# Patient Record
Sex: FEMALE | Race: WHITE | NOT HISPANIC OR LATINO | Employment: OTHER | URBAN - METROPOLITAN AREA
[De-identification: names, ages, dates, MRNs, and addresses within clinical notes are randomized per-mention and may not be internally consistent; named-entity substitution may affect disease eponyms.]

---

## 2017-02-08 ENCOUNTER — ALLSCRIPTS OFFICE VISIT (OUTPATIENT)
Dept: OTHER | Facility: OTHER | Age: 42
End: 2017-02-08

## 2017-03-22 ENCOUNTER — ALLSCRIPTS OFFICE VISIT (OUTPATIENT)
Dept: OTHER | Facility: OTHER | Age: 42
End: 2017-03-22

## 2017-03-27 ENCOUNTER — ALLSCRIPTS OFFICE VISIT (OUTPATIENT)
Dept: OTHER | Facility: OTHER | Age: 42
End: 2017-03-27

## 2017-03-27 ENCOUNTER — APPOINTMENT (OUTPATIENT)
Dept: LAB | Facility: HOSPITAL | Age: 42
End: 2017-03-27
Payer: COMMERCIAL

## 2017-03-27 DIAGNOSIS — E66.9 OBESITY: ICD-10-CM

## 2017-03-27 DIAGNOSIS — R30.0 DYSURIA: ICD-10-CM

## 2017-03-27 DIAGNOSIS — Z12.31 ENCOUNTER FOR SCREENING MAMMOGRAM FOR MALIGNANT NEOPLASM OF BREAST: ICD-10-CM

## 2017-03-27 DIAGNOSIS — F41.9 ANXIETY DISORDER: ICD-10-CM

## 2017-03-27 DIAGNOSIS — M53.3 SACROCOCCYGEAL DISORDERS, NOT ELSEWHERE CLASSIFIED: ICD-10-CM

## 2017-03-27 LAB
BACTERIA UR QL AUTO: ABNORMAL /HPF
BILIRUB UR QL STRIP: NEGATIVE
BILIRUB UR QL STRIP: NEGATIVE
CLARITY UR: ABNORMAL
CLARITY UR: NORMAL
COLOR UR: YELLOW
COLOR UR: YELLOW
GLUCOSE (HISTORICAL): NEGATIVE
GLUCOSE UR STRIP-MCNC: NEGATIVE MG/DL
HGB UR QL STRIP.AUTO: NEGATIVE
HGB UR QL STRIP.AUTO: NEGATIVE
HYALINE CASTS #/AREA URNS LPF: ABNORMAL /LPF
KETONES UR STRIP-MCNC: NEGATIVE MG/DL
KETONES UR STRIP-MCNC: NEGATIVE MG/DL
LEUKOCYTE ESTERASE UR QL STRIP: ABNORMAL
LEUKOCYTE ESTERASE UR QL STRIP: NORMAL
NITRITE UR QL STRIP: NEGATIVE
NITRITE UR QL STRIP: NEGATIVE
NON-SQ EPI CELLS URNS QL MICRO: ABNORMAL /HPF
PH UR STRIP.AUTO: 5.5 [PH]
PH UR STRIP.AUTO: 6 [PH] (ref 4.5–8)
PROT UR STRIP-MCNC: NEGATIVE MG/DL
PROT UR STRIP-MCNC: NEGATIVE MG/DL
RBC #/AREA URNS AUTO: ABNORMAL /HPF
SP GR UR STRIP.AUTO: 1.03
SP GR UR STRIP.AUTO: 1.03 (ref 1–1.03)
UROBILINOGEN UR QL STRIP.AUTO: 0.2
UROBILINOGEN UR QL STRIP.AUTO: 0.2 E.U./DL
WBC #/AREA URNS AUTO: ABNORMAL /HPF

## 2017-03-27 PROCEDURE — 87086 URINE CULTURE/COLONY COUNT: CPT

## 2017-03-27 PROCEDURE — 81001 URINALYSIS AUTO W/SCOPE: CPT

## 2017-03-28 LAB — BACTERIA UR CULT: NORMAL

## 2017-05-08 ENCOUNTER — ALLSCRIPTS OFFICE VISIT (OUTPATIENT)
Dept: OTHER | Facility: OTHER | Age: 42
End: 2017-05-08

## 2017-05-30 ENCOUNTER — ALLSCRIPTS OFFICE VISIT (OUTPATIENT)
Dept: OTHER | Facility: OTHER | Age: 42
End: 2017-05-30

## 2017-06-21 ENCOUNTER — ALLSCRIPTS OFFICE VISIT (OUTPATIENT)
Dept: OTHER | Facility: OTHER | Age: 42
End: 2017-06-21

## 2017-08-02 ENCOUNTER — APPOINTMENT (OUTPATIENT)
Dept: LAB | Age: 42
End: 2017-08-02
Payer: COMMERCIAL

## 2017-08-02 ENCOUNTER — TRANSCRIBE ORDERS (OUTPATIENT)
Dept: ADMINISTRATIVE | Age: 42
End: 2017-08-02

## 2017-08-02 DIAGNOSIS — Z00.8 HEALTH EXAMINATION IN POPULATION SURVEY: ICD-10-CM

## 2017-08-02 DIAGNOSIS — Z00.8 HEALTH EXAMINATION IN POPULATION SURVEY: Primary | ICD-10-CM

## 2017-08-02 LAB
CHOLEST SERPL-MCNC: 207 MG/DL (ref 50–200)
EST. AVERAGE GLUCOSE BLD GHB EST-MCNC: 131 MG/DL
HBA1C MFR BLD: 6.2 % (ref 4.2–6.3)
HDLC SERPL-MCNC: 67 MG/DL (ref 40–60)
LDLC SERPL CALC-MCNC: 118 MG/DL (ref 0–100)
TRIGL SERPL-MCNC: 110 MG/DL

## 2017-08-02 PROCEDURE — 36415 COLL VENOUS BLD VENIPUNCTURE: CPT

## 2017-08-02 PROCEDURE — 83036 HEMOGLOBIN GLYCOSYLATED A1C: CPT

## 2017-08-02 PROCEDURE — 80061 LIPID PANEL: CPT

## 2017-08-21 ENCOUNTER — ALLSCRIPTS OFFICE VISIT (OUTPATIENT)
Dept: OTHER | Facility: OTHER | Age: 42
End: 2017-08-21

## 2017-08-21 ENCOUNTER — TRANSCRIBE ORDERS (OUTPATIENT)
Dept: LAB | Age: 42
End: 2017-08-21

## 2017-08-21 ENCOUNTER — APPOINTMENT (OUTPATIENT)
Dept: LAB | Age: 42
End: 2017-08-21
Payer: COMMERCIAL

## 2017-08-21 DIAGNOSIS — R05.9 COUGH: ICD-10-CM

## 2017-08-21 DIAGNOSIS — M53.3 SACROCOCCYGEAL DISORDERS, NOT ELSEWHERE CLASSIFIED: ICD-10-CM

## 2017-08-21 DIAGNOSIS — F41.9 ANXIETY DISORDER: ICD-10-CM

## 2017-08-21 DIAGNOSIS — E66.9 OBESITY: ICD-10-CM

## 2017-08-21 LAB
ALBUMIN SERPL BCP-MCNC: 3.3 G/DL (ref 3.5–5)
ALP SERPL-CCNC: 74 U/L (ref 46–116)
ALT SERPL W P-5'-P-CCNC: 20 U/L (ref 12–78)
ANION GAP SERPL CALCULATED.3IONS-SCNC: 7 MMOL/L (ref 4–13)
AST SERPL W P-5'-P-CCNC: 14 U/L (ref 5–45)
BACTERIA UR QL AUTO: ABNORMAL /HPF
BASOPHILS # BLD AUTO: 0.05 THOUSANDS/ΜL (ref 0–0.1)
BASOPHILS NFR BLD AUTO: 1 % (ref 0–1)
BILIRUB SERPL-MCNC: 0.43 MG/DL (ref 0.2–1)
BILIRUB UR QL STRIP: NEGATIVE
BUN SERPL-MCNC: 22 MG/DL (ref 5–25)
CALCIUM SERPL-MCNC: 8.7 MG/DL (ref 8.3–10.1)
CHLORIDE SERPL-SCNC: 103 MMOL/L (ref 100–108)
CHOLEST SERPL-MCNC: 230 MG/DL (ref 50–200)
CLARITY UR: ABNORMAL
CO2 SERPL-SCNC: 25 MMOL/L (ref 21–32)
COLOR UR: YELLOW
CREAT SERPL-MCNC: 0.6 MG/DL (ref 0.6–1.3)
EOSINOPHIL # BLD AUTO: 0.25 THOUSAND/ΜL (ref 0–0.61)
EOSINOPHIL NFR BLD AUTO: 2 % (ref 0–6)
ERYTHROCYTE [DISTWIDTH] IN BLOOD BY AUTOMATED COUNT: 16.1 % (ref 11.6–15.1)
GFR SERPL CREATININE-BSD FRML MDRD: 112 ML/MIN/1.73SQ M
GLUCOSE P FAST SERPL-MCNC: 91 MG/DL (ref 65–99)
GLUCOSE UR STRIP-MCNC: NEGATIVE MG/DL
HCT VFR BLD AUTO: 37.6 % (ref 34.8–46.1)
HDLC SERPL-MCNC: 65 MG/DL (ref 40–60)
HGB BLD-MCNC: 12.3 G/DL (ref 11.5–15.4)
HGB UR QL STRIP.AUTO: ABNORMAL
HYALINE CASTS #/AREA URNS LPF: ABNORMAL /LPF
KETONES UR STRIP-MCNC: NEGATIVE MG/DL
LDLC SERPL CALC-MCNC: 145 MG/DL (ref 0–100)
LEUKOCYTE ESTERASE UR QL STRIP: ABNORMAL
LYMPHOCYTES # BLD AUTO: 3.91 THOUSANDS/ΜL (ref 0.6–4.47)
LYMPHOCYTES NFR BLD AUTO: 38 % (ref 14–44)
MCH RBC QN AUTO: 26.3 PG (ref 26.8–34.3)
MCHC RBC AUTO-ENTMCNC: 32.7 G/DL (ref 31.4–37.4)
MCV RBC AUTO: 81 FL (ref 82–98)
MONOCYTES # BLD AUTO: 0.52 THOUSAND/ΜL (ref 0.17–1.22)
MONOCYTES NFR BLD AUTO: 5 % (ref 4–12)
NEUTROPHILS # BLD AUTO: 5.62 THOUSANDS/ΜL (ref 1.85–7.62)
NEUTS SEG NFR BLD AUTO: 54 % (ref 43–75)
NITRITE UR QL STRIP: NEGATIVE
NON-SQ EPI CELLS URNS QL MICRO: ABNORMAL /HPF
NRBC BLD AUTO-RTO: 0 /100 WBCS
PH UR STRIP.AUTO: 6 [PH] (ref 4.5–8)
PLATELET # BLD AUTO: 381 THOUSANDS/UL (ref 149–390)
PMV BLD AUTO: 10 FL (ref 8.9–12.7)
POTASSIUM SERPL-SCNC: 4.3 MMOL/L (ref 3.5–5.3)
PROT SERPL-MCNC: 8.1 G/DL (ref 6.4–8.2)
PROT UR STRIP-MCNC: ABNORMAL MG/DL
RBC # BLD AUTO: 4.67 MILLION/UL (ref 3.81–5.12)
RBC #/AREA URNS AUTO: ABNORMAL /HPF
SODIUM SERPL-SCNC: 135 MMOL/L (ref 136–145)
SP GR UR STRIP.AUTO: 1.03 (ref 1–1.03)
TRIGL SERPL-MCNC: 98 MG/DL
TSH SERPL DL<=0.05 MIU/L-ACNC: 1.84 UIU/ML (ref 0.36–3.74)
UROBILINOGEN UR QL STRIP.AUTO: 0.2 E.U./DL
WBC # BLD AUTO: 10.37 THOUSAND/UL (ref 4.31–10.16)
WBC #/AREA URNS AUTO: ABNORMAL /HPF

## 2017-08-21 PROCEDURE — 36415 COLL VENOUS BLD VENIPUNCTURE: CPT

## 2017-08-21 PROCEDURE — 82785 ASSAY OF IGE: CPT

## 2017-08-21 PROCEDURE — 86003 ALLG SPEC IGE CRUDE XTRC EA: CPT

## 2017-08-21 PROCEDURE — 81001 URINALYSIS AUTO W/SCOPE: CPT

## 2017-08-21 PROCEDURE — 80053 COMPREHEN METABOLIC PANEL: CPT

## 2017-08-21 PROCEDURE — 85025 COMPLETE CBC W/AUTO DIFF WBC: CPT

## 2017-08-21 PROCEDURE — 84443 ASSAY THYROID STIM HORMONE: CPT

## 2017-08-21 PROCEDURE — 87086 URINE CULTURE/COLONY COUNT: CPT

## 2017-08-21 PROCEDURE — 80061 LIPID PANEL: CPT

## 2017-08-22 ENCOUNTER — GENERIC CONVERSION - ENCOUNTER (OUTPATIENT)
Dept: OTHER | Facility: OTHER | Age: 42
End: 2017-08-22

## 2017-08-22 LAB
A ALTERNATA IGE QN: <0.1 KUA/I
A FUMIGATUS IGE QN: <0.1 KUA/I
ALLERGEN COMMENT: ABNORMAL
BACTERIA UR CULT: NORMAL
BERMUDA GRASS IGE QN: 0.13 KUA/I
BOXELDER IGE QN: <0.1 KUA/I
C HERBARUM IGE QN: <0.1 KUA/I
CAT DANDER IGE QN: <0.1 KUA/I
CMN PIGWEED IGE QN: <0.1 KUA/I
COMMON RAGWEED IGE QN: <0.1 KUA/I
COTTONWOOD IGE QN: <0.1 KUA/I
D FARINAE IGE QN: 0.66 KUA/I
D PTERONYSS IGE QN: 0.72 KUA/I
DOG DANDER IGE QN: <0.1 KUA/I
LONDON PLANE IGE QN: <0.1 KUA/I
MOUSE URINE PROT IGE QN: <0.1 KUA/I
MT JUNIPER IGE QN: <0.1 KUA/I
MUGWORT IGE QN: <0.1 KUA/I
P NOTATUM IGE QN: 0.1 KUA/I
ROACH IGE QN: 0.15 KUA/I
SHEEP SORREL IGE QN: <0.1 KUA/I
SILVER BIRCH IGE QN: <0.1 KUA/I
TIMOTHY IGE QN: 0.1 KUA/I
TOTAL IGE SMQN RAST: 156 KU/L (ref 0–113)
WALNUT IGE QN: <0.1 KUA/I
WHITE ASH IGE QN: <0.1 KUA/I
WHITE ELM IGE QN: <0.1 KUA/I
WHITE MULBERRY IGE QN: <0.1 KUA/I
WHITE OAK IGE QN: <0.1 KUA/I

## 2017-09-11 ENCOUNTER — GENERIC CONVERSION - ENCOUNTER (OUTPATIENT)
Dept: OTHER | Facility: OTHER | Age: 42
End: 2017-09-11

## 2017-09-27 ENCOUNTER — GENERIC CONVERSION - ENCOUNTER (OUTPATIENT)
Dept: OTHER | Facility: OTHER | Age: 42
End: 2017-09-27

## 2017-10-09 ENCOUNTER — ALLSCRIPTS OFFICE VISIT (OUTPATIENT)
Dept: OTHER | Facility: OTHER | Age: 42
End: 2017-10-09

## 2017-10-09 DIAGNOSIS — R05.9 COUGH: ICD-10-CM

## 2017-10-09 DIAGNOSIS — J30.2 OTHER SEASONAL ALLERGIC RHINITIS: ICD-10-CM

## 2017-10-13 NOTE — PROGRESS NOTES
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  No hx of smoking  no improvement in cough with flonase would recommend PFTs to r/o asthma  The patient was counseled regarding diagnostic results,-instructions for management,-risk factor reductions,-prognosis,-patient and family education,-impressions  Chief Complaint  pt c/o cough and chest congestion, pt states her ribs hurt from coughing, pt states this has been going on since late spring and has been taking allergy meds and nothing working      History of Present Illness  HPI: Pt states that her allergies are bothering her  Pt states that she is taking OTC zyrtec that is helping some, she has tried claritin in the past without success  Pt states that she has had allergy testing in past and allergic to multiple environmental items  Pt also with a cough that she is concerned for lung 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 and concerned about exposure to aerosol items  Pt was never a smoker  Seasonal Allergy: The patient is being seen for a routine clinic follow-up of seasonal allergy  Presentation included itching eyes, itching nose, runny nose, nasal congestion, postnasal drainage, cough, sneezing and sinus pressure  She describes this as unchanged  Current treatment includes zyrtec  Review of Systems    Constitutional: no fever,-not feeling poorly,-no chills-and-not feeling tired  ENT: as noted in HPI     Cardiovascular: the heart rate was not slow,-no chest pain,-the heart rate was not fast-and-no palpitations  Respiratory: no shortness of breath,-no cough,-no orthopnea,-no wheezing-and-no shortness of breath during exertion  Gastrointestinal: no nausea  Musculoskeletal: no arthralgias-and-no myalgias  Integumentary: no rashes  Neurological: hx of migraines, but-no headache-and-no dizziness  Active Problems  1  Allergic cough (786 2) (R05)   2  Anxiety (300 00) (F41 9)   3  Chronic migraine without aura (346 70) (G43 709)   4  Classic migraine with aura (346 00) (G43 109)   5  Facial Nerve Injury (351 9)   6  Fall on same level from slipping, tripping or stumbling, initial encounter (E885 9)   (W01  0XXA)   7  Left sided sciatica (724 3) (M54 32)   8  Obesity, Class II, BMI 35-39 9 (278 00) (E66 9)   9  Post-nasal drip (784 91) (R09 82)   10  Sacral back pain (724 6) (M53 3)    Past Medical History  Active Problems And Past Medical History Reviewed: The active problems and past medical history were reviewed and updated today  Surgical History  Surgical History Reviewed: The surgical history was reviewed and updated today  Social History   · Caffeine Use   · coffee or tea, 2 servings per day   · Never A Smoker   · No alcohol use   · No illicit drug use  The social history was reviewed and updated today  The social history was reviewed and is unchanged  Family History  Family History Reviewed: The family history was reviewed and updated today  Current Meds   1  Aleve CAPS; 2 caps every 4-6 hours prn headache; Therapy: (Recorded:24Sep2017) to Recorded   2  Ibuprofen 800 MG Oral Tablet; TAKE 1 TABLET 3 times daily with food; Therapy: 21Jun2017 to (Evaluate:01Eqv2516)  Requested for: 21Jun2017; Last   SF:[id number] Ordered   3  Magnesium 250 MG Oral Tablet; Therapy: (Recorded:24Sep2017) to Recorded   4  PriLOSEC OTC 20 MG Oral Tablet Delayed Release; Take 1 tablet daily; Therapy: (Recorded:09Oct2017) to Recorded   5   Vitamin B Complex Oral Tablet; Therapy: (Recorded:22Ndw0499) to Recorded   6  Vitamin D 1000 UNIT CAPS; Therapy: (Recorded:80Tok0889) to Recorded    The medication list was reviewed and updated today  Allergies  1  Sulfa Drugs  2  No Known Environmental Allergies   3  No Known Food Allergies    Vitals   Recorded: 97OFE2615 04:29PM   Temperature 99 2 F, Tympanic   Heart Rate 230   Systolic 334, LUE, Sitting   Diastolic 86, LUE, Sitting   Height 5 ft 7 in   Weight 249 lb    BMI Calculated 39   BSA Calculated 2 22   O2 Saturation 98, RA     Physical Exam    Constitutional   General appearance: No acute distress, well appearing and well nourished  Eyes   Conjunctiva and lids: No swelling, erythema or discharge  Pupils and irises: Equal, round and reactive to light  Ears, Nose, Mouth, and Throat   External inspection of ears and nose: Normal     Otoscopic examination: Tympanic membranes translucent with normal light reflex  Canals patent without erythema  Nasal mucosa, septum, and turbinates: Normal without edema or erythema  -No TTP to sinus  Oropharynx: Normal with no erythema, edema, exudate or lesions  -MMM  Pulmonary   Respiratory effort: No increased work of breathing or signs of respiratory distress  Auscultation of lungs: Clear to auscultation  -no rhonchi or wheezing  Cardiovascular   Auscultation of heart: Normal rate and rhythm, normal S1 and S2, without murmurs  Lymphatic   Palpation of lymph nodes in neck: No lymphadenopathy  Musculoskeletal   Gait and station: Normal     Skin   Skin and subcutaneous tissue: Normal without rashes or lesions  Neurologic   Cranial nerves: Cranial nerves 2-12 intact      Psychiatric   Orientation to person, place, and time: Normal     Mood and affect: Normal          Future Appointments    Date/Time Provider Specialty Site   03/12/2018 10:30 AM Babar Hoffman [facility]   01/10/2018 02:30 PM Cesar Cordero MD Neurology [facility]   09/25/2018 02:30 PM Cesar Cordero MD Neurology [facility] 21   11/20/2017 11:45 AM Kae Yuen DO Internal Medicine [facility]     Signatures   Electronically signed by : Teddy Calderon, [facility]; Oct  9 2017  5:14PM EST                       (Author)    Electronically signed by :  Zeny Mantilla MD; Oct 11 2017 12:31PM EST                       (Author)

## 2017-11-27 ENCOUNTER — TRANSCRIBE ORDERS (OUTPATIENT)
Dept: ADMINISTRATIVE | Age: 42
End: 2017-11-27

## 2017-11-27 ENCOUNTER — APPOINTMENT (OUTPATIENT)
Dept: RADIOLOGY | Age: 42
End: 2017-11-27
Payer: COMMERCIAL

## 2017-11-27 DIAGNOSIS — J30.2 OTHER SEASONAL ALLERGIC RHINITIS: ICD-10-CM

## 2017-11-27 DIAGNOSIS — R05.9 COUGH: ICD-10-CM

## 2017-11-27 PROCEDURE — 71020 HB CHEST X-RAY 2VW FRONTAL&LATL: CPT

## 2018-01-08 ENCOUNTER — GENERIC CONVERSION - ENCOUNTER (OUTPATIENT)
Dept: OTHER | Facility: OTHER | Age: 43
End: 2018-01-08

## 2018-01-10 ENCOUNTER — ALLSCRIPTS OFFICE VISIT (OUTPATIENT)
Dept: OTHER | Facility: OTHER | Age: 43
End: 2018-01-10

## 2018-01-10 ENCOUNTER — GENERIC CONVERSION - ENCOUNTER (OUTPATIENT)
Dept: OTHER | Facility: OTHER | Age: 43
End: 2018-01-10

## 2018-01-10 NOTE — RESULT NOTES
Verified Results  (1) ALLERGY, NORTHEAST PANEL ADULT 43Jsx3265 10:20AM Jessicayue Mar    Order Number: [id number]     Test Name Result Flag Reference   ALTERNARIA ALTERNATA <0 10 kUA/I  0 00-0 09   ASPERGILLUS FUMIGATUS <0 10 kUA/I  0 00-0 09   BERMUDA GRASS 0 13 kUA/I H 0 00-0 09   ALLERGEN MAPLE/BOX ELDER <0 10 kUA/I  0 00-0 09   ALLERGEN CAT EPITHELIUM-DANDER <0 10 kUA/I  0 00-0 09   CLADOSPORIUM HERBARUM <0 10 kUA/I  0 00-0 09   COCKROACH 0 15 kUA/I H 0 00-0 09   ALLERGEN, COMMON SILVER BIRCH <0 10 kUA/I  0 00-0 09   ALLERGEN, COTTONWOOD <0 10 kUA/I  0 00-0 09   D  FARINAE 0 66 kUA/I H 0 00-0 09   D  PTERONYSSINUS 0 72 kUA/I H 0 00-0 09   DOG DANDER <0 10 kUA/I  0 00-0 09   ALLERGEN ELM <0 10 kUA/I  0 00-0 09   M[place] TREE <0 10 kUA/I  0 00-0 09   MOUSE URINE <0 10 kUA/I  0 00-0 09   As in all diagnostic testing, a diagnosis should be made by the physician based on both test results and patient clinical history     ALLERGEN MUGWORT IGE <0 10 kUA/I  0 00-0 09   MULBERRY TREE <0 10 kUA/I  0 00-0 09   ALLERGEN, OAK <0 10 kUA/I  0 00-0 09   PENICILLIUM CHRYSOGENUM 0 10 kUA/I H 0 00-0 09   ALLERGEN ROUGH PIGWEED (W14) IGE <0 10 kUA/I  0 00-0 09   COMMON RAGWEED <0 10 kUA/I  0 00-0 09   ALLERGEN SHEEP SORREL (W18) IGE <0 10 kUA/I  0 00-0 09   [place] TREE <0 10 kUA/I  0 00-0 09   JONAS GRASS 0 10 kUA/I H 0 00-0 09   W[place] TREE <0 10 kUA/I  0 00-0 09   WHITE CRISTÓBAL TREE <0 10 kUA/I  0 00-0 09   TOTAL  kU/l H 0-113   ALLERGEN COMMENT See Below

## 2018-01-12 NOTE — PROGRESS NOTES
Chief Complaint   botox inj      Current Meds    1  Aleve CAPS; 2 caps every 4-6 hours prn headache; Therapy: (Recorded:22Hgx5808) to Recorded   2  Azithromycin 250 MG Oral Tablet; Take 2 tablets on day 1 and then 1 tablet daily until     completion; Therapy: 53OEP2400 to (Last Rx:08Jan2018)  Requested for: 38ICH3716 Ordered   3  Fluticasone Propionate 50 MCG/ACT Nasal Suspension; USE 2 SPRAYS IN EACH     NOSTRIL ONCE DAILY; Therapy: 15YVO5036 to (Last Rx:09Oct2017)  Requested for: 26TQC0039 Ordered   4  Ibuprofen 800 MG Oral Tablet; TAKE 1 TABLET 3 times daily with food; Therapy: 21Jun2017 to (Evaluate:97Eds7155)  Requested for: 21Jun2017; Last     ZW:[id number] Ordered   5  Magnesium 250 MG Oral Tablet; Therapy: (Recorded:01Rhy0164) to Recorded   6  PriLOSEC OTC 20 MG Oral Tablet Delayed Release; Take 1 tablet daily; Therapy: (Recorded:09Oct2017) to Recorded   7  Topiramate 25 MG Oral Tablet; 2 tabs qHS; Therapy: 01Edb5762 to (Martinez Barnes)  Requested for: 50PKT0383; Last     Rx:02Jan2018 Ordered   8  Vitamin B Complex Oral Tablet; Therapy: (Recorded:62Xhk7075) to Recorded   9  Vitamin D 1000 UNIT CAPS; Therapy: (Recorded:13Zyn4370) to Recorded    Allergies   1  Sulfa Drugs  2  No Known Environmental Allergies   3  No Known Food Allergies    Vitals    Recorded: 47SXN0968 03:48PM   Temperature 11 7 F   Systolic 816   Diastolic 94     Procedure   Procedure: Headache botox injection  Indication: Chronic migraine headache  Risks, benefits and alternatives were discussed with the patient  We discussed possible complications, including infection,-- bleeding-- and-- allergic reaction  Written consent was obtained prior to the procedure  The site was prepped with an alcohol swab  Anesthesia: No anesthesia was needed  Procedure Note: The patient was placed in the upright position        100 --Mml of Botox-A and       5 unit(s) was injected into the procerus muscle  5 unit(s) was injected into the  right  muscle  5 unit(s) was injected into the  left  muscle  5 unit(s) was injected into the  right frontalis muscle  5 unit(s) was injected into the  left frontalis muscle  20 unit(s) was injected into the  right temporalis muscle  20 unit(s) was injected into the  left temporalis muscle  15 unit(s) was injected into the  right occipitalis muscle  15 unit(s) was injected into the  left occipitalis muscle  5 units in the scalp  A total of 100units were used  A total of 0units were discarded  Botox Lot:  Lot number: [id number] -- Expiration date: jul 2020  Patient Status:  the patient tolerated the procedure well  Complications:  there were no complications  Follow-up in the office in 3 month(s)  100 UNITS/ 2CC SALINE X1      Assessment   1  Chronic migraine without aura (346 70) (G43 709)    Plan   Chronic migraine without aura    · Botox 100 UNIT Injection Solution Reconstituted   Rx By: Izabela Gage; For: Chronic migraine without aura; Dose of 100 UNIT; Injection; TITO = N; Administered: 1/10/2018 2:47:00 PM; Last Updated By: Jacki Williamson; 1/10/2018 2:48:35 PM   · Chemodenervation of muscles innervated by facial, trigeminal, c-spine, accessory    nerves - POC; Status:Need Information - Financial Authorization; Requested    SALLIE:[id number]; Perform: In Office; EFN:[id number]; For:Chronic migraine without aura; Ordered By:Betty Matthew;   · Follow-up visit in 3 months Evaluation and Treatment  Follow-up  Status: Complete     Done: 37CRD4278   Ordered; For: Chronic migraine without aura; Ordered By: Izabela Gage Performed:  Due: 06DVM8664; Last Updated By: Nahid Hernandez; 1/10/2018 3:26:21 PM  Chronic migraine without aura, Classic migraine with aura    · Topiramate 25 MG Oral Tablet; 3 tabs qHS   Rx By: Izabela Gage; Dispense: 30 Days ; #:90 Tablet;  Refill: 3;For: Chronic migraine without aura, Classic migraine with aura; TITO = N; Verified Transmission to [address]; Last Updated By: System, [employer]; 1/10/2018 3:08:48 PM    Future Appointments      Date/Time Provider Specialty Site   03/12/2018 10:30 AM Sal Arambula, [facility]   03/14/2018 09:30 AM Lula Pimentel MD Neurology [facility] [address]   09/25/2018 02:30 PM Lula Pimentel MD Neurology [facility] [address]     Signatures    Electronically signed by : Nati Miller MD; Nicolas 10 2018  3:58PM EST                       (Author)

## 2018-01-13 VITALS
HEIGHT: 67 IN | OXYGEN SATURATION: 98 % | BODY MASS INDEX: 39.08 KG/M2 | HEART RATE: 100 BPM | TEMPERATURE: 99.2 F | WEIGHT: 249 LBS | DIASTOLIC BLOOD PRESSURE: 86 MMHG | SYSTOLIC BLOOD PRESSURE: 126 MMHG

## 2018-01-13 VITALS
WEIGHT: 239.4 LBS | TEMPERATURE: 98.5 F | OXYGEN SATURATION: 98 % | HEART RATE: 100 BPM | DIASTOLIC BLOOD PRESSURE: 70 MMHG | HEIGHT: 67 IN | DIASTOLIC BLOOD PRESSURE: 88 MMHG | SYSTOLIC BLOOD PRESSURE: 116 MMHG | BODY MASS INDEX: 37.57 KG/M2 | SYSTOLIC BLOOD PRESSURE: 154 MMHG | TEMPERATURE: 97.9 F

## 2018-01-13 VITALS
SYSTOLIC BLOOD PRESSURE: 108 MMHG | WEIGHT: 250 LBS | DIASTOLIC BLOOD PRESSURE: 64 MMHG | HEART RATE: 97 BPM | OXYGEN SATURATION: 99 % | BODY MASS INDEX: 39.24 KG/M2 | HEIGHT: 67 IN | TEMPERATURE: 97.5 F

## 2018-01-14 VITALS
SYSTOLIC BLOOD PRESSURE: 118 MMHG | WEIGHT: 235.6 LBS | OXYGEN SATURATION: 98 % | BODY MASS INDEX: 36.98 KG/M2 | DIASTOLIC BLOOD PRESSURE: 84 MMHG | HEIGHT: 67 IN | HEART RATE: 97 BPM | TEMPERATURE: 98.4 F

## 2018-01-14 VITALS — SYSTOLIC BLOOD PRESSURE: 112 MMHG | DIASTOLIC BLOOD PRESSURE: 80 MMHG | TEMPERATURE: 98.6 F

## 2018-01-14 VITALS
BODY MASS INDEX: 38.45 KG/M2 | WEIGHT: 245 LBS | DIASTOLIC BLOOD PRESSURE: 88 MMHG | HEIGHT: 67 IN | SYSTOLIC BLOOD PRESSURE: 112 MMHG | TEMPERATURE: 98.6 F | HEART RATE: 113 BPM | OXYGEN SATURATION: 94 %

## 2018-01-15 ENCOUNTER — ALLSCRIPTS OFFICE VISIT (OUTPATIENT)
Dept: OTHER | Facility: OTHER | Age: 43
End: 2018-01-15

## 2018-01-15 NOTE — MISCELLANEOUS
Signatures   Electronically signed by : ALANIS Vaca; Mar 23 2017 12:32PM EST                       (Author)

## 2018-01-22 VITALS
HEIGHT: 67 IN | OXYGEN SATURATION: 99 % | RESPIRATION RATE: 14 BRPM | HEART RATE: 80 BPM | DIASTOLIC BLOOD PRESSURE: 72 MMHG | SYSTOLIC BLOOD PRESSURE: 110 MMHG | WEIGHT: 250 LBS | BODY MASS INDEX: 39.24 KG/M2

## 2018-01-22 VITALS — HEART RATE: 88 BPM | DIASTOLIC BLOOD PRESSURE: 96 MMHG | SYSTOLIC BLOOD PRESSURE: 156 MMHG | TEMPERATURE: 98.3 F

## 2018-01-23 VITALS — TEMPERATURE: 97.7 F | SYSTOLIC BLOOD PRESSURE: 148 MMHG | DIASTOLIC BLOOD PRESSURE: 94 MMHG

## 2018-01-23 NOTE — MISCELLANEOUS
Provider Comments  Provider Comments:   MISSED APPT DR Iverson Lipoma      Signatures   Electronically signed by : Charan Echavarria DO; Nicolas 15 2018  2:36PM EST                       (Author)

## 2018-01-24 VITALS
HEART RATE: 85 BPM | HEIGHT: 68 IN | SYSTOLIC BLOOD PRESSURE: 112 MMHG | OXYGEN SATURATION: 99 % | TEMPERATURE: 98 F | DIASTOLIC BLOOD PRESSURE: 80 MMHG

## 2018-03-28 ENCOUNTER — TELEPHONE (OUTPATIENT)
Dept: NEUROLOGY | Facility: CLINIC | Age: 43
End: 2018-03-28

## 2018-03-29 ENCOUNTER — TELEPHONE (OUTPATIENT)
Dept: NEUROLOGY | Facility: CLINIC | Age: 43
End: 2018-03-29

## 2018-04-02 ENCOUNTER — TELEPHONE (OUTPATIENT)
Dept: NEUROLOGY | Facility: CLINIC | Age: 43
End: 2018-04-02

## 2018-04-23 ENCOUNTER — TELEPHONE (OUTPATIENT)
Dept: NEUROLOGY | Facility: CLINIC | Age: 43
End: 2018-04-23

## 2018-04-23 ENCOUNTER — OFFICE VISIT (OUTPATIENT)
Dept: NEUROLOGY | Facility: CLINIC | Age: 43
End: 2018-04-23
Payer: COMMERCIAL

## 2018-04-23 VITALS
BODY MASS INDEX: 29.86 KG/M2 | WEIGHT: 197 LBS | SYSTOLIC BLOOD PRESSURE: 124 MMHG | HEART RATE: 100 BPM | HEIGHT: 68 IN | DIASTOLIC BLOOD PRESSURE: 84 MMHG

## 2018-04-23 DIAGNOSIS — G43.709 CHRONIC MIGRAINE WITHOUT AURA WITHOUT STATUS MIGRAINOSUS, NOT INTRACTABLE: Primary | ICD-10-CM

## 2018-04-23 DIAGNOSIS — G43.109 MIGRAINE WITH AURA AND WITHOUT STATUS MIGRAINOSUS, NOT INTRACTABLE: ICD-10-CM

## 2018-04-23 PROCEDURE — 99214 OFFICE O/P EST MOD 30 MIN: CPT | Performed by: PHYSICIAN ASSISTANT

## 2018-04-23 RX ORDER — COVID-19 ANTIGEN TEST
KIT MISCELLANEOUS
COMMUNITY
End: 2021-03-16 | Stop reason: ALTCHOICE

## 2018-04-23 RX ORDER — IBUPROFEN 800 MG/1
800 TABLET ORAL EVERY 4 HOURS PRN
COMMUNITY
Start: 2017-06-21 | End: 2018-09-25 | Stop reason: ALTCHOICE

## 2018-04-23 RX ORDER — FAMOTIDINE 20 MG
1 TABLET ORAL DAILY
COMMUNITY

## 2018-04-23 RX ORDER — OMEPRAZOLE 20 MG/1
1 TABLET, DELAYED RELEASE ORAL DAILY
COMMUNITY
End: 2018-09-25 | Stop reason: ALTCHOICE

## 2018-04-23 RX ORDER — MULTIVITAMIN WITH IRON
TABLET ORAL AS NEEDED
COMMUNITY
End: 2021-04-23

## 2018-04-23 RX ORDER — TOPIRAMATE 25 MG/1
TABLET ORAL
Qty: 120 TABLET | Refills: 2 | Status: SHIPPED | OUTPATIENT
Start: 2018-04-23 | End: 2018-10-08 | Stop reason: ALTCHOICE

## 2018-04-23 RX ORDER — TOPIRAMATE 25 MG/1
TABLET ORAL
COMMUNITY
Start: 2017-09-11 | End: 2018-04-23 | Stop reason: SDUPTHER

## 2018-04-23 RX ORDER — FLUTICASONE PROPIONATE 50 MCG
2 SPRAY, SUSPENSION (ML) NASAL DAILY
COMMUNITY
Start: 2017-10-09 | End: 2018-09-25 | Stop reason: ALTCHOICE

## 2018-04-23 NOTE — PROGRESS NOTES
Patient ID: Odette Ross is a 39 y o  female  Assessment/Plan:    No problem-specific Assessment & Plan notes found for this encounter  Diagnoses and all orders for this visit:    Chronic migraine without aura without status migrainosus, not intractable  -     Naproxen Sodium (ALEVE) 220 MG CAPS; Take by mouth  -     Magnesium 250 MG TABS; Take by mouth  -     ibuprofen (MOTRIN) 800 mg tablet; Take by mouth  -     topiramate (TOPAMAX) 25 mg tablet; 75 mg qhs x 5 days, then 100 mg qhs  Migraine with aura and without status migrainosus, not intractable  -     Naproxen Sodium (ALEVE) 220 MG CAPS; Take by mouth  -     Magnesium 250 MG TABS; Take by mouth  -     ibuprofen (MOTRIN) 800 mg tablet; Take by mouth  -     topiramate (TOPAMAX) 25 mg tablet; 75 mg qhs x 5 days, then 100 mg qhs  Other orders  -     Discontinue: topiramate (TOPAMAX) 25 mg tablet; Take by mouth  -     B Complex Vitamins (VITAMIN B COMPLEX PO); Take by mouth  -     Vitamin D, Cholecalciferol, 1000 units CAPS; Take by mouth  -     omeprazole (PRILOSEC OTC) 20 MG tablet; Take 1 tablet by mouth daily  -     fluticasone (FLONASE) 50 mcg/act nasal spray; 2 sprays into each nostril daily         Continue botox and topamax for migraine prevention  States she is currently taking 50 mg topamax qhs and continues with headaches as noted  Today we increased Topamax to 75 mg qhs x 5 days, then 100 mg qhs after that  S/e of topamax reviewed with her including memory loss/ word finding as well as paresthesias  Continue naproxen prn migraine, or ibuprofen, but no more than 2-3 doses of any analgesic per week to avoid [facility]  She will continue to hydrate adequately t/o the day and eat smaller, more frequent meals to prevention daytime headaches  Subjective:    HPI    We had the pleasure of evaluating Odette Ross in neurological follow up today  As you know she is a 39year old RH female  She owns a hair salon   She takes care of her daughter and son at home  Since starting botox, the patient reports greater than 7 days of migraine relief from baseline, correlated with headache diary, decreased abortive medication use and decreased ER visits  What medications do you take or have you taken for your headaches? PREVENTIVE: Mag, B-2, B-complex, Zoloft, topamax  ABORTIVE: Toradol, Decadron, aleve, advil/ ibuprofen  Non-Medical/Alternative Treatments used in the past for headaches: Massage- improved neck pain, PT, TENS unit- made neck pain worse    - Headache trigger: Fatigue, Stress/Tension, bright lights, missing meals  - Any warning prior to headache and how long do they last - heavy feeling in the head and neck pain sometimes; sometimes (not with every headache) scotomas/ colorful lights when closing eyes/ spots in eyes lasting about 10 minutes before headaches    What is your current pain level - 0/10  Headaches started at what age - 26 or 30 yo after the birth of her second child  Accident or injury prior to onset of headaches - No  How often do the headaches occur - once per week- migraines; 2-3 per week or more of TTH  What time of the day do the headaches start - Variable  How long do the headaches last - 45 minutes to 1 hour  Are you ever headache free - Yes   Where are they located - frontal, eyes, radiate back into the occipital and neck discomfort L>R  What is the intensity of pain - average 6/10; max 8-9/10  Describe your usual headache - Throbbing, Pounding  Associated symptoms:   - Decrease of appetite, nausea   - Photophobia, phonophobia  - Problem with concentration  - Blurred vision  - Light-headed or dizzy, stiff or sore neck  - Hands or feet tingle or feel numb  - Prefer to be alone and in a dark room, unable to work  Headaches are worse if the patient: bending over, with moving the neck sometimes, or when exposed to bright lights  Are you current pregnant or planning on getting pregnant?  No, tubal ligation a/p the pt  What time of the year do headaches occur more frequently - Variable   Have you seen someone else for headaches or pain - No  Have you had trigger point injection performed and how often - No   Have you had Botox injection performed and how often - Yes q 3 months  Have you had epidural injections or transforaminal injections performed - No  ---    The following portions of the patient's history were reviewed and updated as appropriate:   She  has no past medical history on file  She   Patient Active Problem List    Diagnosis Date Noted    Chronic migraine without aura without status migrainosus, not intractable 04/23/2018    Migraine with aura and without status migrainosus, not intractable 04/23/2018     She  has no past surgical history on file  Her family history is not on file  She  reports that she has never smoked  She has never used smokeless tobacco  She reports that she does not drink alcohol or use drugs  Current Outpatient Prescriptions   Medication Sig Dispense Refill    fluticasone (FLONASE) 50 mcg/act nasal spray 2 sprays into each nostril daily      ibuprofen (MOTRIN) 800 mg tablet Take by mouth      topiramate (TOPAMAX) 25 mg tablet 75 mg qhs x 5 days, then 100 mg qhs  120 tablet 2    B Complex Vitamins (VITAMIN B COMPLEX PO) Take by mouth      Magnesium 250 MG TABS Take by mouth      Naproxen Sodium (ALEVE) 220 MG CAPS Take by mouth      omeprazole (PRILOSEC OTC) 20 MG tablet Take 1 tablet by mouth daily      Vitamin D, Cholecalciferol, 1000 units CAPS Take by mouth       No current facility-administered medications for this visit  No current outpatient prescriptions on file prior to visit  No current facility-administered medications on file prior to visit  She is allergic to sulfa antibiotics            Objective:    Blood pressure 124/84, pulse 100, height 5' 8" (1 727 m), weight 89 4 kg (197 lb)      Physical Exam   Constitutional: She is oriented to person, place, and time  She appears well-developed and well-nourished  Neck: Normal range of motion  Neck supple  Musculoskeletal: Normal range of motion  5/5 t/o  Neurological: She is alert and oriented to person, place, and time  No cranial nerve deficit  Coordination normal    Reflexes 2+/ symmetric t/o  Gait steady  Psychiatric:   Very pleasant  Nursing note and vitals reviewed  Neurological Exam      ROS:    Review of Systems   Constitutional: Negative  Negative for appetite change and fever  HENT: Negative  Negative for hearing loss, tinnitus, trouble swallowing and voice change  Eyes: Negative  Negative for photophobia and pain  Respiratory: Negative  Negative for shortness of breath  Cardiovascular: Negative  Negative for palpitations  Gastrointestinal: Negative  Negative for nausea and vomiting  Endocrine: Negative  Negative for cold intolerance and heat intolerance  Genitourinary: Negative  Negative for dysuria, frequency and urgency  Musculoskeletal: Negative  Negative for myalgias and neck pain  Skin: Negative  Negative for rash  Neurological: Positive for headaches  Negative for dizziness, tremors, seizures, syncope, facial asymmetry, speech difficulty, weakness, light-headedness and numbness  Hematological: Negative  Does not bruise/bleed easily  Psychiatric/Behavioral: Negative  Negative for confusion, hallucinations and sleep disturbance  Review of systems, Past medical history, Surgical history, Family history, Social history and Medication history were reviewed and otherwise unremarkable from a neurological perspective

## 2018-04-24 NOTE — TELEPHONE ENCOUNTER
She is apparently overdue for botox  She requests to see Dr Eduar Diaz for injections  Please schedule if BM agreeable asap

## 2018-04-24 NOTE — TELEPHONE ENCOUNTER
Once I receive a valid auth from Christiano I can set up next binj apt    Please advise Christiano  Thanks! :-)   Wilfredo

## 2018-04-30 ENCOUNTER — PROCEDURE VISIT (OUTPATIENT)
Dept: NEUROLOGY | Facility: CLINIC | Age: 43
End: 2018-04-30
Payer: COMMERCIAL

## 2018-04-30 VITALS — TEMPERATURE: 98.3 F | HEART RATE: 80 BPM | SYSTOLIC BLOOD PRESSURE: 142 MMHG | DIASTOLIC BLOOD PRESSURE: 99 MMHG

## 2018-04-30 DIAGNOSIS — G43.709 CHRONIC MIGRAINE WITHOUT AURA WITHOUT STATUS MIGRAINOSUS, NOT INTRACTABLE: Primary | ICD-10-CM

## 2018-04-30 PROCEDURE — 64615 CHEMODENERV MUSC MIGRAINE: CPT | Performed by: PSYCHIATRY & NEUROLOGY

## 2018-04-30 NOTE — PROGRESS NOTES
Chemodenervation  Date/Time: 4/30/2018 8:10 AM  Performed by: Elizabeth Call by: Pedro Sotomayor details:     Prepped With: Alcohol    Anesthesia (see MAR for exact dosages): Anesthesia method:  None  Procedure details:     Position:  Upright  Botox:     Botox Type:  Type A    Brand:  Botox    mL's of Botulinum Toxin:  100    Final Concentration per CC:  100 units    Needle Gauge:  30 G 2 5 inch  Procedures:     Botox Procedures: chronic headache      Indications: migraines    Injection Location:     Head / Face:  L superior cervical paraspinal, R superior cervical paraspinal, L , R , L frontalis, R frontalis, L medial occipitalis, R medial occipitalis, procerus, R temporalis, L temporalis, R superior trapezius and L superior trapezius    L  injection amount:  5 unit(s)    R  injection amount:  5 unit(s)    L lateral frontalis:  5 unit(s)    R lateral frontalis:  5 unit(s)    L temporalis injection amount:  20 unit(s)    R temporalis injection amount:  20 unit(s)    Procerus injection amount:  5 unit(s)    L medial occipitalis injection amount:  15 unit(s)    R medial occipitalis injection amount:  15 unit(s)  Total Units:     Total units used:  100    Total units discarded:  0  Post-procedure details:     Chemodenervation:  Chronic migraine    Patient tolerance of procedure:   Tolerated well, no immediate complications  Comments:      5 units in the scalp

## 2018-05-24 ENCOUNTER — TELEPHONE (OUTPATIENT)
Dept: NEUROLOGY | Facility: CLINIC | Age: 43
End: 2018-05-24

## 2018-06-13 ENCOUNTER — OFFICE VISIT (OUTPATIENT)
Dept: INTERNAL MEDICINE CLINIC | Facility: CLINIC | Age: 43
End: 2018-06-13
Payer: COMMERCIAL

## 2018-06-13 VITALS
OXYGEN SATURATION: 94 % | HEIGHT: 69 IN | WEIGHT: 246.8 LBS | TEMPERATURE: 97.9 F | SYSTOLIC BLOOD PRESSURE: 132 MMHG | HEART RATE: 94 BPM | BODY MASS INDEX: 36.56 KG/M2 | DIASTOLIC BLOOD PRESSURE: 62 MMHG

## 2018-06-13 DIAGNOSIS — F41.9 ANXIETY: ICD-10-CM

## 2018-06-13 DIAGNOSIS — E66.9 OBESITY, CLASS II, BMI 35-39.9: ICD-10-CM

## 2018-06-13 DIAGNOSIS — R05.8 ALLERGIC COUGH: Primary | ICD-10-CM

## 2018-06-13 DIAGNOSIS — J30.1 SEASONAL ALLERGIC RHINITIS DUE TO POLLEN: ICD-10-CM

## 2018-06-13 PROBLEM — J30.2 SEASONAL ALLERGIES: Status: ACTIVE | Noted: 2017-10-09

## 2018-06-13 PROBLEM — E66.812 OBESITY, CLASS II, BMI 35-39.9: Status: ACTIVE | Noted: 2017-03-27

## 2018-06-13 PROBLEM — R09.82 POST-NASAL DRIP: Status: ACTIVE | Noted: 2017-08-21

## 2018-06-13 PROCEDURE — 99213 OFFICE O/P EST LOW 20 MIN: CPT | Performed by: NURSE PRACTITIONER

## 2018-06-13 RX ORDER — CHLORAL HYDRATE 500 MG
1000 CAPSULE ORAL DAILY
COMMUNITY
End: 2021-03-16 | Stop reason: ALTCHOICE

## 2018-06-13 NOTE — PROGRESS NOTES
Assessment/Plan:    Seasonal allergies    Advised patient to switch from Zyrtec to Claritin to see if this helps  Patient is to continue to use Flonase 2 sprays each nostril daily  Patient is  To use saline rinses when she comes in from outside to help rinse out her sinuses  Patient is to continue to stay hydrated to prevent headaches  Will consider referral to ENT if her symtpoms persist  Will also consider PFT to r/o asthma  Will get blood-work and follow-up with patient in one month  Diagnoses and all orders for this visit:    Allergic cough    Anxiety  -     TSH, 3rd generation with Free T4 reflex    Obesity, Class II, BMI 35-39 9  -     TSH, 3rd generation with Free T4 reflex  -     CBC and differential  -     Comprehensive metabolic panel; Future  -     Lipid panel    Seasonal allergic rhinitis due to pollen    Other orders  -     Omega-3 Fatty Acids (FISH OIL) 1,000 mg; Take 1,000 mg by mouth daily          Subjective:      Patient ID: Jessica Negrete is a 39 y o  female  Patient presents today with complaints of allergies, she has a dry cough which is persistent this started mid January  She did have a chest x-ray done which was negative  She has had allergy testing done in the past   She currently takes Zyrtec as well as uses Flonase intermittently  Sinus Problem   This is a new problem  The current episode started more than 1 year ago  The problem has been waxing and waning since onset  There has been no fever  She is experiencing no pain  Associated symptoms include coughing (dry cough) and sneezing  Pertinent negatives include no chills, congestion, diaphoresis, ear pain, headaches, neck pain, shortness of breath, sinus pressure, sore throat or swollen glands  Treatments tried: Zyrtec and done  The treatment provided mild relief         The following portions of the patient's history were reviewed and updated as appropriate: allergies, current medications, past family history, past medical history, past social history, past surgical history and problem list     Review of Systems   Constitutional: Negative for activity change, appetite change, chills, diaphoresis and fever  HENT: Positive for postnasal drip and sneezing  Negative for congestion, ear discharge, ear pain, rhinorrhea, sinus pain, sinus pressure and sore throat  Eyes: Negative for pain, discharge, itching and visual disturbance  Respiratory: Positive for cough (dry cough)  Negative for chest tightness, shortness of breath and wheezing  Cardiovascular: Negative for chest pain, palpitations and leg swelling  Gastrointestinal: Negative for abdominal pain, blood in stool, constipation, diarrhea, nausea and vomiting  Endocrine: Negative for polydipsia, polyphagia and polyuria  Genitourinary: Negative for difficulty urinating, dysuria, hematuria and urgency  Musculoskeletal: Negative for arthralgias, back pain and neck pain  Skin: Negative for rash and wound  Allergic/Immunologic: Positive for environmental allergies  Neurological: Negative for dizziness, weakness, numbness and headaches  History reviewed  No pertinent past medical history        Current Outpatient Prescriptions:     B Complex Vitamins (VITAMIN B COMPLEX PO), Take by mouth, Disp: , Rfl:     fluticasone (FLONASE) 50 mcg/act nasal spray, 2 sprays into each nostril daily, Disp: , Rfl:     ibuprofen (MOTRIN) 800 mg tablet, Take 800 mg by mouth every 4 (four) hours as needed  , Disp: , Rfl:     Magnesium 250 MG TABS, Take by mouth, Disp: , Rfl:     Naproxen Sodium (ALEVE) 220 MG CAPS, Take by mouth, Disp: , Rfl:     Omega-3 Fatty Acids (FISH OIL) 1,000 mg, Take 1,000 mg by mouth daily, Disp: , Rfl:     omeprazole (PRILOSEC OTC) 20 MG tablet, Take 1 tablet by mouth daily, Disp: , Rfl:     topiramate (TOPAMAX) 25 mg tablet, 75 mg qhs x 5 days, then 100 mg qhs , Disp: 120 tablet, Rfl: 2    Vitamin D, Cholecalciferol, 1000 units CAPS, Take by mouth, Disp: , Rfl:     Allergies   Allergen Reactions    Sulfa Antibiotics Hives     Other reaction(s): Itching       Social History   Past Surgical History:   Procedure Laterality Date     SECTION       No family history on file  Objective:  /62 (BP Location: Left arm, Patient Position: Sitting, Cuff Size: Large)   Pulse 94   Temp 97 9 °F (36 6 °C) (Oral)   Ht 5' 8 5" (1 74 m)   Wt 112 kg (246 lb 12 8 oz)   SpO2 94%   BMI 36 98 kg/m²     No results found for this or any previous visit (from the past 1344 hour(s))  Physical Exam   Constitutional: She is oriented to person, place, and time  She appears well-developed and well-nourished  No distress  HENT:   Head: Normocephalic and atraumatic  Right Ear: External ear normal    Left Ear: External ear normal    Nose: Nose normal    Mouth/Throat: Oropharynx is clear and moist  No oropharyngeal exudate  Eyes: Conjunctivae and EOM are normal  Pupils are equal, round, and reactive to light  Right eye exhibits no discharge  Left eye exhibits no discharge  Neck: Normal range of motion  Neck supple  No thyromegaly present  Cardiovascular: Normal rate, regular rhythm, normal heart sounds and intact distal pulses  Exam reveals no gallop and no friction rub  No murmur heard  Pulmonary/Chest: Effort normal and breath sounds normal  No stridor  No respiratory distress  She has no wheezes  She has no rales  Abdominal: Soft  Bowel sounds are normal  She exhibits no distension  There is no tenderness  Lymphadenopathy:     She has no cervical adenopathy  Neurological: She is alert and oriented to person, place, and time  Skin: Skin is warm and dry  No rash noted  She is not diaphoretic  No erythema  Psychiatric: She has a normal mood and affect   Her behavior is normal  Judgment and thought content normal

## 2018-06-13 NOTE — ASSESSMENT & PLAN NOTE
Advised patient to switch from Zyrtec to Claritin to see if this helps  Patient is to continue to use Flonase 2 sprays each nostril daily  Patient is  To use saline rinses when she comes in from outside to help rinse out her sinuses  Patient is to continue to stay hydrated to prevent headaches  Will consider referral to ENT if her symtpoms persist  Will also consider PFT to r/o asthma  Will get blood-work and follow-up with patient in one month

## 2018-06-29 ENCOUNTER — TELEPHONE (OUTPATIENT)
Dept: NEUROLOGY | Facility: CLINIC | Age: 43
End: 2018-06-29

## 2018-07-31 ENCOUNTER — TRANSCRIBE ORDERS (OUTPATIENT)
Dept: ADMINISTRATIVE | Age: 43
End: 2018-07-31

## 2018-07-31 ENCOUNTER — APPOINTMENT (OUTPATIENT)
Dept: LAB | Age: 43
End: 2018-07-31

## 2018-07-31 DIAGNOSIS — Z00.8 HEALTH EXAMINATION IN POPULATION SURVEY: ICD-10-CM

## 2018-07-31 DIAGNOSIS — Z00.8 HEALTH EXAMINATION IN POPULATION SURVEY: Primary | ICD-10-CM

## 2018-07-31 LAB
CHOLEST SERPL-MCNC: 216 MG/DL (ref 50–200)
EST. AVERAGE GLUCOSE BLD GHB EST-MCNC: 120 MG/DL
HBA1C MFR BLD: 5.8 % (ref 4.2–6.3)
HDLC SERPL-MCNC: 66 MG/DL (ref 40–60)
LDLC SERPL CALC-MCNC: 131 MG/DL (ref 0–100)
NONHDLC SERPL-MCNC: 150 MG/DL
TRIGL SERPL-MCNC: 96 MG/DL

## 2018-07-31 PROCEDURE — 80061 LIPID PANEL: CPT

## 2018-07-31 PROCEDURE — 36415 COLL VENOUS BLD VENIPUNCTURE: CPT

## 2018-07-31 PROCEDURE — 83036 HEMOGLOBIN GLYCOSYLATED A1C: CPT

## 2018-08-08 NOTE — PROGRESS NOTES
Chemodenervation  Date/Time: 8/9/2018 1:33 PM  Performed by: Leopoldo Lisle  Authorized by: Martín Pierre details:     Prepped With: Alcohol    Anesthesia (see MAR for exact dosages): Anesthesia method:  None  Procedure details:     Position:  Upright  Botox:     Botox Type:  Type A    Brand:  Botox    mL's of Botulinum Toxin:  100    Final Concentration per CC:  200 units    Needle Gauge:  30 G 2 5 inch  Procedures:     Botox Procedures: chronic headache      Indications: migraines    Injection Location:     Head / Face:  L , R , L frontalis, R frontalis, L medial occipitalis, R medial occipitalis, procerus, R temporalis and L temporalis    L  injection amount:  5 unit(s)    R  injection amount:  5 unit(s)    L lateral frontalis:  5 unit(s)    R lateral frontalis:  5 unit(s)    L temporalis injection amount:  20 unit(s)    R temporalis injection amount:  20 unit(s)    Procerus injection amount:  5 unit(s)    L medial occipitalis injection amount:  15 unit(s)    R medial occipitalis injection amount:  15 unit(s)  Total Units:     Total units used:  100    Total units discarded:  0  Post-procedure details:     Chemodenervation:  Chronic migraine    Patient tolerance of procedure:   Tolerated well, no immediate complications  Comments:      5 units scalp  All medically necessary

## 2018-08-09 ENCOUNTER — PROCEDURE VISIT (OUTPATIENT)
Dept: NEUROLOGY | Facility: CLINIC | Age: 43
End: 2018-08-09
Payer: COMMERCIAL

## 2018-08-09 VITALS — TEMPERATURE: 97.9 F | SYSTOLIC BLOOD PRESSURE: 142 MMHG | HEART RATE: 80 BPM | DIASTOLIC BLOOD PRESSURE: 88 MMHG

## 2018-08-09 DIAGNOSIS — G43.709 CHRONIC MIGRAINE WITHOUT AURA WITHOUT STATUS MIGRAINOSUS, NOT INTRACTABLE: Primary | ICD-10-CM

## 2018-08-09 PROCEDURE — 64615 CHEMODENERV MUSC MIGRAINE: CPT | Performed by: PHYSICIAN ASSISTANT

## 2018-09-25 ENCOUNTER — OFFICE VISIT (OUTPATIENT)
Dept: INTERNAL MEDICINE CLINIC | Age: 43
End: 2018-09-25
Payer: COMMERCIAL

## 2018-09-25 ENCOUNTER — APPOINTMENT (OUTPATIENT)
Dept: LAB | Age: 43
End: 2018-09-25
Payer: COMMERCIAL

## 2018-09-25 VITALS
HEART RATE: 81 BPM | BODY MASS INDEX: 37.28 KG/M2 | OXYGEN SATURATION: 98 % | DIASTOLIC BLOOD PRESSURE: 80 MMHG | WEIGHT: 248.8 LBS | SYSTOLIC BLOOD PRESSURE: 120 MMHG | TEMPERATURE: 97.5 F

## 2018-09-25 DIAGNOSIS — H65.112 ACUTE ALLERGIC OTITIS MEDIA OF LEFT EAR, RECURRENCE NOT SPECIFIED: Primary | ICD-10-CM

## 2018-09-25 DIAGNOSIS — E66.9 OBESITY, CLASS II, BMI 35-39.9: ICD-10-CM

## 2018-09-25 PROBLEM — H66.90 OTITIS MEDIA: Status: ACTIVE | Noted: 2018-09-25

## 2018-09-25 LAB
ALBUMIN SERPL BCP-MCNC: 3.6 G/DL (ref 3.5–5)
ALP SERPL-CCNC: 76 U/L (ref 46–116)
ALT SERPL W P-5'-P-CCNC: 18 U/L (ref 12–78)
ANION GAP SERPL CALCULATED.3IONS-SCNC: 5 MMOL/L (ref 4–13)
AST SERPL W P-5'-P-CCNC: 15 U/L (ref 5–45)
BASOPHILS # BLD AUTO: 0.06 THOUSANDS/ΜL (ref 0–0.1)
BASOPHILS NFR BLD AUTO: 1 % (ref 0–1)
BILIRUB SERPL-MCNC: 0.51 MG/DL (ref 0.2–1)
BUN SERPL-MCNC: 18 MG/DL (ref 5–25)
CALCIUM SERPL-MCNC: 9 MG/DL (ref 8.3–10.1)
CHLORIDE SERPL-SCNC: 104 MMOL/L (ref 100–108)
CHOLEST SERPL-MCNC: 215 MG/DL (ref 50–200)
CO2 SERPL-SCNC: 27 MMOL/L (ref 21–32)
CREAT SERPL-MCNC: 0.69 MG/DL (ref 0.6–1.3)
EOSINOPHIL # BLD AUTO: 0.17 THOUSAND/ΜL (ref 0–0.61)
EOSINOPHIL NFR BLD AUTO: 2 % (ref 0–6)
ERYTHROCYTE [DISTWIDTH] IN BLOOD BY AUTOMATED COUNT: 16 % (ref 11.6–15.1)
GFR SERPL CREATININE-BSD FRML MDRD: 106 ML/MIN/1.73SQ M
GLUCOSE P FAST SERPL-MCNC: 80 MG/DL (ref 65–99)
HCT VFR BLD AUTO: 40.7 % (ref 34.8–46.1)
HDLC SERPL-MCNC: 58 MG/DL (ref 40–60)
HGB BLD-MCNC: 12.5 G/DL (ref 11.5–15.4)
IMM GRANULOCYTES # BLD AUTO: 0.04 THOUSAND/UL (ref 0–0.2)
IMM GRANULOCYTES NFR BLD AUTO: 0 % (ref 0–2)
LDLC SERPL CALC-MCNC: 125 MG/DL (ref 0–100)
LYMPHOCYTES # BLD AUTO: 3.72 THOUSANDS/ΜL (ref 0.6–4.47)
LYMPHOCYTES NFR BLD AUTO: 39 % (ref 14–44)
MCH RBC QN AUTO: 26 PG (ref 26.8–34.3)
MCHC RBC AUTO-ENTMCNC: 30.7 G/DL (ref 31.4–37.4)
MCV RBC AUTO: 85 FL (ref 82–98)
MONOCYTES # BLD AUTO: 0.51 THOUSAND/ΜL (ref 0.17–1.22)
MONOCYTES NFR BLD AUTO: 5 % (ref 4–12)
NEUTROPHILS # BLD AUTO: 5.02 THOUSANDS/ΜL (ref 1.85–7.62)
NEUTS SEG NFR BLD AUTO: 53 % (ref 43–75)
NONHDLC SERPL-MCNC: 157 MG/DL
NRBC BLD AUTO-RTO: 0 /100 WBCS
PLATELET # BLD AUTO: 412 THOUSANDS/UL (ref 149–390)
PMV BLD AUTO: 10.1 FL (ref 8.9–12.7)
POTASSIUM SERPL-SCNC: 4.5 MMOL/L (ref 3.5–5.3)
PROT SERPL-MCNC: 8.1 G/DL (ref 6.4–8.2)
RBC # BLD AUTO: 4.8 MILLION/UL (ref 3.81–5.12)
SODIUM SERPL-SCNC: 136 MMOL/L (ref 136–145)
TRIGL SERPL-MCNC: 158 MG/DL
TSH SERPL DL<=0.05 MIU/L-ACNC: 1.3 UIU/ML (ref 0.36–3.74)
WBC # BLD AUTO: 9.52 THOUSAND/UL (ref 4.31–10.16)

## 2018-09-25 PROCEDURE — 85025 COMPLETE CBC W/AUTO DIFF WBC: CPT | Performed by: NURSE PRACTITIONER

## 2018-09-25 PROCEDURE — 99213 OFFICE O/P EST LOW 20 MIN: CPT | Performed by: NURSE PRACTITIONER

## 2018-09-25 PROCEDURE — 84443 ASSAY THYROID STIM HORMONE: CPT | Performed by: NURSE PRACTITIONER

## 2018-09-25 PROCEDURE — 36415 COLL VENOUS BLD VENIPUNCTURE: CPT

## 2018-09-25 PROCEDURE — 80053 COMPREHEN METABOLIC PANEL: CPT

## 2018-09-25 PROCEDURE — 80061 LIPID PANEL: CPT | Performed by: NURSE PRACTITIONER

## 2018-09-25 RX ORDER — AMOXICILLIN 500 MG/1
1000 CAPSULE ORAL EVERY 8 HOURS SCHEDULED
Qty: 30 CAPSULE | Refills: 0 | Status: SHIPPED | OUTPATIENT
Start: 2018-09-25 | End: 2018-09-30

## 2018-09-25 NOTE — PROGRESS NOTES
Assessment/Plan:    Otitis media  Will start patient on Amoxicillin 1 g PO every 8 hours for five days  Patient is to start taking antihistamine and continue to use Flonase  Rest and fluids advised  Educated that the course of this illness could be 2-4 weeks  Discussed symptomatic relief, such as warm steam inhalations, tylenol/ibuprofen for fevers and body aches, rest, and drink plenty of fluids  Warm salt gargles for sore throat  Discussed red flag signs to go to the ER, such as chest pain or shortness of breath  Return to the office for reevaluation if symptoms worsen or do not improve in 1-2 weeks         Diagnoses and all orders for this visit:    Acute allergic otitis media of left ear, recurrence not specified  -     amoxicillin (AMOXIL) 500 mg capsule; Take 2 capsules (1,000 mg total) by mouth every 8 (eight) hours for 5 days          Subjective:      Patient ID: Alejandro Blackman is a 39 y o  female  Patient complaining of headache, left ear pain, and pain in the left side of throat x 6 days  She got a Z-Pac from her , who works at [employer], and took a Z-Pac and finished 3 days ago  She states this did not improve her symptoms at all  Patient has a history of seasonal allergies  She normally experiences congestion, facial pain, and cough with her allergies  Patient does not think her current symptoms are from allergies  Patient has been trying to drink a lot of water  Patient states she has a pounding headache throughout the day for the last 6 days  She states the headache is on the left side of her face and ear  The pain also goes down into her neck  She has been taking tylenol and aleve for the past couple days, which temporarily relieves the symptoms  Patient has a history of chronic migraines with aura for which she is followed by neurology  She states these headaches do not feel like her typical migraines and they seem to be stemming from the ear pain   She is also experiencing piercing ear pain, which has been occurring for 5 days  She patient states she does experience some nausea with the headaches, neck pain and swelling on the left side of her neck, and ear fullness  Patient denies photophobia, cough, sore throat, fever, chills, sinus pressure, rhinorrhea, diarrhea or constipation  The following portions of the patient's history were reviewed and updated as appropriate: allergies, current medications, past family history, past medical history, past social history, past surgical history and problem list     Review of Systems   Constitutional: Positive for fatigue  Negative for activity change, appetite change, chills, diaphoresis, fever and unexpected weight change  HENT: Positive for ear pain (Left), postnasal drip (mild) and sneezing  Negative for congestion, ear discharge, facial swelling, rhinorrhea, sinus pain, sinus pressure and sore throat  Eyes: Negative for photophobia, pain, discharge, redness, itching and visual disturbance  Respiratory: Negative for cough, chest tightness, shortness of breath and wheezing  Cardiovascular: Positive for palpitations (chronic)  Negative for chest pain and leg swelling  Gastrointestinal: Positive for nausea  Negative for abdominal pain, constipation, diarrhea and vomiting  Endocrine: Negative for polydipsia, polyphagia and polyuria  Genitourinary: Negative for difficulty urinating, dysuria and urgency  Musculoskeletal: Negative for arthralgias, back pain, myalgias and neck pain  Skin: Negative for rash and wound  Allergic/Immunologic: Positive for environmental allergies  Neurological: Positive for dizziness (mild) and headaches  Negative for weakness, light-headedness and numbness           Objective:      /80 (BP Location: Left arm, Patient Position: Sitting, Cuff Size: Standard)   Pulse 81   Temp 97 5 °F (36 4 °C) (Tympanic)   Wt 113 kg (248 lb 12 8 oz) Comment: with shoes  SpO2 98% BMI 37 28 kg/m²          Physical Exam   Constitutional: She is oriented to person, place, and time  She appears well-developed and well-nourished  No distress  HENT:   Head: Normocephalic and atraumatic  Right Ear: External ear normal  Tympanic membrane is bulging  Tympanic membrane is not erythematous  A middle ear effusion is present  Left Ear: External ear normal  Tympanic membrane is erythematous and bulging  A middle ear effusion is present  Nose: Mucosal edema and rhinorrhea present  Right sinus exhibits no maxillary sinus tenderness and no frontal sinus tenderness  Left sinus exhibits no maxillary sinus tenderness and no frontal sinus tenderness  Mouth/Throat: Mucous membranes are pale and dry  Posterior oropharyngeal erythema present  No oropharyngeal exudate  Eyes: Conjunctivae and EOM are normal  Pupils are equal, round, and reactive to light  Right eye exhibits no discharge  Left eye exhibits no discharge  Neck: Normal range of motion  Neck supple  No thyromegaly present  Cardiovascular: Normal rate, regular rhythm, normal heart sounds and intact distal pulses  Exam reveals no gallop and no friction rub  No murmur heard  Pulmonary/Chest: Effort normal and breath sounds normal  No stridor  No respiratory distress  She has no wheezes  She has no rales  Abdominal: Soft  Bowel sounds are normal  She exhibits no distension  There is no tenderness  Lymphadenopathy:     She has no cervical adenopathy  Neurological: She is alert and oriented to person, place, and time  Skin: Skin is warm and dry  No rash noted  She is not diaphoretic  No erythema  Psychiatric: She has a normal mood and affect   Her behavior is normal  Judgment and thought content normal

## 2018-09-25 NOTE — ASSESSMENT & PLAN NOTE
Will start patient on Amoxicillin 1 g PO every 8 hours for five days  Patient is to start taking antihistamine and continue to use Flonase  Rest and fluids advised  Educated that the course of this illness could be 2-4 weeks  Discussed symptomatic relief, such as warm steam inhalations, tylenol/ibuprofen for fevers and body aches, rest, and drink plenty of fluids  Warm salt gargles for sore throat  Discussed red flag signs to go to the ER, such as chest pain or shortness of breath     Return to the office for reevaluation if symptoms worsen or do not improve in 1-2 weeks

## 2018-10-05 NOTE — PROGRESS NOTES
Patient ID: Michelle Chavez is a 39 y o  female  Assessment/Plan:   Problem List Items Addressed This Visit        Cardiovascular and Mediastinum    Chronic migraine without aura without status migrainosus, not intractable - Primary    Relevant Medications    ketorolac (TORADOL) 10 mg tablet    topiramate (TOPAMAX) 100 mg tablet    Migraine with aura and without status migrainosus, not intractable    Relevant Medications    ketorolac (TORADOL) 10 mg tablet    topiramate (TOPAMAX) 100 mg tablet         Preventive therapy for headaches:  - Continue Topamax at 100 mg at bedtime     -   Continue Botox as well, patient seems to doing really well with Botox  But would like to have more - Botox in her frontal temporal region  Will apply for 200 units of Botox for next visit  Abortive therapy for headaches:  - At onset of her severe headache patients take Toradol with food  Patient and I did discuss today about possible having breast reduction  Patient states that she has noted tension in her shoulders secondary to this  We will talk to her primary care physician in regard to going to Dr Severa Mart, plastic surgeon for possible breast reduction  Subjective:  HPI  We had the pleasure of evaluating Michelle Chavez in neurological follow up today  As you know she is a 39year old RH female  She owns a hair salon   She takes care of her daughter and son at home         Chronic migraine headaches:  PREVENTIVE: Mag, B-2, B-complex, Zoloft, topamax  ABORTIVE: Toradol, Decadron, aleve, advil/ ibuprofen    Non-Medical/Alternative Treatments used in the past for headaches: Massage- improved neck pain, PT, TENS unit- made neck pain worse     - Headache trigger: Fatigue, Stress/Tension, bright lights, missing meals  - Aura - heavy feeling in the head and neck pain sometimes; sometimes (not with every headache) scotomas/ colorful lights when closing eyes/ spots in eyes lasting about 10 minutes before headaches     Since last seen headaches have improved with Botox injections q 3 months  Since starting botox, the patient reports greater than 7 days of migraine relief from baseline, correlated with headache diary, decreased abortive medication use and decreased ER visits  How often do the headaches occur - once per week- migraines; 2-3 per week mild tension type headaches  What time of the day do the headaches start - Variable  How long do the headaches last - 45 minutes to 1 hour  Where are they located - frontal, eyes, radiate back into the occipital and neck discomfort L>R  What is the intensity of pain - Tension headaches: 7-8/10 and severe headaches: 8-9/10  Describe your usual headache - Throbbing, Pounding  Associated symptoms:   -       Decrease of appetite, nausea   -       Photophobia, phonophobia  -       Problem with concentration  -       Blurred vision  -       Light-headed or dizzy, stiff or sore neck  -       Hands or feet tingle or feel numb  -       Prefer to be alone and in a dark room, unable to work           The following portions of the patient's history were reviewed and updated as appropriate: allergies, current medications, past family history, past medical history, past social history, past surgical history and problem list        Objective:    Blood pressure 128/85, pulse 85, height 5' 8 5" (1 74 m), weight 113 kg (249 lb)  Physical Exam   Constitutional: She appears well-developed  Eyes: Pupils are equal, round, and reactive to light  EOM are normal    Neck: Normal range of motion  Neck supple  Cardiovascular: Normal rate  Pulmonary/Chest: Effort normal    Abdominal: Soft  Musculoskeletal: Normal range of motion  Neurological: She has normal strength and normal reflexes  Coordination normal    Skin: Skin is warm  Psychiatric: She has a normal mood and affect   Her speech is normal        Neurological Exam  Mental Status   Speech is normal  Language is fluent with no aphasia  Attention and concentration are normal     Cranial Nerves  CN II: Visual fields full to confrontation  CN III, IV, VI: Extraocular movements intact bilaterally  Pupils equal round and reactive to light bilaterally  CN V: Facial sensation is normal   CN VII: Full and symmetric facial movement  CN VIII: Hearing is normal   CN IX, X: Palate elevates symmetrically  Normal gag reflex  CN XI: Shoulder shrug strength is normal   CN XII: Tongue midline without atrophy or fasciculations  Motor   Strength is 5/5 throughout all four extremities  Sensory  Sensation is intact to light touch, pinprick, vibration and proprioception in all four extremities  Reflexes  Deep tendon reflexes are 2+ and symmetric in all four extremities with downgoing toes bilaterally  Coordination  Finger-to-nose, rapid alternating movements and heel-to-shin normal bilaterally without dysmetria  Gait  Normal casual, toe, heel and tandem gait  ROS:    Review of Systems   Constitutional: Negative  HENT: Negative  Eyes: Negative  Respiratory: Negative  Cardiovascular: Negative  Gastrointestinal: Negative  Endocrine: Negative  Genitourinary: Negative  Musculoskeletal: Negative  Skin: Negative  Allergic/Immunologic: Negative  Neurological: Positive for headaches  Hematological: Negative  Psychiatric/Behavioral: Negative

## 2018-10-08 ENCOUNTER — OFFICE VISIT (OUTPATIENT)
Dept: NEUROLOGY | Facility: CLINIC | Age: 43
End: 2018-10-08
Payer: COMMERCIAL

## 2018-10-08 VITALS
DIASTOLIC BLOOD PRESSURE: 85 MMHG | BODY MASS INDEX: 36.88 KG/M2 | HEIGHT: 69 IN | WEIGHT: 249 LBS | SYSTOLIC BLOOD PRESSURE: 128 MMHG | HEART RATE: 85 BPM

## 2018-10-08 DIAGNOSIS — G43.709 CHRONIC MIGRAINE WITHOUT AURA WITHOUT STATUS MIGRAINOSUS, NOT INTRACTABLE: Primary | ICD-10-CM

## 2018-10-08 DIAGNOSIS — G43.109 MIGRAINE WITH AURA AND WITHOUT STATUS MIGRAINOSUS, NOT INTRACTABLE: ICD-10-CM

## 2018-10-08 PROCEDURE — 99214 OFFICE O/P EST MOD 30 MIN: CPT | Performed by: PSYCHIATRY & NEUROLOGY

## 2018-10-08 RX ORDER — KETOROLAC TROMETHAMINE 10 MG/1
TABLET, FILM COATED ORAL
Qty: 30 TABLET | Refills: 0 | Status: SHIPPED | OUTPATIENT
Start: 2018-10-08 | End: 2019-12-05 | Stop reason: SDUPTHER

## 2018-10-08 RX ORDER — TOPIRAMATE 100 MG/1
TABLET, FILM COATED ORAL
Qty: 90 TABLET | Refills: 3 | Status: SHIPPED | OUTPATIENT
Start: 2018-10-08 | End: 2019-09-18 | Stop reason: ALTCHOICE

## 2018-10-08 NOTE — PATIENT INSTRUCTIONS
Preventive therapy for headaches:  - Continue Topamax at 100 mg at bedtime     -   Continue Botox as well, patient seems to doing really well with Botox  But would like to have more - Botox in her frontal temporal region  Abortive therapy for headaches:  - At onset of her severe headache patients take Toradol with food  Patient and I did discuss today about possible having breast reduction  Patient states that she has noted tension in her shoulders secondary to this  We will talk to her primary care physician in regard to going to Dr Kyra Davalos, plastic surgeon for possible breast reduction

## 2018-10-10 PROBLEM — N62 MACROMASTIA: Status: ACTIVE | Noted: 2018-10-10

## 2018-10-10 PROBLEM — R73.03 PRE-DIABETES: Status: ACTIVE | Noted: 2018-10-10

## 2018-10-10 PROBLEM — E78.2 MIXED DYSLIPIDEMIA: Status: ACTIVE | Noted: 2018-10-10

## 2018-11-12 ENCOUNTER — PROCEDURE VISIT (OUTPATIENT)
Dept: NEUROLOGY | Facility: CLINIC | Age: 43
End: 2018-11-12
Payer: COMMERCIAL

## 2018-11-12 VITALS — DIASTOLIC BLOOD PRESSURE: 80 MMHG | HEART RATE: 70 BPM | TEMPERATURE: 98.4 F | SYSTOLIC BLOOD PRESSURE: 128 MMHG

## 2018-11-12 DIAGNOSIS — G43.709 CHRONIC MIGRAINE WITHOUT AURA WITHOUT STATUS MIGRAINOSUS, NOT INTRACTABLE: Primary | ICD-10-CM

## 2018-11-12 PROCEDURE — 64615 CHEMODENERV MUSC MIGRAINE: CPT | Performed by: PHYSICIAN ASSISTANT

## 2018-11-12 NOTE — PROGRESS NOTES
Chemodenervation  Date/Time: 11/12/2018 9:59 AM  Performed by: Giles Watson by: Osiel Alonso details:     Preparation: Patient was prepped and draped in usual sterile fashion      Prepped With: Alcohol    Anesthesia (see MAR for exact dosages): Anesthesia method:  None  Procedure details:     Position:  Upright  Botox:     Botox Type:  Type A    Brand:  Botox    mL's of Botulinum Toxin:  200    Final Concentration per CC:  200 units    Needle Gauge:  30 G 2 5 inch  Procedures:     Botox Procedures: chronic headache      Indications: migraines    Injection Location:     Head / Face:  L superior cervical paraspinal, R superior cervical paraspinal, L , R , L frontalis, R frontalis, procerus, R medial occipitalis, L medial occipitalis, L temporalis, R temporalis, L superior trapezius and R superior trapezius    L  injection amount:  5 unit(s)    R  injection amount:  5 unit(s)    L medial frontalis:  10 unit(s)    R medial frontalis:  10 unit(s)    L temporalis injection amount:  20 unit(s)    R temporalis injection amount:  20 unit(s)    Procerus injection amount:  5 unit(s)    L medial occipitalis injection amount:  15 unit(s)    R medial occipitalis injection amount:  15 unit(s)    L superior cervical paraspinal injection amount:  10 unit(s)    R superior cervical paraspinal injection amount:  10 unit(s)    L superior trapezius injection amount:  15 unit(s)    R superior trapezius injection amount:  15 unit(s)  Total Units:     Total units used:  200    Total units discarded:  0  Post-procedure details:     Chemodenervation:  Chronic migraine    Facial Nerve Location[de-identified]  Bilateral facial nerve    Patient tolerance of procedure:   Tolerated well, no immediate complications  Comments:      5 units orbicularis oculi bilaterally  35 units frontalis  All medically necessary

## 2018-11-19 ENCOUNTER — OFFICE VISIT (OUTPATIENT)
Dept: INTERNAL MEDICINE CLINIC | Age: 43
End: 2018-11-19
Payer: COMMERCIAL

## 2018-11-19 VITALS
TEMPERATURE: 98.5 F | SYSTOLIC BLOOD PRESSURE: 124 MMHG | BODY MASS INDEX: 37.47 KG/M2 | HEART RATE: 86 BPM | DIASTOLIC BLOOD PRESSURE: 86 MMHG | WEIGHT: 247.2 LBS | OXYGEN SATURATION: 96 % | HEIGHT: 68 IN

## 2018-11-19 DIAGNOSIS — E66.9 OBESITY, CLASS II, BMI 35-39.9: ICD-10-CM

## 2018-11-19 DIAGNOSIS — J30.2 SEASONAL ALLERGIES: ICD-10-CM

## 2018-11-19 DIAGNOSIS — K21.9 GASTROESOPHAGEAL REFLUX DISEASE WITHOUT ESOPHAGITIS: ICD-10-CM

## 2018-11-19 DIAGNOSIS — G43.709 CHRONIC MIGRAINE WITHOUT AURA WITHOUT STATUS MIGRAINOSUS, NOT INTRACTABLE: ICD-10-CM

## 2018-11-19 DIAGNOSIS — Z12.39 SCREENING FOR BREAST CANCER: ICD-10-CM

## 2018-11-19 DIAGNOSIS — E78.2 MIXED DYSLIPIDEMIA: ICD-10-CM

## 2018-11-19 DIAGNOSIS — R73.03 PRE-DIABETES: ICD-10-CM

## 2018-11-19 DIAGNOSIS — Z12.31 ENCOUNTER FOR SCREENING MAMMOGRAM FOR MALIGNANT NEOPLASM OF BREAST: Primary | ICD-10-CM

## 2018-11-19 PROBLEM — H66.90 OTITIS MEDIA: Status: RESOLVED | Noted: 2018-09-25 | Resolved: 2018-11-19

## 2018-11-19 PROCEDURE — 99214 OFFICE O/P EST MOD 30 MIN: CPT | Performed by: NURSE PRACTITIONER

## 2018-11-19 RX ORDER — OMEPRAZOLE 20 MG/1
20 CAPSULE, DELAYED RELEASE ORAL DAILY
COMMUNITY
End: 2019-05-23

## 2018-11-19 RX ORDER — OMEPRAZOLE 20 MG/1
20 CAPSULE, DELAYED RELEASE ORAL DAILY
Qty: 30 CAPSULE | Refills: 5 | Status: SHIPPED | OUTPATIENT
Start: 2018-11-19 | End: 2019-05-23

## 2018-11-19 NOTE — ASSESSMENT & PLAN NOTE
Patient follows Neurology for her migraines, patient currently on Topamax, and takes Toradol for migraines

## 2018-11-19 NOTE — PROGRESS NOTES
Assessment/Plan:    GERD (gastroesophageal reflux disease)   Patient is to continue to take Prilosec 20 mg tablet daily  Patient's GERD well controlled  Chronic migraine without aura without status migrainosus, not intractable   Patient follows Neurology for her migraines, patient currently on Topamax, and takes Toradol for migraines  Mixed dyslipidemia   Patient has elevation in her cholesterol her LDL in her triglycerides  Advised patient to start taking fish oil 2000 mg daily  Patient does not want to start statin at this point in time, would like to work on diet and exercise  Recommend healthy lifestyle choices for your cholesterol  Low fat/low cholesterol diet  Limit/avoid red meat  Eat more lean meat - chicken breast, ground turkey, fish  Exercise 30 mins at least 3 times a week as tolerated  Patient will get follow-up lipid panel prior to her follow-up in 6 months  Obesity, Class II, BMI 35-39 9    Will give patient referral to weight management  Pre-diabetes    Patient's hemoglobin A1c 5 8, will continue to monitor  Patient is to continue to work on diet and exercise  Limit sugars and carbohydrate intake  Avoid soda, juice, sweets, cookies, desserts, pasta, bread    Eat more whole grains, exercised 30 min of cardio at least 3 times a week  Also recommended daily foot exams to check for sores, and recommended yearly eye exams  Screening for breast cancer    Will give patient script for mammogram        Diagnoses and all orders for this visit:    Encounter for screening mammogram for malignant neoplasm of breast    Gastroesophageal reflux disease without esophagitis  -     omeprazole (PriLOSEC) 20 mg delayed release capsule; Take 1 capsule (20 mg total) by mouth daily    Seasonal allergies    Screening for breast cancer  -     Mammo screening bilateral w cad; Future    Obesity, Class II, BMI 35-39 9  -     Ambulatory referral to Weight Management;  Future    Mixed dyslipidemia  -     Lipid panel    Chronic migraine without aura without status migrainosus, not intractable    Pre-diabetes    Other orders  -     Cancel: Mammo screening bilateral w cad; Future  -     omeprazole (PriLOSEC) 20 mg delayed release capsule; Take 20 mg by mouth daily          Subjective:      Patient ID: Sofia Tapia is a 39 y o  female  Patient presents today to review bloodwork, and follow-up on anxiety, dyslipidemia, and migraine  Started dieting and working out more often  Screenings:  Gyn-October of this year, had pap, denies abnormal paps  Mammogram-due for mammogram, goesw yearly  Eye Doctor-has not been in a while  Dentist-every 6 months          Migraine    This is a chronic problem  The current episode started more than 1 year ago  The problem occurs intermittently  The problem has been waxing and waning  Pertinent negatives include no abdominal pain, back pain, coughing, ear pain, eye pain, fever, neck pain, numbness, rhinorrhea, sinus pressure, sore throat, vomiting or weakness  Her past medical history is significant for migraine headaches and obesity  Hyperlipidemia   This is a chronic problem  The current episode started more than 1 year ago  The problem is uncontrolled  Recent lipid tests were reviewed and are high  Exacerbating diseases include obesity  She has no history of diabetes  Factors aggravating her hyperlipidemia include fatty foods  Pertinent negatives include no chest pain, focal sensory loss, focal weakness, leg pain, myalgias or shortness of breath  Current antihyperlipidemic treatment includes diet change and exercise  The current treatment provides no improvement of lipids  Compliance problems include adherence to diet and adherence to exercise  Risk factors for coronary artery disease include a sedentary lifestyle, dyslipidemia and obesity         The following portions of the patient's history were reviewed and updated as appropriate: allergies, current medications, past family history, past medical history, past social history, past surgical history and problem list     Review of Systems   Constitutional: Negative for activity change, appetite change, chills, diaphoresis and fever  HENT: Negative for congestion, ear discharge, ear pain, postnasal drip, rhinorrhea, sinus pain, sinus pressure and sore throat  Eyes: Negative for pain, discharge, itching and visual disturbance  Respiratory: Negative for cough, chest tightness, shortness of breath and wheezing  Cardiovascular: Negative for chest pain and leg swelling  Gastrointestinal: Negative for abdominal pain, constipation, diarrhea and vomiting  Endocrine: Negative for polydipsia, polyphagia and polyuria  Genitourinary: Negative for difficulty urinating, dysuria and urgency  Musculoskeletal: Negative for arthralgias, back pain, myalgias and neck pain  Skin: Negative for rash and wound  Neurological: Negative for focal weakness, weakness, numbness and headaches  Past Medical History:   Diagnosis Date    Depression          Current Outpatient Prescriptions:     B Complex Vitamins (VITAMIN B COMPLEX PO), Take by mouth, Disp: , Rfl:     ketorolac (TORADOL) 10 mg tablet, 1-2 tabs at onset of migraine, can repeat X1 in 6 hours, can combine with triptan    Take with food/milk/antacid , Disp: 30 tablet, Rfl: 0    Magnesium 250 MG TABS, Take by mouth, Disp: , Rfl:     Naproxen Sodium (ALEVE) 220 MG CAPS, Take by mouth, Disp: , Rfl:     Omega-3 Fatty Acids (FISH OIL) 1,000 mg, Take 1,000 mg by mouth daily, Disp: , Rfl:     omeprazole (PriLOSEC) 20 mg delayed release capsule, Take 20 mg by mouth daily, Disp: , Rfl:     onabotulinumtoxin A (BOTOX) 100 units, Inject into a muscle once, Disp: , Rfl:     topiramate (TOPAMAX) 100 mg tablet, One at bedtime (Patient taking differently: 100 mg daily as needed One at bedtime ), Disp: 90 tablet, Rfl: 3    Vitamin D, Cholecalciferol, 1000 units CAPS, Take by mouth, Disp: , Rfl:     omeprazole (PriLOSEC) 20 mg delayed release capsule, Take 1 capsule (20 mg total) by mouth daily, Disp: 30 capsule, Rfl: 5    Allergies   Allergen Reactions    Sulfa Antibiotics Hives     Other reaction(s): Itching       Social History   Past Surgical History:   Procedure Laterality Date     SECTION       SECTION      TUBAL LIGATION       No family history on file      Objective:  /86 (BP Location: Left arm, Patient Position: Sitting, Cuff Size: Large)   Pulse 86   Temp 98 5 °F (36 9 °C) (Tympanic)   Ht 5' 7 52" (1 715 m)   Wt 112 kg (247 lb 3 2 oz)   SpO2 96%   BMI 38 12 kg/m²     Recent Results (from the past 1344 hour(s))   TSH, 3rd generation with Free T4 reflex    Collection Time: 18 11:49 AM   Result Value Ref Range    TSH 3RD GENERATON 1 300 0 358 - 3 740 uIU/mL   CBC and differential    Collection Time: 18 11:49 AM   Result Value Ref Range    WBC 9 52 4 31 - 10 16 Thousand/uL    RBC 4 80 3 81 - 5 12 Million/uL    Hemoglobin 12 5 11 5 - 15 4 g/dL    Hematocrit 40 7 34 8 - 46 1 %    MCV 85 82 - 98 fL    MCH 26 0 (L) 26 8 - 34 3 pg    MCHC 30 7 (L) 31 4 - 37 4 g/dL    RDW 16 0 (H) 11 6 - 15 1 %    MPV 10 1 8 9 - 12 7 fL    Platelets 729 (H) 733 - 390 Thousands/uL    nRBC 0 /100 WBCs    Neutrophils Relative 53 43 - 75 %    Immat GRANS % 0 0 - 2 %    Lymphocytes Relative 39 14 - 44 %    Monocytes Relative 5 4 - 12 %    Eosinophils Relative 2 0 - 6 %    Basophils Relative 1 0 - 1 %    Neutrophils Absolute 5 02 1 85 - 7 62 Thousands/µL    Immature Grans Absolute 0 04 0 00 - 0 20 Thousand/uL    Lymphocytes Absolute 3 72 0 60 - 4 47 Thousands/µL    Monocytes Absolute 0 51 0 17 - 1 22 Thousand/µL    Eosinophils Absolute 0 17 0 00 - 0 61 Thousand/µL    Basophils Absolute 0 06 0 00 - 0 10 Thousands/µL   Lipid panel    Collection Time: 18 11:49 AM   Result Value Ref Range    Cholesterol 215 (H) 50 - 200 mg/dL    Triglycerides 158 (H) <=150 mg/dL    HDL, Direct 58 40 - 60 mg/dL    LDL Calculated 125 (H) 0 - 100 mg/dL    Non-HDL-Chol (CHOL-HDL) 157 mg/dl   Comprehensive metabolic panel    Collection Time: 09/25/18 11:49 AM   Result Value Ref Range    Sodium 136 136 - 145 mmol/L    Potassium 4 5 3 5 - 5 3 mmol/L    Chloride 104 100 - 108 mmol/L    CO2 27 21 - 32 mmol/L    ANION GAP 5 4 - 13 mmol/L    BUN 18 5 - 25 mg/dL    Creatinine 0 69 0 60 - 1 30 mg/dL    Glucose, Fasting 80 65 - 99 mg/dL    Calcium 9 0 8 3 - 10 1 mg/dL    AST 15 5 - 45 U/L    ALT 18 12 - 78 U/L    Alkaline Phosphatase 76 46 - 116 U/L    Total Protein 8 1 6 4 - 8 2 g/dL    Albumin 3 6 3 5 - 5 0 g/dL    Total Bilirubin 0 51 0 20 - 1 00 mg/dL    eGFR 106 ml/min/1 73sq m            Physical Exam   Constitutional: She is oriented to person, place, and time  She appears well-developed and well-nourished  No distress  Obese   HENT:   Head: Normocephalic and atraumatic  Right Ear: External ear normal    Left Ear: External ear normal    Nose: Nose normal    Mouth/Throat: Oropharynx is clear and moist  No oropharyngeal exudate  Eyes: Pupils are equal, round, and reactive to light  Conjunctivae and EOM are normal  Right eye exhibits no discharge  Left eye exhibits no discharge  Neck: Normal range of motion  Neck supple  No thyromegaly present  Cardiovascular: Normal rate, regular rhythm, normal heart sounds and intact distal pulses  Exam reveals no gallop and no friction rub  No murmur heard  Pulmonary/Chest: Effort normal and breath sounds normal  No stridor  No respiratory distress  She has no wheezes  She has no rales  Abdominal: Soft  Bowel sounds are normal  She exhibits no distension  There is no tenderness  Lymphadenopathy:     She has no cervical adenopathy  Neurological: She is alert and oriented to person, place, and time  Skin: Skin is warm and dry  No rash noted  She is not diaphoretic  No erythema  Psychiatric: She has a normal mood and affect   Her behavior is normal  Judgment and thought content normal      BMI Counseling: Body mass index is 38 12 kg/m²  Discussed with patient's BMI with her  The BMI is above average  BMI counseling and education was provided to the patient  Nutrition recommendations include reducing portion sizes, decreasing overall calorie intake, 3-5 servings of fruits/vegetables daily, reducing fast food intake, consuming healthier snacks, decreasing soda and/or juice intake, moderation in carbohydrate intake, increasing intake of lean protein, reducing intake of saturated fat and trans fat and reducing intake of cholesterol  Exercise recommendations include exercising 3-5 times per week  Referral to weight management was provided to the patient

## 2018-11-19 NOTE — ASSESSMENT & PLAN NOTE
Patient's hemoglobin A1c 5 8, will continue to monitor  Patient is to continue to work on diet and exercise  Limit sugars and carbohydrate intake  Avoid soda, juice, sweets, cookies, desserts, pasta, bread    Eat more whole grains, exercised 30 min of cardio at least 3 times a week  Also recommended daily foot exams to check for sores, and recommended yearly eye exams

## 2018-11-19 NOTE — ASSESSMENT & PLAN NOTE
Patient has elevation in her cholesterol her LDL in her triglycerides  Advised patient to start taking fish oil 2000 mg daily  Patient does not want to start statin at this point in time, would like to work on diet and exercise  Recommend healthy lifestyle choices for your cholesterol  Low fat/low cholesterol diet  Limit/avoid red meat  Eat more lean meat - chicken breast, ground turkey, fish  Exercise 30 mins at least 3 times a week as tolerated  Patient will get follow-up lipid panel prior to her follow-up in 6 months

## 2019-01-14 PROCEDURE — 88175 CYTOPATH C/V AUTO FLUID REDO: CPT | Performed by: PATHOLOGY

## 2019-01-14 PROCEDURE — 88141 CYTOPATH C/V INTERPRET: CPT | Performed by: PATHOLOGY

## 2019-01-14 PROCEDURE — 87624 HPV HI-RISK TYP POOLED RSLT: CPT | Performed by: OBSTETRICS & GYNECOLOGY

## 2019-01-15 ENCOUNTER — LAB REQUISITION (OUTPATIENT)
Dept: LAB | Facility: HOSPITAL | Age: 44
End: 2019-01-15
Payer: COMMERCIAL

## 2019-01-15 DIAGNOSIS — Z01.419 ENCOUNTER FOR GYNECOLOGICAL EXAMINATION WITHOUT ABNORMAL FINDING: ICD-10-CM

## 2019-01-15 DIAGNOSIS — Z11.51 ENCOUNTER FOR SCREENING FOR HUMAN PAPILLOMAVIRUS (HPV): ICD-10-CM

## 2019-01-17 LAB
HPV HR 12 DNA CVX QL NAA+PROBE: NEGATIVE
HPV16 DNA CVX QL NAA+PROBE: NEGATIVE
HPV18 DNA CVX QL NAA+PROBE: NEGATIVE

## 2019-01-22 LAB
LAB AP GYN PRIMARY INTERPRETATION: NORMAL
Lab: NORMAL
PATH INTERP SPEC-IMP: NORMAL

## 2019-02-19 ENCOUNTER — PROCEDURE VISIT (OUTPATIENT)
Dept: NEUROLOGY | Facility: CLINIC | Age: 44
End: 2019-02-19
Payer: COMMERCIAL

## 2019-02-19 VITALS — DIASTOLIC BLOOD PRESSURE: 74 MMHG | SYSTOLIC BLOOD PRESSURE: 106 MMHG | TEMPERATURE: 97.6 F | HEART RATE: 72 BPM

## 2019-02-19 DIAGNOSIS — G43.709 CHRONIC MIGRAINE WITHOUT AURA WITHOUT STATUS MIGRAINOSUS, NOT INTRACTABLE: Primary | ICD-10-CM

## 2019-02-19 PROCEDURE — 64615 CHEMODENERV MUSC MIGRAINE: CPT | Performed by: PHYSICIAN ASSISTANT

## 2019-02-19 NOTE — PROGRESS NOTES
Chemodenervation  Date/Time: 2/19/2019 8:35 AM  Performed by: Rohit Duenas PA-C  Authorized by: Rohit Duenas PA-C     Pre-procedure details:     Preparation: Patient was prepped and draped in usual sterile fashion      Prepped With: Alcohol    Anesthesia (see MAR for exact dosages): Anesthesia method:  None  Procedure details:     Position:  Upright  Botox:     Botox Type:  Type A    Brand:  Botox    mL's of Botulinum Toxin:  200    Final Concentration per CC:  200 units    Needle Gauge:  30 G 2 5 inch  Procedures:     Botox Procedures: chronic headache      Indications: migraines    Injection Location:     Head / Face:  L superior cervical paraspinal, R superior cervical paraspinal, L , R , L frontalis, R frontalis, procerus, R medial occipitalis, L medial occipitalis, L temporalis, R temporalis, L superior trapezius and R superior trapezius    L  injection amount:  5 unit(s)    R  injection amount:  5 unit(s)    L medial frontalis:  10 unit(s)    R medial frontalis:  10 unit(s)    L temporalis injection amount:  20 unit(s)    R temporalis injection amount:  20 unit(s)    Procerus injection amount:  5 unit(s)    L medial occipitalis injection amount:  15 unit(s)    R medial occipitalis injection amount:  15 unit(s)    L superior cervical paraspinal injection amount:  10 unit(s)    R superior cervical paraspinal injection amount:  10 unit(s)    L superior trapezius injection amount:  15 unit(s)    R superior trapezius injection amount:  15 unit(s)  Total Units:     Total units used:  200    Total units discarded:  0  Post-procedure details:     Chemodenervation:  Chronic migraine    Facial Nerve Location[de-identified]  Bilateral facial nerve    Patient tolerance of procedure:   Tolerated well, no immediate complications  Comments:      5 units orbicularis oculi bilaterally  35 units frontalis  All medically necessary

## 2019-04-18 ENCOUNTER — DOCUMENTATION (OUTPATIENT)
Dept: NEUROLOGY | Facility: CLINIC | Age: 44
End: 2019-04-18

## 2019-04-30 ENCOUNTER — TELEPHONE (OUTPATIENT)
Dept: NEUROLOGY | Facility: CLINIC | Age: 44
End: 2019-04-30

## 2019-05-20 ENCOUNTER — PROCEDURE VISIT (OUTPATIENT)
Dept: NEUROLOGY | Facility: CLINIC | Age: 44
End: 2019-05-20
Payer: COMMERCIAL

## 2019-05-20 VITALS — HEART RATE: 80 BPM | DIASTOLIC BLOOD PRESSURE: 86 MMHG | SYSTOLIC BLOOD PRESSURE: 120 MMHG | TEMPERATURE: 97.8 F

## 2019-05-20 DIAGNOSIS — G43.709 CHRONIC MIGRAINE WITHOUT AURA WITHOUT STATUS MIGRAINOSUS, NOT INTRACTABLE: Primary | ICD-10-CM

## 2019-05-20 PROCEDURE — 64615 CHEMODENERV MUSC MIGRAINE: CPT | Performed by: PHYSICIAN ASSISTANT

## 2019-05-23 ENCOUNTER — OFFICE VISIT (OUTPATIENT)
Dept: INTERNAL MEDICINE CLINIC | Facility: CLINIC | Age: 44
End: 2019-05-23
Payer: COMMERCIAL

## 2019-05-23 VITALS
DIASTOLIC BLOOD PRESSURE: 98 MMHG | OXYGEN SATURATION: 98 % | BODY MASS INDEX: 39.02 KG/M2 | TEMPERATURE: 98.2 F | HEART RATE: 96 BPM | WEIGHT: 253 LBS | SYSTOLIC BLOOD PRESSURE: 140 MMHG

## 2019-05-23 DIAGNOSIS — J06.9 VIRAL UPPER RESPIRATORY TRACT INFECTION: Primary | ICD-10-CM

## 2019-05-23 PROCEDURE — 99213 OFFICE O/P EST LOW 20 MIN: CPT | Performed by: INTERNAL MEDICINE

## 2019-05-23 RX ORDER — PROMETHAZINE HYDROCHLORIDE AND CODEINE PHOSPHATE 6.25; 1 MG/5ML; MG/5ML
5 SYRUP ORAL EVERY 4 HOURS PRN
Qty: 120 ML | Refills: 0 | Status: SHIPPED | OUTPATIENT
Start: 2019-05-23 | End: 2019-06-03 | Stop reason: ALTCHOICE

## 2019-05-24 ENCOUNTER — TELEPHONE (OUTPATIENT)
Dept: INTERNAL MEDICINE CLINIC | Facility: CLINIC | Age: 44
End: 2019-05-24

## 2019-05-29 DIAGNOSIS — J06.9 VIRAL UPPER RESPIRATORY TRACT INFECTION: Primary | ICD-10-CM

## 2019-05-29 DIAGNOSIS — J06.9 URI, ACUTE: ICD-10-CM

## 2019-05-29 RX ORDER — AZITHROMYCIN 250 MG/1
TABLET, FILM COATED ORAL
Qty: 6 TABLET | Refills: 0 | Status: SHIPPED | OUTPATIENT
Start: 2019-05-29 | End: 2019-06-03 | Stop reason: ALTCHOICE

## 2019-06-03 ENCOUNTER — OFFICE VISIT (OUTPATIENT)
Dept: INTERNAL MEDICINE CLINIC | Facility: CLINIC | Age: 44
End: 2019-06-03
Payer: COMMERCIAL

## 2019-06-03 VITALS
HEART RATE: 108 BPM | OXYGEN SATURATION: 98 % | SYSTOLIC BLOOD PRESSURE: 140 MMHG | DIASTOLIC BLOOD PRESSURE: 98 MMHG | TEMPERATURE: 98.2 F

## 2019-06-03 DIAGNOSIS — H69.83 EUSTACHIAN TUBE DYSFUNCTION, BILATERAL: Primary | ICD-10-CM

## 2019-06-03 PROBLEM — H69.93 EUSTACHIAN TUBE DYSFUNCTION, BILATERAL: Status: ACTIVE | Noted: 2019-06-03

## 2019-06-03 PROCEDURE — 1036F TOBACCO NON-USER: CPT | Performed by: NURSE PRACTITIONER

## 2019-06-03 PROCEDURE — 99213 OFFICE O/P EST LOW 20 MIN: CPT | Performed by: NURSE PRACTITIONER

## 2019-06-03 RX ORDER — PREDNISONE 20 MG/1
20 TABLET ORAL DAILY
Qty: 5 TABLET | Refills: 0 | Status: SHIPPED | OUTPATIENT
Start: 2019-06-03 | End: 2019-06-08

## 2019-07-31 ENCOUNTER — DOCUMENTATION (OUTPATIENT)
Dept: NEUROLOGY | Facility: CLINIC | Age: 44
End: 2019-07-31

## 2019-08-20 ENCOUNTER — PROCEDURE VISIT (OUTPATIENT)
Dept: NEUROLOGY | Facility: CLINIC | Age: 44
End: 2019-08-20
Payer: COMMERCIAL

## 2019-08-20 ENCOUNTER — TELEPHONE (OUTPATIENT)
Dept: NEUROLOGY | Facility: CLINIC | Age: 44
End: 2019-08-20

## 2019-08-20 VITALS — DIASTOLIC BLOOD PRESSURE: 77 MMHG | SYSTOLIC BLOOD PRESSURE: 122 MMHG | HEART RATE: 84 BPM | TEMPERATURE: 98.7 F

## 2019-08-20 DIAGNOSIS — G43.709 CHRONIC MIGRAINE WITHOUT AURA WITHOUT STATUS MIGRAINOSUS, NOT INTRACTABLE: Primary | ICD-10-CM

## 2019-08-20 PROCEDURE — 64615 CHEMODENERV MUSC MIGRAINE: CPT | Performed by: PHYSICIAN ASSISTANT

## 2019-08-20 NOTE — TELEPHONE ENCOUNTER
Spoke with patient today and she is ok with coming to [facility] as it is only every 3 months and she does not want to transfer her care to another provider

## 2019-08-20 NOTE — PROGRESS NOTES
Chemodenervation  Date/Time: 8/20/2019 12:35 PM  Performed by: Diana Henderson PA-C  Authorized by: Diana Henderson PA-C     Pre-procedure details:     Prepped With: Alcohol    Anesthesia (see MAR for exact dosages): Anesthesia method:  None  Procedure details:     Position:  Upright  Botox:     Botox Type:  Type A    Brand:  Botox    mL's of Botulinum Toxin:  200    Final Concentration per CC:  200 units    Needle Gauge:  30 G 2 5 inch  Procedures:     Botox Procedures: chronic headache      Indications: migraines    Injection Location:     Head / Face:  L superior cervical paraspinal, R superior cervical paraspinal, L , R , L frontalis, R frontalis, L medial occipitalis, R medial occipitalis, procerus, R temporalis, L temporalis, R superior trapezius and L superior trapezius    L  injection amount:  5 unit(s)    R  injection amount:  5 unit(s)    L lateral frontalis:  5 unit(s)    R lateral frontalis:  5 unit(s)    L medial frontalis:  5 unit(s)    R medial frontalis:  5 unit(s)    L temporalis injection amount:  20 unit(s)    R temporalis injection amount:  20 unit(s)    Procerus injection amount:  5 unit(s)    L medial occipitalis injection amount:  15 unit(s)    R medial occipitalis injection amount:  15 unit(s)    L superior cervical paraspinal injection amount:  10 unit(s)    R superior cervical paraspinal injection amount:  10 unit(s)    L superior trapezius injection amount:  15 unit(s)    R superior trapezius injection amount:  15 unit(s)  Total Units:     Total units used:  200    Total units discarded:  0  Post-procedure details:     Chemodenervation:  Chronic migraine    Facial Nerve Location[de-identified]  Bilateral facial nerve    Patient tolerance of procedure:   Tolerated well, no immediate complications  Comments:      5 units orbicularis oculi bilaterally  2 5 units lower frontalis bilaterally   7 5 units temporalis bilaterally  15 units frontalis  All medically necessary

## 2019-08-20 NOTE — TELEPHONE ENCOUNTER
Patient called yesterday 8/19/19 and her call was transferred to [facility]  VM was left and message was relayed to me today  Patient's VM stated needs to r/s [facility] appt from today to [place] office  Returned patient's call and left message requesting call back ASAP

## 2019-09-18 ENCOUNTER — APPOINTMENT (OUTPATIENT)
Dept: LAB | Age: 44
End: 2019-09-18
Payer: COMMERCIAL

## 2019-09-18 ENCOUNTER — OFFICE VISIT (OUTPATIENT)
Dept: INTERNAL MEDICINE CLINIC | Age: 44
End: 2019-09-18
Payer: COMMERCIAL

## 2019-09-18 VITALS
HEART RATE: 96 BPM | WEIGHT: 253.2 LBS | OXYGEN SATURATION: 99 % | SYSTOLIC BLOOD PRESSURE: 116 MMHG | DIASTOLIC BLOOD PRESSURE: 68 MMHG | TEMPERATURE: 98.2 F | HEIGHT: 68 IN | BODY MASS INDEX: 38.37 KG/M2

## 2019-09-18 DIAGNOSIS — E66.9 OBESITY (BMI 35.0-39.9 WITHOUT COMORBIDITY): ICD-10-CM

## 2019-09-18 DIAGNOSIS — R00.2 HEART PALPITATIONS: ICD-10-CM

## 2019-09-18 DIAGNOSIS — E78.2 MIXED DYSLIPIDEMIA: ICD-10-CM

## 2019-09-18 DIAGNOSIS — R63.5 WEIGHT GAIN: ICD-10-CM

## 2019-09-18 DIAGNOSIS — R71.8 ANISOCYTOSIS: ICD-10-CM

## 2019-09-18 DIAGNOSIS — R05.3 CHRONIC COUGH: ICD-10-CM

## 2019-09-18 DIAGNOSIS — D75.839 THROMBOCYTOSIS: ICD-10-CM

## 2019-09-18 DIAGNOSIS — R05.3 CHRONIC COUGH: Primary | ICD-10-CM

## 2019-09-18 DIAGNOSIS — R53.83 FATIGUE, UNSPECIFIED TYPE: ICD-10-CM

## 2019-09-18 DIAGNOSIS — R53.82 CHRONIC FATIGUE: ICD-10-CM

## 2019-09-18 LAB
ALBUMIN SERPL BCP-MCNC: 3.7 G/DL (ref 3.5–5)
ALP SERPL-CCNC: 78 U/L (ref 46–116)
ALT SERPL W P-5'-P-CCNC: 18 U/L (ref 12–78)
ANION GAP SERPL CALCULATED.3IONS-SCNC: 5 MMOL/L (ref 4–13)
AST SERPL W P-5'-P-CCNC: 13 U/L (ref 5–45)
BASOPHILS # BLD AUTO: 0.05 THOUSANDS/ΜL (ref 0–0.1)
BASOPHILS NFR BLD AUTO: 1 % (ref 0–1)
BILIRUB SERPL-MCNC: 0.59 MG/DL (ref 0.2–1)
BUN SERPL-MCNC: 15 MG/DL (ref 5–25)
CALCIUM SERPL-MCNC: 9.1 MG/DL (ref 8.3–10.1)
CHLORIDE SERPL-SCNC: 105 MMOL/L (ref 100–108)
CHOLEST SERPL-MCNC: 212 MG/DL (ref 50–200)
CO2 SERPL-SCNC: 26 MMOL/L (ref 21–32)
CREAT SERPL-MCNC: 0.75 MG/DL (ref 0.6–1.3)
EOSINOPHIL # BLD AUTO: 0.23 THOUSAND/ΜL (ref 0–0.61)
EOSINOPHIL NFR BLD AUTO: 2 % (ref 0–6)
ERYTHROCYTE [DISTWIDTH] IN BLOOD BY AUTOMATED COUNT: 17.7 % (ref 11.6–15.1)
GFR SERPL CREATININE-BSD FRML MDRD: 97 ML/MIN/1.73SQ M
GLUCOSE P FAST SERPL-MCNC: 86 MG/DL (ref 65–99)
HCT VFR BLD AUTO: 39 % (ref 34.8–46.1)
HDLC SERPL-MCNC: 62 MG/DL (ref 40–60)
HGB BLD-MCNC: 11.6 G/DL (ref 11.5–15.4)
IMM GRANULOCYTES # BLD AUTO: 0.04 THOUSAND/UL (ref 0–0.2)
IMM GRANULOCYTES NFR BLD AUTO: 0 % (ref 0–2)
LDLC SERPL CALC-MCNC: 127 MG/DL (ref 0–100)
LYMPHOCYTES # BLD AUTO: 4.33 THOUSANDS/ΜL (ref 0.6–4.47)
LYMPHOCYTES NFR BLD AUTO: 41 % (ref 14–44)
MCH RBC QN AUTO: 24.2 PG (ref 26.8–34.3)
MCHC RBC AUTO-ENTMCNC: 29.7 G/DL (ref 31.4–37.4)
MCV RBC AUTO: 81 FL (ref 82–98)
MONOCYTES # BLD AUTO: 0.58 THOUSAND/ΜL (ref 0.17–1.22)
MONOCYTES NFR BLD AUTO: 5 % (ref 4–12)
NEUTROPHILS # BLD AUTO: 5.45 THOUSANDS/ΜL (ref 1.85–7.62)
NEUTS SEG NFR BLD AUTO: 51 % (ref 43–75)
NONHDLC SERPL-MCNC: 150 MG/DL
NRBC BLD AUTO-RTO: 0 /100 WBCS
PLATELET # BLD AUTO: 444 THOUSANDS/UL (ref 149–390)
PMV BLD AUTO: 10.1 FL (ref 8.9–12.7)
POTASSIUM SERPL-SCNC: 5.1 MMOL/L (ref 3.5–5.3)
PROT SERPL-MCNC: 8.3 G/DL (ref 6.4–8.2)
RBC # BLD AUTO: 4.79 MILLION/UL (ref 3.81–5.12)
SODIUM SERPL-SCNC: 136 MMOL/L (ref 136–145)
TRIGL SERPL-MCNC: 116 MG/DL
TSH SERPL DL<=0.05 MIU/L-ACNC: 1.36 UIU/ML (ref 0.36–3.74)
WBC # BLD AUTO: 10.68 THOUSAND/UL (ref 4.31–10.16)

## 2019-09-18 PROCEDURE — 84443 ASSAY THYROID STIM HORMONE: CPT

## 2019-09-18 PROCEDURE — 85025 COMPLETE CBC W/AUTO DIFF WBC: CPT

## 2019-09-18 PROCEDURE — 36415 COLL VENOUS BLD VENIPUNCTURE: CPT

## 2019-09-18 PROCEDURE — 83550 IRON BINDING TEST: CPT

## 2019-09-18 PROCEDURE — 3008F BODY MASS INDEX DOCD: CPT | Performed by: INTERNAL MEDICINE

## 2019-09-18 PROCEDURE — 82728 ASSAY OF FERRITIN: CPT

## 2019-09-18 PROCEDURE — 80061 LIPID PANEL: CPT

## 2019-09-18 PROCEDURE — 99214 OFFICE O/P EST MOD 30 MIN: CPT | Performed by: INTERNAL MEDICINE

## 2019-09-18 PROCEDURE — 83540 ASSAY OF IRON: CPT

## 2019-09-18 PROCEDURE — 80053 COMPREHEN METABOLIC PANEL: CPT

## 2019-09-18 NOTE — PROGRESS NOTES
Assessment/Plan:    Chronic cough  - possibly secondary to seasonal allergies but cough has been going on for about 2-3 years therefore we will order a chest x-ray to rule out other pulmonary process  - follow-up in 2 weeks    Weight gain with fatigue  - will order a TSH, CMP and CBC  - patient was counseled on the importance of eating a heart healthy diet, not skipping meals and taking multivitamins  - will refer patient to nutrition services  - patient may benefit from a sleep study    Obesity  - diet and exercise counseling given    Hyperlipidemia  - last lipid panel done on September 25, 2019 showed a total cholesterol of 215, triglyceride of 158, HDL of 58 and LDL of 125   - will order a repeat lipid panel  - patient has been counseled to eat a heart healthy diet    Palpitations  - will order a TSH  -follow-up in 2 weeks      BMI Counseling: Body mass index is 39 07 kg/m²  The BMI is above normal  Nutrition recommendations include reducing portion sizes, 3-5 servings of fruits/vegetables daily, consuming healthier snacks, decreasing soda and/or juice intake and moderation in carbohydrate intake  Exercise recommendations include moderate aerobic physical activity for 150 minutes/week  Diagnoses and all orders for this visit:    Chronic cough  -     XR chest pa & lateral; Future  -     CBC and differential; Future    Fatigue, unspecified type  -     CBC and differential; Future  -     Comprehensive metabolic panel; Future    Weight gain  -     Ambulatory referral to Nutrition Services; Future  -     TSH, 3rd generation with Free T4 reflex; Future  -     Comprehensive metabolic panel; Future  -     Ambulatory referral to Nutrition Services; Future    Obesity (BMI 35 0-39 9 without comorbidity)  -     Ambulatory referral to Nutrition Services; Future  -     TSH, 3rd generation with Free T4 reflex; Future  -     Comprehensive metabolic panel;  Future  -     Ambulatory referral to Nutrition Services; Future    Mixed dyslipidemia  -     Lipid panel; Future    Heart palpitations  -     TSH, 3rd generation with Free T4 reflex; Future  -     CBC and differential; Future  -     Comprehensive metabolic panel; Future          Subjective:      Patient ID: Uday Hector is a 38 y o  female  HPI  Chief Complaint   Patient presents with    Fatigue     pt states low energy and pt feels tired, fatigued, both hands - fingers hrt     Patient presents with complaints of fatigue and low energy for the past 1 week  She states that 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 without any problem but for the past week she has been too tired to care  She admits to weight gain of about 50 lb over the past 1 and half years  She also complains of pain in her right medial 3 fingers  She states that she has pain after working  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  She believes that the pain might be related to development of arthritis in her hands  The pain is not restricted to any joints but effects the entire fingers  She admits to excessive daytime sleepiness, snoring at night but denies any witnessed apneic episodes  She does not believe that she has sleep apnea but had never been tested  She would like her thyroid function to be tested  She admits to family history of thyroid disorder in some of her cousins  She denies heavy menstrual bleeding and states that her periods are regular  Her last period was a few days ago  Of note, she states that she skips some meals and eats about 1 meal a day but states that she eats very well  She does not take multivitamins and does not exercise  She wants to be referred to Nutrition to help her with weight loss  She admits to chronic dry cough which she thinks is secondary to allergies for which she takes an over-the-counter antihistamine but states that she has been coughing for about 2-3 years and was like a chest x-ray done    She admits to occasional nausea and palpitations which is chronic but denies heartburn, fever, chills, night sweats, chest pain, shortness of breath, abdominal pain, diarrhea, constipation  The following portions of the patient's history were reviewed and updated as appropriate:   She  has a past medical history of Depression  She   Patient Active Problem List    Diagnosis Date Noted    Chronic cough 2019    Fatigue 2019    Weight gain 2019    Eustachian tube dysfunction, bilateral 2019    Viral upper respiratory tract infection 2019    GERD (gastroesophageal reflux disease) 2018    Screening for breast cancer 2018    Macromastia 10/10/2018    Pre-diabetes 10/10/2018    Mixed dyslipidemia 10/10/2018    Chronic migraine without aura without status migrainosus, not intractable 2018    Migraine with aura and without status migrainosus, not intractable 2018    Seasonal allergies 10/09/2017    Post-nasal drip 2017    Allergic cough 2017    Obesity, Class II, BMI 35-39 9 2017    Anxiety 2017     She  has a past surgical history that includes  section;  section; and Tubal ligation  Her family history is not on file  She  reports that she has never smoked  She has never used smokeless tobacco  She reports that she does not drink alcohol or use drugs  Current Outpatient Medications   Medication Sig Dispense Refill    B Complex Vitamins (VITAMIN B COMPLEX PO) Take by mouth      ketorolac (TORADOL) 10 mg tablet 1-2 tabs at onset of migraine, can repeat X1 in 6 hours, can combine with triptan  Take with food/milk/antacid   30 tablet 0    Naproxen Sodium (ALEVE) 220 MG CAPS Take by mouth      Omega-3 Fatty Acids (FISH OIL) 1,000 mg Take 1,000 mg by mouth daily      onabotulinumtoxin A (BOTOX) 100 units Inject into a muscle once      Vitamin D, Cholecalciferol, 1000 units CAPS Take by mouth      Magnesium 250 MG TABS Take by mouth       No current facility-administered medications for this visit  Current Outpatient Medications on File Prior to Visit   Medication Sig    B Complex Vitamins (VITAMIN B COMPLEX PO) Take by mouth    ketorolac (TORADOL) 10 mg tablet 1-2 tabs at onset of migraine, can repeat X1 in 6 hours, can combine with triptan  Take with food/milk/antacid   Naproxen Sodium (ALEVE) 220 MG CAPS Take by mouth    Omega-3 Fatty Acids (FISH OIL) 1,000 mg Take 1,000 mg by mouth daily    onabotulinumtoxin A (BOTOX) 100 units Inject into a muscle once    Vitamin D, Cholecalciferol, 1000 units CAPS Take by mouth    Magnesium 250 MG TABS Take by mouth    [DISCONTINUED] topiramate (TOPAMAX) 100 mg tablet One at bedtime (Patient not taking: Reported on 9/18/2019)     No current facility-administered medications on file prior to visit  She is allergic to sulfa antibiotics       Review of Systems   Constitutional: Positive for fatigue  Negative for activity change, chills, fever and unexpected weight change (wt gain of about 50lbs in the past year and  a half)  HENT: Negative for ear pain, postnasal drip, rhinorrhea, sinus pressure and sore throat  Eyes: Negative for pain  Respiratory: Negative for cough, choking, chest tightness, shortness of breath and wheezing  Cardiovascular: Positive for palpitations (chronic - family history of palpitations) and leg swelling (on standing for hours)  Negative for chest pain  Gastrointestinal: Negative for abdominal pain, constipation, diarrhea and vomiting  Nausea: occasional    Genitourinary: Negative for dysuria and hematuria  Musculoskeletal: Positive for arthralgias (of the right 3 lateral fingers)  Negative for back pain, gait problem, joint swelling, myalgias and neck stiffness  Skin: Negative for pallor and rash  Neurological: Negative for dizziness, tremors, seizures, syncope, light-headedness and headaches  Hematological: Negative for adenopathy  Psychiatric/Behavioral: Negative for behavioral problems  Past Medical History:   Diagnosis Date    Depression          Current Outpatient Medications:     B Complex Vitamins (VITAMIN B COMPLEX PO), Take by mouth, Disp: , Rfl:     ketorolac (TORADOL) 10 mg tablet, 1-2 tabs at onset of migraine, can repeat X1 in 6 hours, can combine with triptan  Take with food/milk/antacid , Disp: 30 tablet, Rfl: 0    Naproxen Sodium (ALEVE) 220 MG CAPS, Take by mouth, Disp: , Rfl:     Omega-3 Fatty Acids (FISH OIL) 1,000 mg, Take 1,000 mg by mouth daily, Disp: , Rfl:     onabotulinumtoxin A (BOTOX) 100 units, Inject into a muscle once, Disp: , Rfl:     Vitamin D, Cholecalciferol, 1000 units CAPS, Take by mouth, Disp: , Rfl:     Magnesium 250 MG TABS, Take by mouth, Disp: , Rfl:     Allergies   Allergen Reactions    Sulfa Antibiotics Hives     Other reaction(s): Itching       Social History   Past Surgical History:   Procedure Laterality Date     SECTION       SECTION      TUBAL LIGATION       History reviewed  No pertinent family history  Objective:  /68 (BP Location: Left arm, Patient Position: Sitting, Cuff Size: Large)   Pulse 96   Temp 98 2 °F (36 8 °C) (Tympanic)   Ht 5' 7 5" (1 715 m) Comment: shoes off  Wt 115 kg (253 lb 3 2 oz) Comment: shoes off  SpO2 99%   BMI 39 07 kg/m²        Physical Exam   Constitutional: She is oriented to person, place, and time  She appears well-developed and well-nourished  No distress  Obese   HENT:   Head: Normocephalic and atraumatic  Right Ear: External ear normal    Left Ear: External ear normal    Nose: Nose normal    Mouth/Throat: No oropharyngeal exudate  Dry mucous membranes   Eyes: Pupils are equal, round, and reactive to light  Conjunctivae and EOM are normal  Right eye exhibits no discharge  Left eye exhibits no discharge  No scleral icterus  Neck: Normal range of motion  Neck supple  No JVD present   No tracheal deviation present  No thyromegaly present  Neck circumference is 16 4 inches   Cardiovascular: Normal rate, regular rhythm, normal heart sounds and intact distal pulses  Exam reveals no gallop and no friction rub  No murmur heard  Pulmonary/Chest: Effort normal and breath sounds normal  No respiratory distress  She has no wheezes  She has no rales  She exhibits no tenderness  Abdominal: Soft  Bowel sounds are normal  She exhibits no distension and no mass  There is no tenderness  There is no rebound and no guarding  Obese abdomen   Musculoskeletal: Normal range of motion  She exhibits no edema, tenderness or deformity  Lymphadenopathy:     She has no cervical adenopathy  Neurological: She is alert and oriented to person, place, and time  She has normal reflexes  No cranial nerve deficit  She exhibits normal muscle tone  Coordination normal    Skin: Skin is warm and dry  No rash noted  She is not diaphoretic  No erythema  No pallor  Psychiatric: She has a normal mood and affect   Her behavior is normal

## 2019-09-19 ENCOUNTER — TELEPHONE (OUTPATIENT)
Dept: INTERNAL MEDICINE CLINIC | Age: 44
End: 2019-09-19

## 2019-09-19 DIAGNOSIS — R71.8 RBC MICROCYTOSIS: ICD-10-CM

## 2019-09-19 DIAGNOSIS — D75.839 THROMBOCYTOSIS: Primary | ICD-10-CM

## 2019-09-19 DIAGNOSIS — R71.8 ANISOCYTOSIS: ICD-10-CM

## 2019-09-19 DIAGNOSIS — E61.1 IRON DEFICIENCY: Primary | ICD-10-CM

## 2019-09-19 DIAGNOSIS — R53.82 CHRONIC FATIGUE: ICD-10-CM

## 2019-09-19 LAB
FERRITIN SERPL-MCNC: 4 NG/ML (ref 8–388)
IRON SATN MFR SERPL: 5 %
IRON SERPL-MCNC: 27 UG/DL (ref 50–170)
TIBC SERPL-MCNC: 509 UG/DL (ref 250–450)

## 2019-09-19 RX ORDER — FERROUS SULFATE TAB EC 324 MG (65 MG FE EQUIVALENT) 324 (65 FE) MG
324 TABLET DELAYED RESPONSE ORAL
Qty: 30 TABLET | Refills: 1 | Status: SHIPPED | OUTPATIENT
Start: 2019-09-19 | End: 2021-10-06 | Stop reason: ALTCHOICE

## 2019-09-19 NOTE — TELEPHONE ENCOUNTER
I spoke to pt regarding her lab results  She is deficient in iron and I will send in some ferrous sulfate to her pharmacy  She was counseled to keep dieting and exercising to decrease her LDL and total cholesterol

## 2019-09-19 NOTE — PROGRESS NOTES
Iron panel added on to patient's tests because of falling hemoglobin, thrombocytosis, microcytosis and increased RDW which point to possible iron deficiency

## 2019-09-25 NOTE — TELEPHONE ENCOUNTER
Pt called because she still feels very weak and fatigue, she wants to know if there is something else she can take to feel better?

## 2019-09-26 NOTE — TELEPHONE ENCOUNTER
I called her and got her voicemail again and left a message for her to call back  I already spoke with her  about an hour ago about her condition

## 2019-09-26 NOTE — TELEPHONE ENCOUNTER
Patient called again about lack of energy and inability to work  Patient would like to speak with you as she feels the vitamins and supplements are not effective enough

## 2019-10-14 ENCOUNTER — CONSULT (OUTPATIENT)
Dept: PLASTIC SURGERY | Facility: CLINIC | Age: 44
End: 2019-10-14
Payer: COMMERCIAL

## 2019-10-14 VITALS
BODY MASS INDEX: 38.77 KG/M2 | WEIGHT: 255.8 LBS | RESPIRATION RATE: 18 BRPM | HEART RATE: 89 BPM | TEMPERATURE: 98.7 F | HEIGHT: 68 IN | DIASTOLIC BLOOD PRESSURE: 84 MMHG | SYSTOLIC BLOOD PRESSURE: 115 MMHG

## 2019-10-14 DIAGNOSIS — R22.9 SUBCUTANEOUS MASS: Primary | ICD-10-CM

## 2019-10-14 PROCEDURE — 99204 OFFICE O/P NEW MOD 45 MIN: CPT | Performed by: PHYSICIAN ASSISTANT

## 2019-10-14 NOTE — PROGRESS NOTES
Assessment/Plan:  Maral Green is a 58-year-old female who is self-referred for a subcutaneous mass of the left ear lobule  Please see HPI  I discussed with her excision of the left ear lobule subcutaneous mass with complex closure  She understood and agreed  We discussed with the patient the options, benefits, and risks of surgery such as anesthesia, bleeding, infection, scarring and the need for additional procedures  Consent was obtained and all questions answered to her satisfaction  We will plan for surgery at her earliest convenience  Diagnoses and all orders for this visit:    Subcutaneous mass          Subjective:      Patient ID: Manuel Henderson is a 38 y o  female  HPI   She reports a lump in the left ear lobe of which has been present for approximately 4 and half years  She states in that time it is increased in size and now it is becoming painful  The following portions of the patient's history were reviewed and updated as appropriate: She  has a past medical history of Depression  She  has a past surgical history that includes  section;  section; and Tubal ligation  Her family history is not on file  She  reports that she has never smoked  She has never used smokeless tobacco  She reports that she does not drink alcohol or use drugs       Review of Systems   HENT: Negative for hearing loss  Eyes: Negative for visual disturbance  Respiratory: Negative for shortness of breath  Cardiovascular: Negative for chest pain  Gastrointestinal: Negative for abdominal pain, blood in stool, constipation, diarrhea, nausea and vomiting  Genitourinary: Negative for hematuria  Musculoskeletal: Negative for gait problem  Skin:        As per HPI  Neurological: Negative for seizures and headaches  Hematological: Does not bruise/bleed easily  Psychiatric/Behavioral: The patient is not nervous/anxious            Objective:      /84 (BP Location: Left arm)   Pulse 89 Temp 98 7 °F (37 1 °C) (Temporal)   Resp 18   Ht 5' 8" (1 727 m)   Wt 116 kg (255 lb 12 8 oz)   BMI 38 89 kg/m²          Physical Exam   Constitutional: She is oriented to person, place, and time  She appears well-developed and well-nourished  No distress  HENT:   Head: Normocephalic and atraumatic  Eyes: Pupils are equal, round, and reactive to light  EOM are normal  No scleral icterus  Neck: Neck supple  No tracheal deviation present  No thyromegaly present  Cardiovascular: Normal rate and regular rhythm  Exam reveals no gallop and no friction rub  No murmur heard  Pulmonary/Chest: Effort normal and breath sounds normal  She has no wheezes  She has no rales  Abdominal: Soft  Bowel sounds are normal  She exhibits no distension  There is no tenderness  There is no rebound and no guarding  Musculoskeletal: Normal range of motion  Lymphadenopathy:     She has no cervical adenopathy  Neurological: She is alert and oriented to person, place, and time  No cranial nerve deficit  Skin:   Left ear lobe lobule medial aspect subcutaneous mass  It measures approximately 1 cm round and is soft and mobile  No photos taken  Psychiatric: She has a normal mood and affect

## 2019-10-16 PROBLEM — R22.9 LOCALIZED SWELLING, MASS AND LUMP, UNSPECIFIED: Status: ACTIVE | Noted: 2019-10-16

## 2019-10-29 ENCOUNTER — DOCUMENTATION (OUTPATIENT)
Dept: NEUROLOGY | Facility: CLINIC | Age: 44
End: 2019-10-29

## 2019-10-29 NOTE — PROGRESS NOTES
Type Date User Summary Attachment   General 10/29/2019  4:04 PM Rossi Muro care coordination  -   Note    Botox- authorization #: [id number]- 3rd visit- valid from 5/20/2019 until 5/20/2020   Please use our stock      Thank you,     [facility]

## 2019-12-02 NOTE — TELEPHONE ENCOUNTER
Patient is requesting for you to call her regarding the lab results which she completed after seeing you yesterday  Thanks  independent

## 2019-12-05 ENCOUNTER — PROCEDURE VISIT (OUTPATIENT)
Dept: NEUROLOGY | Facility: CLINIC | Age: 44
End: 2019-12-05
Payer: COMMERCIAL

## 2019-12-05 VITALS — SYSTOLIC BLOOD PRESSURE: 140 MMHG | HEART RATE: 91 BPM | TEMPERATURE: 99 F | DIASTOLIC BLOOD PRESSURE: 97 MMHG

## 2019-12-05 DIAGNOSIS — G43.109 MIGRAINE WITH AURA AND WITHOUT STATUS MIGRAINOSUS, NOT INTRACTABLE: Primary | ICD-10-CM

## 2019-12-05 DIAGNOSIS — G43.709 CHRONIC MIGRAINE WITHOUT AURA WITHOUT STATUS MIGRAINOSUS, NOT INTRACTABLE: ICD-10-CM

## 2019-12-05 PROCEDURE — 64615 CHEMODENERV MUSC MIGRAINE: CPT | Performed by: PHYSICIAN ASSISTANT

## 2019-12-05 RX ORDER — KETOROLAC TROMETHAMINE 10 MG/1
TABLET, FILM COATED ORAL
Qty: 30 TABLET | Refills: 0 | Status: SHIPPED | OUTPATIENT
Start: 2019-12-05 | End: 2021-02-05 | Stop reason: SDUPTHER

## 2019-12-05 NOTE — PROGRESS NOTES
Chemodenervation  Date/Time: 12/5/2019 3:21 PM  Performed by: Bonnie Bob PA-C  Authorized by: Bonnie Bob PA-C     Pre-procedure details:     Prepped With: Alcohol    Anesthesia (see MAR for exact dosages): Anesthesia method:  None  Procedure details:     Position:  Upright  Botox:     Botox Type:  Type A    Brand:  Botox    mL's of Botulinum Toxin:  200    Final Concentration per CC:  200 units    Needle Gauge:  30 G 2 5 inch  Procedures:     Botox Procedures: chronic headache      Indications: migraines    Injection Location:     Head / Face:  L superior cervical paraspinal, R superior cervical paraspinal, L , R , L frontalis, R frontalis, L medial occipitalis, R medial occipitalis, procerus, R temporalis, L temporalis, R superior trapezius and L superior trapezius    L  injection amount:  5 unit(s)    R  injection amount:  5 unit(s)    L lateral frontalis:  5 unit(s)    R lateral frontalis:  5 unit(s)    L medial frontalis:  5 unit(s)    R medial frontalis:  5 unit(s)    L temporalis injection amount:  20 unit(s)    R temporalis injection amount:  20 unit(s)    Procerus injection amount:  5 unit(s)    L medial occipitalis injection amount:  15 unit(s)    R medial occipitalis injection amount:  15 unit(s)    L superior cervical paraspinal injection amount:  10 unit(s)    R superior cervical paraspinal injection amount:  10 unit(s)    L superior trapezius injection amount:  15 unit(s)    R superior trapezius injection amount:  15 unit(s)  Total Units:     Total units used:  200    Total units discarded:  0  Post-procedure details:     Chemodenervation:  Chronic migraine    Facial Nerve Location[de-identified]  Bilateral facial nerve    Patient tolerance of procedure:   Tolerated well, no immediate complications  Comments:      5 units orbicularis oculi bilaterally  15 units frontalis  10 units temporalis bilaterally  All medically necessary

## 2019-12-23 ENCOUNTER — TELEPHONE (OUTPATIENT)
Dept: NEUROLOGY | Facility: CLINIC | Age: 44
End: 2019-12-23

## 2019-12-23 DIAGNOSIS — G43.109 MIGRAINE WITH AURA AND WITHOUT STATUS MIGRAINOSUS, NOT INTRACTABLE: Primary | ICD-10-CM

## 2019-12-23 RX ORDER — DEXAMETHASONE 2 MG/1
TABLET ORAL
Qty: 5 TABLET | Refills: 0 | Status: SHIPPED | OUTPATIENT
Start: 2019-12-23 | End: 2021-03-16 | Stop reason: ALTCHOICE

## 2019-12-23 NOTE — TELEPHONE ENCOUNTER
Patient made aware, she is agreeable to Decadron  Please send to [facility] in [place]

## 2019-12-23 NOTE — TELEPHONE ENCOUNTER
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

## 2019-12-23 NOTE — TELEPHONE ENCOUNTER
Pt called c/o worsening migraine  Took toradol  on 12/19/19, 12/20/19, 12/21/19, 12/22/19 1 tab q 8 hours-not effective  Took ibuprofen and ES tylenol (took total of 4 tabs)  Pt did not take any meds today  When did migraine start? 4 days ago    Location/Description: "throbbing and pounding pain in my temple, my eyes and back of her head"    Pain scale: 9    Associated symptoms:nausea, vomiting (vomited x 2 yesterday), +sensitivity to light and sound  Precipitating factors: "I have been working a lot"    What medications have you tried for this migraine headache? PREVENTIVE: Mag, B-2, B-complex, Zoloft, topamax  ABORTIVE: Toradol, Decadron, aleve, advil/ ibuprofen    Educated the patient that we do not recommend they take any OTC med more than 3 days in any 1 week due to medication over use headache  Pt verbalized understanding  Current migraine medications are confirmed as:  toradol 10 mg prn    Medications tried in the past? Tried decadron-not effective  Not tried depakote and olanzapine  Pt is going on short vac, leaving tmrw  Pt is requesting meds for her to take  Requesting rx be sent to [facility] pharmacy             [phone] ok to leave detailed message

## 2019-12-23 NOTE — TELEPHONE ENCOUNTER
Patient calling again requesting response from Dr Sabrina Nguyễn  I advised that the message is currently awaiting a response from the physician

## 2020-02-07 ENCOUNTER — TELEPHONE (OUTPATIENT)
Dept: NEUROLOGY | Facility: CLINIC | Age: 45
End: 2020-02-07

## 2020-02-07 NOTE — TELEPHONE ENCOUNTER
Patient called back and I offered appt on 2/10/2020 at 2 pm but patient declined due to conflicting appts  Patient needs Mondays as she is off of work  Will move patient's around to get patient in  Voicemail left for patient stating appt on 2/24/2020 at 230 pm is now available for patient  Asked for call back to confirm

## 2020-02-07 NOTE — TELEPHONE ENCOUNTER
Adenike Ledezma,    Please schedule the patient for a Botox follow-up  Patient was last seen 10/2018  Patient has an upcoming Botox appointment on 3/10/2020 with Vannessa  Will need updated notes to apply for authorization through [address]  Please let me know once the patient is scheduled      Thanks,    Andie Sands

## 2020-02-07 NOTE — TELEPHONE ENCOUNTER
Patient called back and accepted the appt on 2/24 in [facility] with Melissa Camacho - I will defer to [facility] MA to  Book

## 2020-02-10 ENCOUNTER — ANNUAL EXAM (OUTPATIENT)
Dept: OBGYN CLINIC | Facility: CLINIC | Age: 45
End: 2020-02-10
Payer: COMMERCIAL

## 2020-02-10 VITALS
HEIGHT: 68 IN | WEIGHT: 254 LBS | BODY MASS INDEX: 38.49 KG/M2 | SYSTOLIC BLOOD PRESSURE: 130 MMHG | DIASTOLIC BLOOD PRESSURE: 80 MMHG

## 2020-02-10 DIAGNOSIS — N39.3 URINARY, INCONTINENCE, STRESS FEMALE: ICD-10-CM

## 2020-02-10 DIAGNOSIS — Z01.419 ENCOUNTER FOR WELL WOMAN EXAM: Primary | ICD-10-CM

## 2020-02-10 DIAGNOSIS — Z12.31 SCREENING MAMMOGRAM FOR HIGH-RISK PATIENT: ICD-10-CM

## 2020-02-10 PROCEDURE — 99396 PREV VISIT EST AGE 40-64: CPT | Performed by: PHYSICIAN ASSISTANT

## 2020-02-10 RX ORDER — DIPHENOXYLATE HYDROCHLORIDE AND ATROPINE SULFATE 2.5; .025 MG/1; MG/1
1 TABLET ORAL DAILY
COMMUNITY

## 2020-02-10 NOTE — PROGRESS NOTES
Patient is here for yearly exam   Patient is having regular cycles  She has no breast concerns but does have complaints of urinary incontinence with laughing/sneezing  Patient is not due for a pap smear at this visit  1/14/19 Normal Pap, Negative HPV  4/20/16 Normal Pap, Negative HPV  11/5/14 Normal Pap  Mammo ordered by PCP

## 2020-02-11 NOTE — PROGRESS NOTES
Assessment/Plan:    No problem-specific Assessment & Plan notes found for this encounter  Diagnoses and all orders for this visit:    Encounter for well woman exam    Screening mammogram for high-risk patient  -     Mammo screening bilateral w cad; Future  -     Mammo screening bilateral w cad; Future    Urinary, incontinence, stress female  -     Ambulatory referral to Physical Therapy; Future    Other orders  -     multivitamin (THERAGRAN) TABS; Take 1 tablet by mouth daily          Subjective:      Patient ID: Oneyda Hammer is a 54 y o  female  Pt presents for her annual exam today--  She has no complaints except USI--getting worse  She has regular bleeding, no pelvic pain  Bowel and bladder are regular  No breast concerns today  Last mammo--11/18    No pap today  rx mammo  Referral pfpt      The following portions of the patient's history were reviewed and updated as appropriate: allergies, current medications, past family history, past medical history, past social history, past surgical history and problem list     Review of Systems   Constitutional: Negative for chills, fever and unexpected weight change  Gastrointestinal: Negative for abdominal pain, blood in stool, constipation and diarrhea  Genitourinary: Negative  Objective:      /80   Ht 5' 8" (1 727 m)   Wt 115 kg (254 lb)   LMP 01/22/2020 (Exact Date)   Breastfeeding No   BMI 38 62 kg/m²          Physical Exam   Constitutional: She appears well-developed and well-nourished  HENT:   Head: Normocephalic and atraumatic  Neck: Normal range of motion  Pulmonary/Chest: Right breast exhibits no inverted nipple, no mass, no nipple discharge and no skin change  Left breast exhibits no inverted nipple, no mass, no nipple discharge and no skin change  Abdominal: Soft  Genitourinary: Vagina normal and uterus normal  Pelvic exam was performed with patient supine   There is no rash, tenderness or lesion on the right labia  There is no rash, tenderness or lesion on the left labia  Cervix exhibits no motion tenderness, no discharge and no friability  Right adnexum displays no mass, no tenderness and no fullness  Left adnexum displays no mass, no tenderness and no fullness  Lymphadenopathy: No inguinal adenopathy noted on the right or left side  Nursing note and vitals reviewed

## 2020-02-24 ENCOUNTER — OFFICE VISIT (OUTPATIENT)
Dept: NEUROLOGY | Facility: CLINIC | Age: 45
End: 2020-02-24
Payer: COMMERCIAL

## 2020-02-24 VITALS
WEIGHT: 220 LBS | SYSTOLIC BLOOD PRESSURE: 112 MMHG | HEIGHT: 68 IN | BODY MASS INDEX: 33.34 KG/M2 | DIASTOLIC BLOOD PRESSURE: 84 MMHG | HEART RATE: 110 BPM

## 2020-02-24 DIAGNOSIS — G43.709 CHRONIC MIGRAINE WITHOUT AURA WITHOUT STATUS MIGRAINOSUS, NOT INTRACTABLE: Primary | ICD-10-CM

## 2020-02-24 PROCEDURE — 99214 OFFICE O/P EST MOD 30 MIN: CPT | Performed by: PHYSICIAN ASSISTANT

## 2020-02-24 PROCEDURE — 3008F BODY MASS INDEX DOCD: CPT | Performed by: PHYSICIAN ASSISTANT

## 2020-02-24 PROCEDURE — 1036F TOBACCO NON-USER: CPT | Performed by: PHYSICIAN ASSISTANT

## 2020-02-24 RX ORDER — LANOLIN ALCOHOL/MO/W.PET/CERES
CREAM (GRAM) TOPICAL DAILY
COMMUNITY

## 2020-02-24 NOTE — TELEPHONE ENCOUNTER
Patient seen today in office visit  She is aware that if we do not have determination I might have to push her appt back

## 2020-02-24 NOTE — PATIENT INSTRUCTIONS
Preventive therapy for headaches:  -   Continue Botox, patient seems to doing really well with Botox  Abortive therapy for headaches:  - At onset of her severe headache patients take Ubrevly 50 mg  May repeat if needed in 2 hours  Limit of 200 mg in 24 hours  If you are repeating your dose more than 50% of the time, call for 100 mg tabs  May also use Toradol with food  Neck pain:   - We discussed the role of neck pathology and poor posture, with straightening of the normal cervical lordosis, in headaches  We discussed how tightening of the neck muscles can irritate the nerves in the occipital region of her head and cause or worsen head pain  We also discussed and demonstrated neck strengthening and relaxation exercises, as well as giving written instructions on these exercises  - We talked about the importance of good posture for improving shoulder, neck, and head pain  The patient was given visualization exercises for correcting posture, which patient will practice at home  If this simple exercise does not help improve the posture, we will consider formal physical therapy in the future  Medication overuse headaches:   - We discussed medication overuse headache [facility]) and how to avoid it in the future  It was explained that all analgesics have the potential to cause medication overuse headache [facility]) and analgesic overuse can negate the effectiveness of headache preventive measures  After successful [address] treatment, preventive medications for an underlying primary headache disorder have a greater chance for success  Avoid medications with narcotics, barbiturates, or caffeine in them as these can cause rebound headaches after very few doses and can interfere with other headache medicine efficacy  Taking any analgesics for more than 2-3 days a week can cause medication overuse headache  Reproductive age women: Should take folic acid daily when taking anti-seizure drugs especially Depakote  [place] Prescription Drug Monitoring Program report was reviewed and was appropriate      Headache management instructions  - When patient has a moderate to severe headache, they should seek rest, initiate relaxation and apply cold compresses to the head  - Maintain regular sleep schedule  Adults need at least 7-8 hours of uninterrupted a night  - Limit over the counter medications such as Tylenol, Ibuprofen, Aleve, Excedrin  (No more than 3 times a week)  - Maintain headache diary  We discussed an PATTIE for a smart phone is "Migraine nan"  - Limit caffeine to 1-2 cups 8 to 16 oz a day or less  - Avoid dietary trigger  (aged cheese, peanuts, MSG, aspartame and nitrates)  - Patient is to have regular frequent meals to prevent headache onset  - Please drink at least 64 ounces of water a day to help remain hydrated  Please call with any questions or concerns   Office number is [phone]

## 2020-02-24 NOTE — ASSESSMENT & PLAN NOTE
Preventive therapy for headaches:  -   Continue Botox, patient seems to doing really well with Botox  Abortive therapy for headaches:  - At onset of her severe headache patients take Ubrevly 50 mg  May repeat if needed in 2 hours  Limit of 200 mg in 24 hours  If you are repeating your dose more than 50% of the time, call for 100 mg tabs  May also use Toradol with food

## 2020-02-24 NOTE — PROGRESS NOTES
[address] [facility]  PATIENT:  Rosa Crespo  MRN:  [id number]  :  1975  DATE OF SERVICE:  2020      Assessment/Plan:     Chronic migraine without aura without status migrainosus, not intractable  Preventive therapy for headaches:  -   Continue Botox, patient seems to doing really well with Botox  Abortive therapy for headaches:  - At onset of her severe headache patients take Ubrevly 50 mg  May repeat if needed in 2 hours  Limit of 200 mg in 24 hours  If you are repeating your dose more than 50% of the time, call for 100 mg tabs  May also use Toradol with food  Problem List Items Addressed This Visit        Cardiovascular and Mediastinum    Chronic migraine without aura without status migrainosus, not intractable - Primary     Preventive therapy for headaches:  -   Continue Botox, patient seems to doing really well with Botox  Abortive therapy for headaches:  - At onset of her severe headache patients take Ubrevly 50 mg  May repeat if needed in 2 hours  Limit of 200 mg in 24 hours  If you are repeating your dose more than 50% of the time, call for 100 mg tabs  May also use Toradol with food  Relevant Medications    Ubrogepant (Ubrelvy) 50 MG TABS    Other Relevant Orders    ECG 12 lead    Ambulatory referral to Plastic Surgery             History of Present Illness: We had the pleasure of evaluating Rosa Crespo in neurological follow up  today for headaches  As you know,  she is a 54 y o   right handed female  She owns a hair salon   She takes care of her daughter and son at home      QTc: will order today     Chronic migraine headaches:  PREVENTIVE:   Mag, B-2, B-complex,   Zoloft,   topamax  Botox  ABORTIVE:   Toradol,   Decadron,   aleve, advil/ ibuprofen     Non-Medical/Alternative Treatments used in the past for headaches: Massage- improved neck pain, PT, TENS unit- made neck pain worse     - Headache trigger: Fatigue, Stress/Tension, bright lights, missing meals  - Aura - heavy feeling in the head and neck pain sometimes; sometimes (not with every headache) scotomas/ colorful lights when closing eyes/ spots in eyes lasting about 10 minutes before headaches     Since last seen headaches have improved with Botox injections q 3 months  Since starting botox, the patient reports greater than 7 days of migraine relief from baseline, correlated with headache diary, decreased abortive medication use and decreased ER visits  Patient has been getting Botox for over 6 years and has been life changing for her        How often do the headaches occur - migraines: 2-4 a month prior to Botox 4+ a week (16-20) ; 2-3 per week mild tension type headaches  What time of the day do the headaches start - Variable  How long do the headaches last - 45 minutes to 2 hours (prior to Botox all day)  Where are they located - frontal, eyes, radiate back into the occipital and neck discomfort L>R  What is the intensity of pain - Tension headaches: 7-8/10 and severe headaches: 8-9/10  Describe your usual headache - Throbbing, Pounding  Associated symptoms:   -       Decrease of appetite, nausea   -       Photophobia, phonophobia  -       Problem with concentration  -       Blurred vision  -       Light-headed or dizzy, stiff or sore neck  -       Hands or feet tingle or feel numb  -       Prefer to be alone and in a dark room, unable to work    Number of days missed per month because of headaches:  Work (or school) days: 2-5  Social or Family activities: 2-5    What time of the year do headaches occur more frequently? summer  Have you seen someone else for headaches or pain? No  Have you had trigger point injection performed and how often? No  Have you had Botox injection performed and how often? Yes   Have you had epidural injections or transforaminal injections performed? Yes, with childbirth    Have you used CBD or THC for your headaches and how often? No    Are you current pregnant or planning on getting pregnant? No    Have you ever had any Brain imaging? no     Reviewed old notes from physician seen in the past- see above HPI for summary of previous encounters  Past Medical History:   Diagnosis Date    Depression     History of prior pregnancies         Papanicolaou smear 2019    normal       Patient Active Problem List   Diagnosis    Chronic migraine without aura without status migrainosus, not intractable    Migraine with aura and without status migrainosus, not intractable    Allergic cough    Obesity, Class II, BMI 35-39 9    Post-nasal drip    Seasonal allergies    Anxiety    Macromastia    Pre-diabetes    Mixed dyslipidemia    GERD (gastroesophageal reflux disease)    Screening for breast cancer    Viral upper respiratory tract infection    Eustachian tube dysfunction, bilateral    Chronic cough    Fatigue    Weight gain    Thrombocytosis (HCC)    RBC microcytosis    Iron deficiency    Localized swelling, mass and lump, unspecified       Medications:      Current Outpatient Medications   Medication Sig Dispense Refill    ferrous sulfate 324 (65 Fe) mg Take 1 tablet (324 mg total) by mouth daily before breakfast 30 tablet 1    ketorolac (TORADOL) 10 mg tablet 1-2 tabs at onset of migraine, can repeat X1 in 6 hours, can combine with triptan  Take with food/milk/antacid   30 tablet 0    Magnesium 250 MG TABS Take by mouth      multivitamin (THERAGRAN) TABS Take 1 tablet by mouth daily      Omega-3 Fatty Acids (FISH OIL) 1,000 mg Take 1,000 mg by mouth daily      onabotulinumtoxin A (BOTOX) 100 units Inject into a muscle once      vitamin B-12 (VITAMIN B-12) 1,000 mcg tablet Take by mouth daily      Vitamin D, Cholecalciferol, 1000 units CAPS Take by mouth      B Complex Vitamins (VITAMIN B COMPLEX PO) Take by mouth      dexamethasone (DECADRON) 2 mg tablet 1 in a m  with food, may take 1 today to see if this helps break her headache (Patient not taking: Reported on 2/10/2020) 5 tablet 0    Naproxen Sodium (ALEVE) 220 MG CAPS Take by mouth      Ubrogepant (Ubrelvy) 50 MG TABS Take 50 mg by mouth as needed (migraine) May repeat in 2 hours  Limit of 200 mg  10 tablet 3     No current facility-administered medications for this visit  Allergies:       Allergies   Allergen Reactions    Amoxicillin      Other reaction(s): unknown    Clavulanic Acid      Other reaction(s): unknown    Moxifloxacin     Sulfa Antibiotics Hives     Other reaction(s): Itching       Family History:     Family History   Problem Relation Age of Onset    No Known Problems Mother     No Known Problems Father        Social History:     Social History     Socioeconomic History    Marital status: /Civil Union     Spouse name: Not on file    Number of children: Not on file    Years of education: Not on file    Highest education level: Not on file   Occupational History    Not on file   Social Needs    Financial resource strain: Not on file    Food insecurity:     Worry: Not on file     Inability: Not on file    Transportation needs:     Medical: Not on file     Non-medical: Not on file   Tobacco Use    Smoking status: Never Smoker    Smokeless tobacco: Never Used   Substance and Sexual Activity    Alcohol use: No    Drug use: No    Sexual activity: Yes   Lifestyle    Physical activity:     Days per week: Not on file     Minutes per session: Not on file    Stress: Not on file   Relationships    Social connections:     Talks on phone: Not on file     Gets together: Not on file     Attends Church service: Not on file     Active member of club or organization: Not on file     Attends meetings of clubs or organizations: Not on file     Relationship status: Not on file    Intimate partner violence:     Fear of current or ex partner: Not on file     Emotionally abused: Not on file     Physically abused: Not on file     Forced sexual activity: Not on file   Other Topics Concern    Not on file   Social History Narrative    Not on file         Objective:   Physical Exam:                                                                   Vitals:            /84 (BP Location: Left arm, Patient Position: Sitting, Cuff Size: Extra-Large)   Pulse (!) 110   Ht 5' 8" (1 727 m)   Wt 99 8 kg (220 lb)   BMI 33 45 kg/m²   BP Readings from Last 3 Encounters:   02/24/20 112/84   02/10/20 130/80   12/05/19 140/97     Pulse Readings from Last 3 Encounters:   02/24/20 (!) 110   12/05/19 91   10/14/19 89                CONSTITUTIONAL: Well developed, well nourished, well groomed  No dysmorphic features  obese     Eyes:  PERRLA, EOM normal      Neck:  Normal ROM, neck supple  HEENT:  Normocephalic atraumatic  No meningismus  Oropharynx is clear and moist  No oral mucosal lesions  Chest:  Respirations regular and unlabored  Cardiovascular:  Distal extremities warm without palpable edema or tenderness, no observed significant swelling  Musculoskeletal:  Full range of motion  (see below under neurologic exam for evaluation of motor function and gait)   Skin:  warm and dry   Psychiatric:  Normal behavior and appropriate affect        SKULL AND SPINE  ROM: Full range of motion  Tenderness: No focal tenderness    MENTAL STATUS  Orientation: Alert and oriented x 3  Fund of knowledge: Intact  CRANIAL NERVES  II: PERRL  III, IV, VI: Extraocular movements intact  No nystagmus  V: Facial sensation normal V1-V3  VII: Facial movements normal and symmetric  VIII: Intact hearing bilaterally  IX, X: Palate elevation normal and symmetric  XI: Intact trapezius, SCM strength  XII: Tongue protrudes to the midline    MOTOR (Upper and lower extremities)   Bulk/tone/abnormal movement: Normal muscle bulk and tone  Strength: Strength 5/5 throughout  COORDINATION   Station/Gait: Normal baseline gait       Reflexes:  deep tendon reflexes are 2+/4 throughout     Review of Systems:   Review of Systems  Constitutional: Negative  HENT: Negative  Eyes: Positive for photophobia, pain and visual disturbance (blurry vision at times )  Respiratory: Negative  Cardiovascular: Negative  Gastrointestinal: Positive for nausea  Endocrine: Negative  Genitourinary: Negative  Musculoskeletal: Negative  Skin: Negative  Allergic/Immunologic: Negative  Neurological: Positive for dizziness, numbness (tinglign in finger tips with headache ) and headaches  Hematological: Negative  Psychiatric/Behavioral: Positive for decreased concentration     I personally reviewed the ROS entered by the MA    Greater than 50% of the 25 minutes evaluation was a face-to-face discussion regarding  the pathophysiology of her current symptoms and further plan, as well as counseling, educating, and coordinating the patient's care including risks and benefits of treatment, instructions for disease self management, treatment instructions and follow up requirements    Author:  Ryley Brown PA-C 2/24/2020 3:12 PM

## 2020-02-24 NOTE — PROGRESS NOTES
Review of Systems   Constitutional: Negative  HENT: Negative  Eyes: Positive for photophobia, pain and visual disturbance (blurry vision at times )  Respiratory: Negative  Cardiovascular: Negative  Gastrointestinal: Positive for nausea  Endocrine: Negative  Genitourinary: Negative  Musculoskeletal: Negative  Skin: Negative  Allergic/Immunologic: Negative  Neurological: Positive for dizziness, numbness (tinglign in finger tips with headache ) and headaches  Hematological: Negative  Psychiatric/Behavioral: Positive for decreased concentration

## 2020-02-24 NOTE — TELEPHONE ENCOUNTER
Updated clinical note printed & faxed to [address]  Urgent request requested  Will await approval/denial letter

## 2020-02-25 ENCOUNTER — TELEPHONE (OUTPATIENT)
Dept: NEUROLOGY | Facility: CLINIC | Age: 45
End: 2020-02-25

## 2020-02-25 NOTE — TELEPHONE ENCOUNTER
Received a fax form [employer] stating that nya needs PA    PA completed on [facility] FLYNN

## 2020-02-26 NOTE — TELEPHONE ENCOUNTER
Received a phone call from Rogelio Mace with [facility] - she provided me auth# [id number] valid for 4 visits, AV- 3/9/2020 till 3/9/2021

## 2020-02-26 NOTE — TELEPHONE ENCOUNTER
Called [facility]- spoke with Nayana Enciso- she was able to confirm that the authorization request has been received and is pending clinical review      Due date: 2/27/2020 by the end of the business day  Pending authorization #: [id number]      Call reference #: [id number]

## 2020-02-26 NOTE — TELEPHONE ENCOUNTER
Patient made aware of co-pay card  She will go to their website and use this  Patient also asking about her botox approval  I see in the botox encounter that this has been approved  Patient made aware

## 2020-02-26 NOTE — TELEPHONE ENCOUNTER
Manisha Stoner from [facility] called and states that Kenya MCKEON has been denied  She will be faxing denial letter to [phone]  Called and Left a message on pt's answering machine for a call back    Clinical team: when pt calls back, pls advise of the above and ask her to go to [facility] website and enroll for copay card, can use this at the pharmacy to get this med

## 2020-02-27 ENCOUNTER — DOCUMENTATION (OUTPATIENT)
Dept: NEUROLOGY | Facility: CLINIC | Age: 45
End: 2020-02-27

## 2020-02-27 NOTE — PROGRESS NOTES
Type Date User Summary Attachment   General 02/26/2020  1:46 PM Pamela Slade care coordination  -   Note    Botox- authorization #: [id number]- valid for 4 visits- from 3/9/2020 until 3/9/2021   Please use our stock      Thank you,     Beatriz Garner

## 2020-03-09 ENCOUNTER — PROCEDURE VISIT (OUTPATIENT)
Dept: NEUROLOGY | Facility: CLINIC | Age: 45
End: 2020-03-09
Payer: COMMERCIAL

## 2020-03-09 VITALS — HEART RATE: 97 BPM | TEMPERATURE: 99.4 F | DIASTOLIC BLOOD PRESSURE: 86 MMHG | SYSTOLIC BLOOD PRESSURE: 122 MMHG

## 2020-03-09 DIAGNOSIS — G43.709 CHRONIC MIGRAINE WITHOUT AURA WITHOUT STATUS MIGRAINOSUS, NOT INTRACTABLE: Primary | ICD-10-CM

## 2020-03-09 PROCEDURE — 64615 CHEMODENERV MUSC MIGRAINE: CPT | Performed by: PHYSICIAN ASSISTANT

## 2020-03-09 NOTE — PROGRESS NOTES
Chemodenervation  Date/Time: 3/9/2020 12:59 PM  Performed by: Eddie Rosales PA-C  Authorized by: Eddie Rosales PA-C     Pre-procedure details:     Prepped With: Alcohol    Anesthesia (see MAR for exact dosages): Anesthesia method:  None  Procedure details:     Position:  Upright  Botox:     Botox Type:  Type A    Brand:  Botox    mL's of Botulinum Toxin:  200    Final Concentration per CC:  200 units    Needle Gauge:  30 G 2 5 inch  Procedures:     Botox Procedures: chronic headache      Indications: migraines    Injection Location:     Head / Face:  L superior cervical paraspinal, R superior cervical paraspinal, L , R , L frontalis, R frontalis, L medial occipitalis, R medial occipitalis, procerus, R temporalis, L temporalis, R superior trapezius and L superior trapezius    L  injection amount:  5 unit(s)    R  injection amount:  5 unit(s)    L lateral frontalis:  5 unit(s)    R lateral frontalis:  5 unit(s)    L medial frontalis:  5 unit(s)    R medial frontalis:  5 unit(s)    L temporalis injection amount:  20 unit(s)    R temporalis injection amount:  20 unit(s)    Procerus injection amount:  5 unit(s)    L medial occipitalis injection amount:  15 unit(s)    R medial occipitalis injection amount:  15 unit(s)    L superior cervical paraspinal injection amount:  10 unit(s)    R superior cervical paraspinal injection amount:  10 unit(s)    L superior trapezius injection amount:  15 unit(s)    R superior trapezius injection amount:  15 unit(s)  Total Units:     Total units used:  200    Total units discarded:  0  Post-procedure details:     Chemodenervation:  Chronic migraine    Facial Nerve Location[de-identified]  Bilateral facial nerve    Patient tolerance of procedure:   Tolerated well, no immediate complications  Comments:      5 units orbicularis oculi bilaterally  2 5 units lower frontalis bilaterally  7 5 temporalis bilaterally  15 units frontalis  All medically necessary

## 2020-04-28 ENCOUNTER — TELEPHONE (OUTPATIENT)
Dept: INTERNAL MEDICINE CLINIC | Age: 45
End: 2020-04-28

## 2020-05-19 ENCOUNTER — DOCUMENTATION (OUTPATIENT)
Dept: NEUROLOGY | Facility: CLINIC | Age: 45
End: 2020-05-19

## 2020-06-15 ENCOUNTER — PROCEDURE VISIT (OUTPATIENT)
Dept: NEUROLOGY | Facility: CLINIC | Age: 45
End: 2020-06-15
Payer: COMMERCIAL

## 2020-06-15 VITALS — HEART RATE: 94 BPM | TEMPERATURE: 98.9 F | SYSTOLIC BLOOD PRESSURE: 126 MMHG | DIASTOLIC BLOOD PRESSURE: 94 MMHG

## 2020-06-15 DIAGNOSIS — G43.709 CHRONIC MIGRAINE WITHOUT AURA WITHOUT STATUS MIGRAINOSUS, NOT INTRACTABLE: Primary | ICD-10-CM

## 2020-06-15 PROCEDURE — 64615 CHEMODENERV MUSC MIGRAINE: CPT | Performed by: PHYSICIAN ASSISTANT

## 2020-08-07 DIAGNOSIS — E55.9 VITAMIN D DEFICIENCY: Primary | ICD-10-CM

## 2020-08-07 RX ORDER — ERGOCALCIFEROL (VITAMIN D2) 1250 MCG
50000 CAPSULE ORAL WEEKLY
Qty: 12 CAPSULE | Refills: 0 | Status: SHIPPED | OUTPATIENT
Start: 2020-08-07 | End: 2020-08-21 | Stop reason: SDUPTHER

## 2020-08-10 ENCOUNTER — DOCUMENTATION (OUTPATIENT)
Dept: NEUROLOGY | Facility: CLINIC | Age: 45
End: 2020-08-10

## 2020-08-10 NOTE — PROGRESS NOTES
Type  Date  User  Summary  Attachment    General  08/10/2020 11:42 AM  Yessica [facility]  -    Note     Botox- authorization #: [id number]- 3rd visit- valid from 3/9/2020 until 3/9/2021   Please use our stock      Thank you,     Sonia Rice

## 2020-08-19 ENCOUNTER — TELEPHONE (OUTPATIENT)
Dept: OBGYN CLINIC | Facility: CLINIC | Age: 45
End: 2020-08-19

## 2020-08-19 NOTE — TELEPHONE ENCOUNTER
Needs pelvic US and endo bx if considering ablation--  If we discussed at her yearly, I likely gave her info  She probably does not need an appt but an US 1st, then appt with  for [employer]

## 2020-08-19 NOTE — TELEPHONE ENCOUNTER
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

## 2020-08-19 NOTE — TELEPHONE ENCOUNTER
Spoke with patient-made her appt with dr Juan Talbert for tomorrow  Stated you had mentioned ablation to her at her yearly and she feels she needs to consider this now

## 2020-08-20 ENCOUNTER — OFFICE VISIT (OUTPATIENT)
Dept: OBGYN CLINIC | Facility: CLINIC | Age: 45
End: 2020-08-20
Payer: COMMERCIAL

## 2020-08-20 VITALS
HEIGHT: 68 IN | BODY MASS INDEX: 39.25 KG/M2 | WEIGHT: 259 LBS | DIASTOLIC BLOOD PRESSURE: 80 MMHG | SYSTOLIC BLOOD PRESSURE: 130 MMHG

## 2020-08-20 DIAGNOSIS — N92.0 MENORRHAGIA WITH REGULAR CYCLE: Primary | ICD-10-CM

## 2020-08-20 DIAGNOSIS — Z12.31 ENCOUNTER FOR SCREENING MAMMOGRAM FOR BREAST CANCER: ICD-10-CM

## 2020-08-20 PROCEDURE — 1036F TOBACCO NON-USER: CPT | Performed by: OBSTETRICS & GYNECOLOGY

## 2020-08-20 PROCEDURE — 99213 OFFICE O/P EST LOW 20 MIN: CPT | Performed by: OBSTETRICS & GYNECOLOGY

## 2020-08-20 PROCEDURE — 3008F BODY MASS INDEX DOCD: CPT | Performed by: OBSTETRICS & GYNECOLOGY

## 2020-08-20 NOTE — PROGRESS NOTES
The patient is here to discuss menorrhagia and getting an ablation done  The patient has been bleeding since 8/10/20 and she has heavy clotting and vaginal cramping

## 2020-08-20 NOTE — PROGRESS NOTES
Patient presents to the office discuss heavy periods  Patient's menstrual cycles are every 28 days lasting 10 days with heavy bleeding 6-7 days  Patient's last menstrual period was 08/10/2020 and is still bleeding  Patient was anemic by labs last year  Patient takes Aleve without any decrease in blood flow  Patient denies any pelvic pain  Patient had a  with bilateral salpingectomy  Patient was never on birth control pills and is not interested in any hormonal manipulation  She was interested in endometrial ablation  This was discussed  Patient was present with her  during the visit today  Medications reviewed  Patient denies any bowel or bladder issues  Physical exam:  Abdomen soft nontender  External genitalia normal   Vagina filled with dark blood  Cervix no lesions with dark blood from the cervical os  Uterus nontender mobile top-normal size  No adnexal masses appreciated  Impression:  Menorrhagia    Plan:  Check CBC with platelet  Check pelvic ultrasound after this menstrual cycle  If still bleeding in 5 days will Rx Provera for 10 days before ultrasound  Explained to patient that depending on the ultrasound she would need an endometrial biopsy prior to scheduling an ablation  Information on endometrial ablation given

## 2020-08-21 ENCOUNTER — OFFICE VISIT (OUTPATIENT)
Dept: INTERNAL MEDICINE CLINIC | Facility: CLINIC | Age: 45
End: 2020-08-21
Payer: COMMERCIAL

## 2020-08-21 VITALS
SYSTOLIC BLOOD PRESSURE: 140 MMHG | TEMPERATURE: 97.7 F | HEIGHT: 68 IN | DIASTOLIC BLOOD PRESSURE: 88 MMHG | WEIGHT: 260.2 LBS | BODY MASS INDEX: 39.43 KG/M2 | HEART RATE: 89 BPM | OXYGEN SATURATION: 98 %

## 2020-08-21 DIAGNOSIS — K21.9 GASTROESOPHAGEAL REFLUX DISEASE WITHOUT ESOPHAGITIS: ICD-10-CM

## 2020-08-21 DIAGNOSIS — G43.709 CHRONIC MIGRAINE WITHOUT AURA WITHOUT STATUS MIGRAINOSUS, NOT INTRACTABLE: ICD-10-CM

## 2020-08-21 DIAGNOSIS — E61.1 IRON DEFICIENCY: Primary | ICD-10-CM

## 2020-08-21 DIAGNOSIS — E78.2 MIXED DYSLIPIDEMIA: ICD-10-CM

## 2020-08-21 DIAGNOSIS — R73.03 PRE-DIABETES: ICD-10-CM

## 2020-08-21 DIAGNOSIS — E55.9 VITAMIN D DEFICIENCY: ICD-10-CM

## 2020-08-21 PROBLEM — R22.9 LOCALIZED SWELLING, MASS AND LUMP, UNSPECIFIED: Status: RESOLVED | Noted: 2019-10-16 | Resolved: 2020-08-21

## 2020-08-21 PROBLEM — J06.9 VIRAL UPPER RESPIRATORY TRACT INFECTION: Status: RESOLVED | Noted: 2019-05-23 | Resolved: 2020-08-21

## 2020-08-21 PROBLEM — R09.82 POST-NASAL DRIP: Status: RESOLVED | Noted: 2017-08-21 | Resolved: 2020-08-21

## 2020-08-21 PROBLEM — G43.109 MIGRAINE WITH AURA AND WITHOUT STATUS MIGRAINOSUS, NOT INTRACTABLE: Status: RESOLVED | Noted: 2018-04-23 | Resolved: 2020-08-21

## 2020-08-21 PROBLEM — R05.8 ALLERGIC COUGH: Status: RESOLVED | Noted: 2017-05-08 | Resolved: 2020-08-21

## 2020-08-21 PROBLEM — R71.8 RBC MICROCYTOSIS: Status: RESOLVED | Noted: 2019-09-19 | Resolved: 2020-08-21

## 2020-08-21 PROBLEM — R53.83 FATIGUE: Status: RESOLVED | Noted: 2019-09-18 | Resolved: 2020-08-21

## 2020-08-21 PROCEDURE — 99214 OFFICE O/P EST MOD 30 MIN: CPT | Performed by: NURSE PRACTITIONER

## 2020-08-21 PROCEDURE — 1036F TOBACCO NON-USER: CPT | Performed by: NURSE PRACTITIONER

## 2020-08-21 PROCEDURE — 3008F BODY MASS INDEX DOCD: CPT | Performed by: NURSE PRACTITIONER

## 2020-08-21 RX ORDER — ERGOCALCIFEROL (VITAMIN D2) 1250 MCG
50000 CAPSULE ORAL WEEKLY
Qty: 12 CAPSULE | Refills: 0 | Status: SHIPPED | OUTPATIENT
Start: 2020-08-21 | End: 2021-02-05

## 2020-08-21 NOTE — PROGRESS NOTES
Assessment/Plan:    GERD (gastroesophageal reflux disease)   You may use OTC tums as needed  Recommend small frequent meals throughout the day  Avoid aggravating foods - spicy foods, acidic foods - such as tomato and citrus  Avoid alcohol  Do not lay down for at least 30 mins after eating  Chronic migraine without aura without status migrainosus, not intractable  Patient currently follows neurology  Pre-diabetes    Patient's hemoglobin A1c 5 8, will continue to monitor  Patient is to continue to work on diet and exercise  Limit sugars and carbohydrate intake  Avoid soda, juice, sweets, cookies, desserts, pasta, bread    Eat more whole grains, exercised 30 min of cardio at least 3 times a week  Also recommended daily foot exams to check for sores, and recommended yearly eye exams  Mixed dyslipidemia   Patient has elevation in her cholesterol her LDL in her triglycerides  Advised patient to start taking fish oil 2000 mg daily  Patient does not want to start statin at this point in time, would like to work on diet and exercise  Recommend healthy lifestyle choices for your cholesterol  Low fat/low cholesterol diet  Limit/avoid red meat  Eat more lean meat - chicken breast, ground turkey, fish  Exercise 30 mins at least 3 times a week as tolerated  Patient will get follow-up lipid panel prior to her follow-up in 6 months  Iron deficiency  Patient is to restart iron supplementation and at on vitamin-C 500mg daily  Will get repeat blood work  BMI Counseling: Body mass index is 39 56 kg/m²  The BMI is above normal  Nutrition recommendations include decreasing portion sizes, encouraging healthy choices of fruits and vegetables, decreasing fast food intake, consuming healthier snacks, limiting drinks that contain sugar, moderation in carbohydrate intake, increasing intake of lean protein, reducing intake of saturated and trans fat and reducing intake of cholesterol   Exercise recommendations include moderate physical activity 150 minutes/week and exercising 3-5 times per week  Diagnoses and all orders for this visit:    Iron deficiency  -     Iron Panel (Includes Ferritin, Iron Sat%, Iron, and TIBC); Future    Pre-diabetes  -     Hemoglobin A1C    Mixed dyslipidemia  -     CBC and differential  -     Comprehensive metabolic panel; Future  -     Lipid panel    Vitamin D deficiency  -     ergocalciferol (ERGOCALCIFEROL) 1 25 MG (24413 UT) capsule; Take 1 capsule (50,000 Units total) by mouth once a week    Gastroesophageal reflux disease without esophagitis    Chronic migraine without aura without status migrainosus, not intractable          Subjective:      Patient ID: Wolf Linares is a 54 y o  female  Patient presents today to, and follow-up on dyslipidemia, anemia and migraine  Screenings:  Gyn-October of this year, had pap, denies abnormal paps  Mammogram-due for mammogram, goes yearly  Eye Doctor-has not been in a while  Dentist-every 6 months        Migraine    This is a chronic problem  The current episode started more than 1 year ago  The problem occurs intermittently  The problem has been waxing and waning  Pertinent negatives include no abdominal pain, back pain, coughing, dizziness, ear pain, eye pain, fever, nausea, neck pain, numbness, rhinorrhea, sinus pressure, sore throat, vomiting or weakness  Her past medical history is significant for migraine headaches and obesity  Hyperlipidemia   This is a chronic problem  The current episode started more than 1 year ago  The problem is uncontrolled  Recent lipid tests were reviewed and are high  Exacerbating diseases include obesity  She has no history of diabetes  Factors aggravating her hyperlipidemia include fatty foods  Pertinent negatives include no chest pain, focal sensory loss, focal weakness, leg pain, myalgias or shortness of breath   Current antihyperlipidemic treatment includes diet change and exercise  The current treatment provides no improvement of lipids  Compliance problems include adherence to diet and adherence to exercise  Risk factors for coronary artery disease include a sedentary lifestyle, dyslipidemia and obesity  The following portions of the patient's history were reviewed and updated as appropriate: allergies, current medications, past family history, past medical history, past social history, past surgical history and problem list     Review of Systems   Constitutional: Negative for activity change, appetite change, chills, diaphoresis and fever  HENT: Negative for congestion, ear discharge, ear pain, postnasal drip, rhinorrhea, sinus pressure, sinus pain and sore throat  Eyes: Negative for pain, discharge, itching and visual disturbance  Respiratory: Negative for cough, chest tightness, shortness of breath and wheezing  Cardiovascular: Negative for chest pain, palpitations and leg swelling  Gastrointestinal: Negative for abdominal pain, constipation, diarrhea, nausea and vomiting  Bloating   Endocrine: Negative for polydipsia, polyphagia and polyuria  Genitourinary: Negative for difficulty urinating, dysuria and urgency  Musculoskeletal: Negative for arthralgias, back pain, myalgias and neck pain  Skin: Negative for rash and wound  Neurological: Negative for dizziness, focal weakness, weakness, numbness and headaches           Past Medical History:   Diagnosis Date    Depression     History of prior pregnancies         Migraine with aura and without status migrainosus, not intractable 2018    Papanicolaou smear 2019    normal         Current Outpatient Medications:     B Complex Vitamins (VITAMIN B COMPLEX PO), Take by mouth, Disp: , Rfl:     ergocalciferol (ERGOCALCIFEROL) 1 25 MG (88477 UT) capsule, Take 1 capsule (50,000 Units total) by mouth once a week, Disp: 12 capsule, Rfl: 0    ferrous sulfate 324 (65 Fe) mg, Take 1 tablet (324 mg total) by mouth daily before breakfast, Disp: 30 tablet, Rfl: 1    ketorolac (TORADOL) 10 mg tablet, 1-2 tabs at onset of migraine, can repeat X1 in 6 hours, can combine with triptan  Take with food/milk/antacid , Disp: 30 tablet, Rfl: 0    Magnesium 250 MG TABS, Take by mouth, Disp: , Rfl:     multivitamin (THERAGRAN) TABS, Take 1 tablet by mouth daily, Disp: , Rfl:     Naproxen Sodium (ALEVE) 220 MG CAPS, Take by mouth, Disp: , Rfl:     onabotulinumtoxin A (BOTOX) 100 units, Inject into a muscle once, Disp: , Rfl:     Ubrogepant (Ubrelvy) 50 MG TABS, Take 50 mg by mouth as needed (migraine) May repeat in 2 hours  Limit of 200 mg , Disp: 10 tablet, Rfl: 3    vitamin B-12 (VITAMIN B-12) 1,000 mcg tablet, Take by mouth daily, Disp: , Rfl:     Vitamin D, Cholecalciferol, 1000 units CAPS, Take by mouth, Disp: , Rfl:     dexamethasone (DECADRON) 2 mg tablet, 1 in a m  with food, may take 1 today to see if this helps break her headache (Patient not taking: Reported on 2/10/2020), Disp: 5 tablet, Rfl: 0    Omega-3 Fatty Acids (FISH OIL) 1,000 mg, Take 1,000 mg by mouth daily, Disp: , Rfl:     Allergies   Allergen Reactions    Amoxicillin      Other reaction(s): unknown    Clavulanic Acid      Other reaction(s): unknown    Moxifloxacin     Sulfa Antibiotics Hives     Other reaction(s): Itching       Social History   Past Surgical History:   Procedure Laterality Date     SECTION       SECTION      TUBAL LIGATION       Family History   Problem Relation Age of Onset    No Known Problems Mother     No Known Problems Father        Objective:  /88 (BP Location: Left arm, Patient Position: Sitting, Cuff Size: Large)   Pulse 89   Temp 97 7 °F (36 5 °C) (Temporal)   Ht 5' 8" (1 727 m)   Wt 118 kg (260 lb 3 2 oz)   LMP 08/10/2020 (Exact Date)   SpO2 98%   BMI 39 56 kg/m²     No results found for this or any previous visit (from the past 1344 hour(s))           Physical Exam  Constitutional:       General: She is not in acute distress  Appearance: She is well-developed  She is not diaphoretic  Comments: overweight   HENT:      Head: Normocephalic and atraumatic  Right Ear: External ear normal       Left Ear: External ear normal       Nose: Nose normal       Mouth/Throat:      Pharynx: No oropharyngeal exudate  Eyes:      General:         Right eye: No discharge  Left eye: No discharge  Conjunctiva/sclera: Conjunctivae normal       Pupils: Pupils are equal, round, and reactive to light  Neck:      Musculoskeletal: Normal range of motion and neck supple  Thyroid: No thyromegaly  Cardiovascular:      Rate and Rhythm: Normal rate and regular rhythm  Heart sounds: Normal heart sounds  No murmur  No friction rub  No gallop  Pulmonary:      Effort: Pulmonary effort is normal  No respiratory distress  Breath sounds: Normal breath sounds  No stridor  No wheezing or rales  Abdominal:      General: Bowel sounds are normal  There is no distension  Palpations: Abdomen is soft  Tenderness: There is no abdominal tenderness  Lymphadenopathy:      Cervical: No cervical adenopathy  Skin:     General: Skin is warm and dry  Findings: No erythema or rash  Neurological:      Mental Status: She is alert and oriented to person, place, and time  Psychiatric:         Behavior: Behavior normal          Thought Content:  Thought content normal          Judgment: Judgment normal

## 2020-08-21 NOTE — ASSESSMENT & PLAN NOTE
Patient is to restart iron supplementation and at on vitamin-C 500mg daily  Will get repeat blood work

## 2020-08-26 ENCOUNTER — APPOINTMENT (OUTPATIENT)
Dept: LAB | Age: 45
End: 2020-08-26
Payer: COMMERCIAL

## 2020-08-26 ENCOUNTER — HOSPITAL ENCOUNTER (OUTPATIENT)
Dept: RADIOLOGY | Age: 45
Discharge: HOME | End: 2020-08-26
Payer: COMMERCIAL

## 2020-08-26 DIAGNOSIS — E61.1 IRON DEFICIENCY: ICD-10-CM

## 2020-08-26 DIAGNOSIS — E78.2 MIXED DYSLIPIDEMIA: ICD-10-CM

## 2020-08-26 DIAGNOSIS — N92.0 MENORRHAGIA WITH REGULAR CYCLE: ICD-10-CM

## 2020-08-26 LAB
ALBUMIN SERPL BCP-MCNC: 3.5 G/DL (ref 3.5–5)
ALP SERPL-CCNC: 69 U/L (ref 46–116)
ALT SERPL W P-5'-P-CCNC: 22 U/L (ref 12–78)
ANION GAP SERPL CALCULATED.3IONS-SCNC: 5 MMOL/L (ref 4–13)
AST SERPL W P-5'-P-CCNC: 15 U/L (ref 5–45)
BASOPHILS # BLD AUTO: 0.05 THOUSANDS/ΜL (ref 0–0.1)
BASOPHILS NFR BLD AUTO: 1 % (ref 0–1)
BILIRUB SERPL-MCNC: 0.52 MG/DL (ref 0.2–1)
BUN SERPL-MCNC: 13 MG/DL (ref 5–25)
CALCIUM SERPL-MCNC: 9.1 MG/DL (ref 8.3–10.1)
CHLORIDE SERPL-SCNC: 107 MMOL/L (ref 100–108)
CHOLEST SERPL-MCNC: 208 MG/DL (ref 50–200)
CO2 SERPL-SCNC: 27 MMOL/L (ref 21–32)
CREAT SERPL-MCNC: 0.66 MG/DL (ref 0.6–1.3)
EOSINOPHIL # BLD AUTO: 0.23 THOUSAND/ΜL (ref 0–0.61)
EOSINOPHIL NFR BLD AUTO: 2 % (ref 0–6)
ERYTHROCYTE [DISTWIDTH] IN BLOOD BY AUTOMATED COUNT: 15 % (ref 11.6–15.1)
EST. AVERAGE GLUCOSE BLD GHB EST-MCNC: 117 MG/DL
FERRITIN SERPL-MCNC: 24 NG/ML (ref 8–388)
GFR SERPL CREATININE-BSD FRML MDRD: 106 ML/MIN/1.73SQ M
GLUCOSE P FAST SERPL-MCNC: 84 MG/DL (ref 65–99)
HBA1C MFR BLD: 5.7 %
HCT VFR BLD AUTO: 41.7 % (ref 34.8–46.1)
HDLC SERPL-MCNC: 62 MG/DL
HGB BLD-MCNC: 12.9 G/DL (ref 11.5–15.4)
IMM GRANULOCYTES # BLD AUTO: 0.03 THOUSAND/UL (ref 0–0.2)
IMM GRANULOCYTES NFR BLD AUTO: 0 % (ref 0–2)
IRON SATN MFR SERPL: 18 %
IRON SERPL-MCNC: 72 UG/DL (ref 50–170)
LDLC SERPL CALC-MCNC: 121 MG/DL (ref 0–100)
LYMPHOCYTES # BLD AUTO: 3.77 THOUSANDS/ΜL (ref 0.6–4.47)
LYMPHOCYTES NFR BLD AUTO: 37 % (ref 14–44)
MCH RBC QN AUTO: 27.7 PG (ref 26.8–34.3)
MCHC RBC AUTO-ENTMCNC: 30.9 G/DL (ref 31.4–37.4)
MCV RBC AUTO: 90 FL (ref 82–98)
MONOCYTES # BLD AUTO: 0.6 THOUSAND/ΜL (ref 0.17–1.22)
MONOCYTES NFR BLD AUTO: 6 % (ref 4–12)
NEUTROPHILS # BLD AUTO: 5.49 THOUSANDS/ΜL (ref 1.85–7.62)
NEUTS SEG NFR BLD AUTO: 54 % (ref 43–75)
NONHDLC SERPL-MCNC: 146 MG/DL
NRBC BLD AUTO-RTO: 0 /100 WBCS
PLATELET # BLD AUTO: 365 THOUSANDS/UL (ref 149–390)
PMV BLD AUTO: 10.2 FL (ref 8.9–12.7)
POTASSIUM SERPL-SCNC: 4.5 MMOL/L (ref 3.5–5.3)
PROT SERPL-MCNC: 7.9 G/DL (ref 6.4–8.2)
RBC # BLD AUTO: 4.65 MILLION/UL (ref 3.81–5.12)
SODIUM SERPL-SCNC: 139 MMOL/L (ref 136–145)
TIBC SERPL-MCNC: 393 UG/DL (ref 250–450)
TRIGL SERPL-MCNC: 127 MG/DL
WBC # BLD AUTO: 10.17 THOUSAND/UL (ref 4.31–10.16)

## 2020-08-26 PROCEDURE — 80053 COMPREHEN METABOLIC PANEL: CPT

## 2020-08-26 PROCEDURE — 82728 ASSAY OF FERRITIN: CPT

## 2020-08-26 PROCEDURE — 80061 LIPID PANEL: CPT | Performed by: NURSE PRACTITIONER

## 2020-08-26 PROCEDURE — 76830 TRANSVAGINAL US NON-OB: CPT

## 2020-08-26 PROCEDURE — 85025 COMPLETE CBC W/AUTO DIFF WBC: CPT | Performed by: NURSE PRACTITIONER

## 2020-08-26 PROCEDURE — 83036 HEMOGLOBIN GLYCOSYLATED A1C: CPT | Performed by: NURSE PRACTITIONER

## 2020-08-26 PROCEDURE — 36415 COLL VENOUS BLD VENIPUNCTURE: CPT

## 2020-08-26 PROCEDURE — 83550 IRON BINDING TEST: CPT

## 2020-08-26 PROCEDURE — 76856 US EXAM PELVIC COMPLETE: CPT

## 2020-08-26 PROCEDURE — 83540 ASSAY OF IRON: CPT

## 2020-09-16 ENCOUNTER — TELEPHONE (OUTPATIENT)
Dept: NEUROLOGY | Facility: CLINIC | Age: 45
End: 2020-09-16

## 2020-09-16 NOTE — TELEPHONE ENCOUNTER
Called patient to offering overbooking her Botox for Monday 9/21/2020 at 1045 am but patient has conflicting appt at 11 am  Voicemail left for patient requesting call back to discuss

## 2020-09-16 NOTE — TELEPHONE ENCOUNTER
----- Message from Bakari Bower sent at 9/14/2020  1:29 PM EDT -----  Regarding: Botox Reschedule  Patient called stating that she is out of town and completely forgot about her appointment today  She will be back this weekend, so anytime next week or after will be ok to reschedule

## 2020-09-21 ENCOUNTER — HOSPITAL ENCOUNTER (OUTPATIENT)
Dept: RADIOLOGY | Facility: IMAGING CENTER | Age: 45
Discharge: HOME | End: 2020-09-21
Payer: COMMERCIAL

## 2020-09-21 VITALS — HEIGHT: 68 IN | WEIGHT: 225 LBS | BODY MASS INDEX: 34.1 KG/M2

## 2020-09-21 DIAGNOSIS — Z12.31 ENCOUNTER FOR SCREENING MAMMOGRAM FOR BREAST CANCER: ICD-10-CM

## 2020-09-21 PROCEDURE — 77067 SCR MAMMO BI INCL CAD: CPT

## 2020-09-22 ENCOUNTER — TRANSCRIBE ORDERS (OUTPATIENT)
Dept: OBGYN CLINIC | Facility: CLINIC | Age: 45
End: 2020-09-22

## 2020-09-22 DIAGNOSIS — N64.89 BREAST ASYMMETRY IN FEMALE: Primary | ICD-10-CM

## 2020-09-25 ENCOUNTER — PROCEDURE VISIT (OUTPATIENT)
Dept: NEUROLOGY | Facility: CLINIC | Age: 45
End: 2020-09-25
Payer: COMMERCIAL

## 2020-09-25 VITALS — DIASTOLIC BLOOD PRESSURE: 80 MMHG | HEART RATE: 105 BPM | SYSTOLIC BLOOD PRESSURE: 124 MMHG | TEMPERATURE: 97.9 F

## 2020-09-25 DIAGNOSIS — G43.709 CHRONIC MIGRAINE WITHOUT AURA WITHOUT STATUS MIGRAINOSUS, NOT INTRACTABLE: Primary | ICD-10-CM

## 2020-09-25 PROCEDURE — 64615 CHEMODENERV MUSC MIGRAINE: CPT | Performed by: PHYSICIAN ASSISTANT

## 2020-09-25 NOTE — PROGRESS NOTES
Chemodenervation    Date/Time: 9/25/2020 1:48 PM  Performed by: Michele Salinas PA-C  Authorized by: Michele Salinas PA-C     Pre-procedure details:     Prepped With: Alcohol    Anesthesia (see MAR for exact dosages): Anesthesia method:  None  Procedure details:     Position:  Upright  Botox:     Botox Type:  Type A    Brand:  Botox    mL's of Botulinum Toxin:  200    Final Concentration per CC:  50 units    Needle Gauge:  30 G 2 5 inch  Procedures:     Botox Procedures: chronic headache      Indications: migraines    Injection Location:     Head / Face:  L superior cervical paraspinal, R superior cervical paraspinal, L , R , L frontalis, R frontalis, L medial occipitalis, R medial occipitalis, procerus, R temporalis, L temporalis, R superior trapezius and L superior trapezius    L  injection amount:  5 unit(s)    R  injection amount:  5 unit(s)    L lateral frontalis:  5 unit(s)    R lateral frontalis:  5 unit(s)    L medial frontalis:  5 unit(s)    R medial frontalis:  5 unit(s)    L temporalis injection amount:  20 unit(s)    R temporalis injection amount:  20 unit(s)    Procerus injection amount:  5 unit(s)    L medial occipitalis injection amount:  15 unit(s)    R medial occipitalis injection amount:  15 unit(s)    L superior cervical paraspinal injection amount:  10 unit(s)    R superior cervical paraspinal injection amount:  10 unit(s)    L superior trapezius injection amount:  15 unit(s)    R superior trapezius injection amount:  15 unit(s)  Total Units:     Total units used:  200    Total units discarded:  0  Post-procedure details:     Chemodenervation:  Chronic migraine    Facial Nerve Location[de-identified]  Bilateral facial nerve    Patient tolerance of procedure:   Tolerated well, no immediate complications  Comments:         5 units orbicularis oculi bilaterally  2 5 units lower frontalis bilaterally  7 5 temporalis bilaterally  15 units frontalis  All medically necessary

## 2020-09-28 ENCOUNTER — TELEPHONE (OUTPATIENT)
Dept: OBGYN CLINIC | Facility: CLINIC | Age: 45
End: 2020-09-28

## 2020-10-12 ENCOUNTER — HOSPITAL ENCOUNTER (OUTPATIENT)
Dept: MAMMOGRAPHY | Facility: CLINIC | Age: 45
Discharge: HOME | End: 2020-10-12
Payer: COMMERCIAL

## 2020-10-12 ENCOUNTER — HOSPITAL ENCOUNTER (OUTPATIENT)
Dept: ULTRASOUND IMAGING | Facility: CLINIC | Age: 45
Discharge: HOME | End: 2020-10-12
Payer: COMMERCIAL

## 2020-10-12 VITALS — BODY MASS INDEX: 34.1 KG/M2 | WEIGHT: 225 LBS | HEIGHT: 68 IN

## 2020-10-12 DIAGNOSIS — N64.89 BREAST ASYMMETRY IN FEMALE: ICD-10-CM

## 2020-10-12 PROCEDURE — 76642 ULTRASOUND BREAST LIMITED: CPT

## 2020-10-12 PROCEDURE — G0279 TOMOSYNTHESIS, MAMMO: HCPCS

## 2020-10-12 PROCEDURE — 77066 DX MAMMO INCL CAD BI: CPT

## 2020-10-16 ENCOUNTER — TELEPHONE (OUTPATIENT)
Dept: INTERNAL MEDICINE CLINIC | Facility: CLINIC | Age: 45
End: 2020-10-16

## 2020-10-27 ENCOUNTER — TELEPHONE (OUTPATIENT)
Dept: NEUROLOGY | Facility: CLINIC | Age: 45
End: 2020-10-27

## 2020-11-10 ENCOUNTER — DOCUMENTATION (OUTPATIENT)
Dept: NEUROLOGY | Facility: CLINIC | Age: 45
End: 2020-11-10

## 2020-12-03 ENCOUNTER — OFFICE VISIT (OUTPATIENT)
Dept: INTERNAL MEDICINE CLINIC | Facility: CLINIC | Age: 45
End: 2020-12-03
Payer: COMMERCIAL

## 2020-12-03 VITALS
HEIGHT: 68 IN | RESPIRATION RATE: 18 BRPM | SYSTOLIC BLOOD PRESSURE: 132 MMHG | WEIGHT: 265 LBS | BODY MASS INDEX: 40.16 KG/M2 | HEART RATE: 90 BPM | TEMPERATURE: 100.4 F | OXYGEN SATURATION: 98 % | DIASTOLIC BLOOD PRESSURE: 72 MMHG

## 2020-12-03 DIAGNOSIS — R73.03 PRE-DIABETES: ICD-10-CM

## 2020-12-03 DIAGNOSIS — F41.9 ANXIETY: ICD-10-CM

## 2020-12-03 DIAGNOSIS — E78.2 MIXED DYSLIPIDEMIA: ICD-10-CM

## 2020-12-03 DIAGNOSIS — E55.9 VITAMIN D INSUFFICIENCY: ICD-10-CM

## 2020-12-03 DIAGNOSIS — K21.9 GASTROESOPHAGEAL REFLUX DISEASE WITHOUT ESOPHAGITIS: ICD-10-CM

## 2020-12-03 DIAGNOSIS — G43.709 CHRONIC MIGRAINE WITHOUT AURA WITHOUT STATUS MIGRAINOSUS, NOT INTRACTABLE: ICD-10-CM

## 2020-12-03 DIAGNOSIS — E61.1 IRON DEFICIENCY: Primary | ICD-10-CM

## 2020-12-03 DIAGNOSIS — H69.83 EUSTACHIAN TUBE DYSFUNCTION, BILATERAL: ICD-10-CM

## 2020-12-03 PROCEDURE — 99214 OFFICE O/P EST MOD 30 MIN: CPT | Performed by: NURSE PRACTITIONER

## 2020-12-03 RX ORDER — MECLIZINE HYDROCHLORIDE 25 MG/1
25 TABLET ORAL 3 TIMES DAILY PRN
Qty: 30 TABLET | Refills: 0 | Status: SHIPPED | OUTPATIENT
Start: 2020-12-03 | End: 2021-02-05

## 2020-12-14 ENCOUNTER — TELEPHONE (OUTPATIENT)
Dept: NEUROLOGY | Facility: CLINIC | Age: 45
End: 2020-12-14

## 2020-12-23 ENCOUNTER — TELEPHONE (OUTPATIENT)
Dept: NEUROLOGY | Facility: CLINIC | Age: 45
End: 2020-12-23

## 2020-12-28 ENCOUNTER — PROCEDURE VISIT (OUTPATIENT)
Dept: NEUROLOGY | Facility: CLINIC | Age: 45
End: 2020-12-28
Payer: COMMERCIAL

## 2020-12-28 VITALS — HEART RATE: 86 BPM | TEMPERATURE: 97.7 F | SYSTOLIC BLOOD PRESSURE: 137 MMHG | DIASTOLIC BLOOD PRESSURE: 81 MMHG

## 2020-12-28 DIAGNOSIS — G43.709 CHRONIC MIGRAINE WITHOUT AURA WITHOUT STATUS MIGRAINOSUS, NOT INTRACTABLE: Primary | ICD-10-CM

## 2020-12-28 PROCEDURE — 64615 CHEMODENERV MUSC MIGRAINE: CPT | Performed by: PHYSICIAN ASSISTANT

## 2021-01-25 ENCOUNTER — TELEPHONE (OUTPATIENT)
Dept: NEUROLOGY | Facility: CLINIC | Age: 46
End: 2021-01-25

## 2021-01-25 NOTE — TELEPHONE ENCOUNTER
Called to remind patient of their upcoming appointment in 2 weeks in [facility]  Voicemail left to advise patient to call back with any urgent symptoms or issues  Patient also made aware that we will be calling back two days prior to appointment to complete the COVID screening

## 2021-02-04 ENCOUNTER — TELEPHONE (OUTPATIENT)
Dept: NEUROLOGY | Facility: CLINIC | Age: 46
End: 2021-02-04

## 2021-02-05 ENCOUNTER — TELEMEDICINE (OUTPATIENT)
Dept: NEUROLOGY | Facility: CLINIC | Age: 46
End: 2021-02-05
Payer: COMMERCIAL

## 2021-02-05 ENCOUNTER — TELEPHONE (OUTPATIENT)
Dept: NEUROLOGY | Facility: CLINIC | Age: 46
End: 2021-02-05

## 2021-02-05 VITALS — HEIGHT: 68 IN | BODY MASS INDEX: 33.65 KG/M2 | WEIGHT: 222 LBS

## 2021-02-05 DIAGNOSIS — G43.709 CHRONIC MIGRAINE WITHOUT AURA WITHOUT STATUS MIGRAINOSUS, NOT INTRACTABLE: Primary | ICD-10-CM

## 2021-02-05 PROCEDURE — 99213 OFFICE O/P EST LOW 20 MIN: CPT | Performed by: PHYSICIAN ASSISTANT

## 2021-02-05 RX ORDER — RIMEGEPANT SULFATE 75 MG/75MG
75 TABLET, ORALLY DISINTEGRATING ORAL AS NEEDED
Qty: 8 TABLET | Refills: 3 | Status: SHIPPED | OUTPATIENT
Start: 2021-02-05 | End: 2021-04-23

## 2021-02-05 RX ORDER — KETOROLAC TROMETHAMINE 10 MG/1
TABLET, FILM COATED ORAL
Qty: 30 TABLET | Refills: 0 | Status: SHIPPED | OUTPATIENT
Start: 2021-02-05 | End: 2021-03-16 | Stop reason: ALTCHOICE

## 2021-02-05 RX ORDER — CYCLOBENZAPRINE HCL 5 MG
5 TABLET ORAL AS NEEDED
Qty: 30 TABLET | Refills: 3 | Status: SHIPPED | OUTPATIENT
Start: 2021-02-05 | End: 2021-06-30 | Stop reason: SDUPTHER

## 2021-02-05 NOTE — PROGRESS NOTES
Virtual Regular Visit      Assessment/Plan:    Problem List Items Addressed This Visit        Cardiovascular and Mediastinum    Chronic migraine without aura without status migrainosus, not intractable - Primary     Preventive therapy for headaches:  -   Continue Botox, patient seems to doing really well with Botox  Abortive therapy for headaches:  - At onset of her severe headache patients take Nurtec 75 mg  Limit of 1 in 24 hours  May also use Toradol with food  May use Cyclobenzaprine 5 mg at bedtime on the nights of your migraines         Relevant Medications    Rimegepant Sulfate (Nurtec) 75 MG TBDP    cyclobenzaprine (FLEXERIL) 5 mg tablet    ketorolac (TORADOL) 10 mg tablet               Reason for visit is   Chief Complaint   Patient presents with    Migraine    Virtual Regular Visit        Encounter provider Felicitas Marinelli PA-C    Provider located at [place] [address]  [phone]      Recent Visits  Date Type Provider Dept   02/04/21 Telephone Eleonora Brown   02/04/21 Telephone [facility] 141 recent visits within past 7 days and meeting all other requirements     Today's Visits  Date Type Provider Dept   02/05/21 Telephone Ree Alvarado MA [facility]   02/05/21 [address] today's visits and meeting all other requirements     Future Appointments  No visits were found meeting these conditions  Showing future appointments within next 150 days and meeting all other requirements        The patient was identified by name and date of birth  Sudhakar Fraser was informed that this is a telemedicine visit and that the visit is being conducted through telephone  My office door was closed  No one else was in the room    She acknowledged consent and understanding of privacy and security of the video platform  The patient has agreed to participate and understands they can discontinue the visit at any time  It was my intent to perform this visit via video technology but the patient was not able to do a video connection so the visit was completed via audio telephone only  Patient is aware this is a billable service  Subjective  Jael Ferguson is a 51 y o  right handed female She owns a hair salon  She takes care of her daughter and son at home      QTc: reminded to have this completed     Chronic migraine headaches:  PREVENTIVE:   Mag, B-2, B-complex,   Zoloft,   topamax  Botox  ABORTIVE:   Toradol,   Decadron,   aleve, advil/ ibuprofen     Non-Medical/Alternative Treatments used in the past for headaches: Massage- improved neck pain, PT, TENS unit- made neck pain worse     Headache trigger: Fatigue, Stress/Tension, bright lights, missing meals  Aura - heavy feeling in the head and neck pain sometimes; sometimes (not with every headache) scotomas/ colorful lights when closing eyes/ spots in eyes lasting about 10 minutes before headaches     Since last seen headaches have improved with Botox injections q 3 months  Since starting botox, the patient reports greater than 7 days of migraine relief from baseline, correlated with headache diary, decreased abortive medication use and decreased ER visits    Patient has been getting Botox for over 6 years and has been life changing for her        How often do the headaches occur -   migraines: 2-4 a month prior to Botox 4+ a week (16-20);   TTH: 2-3 per week   What time of the day do the headaches start - Variable; occasionally wakes up with them  How long do the headaches last - 45 minutes to 2 hours (prior to Botox all day)  Where are they located - frontal, eyes, radiate back into the occipital and neck discomfort L>R  What is the intensity of pain -   Tension headaches: 7-8/10  severe headaches: 8-9/10  Describe your usual headache - Throbbing, Pounding  Associated symptoms:   -       Decrease of appetite, nausea   -       Photophobia, phonophobia  -       Problem with concentration  -       Blurred vision  -       Light-headed or dizzy, stiff or sore neck  -       Hands or feet tingle or feel numb  -       Prefer to be alone and in a dark room, unable to work     Number of days missed per month because of headaches:  Work (or school) days: 0-2  Social or Family activities: 2-5     What time of the year do headaches occur more frequently? summer  Have you seen someone else for headaches or pain? No  Have you had trigger point injection performed and how often? No  Have you had Botox injection performed and how often? Yes   Have you had epidural injections or transforaminal injections performed? Yes, with childbirth     Have you used CBD or THC for your headaches and how often? No     Are you current pregnant or planning on getting pregnant? No     Have you ever had any Brain imaging? no      Reviewed old notes from physician seen in the past- see above HPI for summary of previous encounters               Past Medical History:   Diagnosis Date    Depression     History of prior pregnancies         Migraine with aura and without status migrainosus, not intractable 2018    Papanicolaou smear 2019    normal       Past Surgical History:   Procedure Laterality Date     SECTION       SECTION      TUBAL LIGATION         Current Outpatient Medications   Medication Sig Dispense Refill    B Complex Vitamins (VITAMIN B COMPLEX PO) Take by mouth      ferrous sulfate 324 (65 Fe) mg Take 1 tablet (324 mg total) by mouth daily before breakfast 30 tablet 1    ketorolac (TORADOL) 10 mg tablet 1-2 tabs at onset of migraine, can repeat X1 in 6 hours, can combine with triptan  Take with food/milk/antacid   30 tablet 0    Magnesium 250 MG TABS Take by mouth as needed       multivitamin (THERAGRAN) TABS Take 1 tablet by mouth daily      Naproxen Sodium (ALEVE) 220 MG CAPS Take by mouth      onabotulinumtoxin A (BOTOX) 100 units Inject into a muscle once      vitamin B-12 (VITAMIN B-12) 1,000 mcg tablet Take by mouth daily      Vitamin D, Cholecalciferol, 1000 units CAPS Take by mouth      cyclobenzaprine (FLEXERIL) 5 mg tablet Take 1 tablet (5 mg total) by mouth as needed for muscle spasms (migraine) 30 tablet 3    dexamethasone (DECADRON) 2 mg tablet 1 in a m  with food, may take 1 today to see if this helps break her headache (Patient not taking: Reported on 2/10/2020) 5 tablet 0    Omega-3 Fatty Acids (FISH OIL) 1,000 mg Take 1,000 mg by mouth daily      Rimegepant Sulfate (Nurtec) 75 MG TBDP Take 75 mg by mouth as needed (migraine) 8 tablet 3     No current facility-administered medications for this visit  Allergies   Allergen Reactions    Amoxicillin      Other reaction(s): unknown    Clavulanic Acid      Other reaction(s): unknown    Moxifloxacin     Sulfa Antibiotics Hives     Other reaction(s): Itching    I have reviewed the patient's medical, social and surgical history as well as medications in detail and updated the computerized patient record  Review of Systems   Constitutional: Negative  Negative for appetite change and fever  HENT: Negative  Negative for hearing loss, tinnitus, trouble swallowing and voice change  Sound sensitivity with migraines   Eyes: Positive for photophobia and visual disturbance (occasional auras)  Negative for pain  Respiratory: Negative  Negative for shortness of breath  Cardiovascular: Negative  Negative for palpitations  Gastrointestinal: Negative  Negative for nausea and vomiting  Endocrine: Negative  Negative for cold intolerance  Genitourinary: Negative  Negative for dysuria, frequency and urgency  Musculoskeletal: Positive for joint swelling and neck stiffness  Negative for myalgias and neck pain  Skin: Negative  Negative for rash  Neurological: Positive for headaches (average 1 migraine occurs every 7-8 days )  Negative for dizziness, tremors, seizures, syncope, facial asymmetry, speech difficulty, weakness, light-headedness and numbness  Occasional tingling with migraines   Hematological: Negative  Does not bruise/bleed easily  Psychiatric/Behavioral: Negative  Negative for confusion, hallucinations and sleep disturbance  I personally reviewed and updated the ROS that was entered by the medical assistant      Video Exam    Vitals:    02/05/21 1147   Weight: 101 kg (222 lb)   Height: 5' 8" (1 727 m)       Physical Exam   Unable to perform  I spent 12 minutes with patient today in which greater than 50% of the time was spent in counseling/coordination of care regarding as above and 15 minutes of non-face to face time      VIRTUAL VISIT DISCLAIMER    Amilcar Alarcon acknowledges that she has consented to an online visit or consultation  She understands that the online visit is based solely on information provided by her, and that, in the absence of a face-to-face physical evaluation by the physician, the diagnosis she receives is both limited and provisional in terms of accuracy and completeness  This is not intended to replace a full medical face-to-face evaluation by the physician  Amilcar Alarcon understands and accepts these terms

## 2021-02-05 NOTE — PATIENT INSTRUCTIONS
Preventive therapy for headaches:  -   Continue Botox, patient seems to doing really well with Botox  Abortive therapy for headaches:  - At onset of her severe headache patients take Nurtec 75 mg  Limit of 1 in 24 hours  May also use Toradol with food  May use Cyclobenzaprine 5 mg at bedtime on the nights of your migraines      Neck pain:   - We discussed the role of neck pathology and poor posture, with straightening of the normal cervical lordosis, in headaches  We discussed how tightening of the neck muscles can irritate the nerves in the occipital region of her head and cause or worsen head pain  We also discussed and demonstrated neck strengthening and relaxation exercises, as well as giving written instructions on these exercises  - We talked about the importance of good posture for improving shoulder, neck, and head pain  The patient was given visualization exercises for correcting posture, which patient will practice at home  If this simple exercise does not help improve the posture, we will consider formal physical therapy in the future  Medication overuse headaches:   - We discussed medication overuse headache [facility]) and how to avoid it in the future  It was explained that all analgesics have the potential to cause medication overuse headache [facility]) and analgesic overuse can negate the effectiveness of headache preventive measures  After successful [address] treatment, preventive medications for an underlying primary headache disorder have a greater chance for success  Avoid medications with narcotics, barbiturates, or caffeine in them as these can cause rebound headaches after very few doses and can interfere with other headache medicine efficacy  Taking any analgesics for more than 2-3 days a week can cause medication overuse headache  Reproductive age women: Should take folic acid daily when taking anti-seizure drugs especially Depakote       [place] Prescription Drug Monitoring Program report was reviewed and was appropriate      Headache management instructions  - When patient has a moderate to severe headache, they should seek rest, initiate relaxation and apply cold compresses to the head  - Maintain regular sleep schedule  Adults need at least 7-8 hours of uninterrupted a night  - Limit over the counter medications such as Tylenol, Ibuprofen, Aleve, Excedrin  (No more than 3 times a week)  - Maintain headache diary  We discussed an PATTIE for a smart phone is "Migraine nan"  - Limit caffeine to 1-2 cups 8 to 16 oz a day or less  - Avoid dietary trigger  (aged cheese, peanuts, MSG, aspartame and nitrates)  - Patient is to have regular frequent meals to prevent headache onset  - Please drink at least 64 ounces of water a day to help remain hydrated  Please call with any questions or concerns   Office number is [phone]

## 2021-02-05 NOTE — ASSESSMENT & PLAN NOTE
Preventive therapy for headaches:  -   Continue Botox, patient seems to doing really well with Botox  Abortive therapy for headaches:  - At onset of her severe headache patients take Nurtec 75 mg  Limit of 1 in 24 hours  May also use Toradol with food    May use Cyclobenzaprine 5 mg at bedtime on the nights of your migraines

## 2021-02-16 ENCOUNTER — TELEPHONE (OUTPATIENT)
Dept: NEUROLOGY | Facility: CLINIC | Age: 46
End: 2021-02-16

## 2021-02-16 NOTE — TELEPHONE ENCOUNTER
Botox authorization faxed to [phone] [place] [address] on 2/16/2021  Will await approval/denial letter  Authorization can take up to 15 days to receive a determination

## 2021-02-17 ENCOUNTER — TELEPHONE (OUTPATIENT)
Dept: NEUROLOGY | Facility: CLINIC | Age: 46
End: 2021-02-17

## 2021-02-17 NOTE — TELEPHONE ENCOUNTER
Received PA request for [facility]  Submitted PA on CMM, awaiting determination       Key: [id number]    ID: [id number]  [facility]: [id number]  PCN: [id number]  Group: [id number]

## 2021-02-22 ENCOUNTER — DOCUMENTATION (OUTPATIENT)
Dept: NEUROLOGY | Facility: CLINIC | Age: 46
End: 2021-02-22

## 2021-02-22 NOTE — PROGRESS NOTES
Type Date User Summary Attachment   General 02/22/2021  9:41 AM Zandra Soler care coordination  -   Note    Botox- authorization #: [id number]- valid for 4 visits- from 3/1/2021 until 3/1/2022   Please use our stock      Thank you     Georgiana Gilford

## 2021-02-22 NOTE — PROGRESS NOTES
Patient is scheduled 3/29/2021 with Fatimah Tatum in the [facility] location

## 2021-02-22 NOTE — TELEPHONE ENCOUNTER
Received an approval letter from [address] [place] [address] for the patient's Botox authorization  Approval letter has been scanned into the patient's chart under "media" for future reference      Authorization information:    Botox 200 units approved  Authorization #: [id number]  Valid dates: 3/1/2021 until 3/1/2022  Please use our stock

## 2021-02-24 ENCOUNTER — ANNUAL EXAM (OUTPATIENT)
Dept: OBGYN CLINIC | Facility: CLINIC | Age: 46
End: 2021-02-24
Payer: COMMERCIAL

## 2021-02-24 VITALS
DIASTOLIC BLOOD PRESSURE: 80 MMHG | WEIGHT: 268 LBS | BODY MASS INDEX: 40.62 KG/M2 | HEIGHT: 68 IN | SYSTOLIC BLOOD PRESSURE: 132 MMHG

## 2021-02-24 DIAGNOSIS — N92.6 IRREGULAR PERIODS/MENSTRUAL CYCLES: ICD-10-CM

## 2021-02-24 DIAGNOSIS — Z12.39 ENCOUNTER FOR SCREENING BREAST EXAMINATION: ICD-10-CM

## 2021-02-24 DIAGNOSIS — Z12.31 ENCOUNTER FOR SCREENING MAMMOGRAM FOR BREAST CANCER: ICD-10-CM

## 2021-02-24 DIAGNOSIS — Z01.419 ENCOUNTER FOR WELL WOMAN EXAM: Primary | ICD-10-CM

## 2021-02-24 PROCEDURE — 99396 PREV VISIT EST AGE 40-64: CPT | Performed by: PHYSICIAN ASSISTANT

## 2021-02-24 NOTE — PROGRESS NOTES
Impression: Age-related nuclear cataract, bilateral: H25.13. Plan: Observe. No treatment currently recommended as cataracts do not affect the patients day to day life. Patient to monitor for vision changes and if vision significantly worsens, advised to RTC for evaluation. Patient is here for yearly exam  Patient is having irregular cycles  She did not have a period in January but had spotting twice in February (2/7-2/9 Pink spotting per wiping , 2/13 & 2/13 Spotting )  Patient has no hot flashes of night sweats  Patient is considering a breast reduction, but has no breast concerns  B&B ok      Patient is not due for a pap smear  1/14/19 Normal Pap, Negative HPV

## 2021-02-24 NOTE — PROGRESS NOTES
Assessment/Plan:    No problem-specific Assessment & Plan notes found for this encounter  Diagnoses and all orders for this visit:    Encounter for well woman exam    Encounter for screening breast examination    Encounter for screening mammogram for breast cancer  -     Mammo screening bilateral w 3d & cad; Future    Irregular periods/menstrual cycles          Subjective:      Patient ID: Fiorella Huff is a 51 y o  female  Pt presents for her annual exam today--  She has no gyn complaints   She has irregular, light bleeding--was considering Ablation, but periods are improving  Will observe  No pelvic pain  Bowel and bladder are regular  Some usi---pfpt discussed again  betsy  No breast concerns today except is considering reduction  Neck pain, back pain, difficulty exercising--indentations from bra straps    Last mammo--2020      No pap today  rx mammo  Daily mvi  Observe cycles      The following portions of the patient's history were reviewed and updated as appropriate: allergies, current medications, past family history, past medical history, past social history, past surgical history and problem list     Review of Systems   Constitutional: Negative for chills, fever and unexpected weight change  Gastrointestinal: Negative for abdominal pain, blood in stool, constipation and diarrhea  Genitourinary: Negative  Objective:      /80   Ht 5' 8" (1 727 m)   Wt 122 kg (268 lb)   LMP 02/07/2021 (Exact Date)   Breastfeeding No   BMI 40 75 kg/m²          Physical Exam  Vitals signs and nursing note reviewed  Constitutional:       Appearance: She is well-developed  HENT:      Head: Normocephalic and atraumatic  Neck:      Musculoskeletal: Normal range of motion  Chest:      Breasts:         Right: No inverted nipple, mass, nipple discharge or skin change  Left: No inverted nipple, mass, nipple discharge or skin change  Abdominal:      Palpations: Abdomen is soft  Genitourinary:     Exam position: Supine  Labia:         Right: No rash, tenderness or lesion  Left: No rash, tenderness or lesion  Vagina: Normal       Cervix: No cervical motion tenderness, discharge or friability  Adnexa:         Right: No mass, tenderness or fullness  Left: No mass, tenderness or fullness  Lymphadenopathy:      Lower Body: No right inguinal adenopathy  No left inguinal adenopathy

## 2021-03-02 ENCOUNTER — IMMUNIZATIONS (OUTPATIENT)
Dept: FAMILY MEDICINE CLINIC | Facility: HOSPITAL | Age: 46
End: 2021-03-02

## 2021-03-02 DIAGNOSIS — Z23 ENCOUNTER FOR IMMUNIZATION: Primary | ICD-10-CM

## 2021-03-02 PROCEDURE — 91300 SARS-COV-2 / COVID-19 MRNA VACCINE (PFIZER-BIONTECH) 30 MCG: CPT

## 2021-03-02 PROCEDURE — 0001A SARS-COV-2 / COVID-19 MRNA VACCINE (PFIZER-BIONTECH) 30 MCG: CPT

## 2021-03-03 DIAGNOSIS — G43.709 CHRONIC MIGRAINE WITHOUT AURA WITHOUT STATUS MIGRAINOSUS, NOT INTRACTABLE: Primary | ICD-10-CM

## 2021-03-03 RX ORDER — SUMATRIPTAN 100 MG/1
100 TABLET, FILM COATED ORAL AS NEEDED
Qty: 9 TABLET | Refills: 0 | Status: SHIPPED | OUTPATIENT
Start: 2021-03-03 | End: 2021-03-16 | Stop reason: ALTCHOICE

## 2021-03-03 NOTE — TELEPHONE ENCOUNTER
Sent in sumatriptan 100 mg to pharm  Please have patient try   If fails, let us know so we can send in a second triptan

## 2021-03-03 NOTE — TELEPHONE ENCOUNTER
Denial letter not received yet  Called [facility] and spoke to Coby Carranza  States that denial letter was mailed  The Fax number they on file is invalid  They can not fax due to their system because it was already mailed    She will send an email     She read the denial letter-denied as   Pt has not tried and failled 2 acute triptans  Please advise

## 2021-03-09 ENCOUNTER — TELEPHONE (OUTPATIENT)
Dept: INTERNAL MEDICINE CLINIC | Age: 46
End: 2021-03-09

## 2021-03-09 NOTE — TELEPHONE ENCOUNTER
Can we check to see if patient has tried Melatonin first?  Would recommmend up to 6mg at night  If so we can try something else

## 2021-03-09 NOTE — TELEPHONE ENCOUNTER
Cleo -    This patient called the office and stated that the last time she saw you , you and her discussed about a sleep medication and at that time she said she would think about it  The patient now would like a mild sleep medication where she can get a good nights sleep  She does have an apt with you in April  The pharmacy would be [facility]

## 2021-03-10 ENCOUNTER — TELEMEDICINE (OUTPATIENT)
Dept: INTERNAL MEDICINE CLINIC | Facility: CLINIC | Age: 46
End: 2021-03-10
Payer: COMMERCIAL

## 2021-03-10 VITALS — BODY MASS INDEX: 36.68 KG/M2 | WEIGHT: 242 LBS | TEMPERATURE: 96.7 F | HEIGHT: 68 IN

## 2021-03-10 DIAGNOSIS — R06.83 SNORING: ICD-10-CM

## 2021-03-10 DIAGNOSIS — J02.9 SORE THROAT: Primary | ICD-10-CM

## 2021-03-10 PROCEDURE — 99213 OFFICE O/P EST LOW 20 MIN: CPT | Performed by: NURSE PRACTITIONER

## 2021-03-10 NOTE — PROGRESS NOTES
Virtual Regular Visit      Assessment/Plan:    Problem List Items Addressed This Visit        Other    Sore throat - Primary     Suspect right sided reported lymphadenopathy is likely reactive to sinus congestion/post nasal drip/ ear pressure  Start Allegra D daily for 5-7 days  Start nasal saline rinses 3x daily   Follow up if symptoms worsen or fail to improve           Snoring     Recommend sleep study before considering sleep aids  Pt with obesity, snoring, large neck circumference, and morning headaches - suspect sleep apnea  Discussed risk of sleep aid with untreated sleep apnea  Discussed increased risk of stroke with untreated sleep apnea  Relevant Orders    Home Study          Reason for visit is   Chief Complaint   Patient presents with    Immunizations     swollen nodes in neck/ sore right side/ had a cold before the vaccine side effect from covid vaccine had the vaccine a week ago  Right both her a little bit, got the vaccine a week ago    health maintenance     bmi f/u plan due, phq9 due    Virtual Regular Visit        Encounter provider ANNMARIE Charles    Provider located at [address] [place] [address] [address] [facility] [id number]      Recent Visits  Date Type Provider Dept   03/09/21 Telephone ANNMARIE [facility]   Showing recent visits within past 7 days and meeting all other requirements     Today's Visits  Date Type Provider Dept   03/10/21 Telemedicine ANNMARIE [facility]   Showing today's visits and meeting all other requirements     Future Appointments  No visits were found meeting these conditions  Showing future appointments within next 150 days and meeting all other requirements        The patient was identified by name and date of birth   Ade Stewart was informed that this is a telemedicine visit and that the visit is being conducted through [facility] [place] and patient was informed that this is not a secure, HIPAA-compliant platform  She agrees to proceed     My office door was closed  No one else was in the room  She acknowledged consent and understanding of privacy and security of the video platform  The patient has agreed to participate and understands they can discontinue the visit at any time  Patient is aware this is a billable service  Yokasta Huff is a 51 y o  female presents today with concern for right cervical lymphadenopathy and tenderness after having her COVID vaccine last week  She states prior to her vaccine she started with sneezing, congestion and allergy like symptoms were are typical for her  Tuesday she got the Pfizer vaccine and then later noticed the right cervical lymphadenopathy as well as mild pain when swallowing on the right side  She also has mild right ear pain, sinus congestion and pressure and post nasal drip  She has tried naproxen and nyquil without significant relief  No known exposure to COVID     She also mentions that she has been having trouble sleeping at night  She has anxiety and her father is having health issues and lives in [facility] and she has been worrying about him  She tells me she does snore, has trouble breathing through her nose, sometimes feels her sleep is not refreshing and has occasional morning headaches but also has a hx of migraines            Past Medical History:   Diagnosis Date    Depression     History of prior pregnancies         Migraine with aura and without status migrainosus, not intractable 2018    Papanicolaou smear 2019    normal       Past Surgical History:   Procedure Laterality Date     SECTION       SECTION      TUBAL LIGATION         Current Outpatient Medications   Medication Sig Dispense Refill    B Complex Vitamins (VITAMIN B COMPLEX PO) Take by mouth      cyclobenzaprine (FLEXERIL) 5 mg tablet Take 1 tablet (5 mg total) by mouth as needed for muscle spasms (migraine) 30 tablet 3    dexamethasone (DECADRON) 2 mg tablet 1 in a m  with food, may take 1 today to see if this helps break her headache 5 tablet 0    ferrous sulfate 324 (65 Fe) mg Take 1 tablet (324 mg total) by mouth daily before breakfast 30 tablet 1    Magnesium 250 MG TABS Take by mouth as needed       multivitamin (THERAGRAN) TABS Take 1 tablet by mouth daily      Naproxen Sodium (ALEVE) 220 MG CAPS Take by mouth      Omega-3 Fatty Acids (FISH OIL) 1,000 mg Take 1,000 mg by mouth daily      onabotulinumtoxin A (BOTOX) 100 units Inject into a muscle once      Rimegepant Sulfate (Nurtec) 75 MG TBDP Take 75 mg by mouth as needed (migraine) 8 tablet 3    SUMAtriptan (IMITREX) 100 mg tablet Take 1 tablet (100 mg total) by mouth as needed for migraine May repeat in 2 hours  Limit of 3/week or 9/month 9 tablet 0    vitamin B-12 (VITAMIN B-12) 1,000 mcg tablet Take by mouth daily      Vitamin D, Cholecalciferol, 1000 units CAPS Take by mouth      ketorolac (TORADOL) 10 mg tablet 1-2 tabs at onset of migraine, can repeat X1 in 6 hours, can combine with triptan  Take with food/milk/antacid  (Patient not taking: Reported on 2/24/2021) 30 tablet 0     No current facility-administered medications for this visit  Allergies   Allergen Reactions    Amoxicillin      Other reaction(s): unknown    Clavulanic Acid      Other reaction(s): unknown    Moxifloxacin     Sulfa Antibiotics Hives     Other reaction(s): Itching       Review of Systems   Constitutional: Negative for chills and fever  HENT: Positive for ear pain, postnasal drip, sinus pressure, sinus pain and sneezing  Negative for sore throat and trouble swallowing (pain with swallowing)  Respiratory: Negative for cough, chest tightness and shortness of breath  Neurological: Positive for headaches     Psychiatric/Behavioral: Positive for sleep disturbance  Video Exam    Vitals:    03/10/21 1051   Temp: (!) 96 7 °F (35 9 °C)   Weight: 110 kg (242 lb)   Height: 5' 8" (1 727 m)       Physical Exam  Vitals signs reviewed  Constitutional:       General: She is not in acute distress  Appearance: She is obese  She is not diaphoretic  HENT:      Head: Normocephalic and atraumatic  Eyes:      Conjunctiva/sclera: Conjunctivae normal    Neck:      Comments: Appears to have larger neck circumference  Pulmonary:      Effort: Pulmonary effort is normal  No respiratory distress  Comments: No cough  No conversational dyspnea  Neurological:      Mental Status: She is alert and oriented to person, place, and time  Mental status is at baseline  Psychiatric:         Mood and Affect: Mood normal          Behavior: Behavior normal           I spent 15 minutes directly with the patient during this visit      VIRTUAL VISIT DISCLAIMER    Chung Rodriguez acknowledges that she has consented to an online visit or consultation  She understands that the online visit is based solely on information provided by her, and that, in the absence of a face-to-face physical evaluation by the physician, the diagnosis she receives is both limited and provisional in terms of accuracy and completeness  This is not intended to replace a full medical face-to-face evaluation by the physician  Chung Michael understands and accepts these terms

## 2021-03-10 NOTE — ASSESSMENT & PLAN NOTE
Recommend sleep study before considering sleep aids  Pt with obesity, snoring, large neck circumference, and morning headaches - suspect sleep apnea  Discussed risk of sleep aid with untreated sleep apnea  Discussed increased risk of stroke with untreated sleep apnea

## 2021-03-10 NOTE — ASSESSMENT & PLAN NOTE
Suspect right sided reported lymphadenopathy is likely reactive to sinus congestion/post nasal drip/ ear pressure       Start Allegra D daily for 5-7 days  Start nasal saline rinses 3x daily   Follow up if symptoms worsen or fail to improve

## 2021-03-12 ENCOUNTER — TELEPHONE (OUTPATIENT)
Dept: INTERNAL MEDICINE CLINIC | Facility: CLINIC | Age: 46
End: 2021-03-12

## 2021-03-12 NOTE — TELEPHONE ENCOUNTER
She has tried melotonin it does not help her, she also tried chamomile  she even tried 10 mg of melotonin  She is looking for something to help relax her, she does have Stress/anxiety  and cant seem to relax  She is not depressed but she needs possibly a prescription medication  Leave message detailed she may be with a client when we callback she is a

## 2021-03-15 ENCOUNTER — PROCEDURE VISIT (OUTPATIENT)
Dept: OBGYN CLINIC | Facility: CLINIC | Age: 46
End: 2021-03-15
Payer: COMMERCIAL

## 2021-03-15 VITALS — SYSTOLIC BLOOD PRESSURE: 120 MMHG | DIASTOLIC BLOOD PRESSURE: 80 MMHG

## 2021-03-15 DIAGNOSIS — N92.0 MENORRHAGIA WITH REGULAR CYCLE: Primary | ICD-10-CM

## 2021-03-15 PROCEDURE — 88305 TISSUE EXAM BY PATHOLOGIST: CPT | Performed by: PATHOLOGY

## 2021-03-15 PROCEDURE — 58558 HYSTEROSCOPY BIOPSY: CPT | Performed by: OBSTETRICS & GYNECOLOGY

## 2021-03-15 NOTE — PROGRESS NOTES
The patient is here for an endometrial biopsy  The patient has extremely heavy periods/ clotting and spotting a couple days after periods  The patient has bad cramping

## 2021-03-15 NOTE — PROGRESS NOTES
Endometrial biopsy    Date/Time: 3/15/2021 1:48 PM  Performed by: Kim Coleman MD  Authorized by: Kim Coleman MD   Universal Protocol:  Consent: Written consent obtained  Risks and benefits: risks, benefits and alternatives were discussed  Consent given by: patient  Timeout called at: 3/15/2021 1:48 PM   Patient understanding: patient states understanding of the procedure being performed  Patient consent: the patient's understanding of the procedure matches consent given  Procedure consent: procedure consent matches procedure scheduled  Patient identity confirmed: verbally with patient      Indication:     Indications comment:  Menorrhagia  Procedure:     Procedure: endometrial biopsy with Pipelle      A bivalve speculum was placed in the vagina: yes      Cervix cleaned and prepped: yes      A paracervical block was performed: no      An intracervical block was performed: no      Uterus sound depth (cm):  8    Specimen collected: specimen collected and sent to pathology    Findings:     Uterus size:  9-10 weeks    Cervix: normal      Adnexa: normal    Comments:     Procedure comments:  Hysteroscopic findings:  No polyps no abnormalities  Slightly thickened tissue    Impression:  Menorrhagia  Plan:  If pathology from endometrial biopsy is benign,Schedule hysteroscopy NovaSure endometrial ablation  Information on ablation was given  Consent for surgery signed

## 2021-03-16 ENCOUNTER — OFFICE VISIT (OUTPATIENT)
Dept: INTERNAL MEDICINE CLINIC | Facility: CLINIC | Age: 46
End: 2021-03-16
Payer: COMMERCIAL

## 2021-03-16 VITALS
TEMPERATURE: 98.2 F | SYSTOLIC BLOOD PRESSURE: 128 MMHG | DIASTOLIC BLOOD PRESSURE: 82 MMHG | OXYGEN SATURATION: 98 % | HEART RATE: 108 BPM | RESPIRATION RATE: 22 BRPM | WEIGHT: 268.8 LBS | BODY MASS INDEX: 40.74 KG/M2 | HEIGHT: 68 IN

## 2021-03-16 DIAGNOSIS — F41.9 ANXIETY: Primary | ICD-10-CM

## 2021-03-16 DIAGNOSIS — J30.2 SEASONAL ALLERGIES: ICD-10-CM

## 2021-03-16 PROCEDURE — 99213 OFFICE O/P EST LOW 20 MIN: CPT | Performed by: NURSE PRACTITIONER

## 2021-03-16 RX ORDER — LORAZEPAM 0.5 MG/1
0.5 TABLET ORAL DAILY PRN
Qty: 20 TABLET | Refills: 0 | Status: SHIPPED | OUTPATIENT
Start: 2021-03-16 | End: 2021-03-26 | Stop reason: SDUPTHER

## 2021-03-16 NOTE — PROGRESS NOTES
Assessment/Plan:    Problem List Items Addressed This Visit        Other    Seasonal allergies     Symptoms attributable to allergies  Continue Allegra D x 1 week  Then plain allegra going forward  Unable to tolerate nasal sprays            Anxiety - Primary     Start ativan prn  Advised not for daily use  PDMP queried   Controlled substance agreement signed today          Relevant Medications    LORazepam (ATIVAN) 0 5 mg tablet          M*Modal software was used to dictate this note  It may contain errors with dictating incorrect words or incorrect spelling  Please contact the provider directly with any questions  Subjective:      Patient ID: Apolonia Cho is a 51 y o  female  HPI    Patient presents today with cold symptoms including right sided lymphadenopathy, dry cough "tickle in my throat" which she feels is similar to her allergy symptoms and she has popping in her ears  She was seen virtually on 3/10 with similar symptoms  She has been taking allegra D daily, using Afrin intermittently  She does not use Flonase consistently as she feels she has a migraine with it  She had her first [employer] vaccine earlier this month  She states the right sided lymphadenopathy  She was tested for COVID at [facility] about 3 weeks ago and tells me she was negative  She is also concerned about anxiety  She states that she was previously on ativan prn  Her anxiety has been worse lately because her father is dealing with health issues and lives in [facility] and she cannot be with him  She states she will have difficulty sleeping at night because she lays there thinking about it  The following portions of the patient's history were reviewed and updated as appropriate: allergies, current medications, past family history, past medical history, past social history, past surgical history and problem list     Review of Systems   Constitutional: Negative for chills, diaphoresis and fever     HENT: Positive for postnasal drip and sore throat (scratchy/ tickle)  Negative for congestion, ear pain (popping), rhinorrhea, sinus pressure and sinus pain  Nosebleeds: worse at night  Respiratory: Positive for cough (dry)  Negative for chest tightness, shortness of breath and wheezing  Cardiovascular: Negative for chest pain  Gastrointestinal: Negative for diarrhea, nausea and vomiting  Neurological: Positive for headaches (intermittent migraines)  Negative for dizziness and light-headedness           Past Medical History:   Diagnosis Date    Depression     History of prior pregnancies         Migraine with aura and without status migrainosus, not intractable 2018    Papanicolaou smear 2019    normal         Current Outpatient Medications:     B Complex Vitamins (VITAMIN B COMPLEX PO), Take by mouth, Disp: , Rfl:     cyclobenzaprine (FLEXERIL) 5 mg tablet, Take 1 tablet (5 mg total) by mouth as needed for muscle spasms (migraine), Disp: 30 tablet, Rfl: 3    ferrous sulfate 324 (65 Fe) mg, Take 1 tablet (324 mg total) by mouth daily before breakfast, Disp: 30 tablet, Rfl: 1    Magnesium 250 MG TABS, Take by mouth as needed , Disp: , Rfl:     multivitamin (THERAGRAN) TABS, Take 1 tablet by mouth daily, Disp: , Rfl:     onabotulinumtoxin A (BOTOX) 100 units, Inject into a muscle once, Disp: , Rfl:     Rimegepant Sulfate (Nurtec) 75 MG TBDP, Take 75 mg by mouth as needed (migraine), Disp: 8 tablet, Rfl: 3    vitamin B-12 (VITAMIN B-12) 1,000 mcg tablet, Take by mouth daily, Disp: , Rfl:     Vitamin D, Cholecalciferol, 1000 units CAPS, Take by mouth, Disp: , Rfl:     LORazepam (ATIVAN) 0 5 mg tablet, Take 1 tablet (0 5 mg total) by mouth daily as needed for anxiety, Disp: 20 tablet, Rfl: 0    Allergies   Allergen Reactions    Amoxicillin      Other reaction(s): unknown    Clavulanic Acid      Other reaction(s): unknown    Moxifloxacin     Sulfa Antibiotics Hives     Other reaction(s): Itching Social History   Past Surgical History:   Procedure Laterality Date     SECTION       SECTION      TUBAL LIGATION       Family History   Problem Relation Age of Onset    No Known Problems Mother     No Known Problems Father     No Known Problems Sister     No Known Problems Daughter     No Known Problems Maternal Grandmother     No Known Problems Maternal Grandfather     No Known Problems Paternal Grandmother     No Known Problems Paternal Grandfather     No Known Problems Sister     No Known Problems Maternal Aunt     No Known Problems Maternal Aunt     No Known Problems Maternal Aunt     No Known Problems Maternal Aunt     No Known Problems Paternal Aunt     No Known Problems Paternal Aunt     No Known Problems Paternal Aunt        Objective:  /82 (BP Location: Left arm, Patient Position: Sitting, Cuff Size: Large)   Pulse (!) 108   Temp 98 2 °F (36 8 °C) (Tympanic)   Resp 22   Ht 5' 8" (1 727 m)   Wt 122 kg (268 lb 12 8 oz)   LMP 2021 (Exact Date)   SpO2 98%   BMI 40 87 kg/m²      Physical Exam  Vitals signs reviewed  Constitutional:       General: She is not in acute distress  Appearance: Normal appearance  She is well-developed  She is obese  She is not diaphoretic  HENT:      Head: Normocephalic and atraumatic  Right Ear: Tympanic membrane and external ear normal       Left Ear: Tympanic membrane and external ear normal       Nose: Nose normal       Mouth/Throat:      Mouth: Mucous membranes are moist       Pharynx: Oropharynx is clear  Posterior oropharyngeal erythema (mild) present  No oropharyngeal exudate  Eyes:      Extraocular Movements: Extraocular movements intact  Conjunctiva/sclera: Conjunctivae normal       Pupils: Pupils are equal, round, and reactive to light  Neck:      Musculoskeletal: Normal range of motion and neck supple  Muscular tenderness (mild right sided - improving per pt) present        Vascular: No carotid bruit  Cardiovascular:      Rate and Rhythm: Normal rate and regular rhythm  Heart sounds: Normal heart sounds  No murmur  Pulmonary:      Effort: Pulmonary effort is normal  No respiratory distress  Breath sounds: Normal breath sounds  No decreased breath sounds, wheezing, rhonchi or rales  Lymphadenopathy:      Cervical: No cervical adenopathy  Skin:     General: Skin is warm and dry  Neurological:      Mental Status: She is alert and oriented to person, place, and time  Mental status is at baseline     Psychiatric:         Mood and Affect: Mood normal          Behavior: Behavior normal

## 2021-03-16 NOTE — ASSESSMENT & PLAN NOTE
Start ativan prn  Advised not for daily use  PDMP queried   Controlled substance agreement signed today

## 2021-03-16 NOTE — ASSESSMENT & PLAN NOTE
Symptoms attributable to allergies  Continue Allegra D x 1 week  Then plain allegra going forward  Unable to tolerate nasal sprays

## 2021-03-18 ENCOUNTER — PREP FOR PROCEDURE (OUTPATIENT)
Dept: OBGYN CLINIC | Facility: CLINIC | Age: 46
End: 2021-03-18

## 2021-03-18 DIAGNOSIS — N92.0 MENORRHAGIA WITH REGULAR CYCLE: Primary | ICD-10-CM

## 2021-03-23 ENCOUNTER — IMMUNIZATIONS (OUTPATIENT)
Dept: FAMILY MEDICINE CLINIC | Facility: HOSPITAL | Age: 46
End: 2021-03-23

## 2021-03-23 DIAGNOSIS — Z23 ENCOUNTER FOR IMMUNIZATION: Primary | ICD-10-CM

## 2021-03-23 PROCEDURE — 0002A SARS-COV-2 / COVID-19 MRNA VACCINE (PFIZER-BIONTECH) 30 MCG: CPT

## 2021-03-23 PROCEDURE — 91300 SARS-COV-2 / COVID-19 MRNA VACCINE (PFIZER-BIONTECH) 30 MCG: CPT

## 2021-03-26 ENCOUNTER — TELEPHONE (OUTPATIENT)
Dept: INTERNAL MEDICINE CLINIC | Age: 46
End: 2021-03-26

## 2021-03-26 ENCOUNTER — OFFICE VISIT (OUTPATIENT)
Dept: INTERNAL MEDICINE CLINIC | Age: 46
End: 2021-03-26
Payer: COMMERCIAL

## 2021-03-26 VITALS
BODY MASS INDEX: 40.87 KG/M2 | OXYGEN SATURATION: 97 % | SYSTOLIC BLOOD PRESSURE: 136 MMHG | HEART RATE: 96 BPM | HEIGHT: 68 IN | TEMPERATURE: 98.4 F | DIASTOLIC BLOOD PRESSURE: 88 MMHG

## 2021-03-26 DIAGNOSIS — F41.0 PANIC ATTACK: ICD-10-CM

## 2021-03-26 DIAGNOSIS — F41.1 GAD (GENERALIZED ANXIETY DISORDER): Primary | ICD-10-CM

## 2021-03-26 DIAGNOSIS — F41.9 ANXIETY: ICD-10-CM

## 2021-03-26 PROBLEM — E66.01 MORBID OBESITY (HCC): Status: ACTIVE | Noted: 2021-03-26

## 2021-03-26 PROCEDURE — 99214 OFFICE O/P EST MOD 30 MIN: CPT | Performed by: INTERNAL MEDICINE

## 2021-03-26 RX ORDER — LORAZEPAM 0.5 MG/1
1 TABLET ORAL DAILY PRN
Qty: 20 TABLET | Refills: 0 | Status: SHIPPED | OUTPATIENT
Start: 2021-03-26 | End: 2021-10-13 | Stop reason: SDUPTHER

## 2021-03-26 RX ORDER — FLUOXETINE HYDROCHLORIDE 20 MG/1
20 CAPSULE ORAL DAILY
Qty: 60 CAPSULE | Refills: 1 | Status: SHIPPED | OUTPATIENT
Start: 2021-03-26 | End: 2021-03-26 | Stop reason: ALTCHOICE

## 2021-03-26 NOTE — TELEPHONE ENCOUNTER
Pt   called pt  Was prescribed Prozac today and wants to see if she can start zoloft instead  Pt  Stated she has taken zoloft in the past   If ok if she switches to zoloft,  asked if she can have 90 day supply since it is requested by insurance      Please advise    Thank you

## 2021-03-26 NOTE — PROGRESS NOTES
Assessment/Plan:      Generalized anxiety disorder with occasional panic attacks  - will start patient on Zoloft 50 mg daily     - Of note, patient had complained of weight gain with Zoloft and I had suggested an alternative of Prozac to her and actually prescribed it but her  later called and said that they want Zoloft instead of Prozac so I switched medication to Zoloft as they requested     - will also increase her lorazepam to 1 mg daily as needed for panic attacks   I will send a few more tablets to patient's pharmacy  - the [facility] web site was interrogated and did not show misuse   - she was counseled that the  Zoloft may take about 4-6 weeks for its full effect to be observed  - she was counseled to get her blood work done which includes a TSH had been ordered at a previous visit  -follow-up in 4-6 weeks     Diagnoses and all orders for this visit:    LASHAUN (generalized anxiety disorder)  -     sertraline (ZOLOFT) 50 mg tablet; Take 1 tablet (50 mg total) by mouth daily    Panic attack  -     Discontinue: FLUoxetine (PROzac) 20 mg capsule; Take 1 capsule (20 mg total) by mouth daily  -     sertraline (ZOLOFT) 50 mg tablet; Take 1 tablet (50 mg total) by mouth daily    Anxiety  -     LORazepam (ATIVAN) 0 5 mg tablet; Take 2 tablets (1 mg total) by mouth daily as needed for anxiety  -     sertraline (ZOLOFT) 50 mg tablet; Take 1 tablet (50 mg total) by mouth daily    BMI 40 0-44 9, adult (HCC)    Other orders  -     Discontinue: Ketorolac Tromethamine (TORADOL ORAL PO); Take by mouth as needed            BMI Counseling: Body mass index is 40 87 kg/m²  The BMI is above normal  Nutrition recommendations include limiting drinks that contain sugar, reducing intake of saturated and trans fat and reducing intake of cholesterol  Exercise recommendations include moderate physical activity 150 minutes/week  No pharmacotherapy was ordered  Patient referred to PCP due to patient being overweight  Subjective:      Patient ID: Clementina Walker is a 51 y o  female  HPI   patient presents with her  with complaints of worsening anxiety in the past year with panic attacks  She states that she has had a lot of stress  this past year and has noticed that her anxiety has worsened and is currently getting more frequent panic attacks  She states that she now has panic attacks about 3 to 4 times a week and the last panic attack was within the past 24 hours  She states that her panic attacks are associated with a feeling that the world is closing in on her, palpitations, excessive anxiety, feeling overwhelmed and feeling as though she is about to have a heart attack  She states that she was seen in the office within the past 10 days and was given some Ativan and states that it helped initially but later on, not so much  She feels that she needs a daily medication to help her  Of note, she states that she had anxiety in the past and was on Zoloft  50 mg which helped her a great deal   She states that she discontinued it because at the point she did not need it anymore  She also complains of weight gain which she did not like  She denies fever, chills, night sweats, chest pain, shortness of breath, nausea, vomiting, abdominal pain, diarrhea, constipation, myalgias  or arthralgias  The following portions of the patient's history were reviewed and updated as appropriate:   She  has a past medical history of Depression, History of prior pregnancies, Migraine with aura and without status migrainosus, not intractable (4/23/2018), and Papanicolaou smear (01/2019)    She   Patient Active Problem List    Diagnosis Date Noted    LASHAUN (generalized anxiety disorder) 03/26/2021    Panic attack 03/26/2021    Morbid obesity ([facility] ) 03/26/2021    BMI 40 0-44 9, adult ([facility] ) 03/26/2021    Sore throat 03/10/2021    Snoring 03/10/2021    [facility] 09/19/2019    Iron deficiency 09/19/2019    Chronic cough 2019    Weight gain 2019    Eustachian tube dysfunction, bilateral 2019    GERD (gastroesophageal reflux disease) 2018    Screening for breast cancer 2018    Macromastia 10/10/2018    Pre-diabetes 10/10/2018    Mixed dyslipidemia 10/10/2018    Chronic migraine without aura without status migrainosus, not intractable 2018    Seasonal allergies 10/09/2017    Obesity, Class II, BMI 35-39 9 2017    Anxiety 2017     She  has a past surgical history that includes  section;  section; and Tubal ligation  Her family history includes No Known Problems in her daughter, father, maternal aunt, maternal aunt, maternal aunt, maternal aunt, maternal grandfather, maternal grandmother, mother, paternal aunt, paternal aunt, paternal aunt, paternal grandfather, paternal grandmother, sister, and sister  She  reports that she has never smoked  She has never used smokeless tobacco  She reports that she does not drink alcohol or use drugs    Current Outpatient Medications   Medication Sig Dispense Refill    B Complex Vitamins (VITAMIN B COMPLEX PO) Take by mouth daily       cyclobenzaprine (FLEXERIL) 5 mg tablet Take 1 tablet (5 mg total) by mouth as needed for muscle spasms (migraine) 30 tablet 3    ferrous sulfate 324 (65 Fe) mg Take 1 tablet (324 mg total) by mouth daily before breakfast 30 tablet 1    LORazepam (ATIVAN) 0 5 mg tablet Take 2 tablets (1 mg total) by mouth daily as needed for anxiety 20 tablet 0    Magnesium 250 MG TABS Take by mouth as needed       multivitamin (THERAGRAN) TABS Take 1 tablet by mouth daily      onabotulinumtoxin A (BOTOX) 100 units Inject into a muscle once      vitamin B-12 (VITAMIN B-12) 1,000 mcg tablet Take by mouth daily      Vitamin D, Cholecalciferol, 1000 units CAPS Take by mouth      Rimegepant Sulfate (Nurtec) 75 MG TBDP Take 75 mg by mouth as needed (migraine) (Patient not taking: Reported on 3/26/2021) 8 tablet 3    sertraline (ZOLOFT) 50 mg tablet Take 1 tablet (50 mg total) by mouth daily 90 tablet 1     No current facility-administered medications for this visit  Current Outpatient Medications on File Prior to Visit   Medication Sig    B Complex Vitamins (VITAMIN B COMPLEX PO) Take by mouth daily     cyclobenzaprine (FLEXERIL) 5 mg tablet Take 1 tablet (5 mg total) by mouth as needed for muscle spasms (migraine)    ferrous sulfate 324 (65 Fe) mg Take 1 tablet (324 mg total) by mouth daily before breakfast    Magnesium 250 MG TABS Take by mouth as needed     multivitamin (THERAGRAN) TABS Take 1 tablet by mouth daily    onabotulinumtoxin A (BOTOX) 100 units Inject into a muscle once    vitamin B-12 (VITAMIN B-12) 1,000 mcg tablet Take by mouth daily    Vitamin D, Cholecalciferol, 1000 units CAPS Take by mouth    [DISCONTINUED] Ketorolac Tromethamine (TORADOL ORAL PO) Take by mouth as needed    [DISCONTINUED] LORazepam (ATIVAN) 0 5 mg tablet Take 1 tablet (0 5 mg total) by mouth daily as needed for anxiety    Rimegepant Sulfate (Nurtec) 75 MG TBDP Take 75 mg by mouth as needed (migraine) (Patient not taking: Reported on 3/26/2021)     No current facility-administered medications on file prior to visit  She is allergic to amoxicillin; clavulanic acid; moxifloxacin; and sulfa antibiotics       Review of Systems   Constitutional: Negative for activity change, chills, fatigue, fever and unexpected weight change  HENT: Negative for ear pain, postnasal drip, rhinorrhea, sinus pressure and sore throat  Eyes: Negative for pain  Respiratory: Negative for cough, choking, chest tightness, shortness of breath and wheezing  Cardiovascular: Positive for palpitations (with panic attacks)  Negative for chest pain and leg swelling  Gastrointestinal: Negative for abdominal pain, constipation, diarrhea, nausea and vomiting  Genitourinary: Negative for dysuria and hematuria  Musculoskeletal: Negative for arthralgias, back pain, gait problem, joint swelling, myalgias and neck stiffness  Skin: Negative for pallor and rash  Neurological: Positive for headaches ( occasional migraine headaches, improving since she started getting Botox injections)  Negative for dizziness, tremors, seizures, syncope and light-headedness  Hematological: Negative for adenopathy  Psychiatric/Behavioral: Positive for sleep disturbance  Negative for behavioral problems and dysphoric mood  The patient is nervous/anxious (with panic attacks with associated feelings of palpitations, a feeling of being overwhelmed and  she is about to have a heart attack, having panic attacks about 3-4 x a week  she used zoloft in the past)  Objective:      /88 (BP Location: Left arm, Patient Position: Sitting, Cuff Size: Large)   Pulse 96   Temp 98 4 °F (36 9 °C) (Temporal)   Ht 5' 8" (1 727 m)   LMP 03/01/2021 (Exact Date)   SpO2 97%   BMI 40 87 kg/m²          Physical Exam  Constitutional:       General: She is not in acute distress  Appearance: She is well-developed  She is obese  She is not diaphoretic  Comments: BMI - 136/88   HENT:      Head: Normocephalic and atraumatic  Right Ear: External ear normal       Left Ear: External ear normal       Nose: Nose normal       Mouth/Throat:      Mouth: Mucous membranes are dry  Pharynx: No oropharyngeal exudate  Comments: Dry mucous membranes  Eyes:      General: No scleral icterus  Right eye: No discharge  Left eye: No discharge  Conjunctiva/sclera: Conjunctivae normal       Pupils: Pupils are equal, round, and reactive to light  Neck:      Musculoskeletal: Normal range of motion and neck supple  Thyroid: No thyromegaly  Vascular: No JVD  Trachea: No tracheal deviation  Cardiovascular:      Rate and Rhythm: Normal rate and regular rhythm  Heart sounds: Normal heart sounds  No murmur   No friction rub  No gallop  Pulmonary:      Effort: Pulmonary effort is normal  No respiratory distress  Breath sounds: Normal breath sounds  No wheezing or rales  Chest:      Chest wall: No tenderness  Abdominal:      General: Bowel sounds are normal  There is no distension  Palpations: Abdomen is soft  There is no mass  Tenderness: There is no abdominal tenderness  There is no guarding or rebound  Musculoskeletal: Normal range of motion  General: No tenderness or deformity  Lymphadenopathy:      Cervical: No cervical adenopathy  Skin:     General: Skin is warm and dry  Coloration: Skin is not pale  Findings: No erythema or rash  Neurological:      Mental Status: She is alert and oriented to person, place, and time  Cranial Nerves: No cranial nerve deficit  Motor: No abnormal muscle tone  Coordination: Coordination normal       Deep Tendon Reflexes: Reflexes are normal and symmetric  Psychiatric:         Mood and Affect: Mood is anxious ( very anxious affect) and depressed ( mildly depressed affect)  Behavior: Behavior normal            Procedure visit on 03/15/2021   Component Date Value Ref Range Status    Case Report 03/15/2021    Final                    Value:Surgical Pathology Report                         Case: [id number]                                   Authorizing Provider:  Va Villanueva MD            Collected:           03/15/2021 1400              Ordering Location:     [address]  Received:            03/15/2021 1400              Pathologist:           Deandre Jimenez MD                                                              Specimen:    Endometrium                                                                                Final Diagnosis 03/15/2021    Final                    Value: This result contains rich text formatting which cannot be displayed here      Note 03/15/2021    Final Value:This result contains rich text formatting which cannot be displayed here   Additional Information 03/15/2021    Final                    Value: This result contains rich text formatting which cannot be displayed here  [facility] Description 03/15/2021    Final                    Value: This result contains rich text formatting which cannot be displayed here  Appointment on 08/26/2020   Component Date Value Ref Range Status    Sodium 08/26/2020 139  136 - 145 mmol/L Final    Potassium 08/26/2020 4 5  3 5 - 5 3 mmol/L Final    Chloride 08/26/2020 107  100 - 108 mmol/L Final    CO2 08/26/2020 27  21 - 32 mmol/L Final    ANION GAP 08/26/2020 5  4 - 13 mmol/L Final    BUN 08/26/2020 13  5 - 25 mg/dL Final    Creatinine 08/26/2020 0 66  0 60 - 1 30 mg/dL Final    Standardized to IDMS reference method    Glucose, Fasting 08/26/2020 84  65 - 99 mg/dL Final    Specimen collection should occur prior to Sulfasalazine administration due to the potential for falsely depressed results  Specimen collection should occur prior to Sulfapyridine administration due to the potential for falsely elevated results   Calcium 08/26/2020 9 1  8 3 - 10 1 mg/dL Final    AST 08/26/2020 15  5 - 45 U/L Final    Specimen collection should occur prior to Sulfasalazine administration due to the potential for falsely depressed results   ALT 08/26/2020 22  12 - 78 U/L Final    Specimen collection should occur prior to Sulfasalazine and/or Sulfapyridine administration due to the potential for falsely depressed results   Alkaline Phosphatase 08/26/2020 69  46 - 116 U/L Final    Total Protein 08/26/2020 7 9  6 4 - 8 2 g/dL Final    Albumin 08/26/2020 3 5  3 5 - 5 0 g/dL Final    Total Bilirubin 08/26/2020 0 52  0 20 - 1 00 mg/dL Final    Use of this assay is not recommended for patients undergoing treatment with eltrombopag due to the potential for falsely elevated results      eGFR 08/26/2020 106  ml/min/1 73sq m Final    Iron Saturation 08/26/2020 18  % Final    TIBC 08/26/2020 393  250 - 450 ug/dL Final    Iron 08/26/2020 72  50 - 170 ug/dL Final    Patients treated with metal-binding drugs (ie  Deferoxamine) may have depressed iron values   Ferritin 08/26/2020 24  8 - 388 ng/mL Final   Office Visit on 08/21/2020   Component Date Value Ref Range Status    WBC 08/26/2020 10 17* 4 31 - 10 16 Thousand/uL Final    RBC 08/26/2020 4 65  3 81 - 5 12 Million/uL Final    Hemoglobin 08/26/2020 12 9  11 5 - 15 4 g/dL Final    Hematocrit 08/26/2020 41 7  34 8 - 46 1 % Final    MCV 08/26/2020 90  82 - 98 fL Final    MCH 08/26/2020 27 7  26 8 - 34 3 pg Final    MCHC 08/26/2020 30 9* 31 4 - 37 4 g/dL Final    RDW 08/26/2020 15 0  11 6 - 15 1 % Final    MPV 08/26/2020 10 2  8 9 - 12 7 fL Final    Platelets 68/85/7877 365  149 - 390 Thousands/uL Final    nRBC 08/26/2020 0  /100 WBCs Final    Neutrophils Relative 08/26/2020 54  43 - 75 % Final    Immat GRANS % 08/26/2020 0  0 - 2 % Final    Lymphocytes Relative 08/26/2020 37  14 - 44 % Final    Monocytes Relative 08/26/2020 6  4 - 12 % Final    Eosinophils Relative 08/26/2020 2  0 - 6 % Final    Basophils Relative 08/26/2020 1  0 - 1 % Final    Neutrophils Absolute 08/26/2020 5 49  1 85 - 7 62 Thousands/µL Final    Immature Grans Absolute 08/26/2020 0 03  0 00 - 0 20 Thousand/uL Final    Lymphocytes Absolute 08/26/2020 3 77  0 60 - 4 47 Thousands/µL Final    Monocytes Absolute 08/26/2020 0 60  0 17 - 1 22 Thousand/µL Final    Eosinophils Absolute 08/26/2020 0 23  0 00 - 0 61 Thousand/µL Final    Basophils Absolute 08/26/2020 0 05  0 00 - 0 10 Thousands/µL Final    Hemoglobin A1C 08/26/2020 5 7* Normal 3 8-5 6%; PreDiabetic 5 7-6 4%;  Diabetic >=6 5%; Glycemic control for adults with diabetes <7 0% % Final    EAG 08/26/2020 117  mg/dl Final

## 2021-03-28 ENCOUNTER — LAB (OUTPATIENT)
Dept: LAB | Age: 46
End: 2021-03-28
Payer: COMMERCIAL

## 2021-03-28 ENCOUNTER — APPOINTMENT (OUTPATIENT)
Dept: LAB | Age: 46
End: 2021-03-28
Payer: COMMERCIAL

## 2021-03-28 DIAGNOSIS — N92.0 MENORRHAGIA WITH REGULAR CYCLE: ICD-10-CM

## 2021-03-28 DIAGNOSIS — E61.1 IRON DEFICIENCY: ICD-10-CM

## 2021-03-28 LAB
25(OH)D3 SERPL-MCNC: 37.4 NG/ML (ref 30–100)
ALBUMIN SERPL BCP-MCNC: 3.4 G/DL (ref 3.5–5)
ALP SERPL-CCNC: 83 U/L (ref 46–116)
ALT SERPL W P-5'-P-CCNC: 29 U/L (ref 12–78)
ANION GAP SERPL CALCULATED.3IONS-SCNC: 5 MMOL/L (ref 4–13)
AST SERPL W P-5'-P-CCNC: 17 U/L (ref 5–45)
BASOPHILS # BLD AUTO: 0.05 THOUSANDS/ΜL (ref 0–0.1)
BASOPHILS NFR BLD AUTO: 0 % (ref 0–1)
BILIRUB SERPL-MCNC: 0.52 MG/DL (ref 0.2–1)
BUN SERPL-MCNC: 16 MG/DL (ref 5–25)
CALCIUM ALBUM COR SERPL-MCNC: 9.7 MG/DL (ref 8.3–10.1)
CALCIUM SERPL-MCNC: 9.2 MG/DL (ref 8.3–10.1)
CHLORIDE SERPL-SCNC: 106 MMOL/L (ref 100–108)
CHOLEST SERPL-MCNC: 217 MG/DL (ref 50–200)
CO2 SERPL-SCNC: 27 MMOL/L (ref 21–32)
CREAT SERPL-MCNC: 0.73 MG/DL (ref 0.6–1.3)
EOSINOPHIL # BLD AUTO: 0.37 THOUSAND/ΜL (ref 0–0.61)
EOSINOPHIL NFR BLD AUTO: 3 % (ref 0–6)
ERYTHROCYTE [DISTWIDTH] IN BLOOD BY AUTOMATED COUNT: 16.4 % (ref 11.6–15.1)
EST. AVERAGE GLUCOSE BLD GHB EST-MCNC: 123 MG/DL
GFR SERPL CREATININE-BSD FRML MDRD: 98 ML/MIN/1.73SQ M
GLUCOSE P FAST SERPL-MCNC: 98 MG/DL (ref 65–99)
HBA1C MFR BLD: 5.9 %
HCT VFR BLD AUTO: 38.2 % (ref 34.8–46.1)
HDLC SERPL-MCNC: 66 MG/DL
HGB BLD-MCNC: 12.3 G/DL (ref 11.5–15.4)
IMM GRANULOCYTES # BLD AUTO: 0.03 THOUSAND/UL (ref 0–0.2)
IMM GRANULOCYTES NFR BLD AUTO: 0 % (ref 0–2)
LDLC SERPL CALC-MCNC: 121 MG/DL (ref 0–100)
LYMPHOCYTES # BLD AUTO: 3.96 THOUSANDS/ΜL (ref 0.6–4.47)
LYMPHOCYTES NFR BLD AUTO: 36 % (ref 14–44)
MCH RBC QN AUTO: 26.9 PG (ref 26.8–34.3)
MCHC RBC AUTO-ENTMCNC: 32.2 G/DL (ref 31.4–37.4)
MCV RBC AUTO: 83 FL (ref 82–98)
MONOCYTES # BLD AUTO: 0.53 THOUSAND/ΜL (ref 0.17–1.22)
MONOCYTES NFR BLD AUTO: 5 % (ref 4–12)
NEUTROPHILS # BLD AUTO: 6.18 THOUSANDS/ΜL (ref 1.85–7.62)
NEUTS SEG NFR BLD AUTO: 56 % (ref 43–75)
NONHDLC SERPL-MCNC: 151 MG/DL
NRBC BLD AUTO-RTO: 0 /100 WBCS
PLATELET # BLD AUTO: 357 THOUSANDS/UL (ref 149–390)
PMV BLD AUTO: 10.4 FL (ref 8.9–12.7)
POTASSIUM SERPL-SCNC: 3.9 MMOL/L (ref 3.5–5.3)
PROT SERPL-MCNC: 7.9 G/DL (ref 6.4–8.2)
RBC # BLD AUTO: 4.58 MILLION/UL (ref 3.81–5.12)
SODIUM SERPL-SCNC: 138 MMOL/L (ref 136–145)
TRIGL SERPL-MCNC: 152 MG/DL
TSH SERPL DL<=0.05 MIU/L-ACNC: 2.06 UIU/ML (ref 0.36–3.74)
WBC # BLD AUTO: 11.12 THOUSAND/UL (ref 4.31–10.16)

## 2021-03-28 PROCEDURE — 82306 VITAMIN D 25 HYDROXY: CPT | Performed by: NURSE PRACTITIONER

## 2021-03-28 PROCEDURE — 80061 LIPID PANEL: CPT | Performed by: NURSE PRACTITIONER

## 2021-03-28 PROCEDURE — 93005 ELECTROCARDIOGRAM TRACING: CPT

## 2021-03-28 PROCEDURE — 80053 COMPREHEN METABOLIC PANEL: CPT

## 2021-03-28 PROCEDURE — 36415 COLL VENOUS BLD VENIPUNCTURE: CPT | Performed by: NURSE PRACTITIONER

## 2021-03-28 PROCEDURE — 83036 HEMOGLOBIN GLYCOSYLATED A1C: CPT | Performed by: NURSE PRACTITIONER

## 2021-03-28 PROCEDURE — 85025 COMPLETE CBC W/AUTO DIFF WBC: CPT | Performed by: NURSE PRACTITIONER

## 2021-03-28 PROCEDURE — 84443 ASSAY THYROID STIM HORMONE: CPT | Performed by: NURSE PRACTITIONER

## 2021-03-29 ENCOUNTER — PROCEDURE VISIT (OUTPATIENT)
Dept: NEUROLOGY | Facility: CLINIC | Age: 46
End: 2021-03-29
Payer: COMMERCIAL

## 2021-03-29 VITALS — TEMPERATURE: 97.5 F | HEART RATE: 83 BPM | DIASTOLIC BLOOD PRESSURE: 85 MMHG | SYSTOLIC BLOOD PRESSURE: 130 MMHG

## 2021-03-29 DIAGNOSIS — G43.709 CHRONIC MIGRAINE WITHOUT AURA WITHOUT STATUS MIGRAINOSUS, NOT INTRACTABLE: Primary | ICD-10-CM

## 2021-03-29 LAB
ATRIAL RATE: 94 BPM
P AXIS: 34 DEGREES
PR INTERVAL: 138 MS
QRS AXIS: -19 DEGREES
QRSD INTERVAL: 82 MS
QT INTERVAL: 348 MS
QTC INTERVAL: 435 MS
T WAVE AXIS: -3 DEGREES
VENTRICULAR RATE: 94 BPM

## 2021-03-29 PROCEDURE — 93010 ELECTROCARDIOGRAM REPORT: CPT | Performed by: INTERNAL MEDICINE

## 2021-03-29 PROCEDURE — 64615 CHEMODENERV MUSC MIGRAINE: CPT | Performed by: PHYSICIAN ASSISTANT

## 2021-03-29 NOTE — PROGRESS NOTES
Universal Protocol   Consent: Verbal consent obtained  Written consent obtained  Risks and benefits: risks, benefits and alternatives were discussed  Consent given by: patient  Time out: Immediately prior to procedure a "time out" was called to verify the correct patient, procedure, equipment, support staff and site/side marked as required  Patient understanding: patient states understanding of the procedure being performed  Patient consent: the patient's understanding of the procedure matches consent given  Procedure consent: procedure consent matches procedure scheduled  Relevant documents: relevant documents present and verified  Patient identity confirmed: verbally with patient        Chemodenervation     Date/Time 3/29/2021 12:29 PM     Performed by  Felicitas Marinelli PA-C     Authorized by Felicitas Marinelli PA-C        Pre-procedure details      Prepped With: Alcohol     Anesthesia  (see MAR for exact dosages):      Anesthesia method:  None   Procedure details     Position:  Upright   Botox     Botox Type:  Type A    Brand:  Botox    mL's of Botulinum Toxin:  200    Final Concentration per CC:  50 units    Needle Gauge:  30 G 2 5 inch   Procedures     Botox Procedures: chronic headache      Indications: migraines     Injection Location      Head / Face:  L superior cervical paraspinal, R superior cervical paraspinal, L , R , L frontalis, R frontalis, L medial occipitalis, R medial occipitalis, procerus, R temporalis, L temporalis, R superior trapezius and L superior trapezius    L  injection amount:  5 unit(s)    R  injection amount:  5 unit(s)    L lateral frontalis:  5 unit(s)    R lateral frontalis:  5 unit(s)    L medial frontalis:  5 unit(s)    R medial frontalis:  5 unit(s)    L temporalis injection amount:  20 unit(s)    R temporalis injection amount:  20 unit(s)    Procerus injection amount:  5 unit(s)    L medial occipitalis injection amount:  15 unit(s)    R medial occipitalis injection amount:  15 unit(s)    L superior cervical paraspinal injection amount:  10 unit(s)    R superior cervical paraspinal injection amount:  10 unit(s)    L superior trapezius injection amount:  15 unit(s)    R superior trapezius injection amount:  15 unit(s)   Total Units     Total units used:  200    Total units discarded:  0   Post-procedure details      Chemodenervation:  Chronic migraine    Facial Nerve Location[de-identified]  Bilateral facial nerve    Patient tolerance of procedure:   Tolerated well, no immediate complications   Comments      5 units orbicularis oculi bilaterally  2 5 units lower frontalis bilaterally  7 5 temporalis bilaterally  15 units frontalis  All medically necessary

## 2021-04-21 ENCOUNTER — TELEMEDICINE (OUTPATIENT)
Dept: OBGYN CLINIC | Facility: CLINIC | Age: 46
End: 2021-04-21
Payer: COMMERCIAL

## 2021-04-21 DIAGNOSIS — Z01.818 VISIT FOR PRE-OPERATIVE EXAMINATION: Primary | ICD-10-CM

## 2021-04-21 PROCEDURE — 99212 OFFICE O/P EST SF 10 MIN: CPT | Performed by: OBSTETRICS & GYNECOLOGY

## 2021-04-21 NOTE — PROGRESS NOTES
Virtual Brief Visit    Assessment/Plan:  Preop  Hysteroscopy NovaSure endometrial ablation    Problem List Items Addressed This Visit     None                Reason for visit is   Chief Complaint   Patient presents with    Pre-op Exam        Encounter provider Lance Luu MD    Provider located at [address] [place] [address] OB/GYN [address] [address]  [phone]    Recent Visits  No visits were found meeting these conditions  Showing recent visits within past 7 days and meeting all other requirements     Today's Visits  Date Type Provider Dept   04/21/21 Telemedicine Lance Luu MD [facility]   Showing today's visits and meeting all other requirements     Future Appointments  No visits were found meeting these conditions  Showing future appointments within next 150 days and meeting all other requirements        After connecting through [employer] Teams and patient was informed that this is a secure, HIPAA-compliant platform  She agrees to proceed  , the patient was identified by name and date of birth  Jovanny Diaz was informed that this is a telemedicine visit and that the visit is being conducted through [address] [place] [address] Now and patient was informed that this is a secure, HIPAA-compliant platform  She agrees to proceed     My office door was closed  No one else was in the room  She acknowledged consent and understanding of privacy and security of the platform  The patient has agreed to participate and understands she can discontinue the visit at any time  Patient is aware this is a billable service  Subjective    Jovanny Diaz is a 51 y o  female scheduled for surgery on April 27th  Patient is scheduled for hysteroscopy NovaSure endometrial ablation  Patient was instructed on preop and postoperative course  Explained that patient will have some spotting due to cervical dilatation following the procedure  Patient questions were answered today    Patient scheduled her postop visit      Past Medical History:   Diagnosis Date    Depression     History of prior pregnancies         Migraine with aura and without status migrainosus, not intractable 2018    Papanicolaou smear 2019    normal       Past Surgical History:   Procedure Laterality Date     SECTION       SECTION      TUBAL LIGATION         Current Outpatient Medications   Medication Sig Dispense Refill    B Complex Vitamins (VITAMIN B COMPLEX PO) Take by mouth daily       cyclobenzaprine (FLEXERIL) 5 mg tablet Take 1 tablet (5 mg total) by mouth as needed for muscle spasms (migraine) 30 tablet 3    ferrous sulfate 324 (65 Fe) mg Take 1 tablet (324 mg total) by mouth daily before breakfast 30 tablet 1    LORazepam (ATIVAN) 0 5 mg tablet Take 2 tablets (1 mg total) by mouth daily as needed for anxiety 20 tablet 0    Magnesium 250 MG TABS Take by mouth as needed       multivitamin (THERAGRAN) TABS Take 1 tablet by mouth daily      onabotulinumtoxin A (BOTOX) 100 units Inject into a muscle once      Rimegepant Sulfate (Nurtec) 75 MG TBDP Take 75 mg by mouth as needed (migraine) (Patient not taking: Reported on 3/26/2021) 8 tablet 3    sertraline (ZOLOFT) 50 mg tablet Take 1 tablet (50 mg total) by mouth daily 90 tablet 1    vitamin B-12 (VITAMIN B-12) 1,000 mcg tablet Take by mouth daily      Vitamin D, Cholecalciferol, 1000 units CAPS Take by mouth       No current facility-administered medications for this visit  Allergies   Allergen Reactions    Amoxicillin      Other reaction(s): unknown    Clavulanic Acid      Other reaction(s): unknown    Moxifloxacin     Sulfa Antibiotics Hives     Other reaction(s): Itching         There were no vitals filed for this visit  I spent 10 minutes directly with the patient during this visit    VIRTUAL VISIT DISCLAIMER    Juliet Bernstein acknowledges that she has consented to an online visit or consultation   She understands that the online visit is based solely on information provided by her, and that, in the absence of a face-to-face physical evaluation by the physician, the diagnosis she receives is both limited and provisional in terms of accuracy and completeness  This is not intended to replace a full medical face-to-face evaluation by the physician  Wolf Linares understands and accepts these terms

## 2021-04-22 ENCOUNTER — OFFICE VISIT (OUTPATIENT)
Dept: INTERNAL MEDICINE CLINIC | Age: 46
End: 2021-04-22
Payer: COMMERCIAL

## 2021-04-22 VITALS
OXYGEN SATURATION: 98 % | BODY MASS INDEX: 39.71 KG/M2 | RESPIRATION RATE: 18 BRPM | TEMPERATURE: 97.6 F | SYSTOLIC BLOOD PRESSURE: 132 MMHG | HEIGHT: 68 IN | DIASTOLIC BLOOD PRESSURE: 78 MMHG | HEART RATE: 96 BPM | WEIGHT: 262 LBS

## 2021-04-22 DIAGNOSIS — F41.0 PANIC ATTACK: ICD-10-CM

## 2021-04-22 DIAGNOSIS — F41.1 GAD (GENERALIZED ANXIETY DISORDER): Primary | ICD-10-CM

## 2021-04-22 PROCEDURE — 99213 OFFICE O/P EST LOW 20 MIN: CPT | Performed by: INTERNAL MEDICINE

## 2021-04-22 PROCEDURE — NC001 PR NO CHARGE: Performed by: OBSTETRICS & GYNECOLOGY

## 2021-04-22 NOTE — H&P
H&P Exam - Gynecology   Josefina Ribeiro 51 y o  female MRN: [id number]  Unit/Bed#:  Encounter: [id number]      Assessment:  Menorrhagia    Plan:  Hysteroscopy, NovaSure endometrial ablation          HPI:  Joelle Jaramillo is a 51 y o  female who presents with worsening heavy periods  Patient has no relief with nonsteroidal anti-inflammatory drugs  Patient is not interested in hormonal therapy, or and IUD    Pelvic ultrasound showed the uterus to be 10 x6 x 6 6 cm without fibroids  An endometrial biopsy performed on March 15, 2021 was benign  Review of Systems   Constitutional: Negative  Negative for fatigue, fever and unexpected weight change  HENT: Negative  Eyes: Negative  Respiratory: Negative  Negative for chest tightness, shortness of breath, wheezing and stridor  Cardiovascular: Negative  Negative for chest pain, palpitations and leg swelling  Gastrointestinal: Negative  Negative for abdominal pain, blood in stool, diarrhea, nausea, rectal pain and vomiting  Endocrine: Negative  Genitourinary: Positive for menstrual problem  Negative for dysuria, frequency, vaginal bleeding, vaginal discharge and vaginal pain  Musculoskeletal: Negative  Skin: Negative  Allergic/Immunologic: Negative  Neurological: Negative  Hematological: Negative  Psychiatric/Behavioral: Negative  All other systems reviewed and are negative        Historical Information   Past Medical History:   Diagnosis Date    Depression     History of prior pregnancies         Migraine with aura and without status migrainosus, not intractable 2018    Papanicolaou smear 2019    normal     Past Surgical History:   Procedure Laterality Date     SECTION       SECTION      TUBAL LIGATION       OB/GYN History:   x2  Family History   Problem Relation Age of Onset    No Known Problems Mother     No Known Problems Father     No Known Problems Sister     No Known Problems Daughter     No Known Problems Maternal Grandmother     No Known Problems Maternal Grandfather     No Known Problems Paternal Grandmother     No Known Problems Paternal Grandfather     No Known Problems Sister     No Known Problems Maternal Aunt     No Known Problems Maternal Aunt     No Known Problems Maternal Aunt     No Known Problems Maternal Aunt     No Known Problems Paternal Aunt     No Known Problems Paternal Aunt     No Known Problems Paternal Aunt      Social History   Social History     Substance and Sexual Activity   Alcohol Use No     Social History     Substance and Sexual Activity   Drug Use No     Social History     Tobacco Use   Smoking Status Never Smoker   Smokeless Tobacco Never Used     E-Cigarette/Vaping    E-Cigarette Use Never User      E-Cigarette/Vaping Substances    Nicotine No     THC No     CBD No     Flavoring No     Other No     Unknown No        Meds/Allergies   all current active meds have been reviewed  Allergies   Allergen Reactions    Amoxicillin      Other reaction(s): unknown    Clavulanic Acid      Other reaction(s): unknown    Moxifloxacin     Sulfa Antibiotics Hives     Other reaction(s): Itching       Objective   Vitals: not currently breastfeeding  No intake or output data in the 24 hours ending 04/22/21 1705    Invasive Devices: Invasive Devices     None                 Physical Exam  HENT:      Head: Normocephalic  Nose: Nose normal    Eyes:      Pupils: Pupils are equal, round, and reactive to light  Neck:      Musculoskeletal: Normal range of motion and neck supple  Cardiovascular:      Rate and Rhythm: Normal rate and regular rhythm  Pulses: Normal pulses  Heart sounds: Normal heart sounds  No murmur  Pulmonary:      Effort: Pulmonary effort is normal       Breath sounds: Normal breath sounds  Abdominal:      General: Abdomen is flat  Palpations: Abdomen is soft  Musculoskeletal: Normal range of motion  Skin:     General: Skin is warm  Neurological:      General: No focal deficit present  Mental Status: She is alert  Psychiatric:         Mood and Affect: Mood normal          Behavior: Behavior normal          Thought Content: Thought content normal          Lab Results: I have personally reviewed pertinent reports  Imaging: I have personally reviewed pertinent reports  EKG, Pathology, and Other Studies: I have personally reviewed pertinent reports

## 2021-04-22 NOTE — PROGRESS NOTES
Assessment/Plan:     Generalized anxiety disorder with panic attacks  - symptoms are much improved on sertraline 50 mg daily   - will continue with sertraline at this dose and also with p r n  lorazepam for panic attacks   - patient was counseled to continue with exercise  - she was counseled to avoid exposing herself to hearing things that will make her feel scared about her upcoming procedure   - follow-up in 3-4 months     Diagnoses and all orders for this visit:    LASHAUN (generalized anxiety disorder)    Panic attack             Subjective:      Patient ID: Omayra Kennedy is a 51 y o  female  HPI  Patient presents for a follow-up visit regarding her generalized anxiety disorder with panic attacks  She states that she feels much better today  She states that she noticed a change in her condition about 2 weeks after she started the Zoloft and she continues to improve every day  She has started exercising and changing her diet and is losing weight and feeling much better about herself  She states that she has tried to drink more water  Of note, she states that in the last 2 weeks she has not needed to use her Ativan  She states that her last panic attack was about a week and a half ago  She states that her sleep is improving also  Of note, she states that she has irregular menstrual bleeding and is scheduled for an endometrial ablation next week  She is a bit concerned and worried about that especially when she talks to people and they tell her the adverse  effects they experienced after having a similar procedure done  She denies fever, chills, night sweats, chest pain, shortness of breath palpitations, nausea, vomiting, abdominal pain, diarrhea, constipation, myalgias, arthralgias      The following portions of the patient's history were reviewed and updated as appropriate:   She  has a past medical history of Depression, History of prior pregnancies, Migraine with aura and without status migrainosus, not intractable (2018), and Papanicolaou smear (2019)  She   Patient Active Problem List    Diagnosis Date Noted    LASHAUN (generalized anxiety disorder) 2021    Panic attack 2021    Morbid obesity ([facility] [address] ) 2021    [facility] 40 0-44 9, [facility]    Sore throat 03/10/2021    Snoring 03/10/2021    Thrombocytosis (HCC) 2019    Iron deficiency 2019    Chronic cough 2019    Weight gain 2019    Eustachian tube dysfunction, bilateral 2019    GERD (gastroesophageal reflux disease) 2018    Screening for breast cancer 2018    Macromastia 10/10/2018    Pre-diabetes 10/10/2018    Mixed dyslipidemia 10/10/2018    Chronic migraine without aura without status migrainosus, not intractable 2018    Seasonal allergies 10/09/2017    Obesity, Class II, BMI 35-39 9 2017    Anxiety 2017     She  has a past surgical history that includes  section;  section; and Tubal ligation  Her family history includes No Known Problems in her daughter, father, maternal aunt, maternal aunt, maternal aunt, maternal aunt, maternal grandfather, maternal grandmother, mother, paternal aunt, paternal aunt, paternal aunt, paternal grandfather, paternal grandmother, sister, and sister  She  reports that she has never smoked  She has never used smokeless tobacco  She reports that she does not drink alcohol or use drugs    Current Outpatient Medications   Medication Sig Dispense Refill    B Complex Vitamins (VITAMIN B COMPLEX PO) Take by mouth daily       cyclobenzaprine (FLEXERIL) 5 mg tablet Take 1 tablet (5 mg total) by mouth as needed for muscle spasms (migraine) 30 tablet 3    ferrous sulfate 324 (65 Fe) mg Take 1 tablet (324 mg total) by mouth daily before breakfast 30 tablet 1    LORazepam (ATIVAN) 0 5 mg tablet Take 2 tablets (1 mg total) by mouth daily as needed for anxiety 20 tablet 0    Magnesium 250 MG TABS Take by mouth as needed       multivitamin (THERAGRAN) TABS Take 1 tablet by mouth daily      onabotulinumtoxin A (BOTOX) 100 units Inject into a muscle once      sertraline (ZOLOFT) 50 mg tablet Take 1 tablet (50 mg total) by mouth daily 90 tablet 1    vitamin B-12 (VITAMIN B-12) 1,000 mcg tablet Take by mouth daily      Vitamin D, Cholecalciferol, 1000 units CAPS Take by mouth      Rimegepant Sulfate (Nurtec) 75 MG TBDP Take 75 mg by mouth as needed (migraine) (Patient not taking: Reported on 3/26/2021) 8 tablet 3     No current facility-administered medications for this visit  Current Outpatient Medications on File Prior to Visit   Medication Sig    B Complex Vitamins (VITAMIN B COMPLEX PO) Take by mouth daily     cyclobenzaprine (FLEXERIL) 5 mg tablet Take 1 tablet (5 mg total) by mouth as needed for muscle spasms (migraine)    ferrous sulfate 324 (65 Fe) mg Take 1 tablet (324 mg total) by mouth daily before breakfast    LORazepam (ATIVAN) 0 5 mg tablet Take 2 tablets (1 mg total) by mouth daily as needed for anxiety    Magnesium 250 MG TABS Take by mouth as needed     multivitamin (THERAGRAN) TABS Take 1 tablet by mouth daily    onabotulinumtoxin A (BOTOX) 100 units Inject into a muscle once    sertraline (ZOLOFT) 50 mg tablet Take 1 tablet (50 mg total) by mouth daily    vitamin B-12 (VITAMIN B-12) 1,000 mcg tablet Take by mouth daily    Vitamin D, Cholecalciferol, 1000 units CAPS Take by mouth    Rimegepant Sulfate (Nurtec) 75 MG TBDP Take 75 mg by mouth as needed (migraine) (Patient not taking: Reported on 3/26/2021)     No current facility-administered medications on file prior to visit  She is allergic to amoxicillin; clavulanic acid; moxifloxacin; and sulfa antibiotics       Review of Systems   Constitutional: Negative for activity change, chills, fatigue, fever and unexpected weight change  HENT: Negative for ear pain, postnasal drip, rhinorrhea, sinus pressure and sore throat  Eyes: Negative for pain  Respiratory: Negative for cough, choking, chest tightness, shortness of breath and wheezing  Cardiovascular: Negative for chest pain, palpitations and leg swelling  Gastrointestinal: Negative for abdominal pain, constipation, diarrhea, nausea and vomiting  Genitourinary: Positive for menstrual problem (irregular bleeding)  Negative for dysuria and hematuria  Musculoskeletal: Negative for arthralgias, back pain, gait problem, joint swelling, myalgias and neck stiffness  Skin: Negative for pallor and rash  Neurological: Negative for dizziness, tremors, seizures, syncope, light-headedness and headaches  Hematological: Negative for adenopathy  Psychiatric/Behavioral: Positive for sleep disturbance (improving)  Negative for behavioral problems  The patient is nervous/anxious (improving and has not used her ativan in two weeks  last panic attack was 21/2 weelks ago  )  Feels like zoloft started working after 2 weeks         Objective: There were no vitals taken for this visit  Physical Exam  Constitutional:       General: She is not in acute distress  Appearance: She is well-developed  She is obese  She is not diaphoretic  Comments: Patient is losing weight, her BMI is 39 84 today   HENT:      Head: Normocephalic and atraumatic  Right Ear: External ear normal       Left Ear: External ear normal       Nose: Nose normal       Mouth/Throat:      Mouth: Mucous membranes are dry  Pharynx: No oropharyngeal exudate  Comments: Dry mucous membranes  Eyes:      General: No scleral icterus  Right eye: No discharge  Left eye: No discharge  Conjunctiva/sclera: Conjunctivae normal       Pupils: Pupils are equal, round, and reactive to light  Neck:      Musculoskeletal: Normal range of motion and neck supple  Thyroid: No thyromegaly  Vascular: No JVD  Trachea: No tracheal deviation     Cardiovascular:      Rate and Rhythm: Normal rate and regular rhythm  Heart sounds: Normal heart sounds  No murmur  No friction rub  No gallop  Pulmonary:      Effort: Pulmonary effort is normal  No respiratory distress  Breath sounds: Normal breath sounds  No wheezing or rales  Chest:      Chest wall: No tenderness  Abdominal:      General: Bowel sounds are normal  There is no distension  Palpations: Abdomen is soft  There is no mass  Tenderness: There is no abdominal tenderness  There is no guarding or rebound  Musculoskeletal: Normal range of motion  General: No tenderness or deformity  Lymphadenopathy:      Cervical: No cervical adenopathy  Skin:     General: Skin is warm and dry  Coloration: Skin is not pale  Findings: No erythema or rash  Neurological:      Mental Status: She is alert and oriented to person, place, and time  Cranial Nerves: No cranial nerve deficit  Motor: No abnormal muscle tone  Coordination: Coordination normal       Deep Tendon Reflexes: Reflexes are normal and symmetric  Psychiatric:         Mood and Affect: Mood is not anxious (Patient's affect is much less anxious) or depressed ( patient affect is much less depressed today)  Behavior: Behavior normal            Lab on 03/28/2021   Component Date Value Ref Range Status    Sodium 03/28/2021 138  136 - 145 mmol/L Final    Potassium 03/28/2021 3 9  3 5 - 5 3 mmol/L Final    Chloride 03/28/2021 106  100 - 108 mmol/L Final    CO2 03/28/2021 27  21 - 32 mmol/L Final    ANION GAP 03/28/2021 5  4 - 13 mmol/L Final    BUN 03/28/2021 16  5 - 25 mg/dL Final    Creatinine 03/28/2021 0 73  0 60 - 1 30 mg/dL Final    Standardized to IDMS reference method    Glucose, Fasting 03/28/2021 98  65 - 99 mg/dL Final    Specimen collection should occur prior to Sulfasalazine administration due to the potential for falsely depressed results   Specimen collection should occur prior to Sulfapyridine administration due to the potential for falsely elevated results   Calcium 03/28/2021 9 2  8 3 - 10 1 mg/dL Final    Corrected Calcium 03/28/2021 9 7  8 3 - 10 1 mg/dL Final    AST 03/28/2021 17  5 - 45 U/L Final    Specimen collection should occur prior to Sulfasalazine administration due to the potential for falsely depressed results   ALT 03/28/2021 29  12 - 78 U/L Final    Specimen collection should occur prior to Sulfasalazine and/or Sulfapyridine administration due to the potential for falsely depressed results   Alkaline Phosphatase 03/28/2021 83  46 - 116 U/L Final    Total Protein 03/28/2021 7 9  6 4 - 8 2 g/dL Final    Albumin 03/28/2021 3 4* 3 5 - 5 0 g/dL Final    Total Bilirubin 03/28/2021 0 52  0 20 - 1 00 mg/dL Final    Use of this assay is not recommended for patients undergoing treatment with eltrombopag due to the potential for falsely elevated results      eGFR 03/28/2021 98  ml/min/1 73sq m Final    Ventricular Rate 03/28/2021 94  BPM Final    Atrial Rate 03/28/2021 94  BPM Final    IL Interval 03/28/2021 138  ms Final    QRSD Interval 03/28/2021 82  ms Final    QT Interval 03/28/2021 348  ms Final    QTC Interval 03/28/2021 435  ms Final    P Axis 03/28/2021 34  degrees Final    QRS Axis 03/28/2021 -19  degrees Final    T Wave Axis 03/28/2021 -3  degrees Final   Procedure visit on 03/15/2021   Component Date Value Ref Range Status    Case Report 03/15/2021    Final                    Value:Surgical Pathology Report                         Case: [id number]                                   Authorizing Provider:  Catina Wiley MD            Collected:           03/15/2021 1400              Ordering Location:     [facility] [place]  Received:            03/15/2021 1400              Pathologist:           Edmond Dowling MD                                                              Specimen:    Endometrium  Final Diagnosis 03/15/2021    Final                    Value: This result contains rich text formatting which cannot be displayed here   Note 03/15/2021    Final                    Value: This result contains rich text formatting which cannot be displayed here   Additional Information 03/15/2021    Final                    Value: This result contains rich text formatting which cannot be displayed here  Jayne Hyde Description 03/15/2021    Final                    Value: This result contains rich text formatting which cannot be displayed here     Office Visit on 12/03/2020   Component Date Value Ref Range Status    WBC 03/28/2021 11 12* 4 31 - 10 16 Thousand/uL Final    RBC 03/28/2021 4 58  3 81 - 5 12 Million/uL Final    Hemoglobin 03/28/2021 12 3  11 5 - 15 4 g/dL Final    Hematocrit 03/28/2021 38 2  34 8 - 46 1 % Final    MCV 03/28/2021 83  82 - 98 fL Final    MCH 03/28/2021 26 9  26 8 - 34 3 pg Final    MCHC 03/28/2021 32 2  31 4 - 37 4 g/dL Final    RDW 03/28/2021 16 4* 11 6 - 15 1 % Final    MPV 03/28/2021 10 4  8 9 - 12 7 fL Final    Platelets 24/54/0684 357  149 - 390 Thousands/uL Final    nRBC 03/28/2021 0  /100 WBCs Final    Neutrophils Relative 03/28/2021 56  43 - 75 % Final    Immat GRANS % 03/28/2021 0  0 - 2 % Final    Lymphocytes Relative 03/28/2021 36  14 - 44 % Final    Monocytes Relative 03/28/2021 5  4 - 12 % Final    Eosinophils Relative 03/28/2021 3  0 - 6 % Final    Basophils Relative 03/28/2021 0  0 - 1 % Final    Neutrophils Absolute 03/28/2021 6 18  1 85 - 7 62 Thousands/µL Final    Immature Grans Absolute 03/28/2021 0 03  0 00 - 0 20 Thousand/uL Final    Lymphocytes Absolute 03/28/2021 3 96  0 60 - 4 47 Thousands/µL Final    Monocytes Absolute 03/28/2021 0 53  0 17 - 1 22 Thousand/µL Final    Eosinophils Absolute 03/28/2021 0 37  0 00 - 0 61 Thousand/µL Final    Basophils Absolute 03/28/2021 0 05  0 00 - 0 10 Thousands/µL Final    Hemoglobin A1C 03/28/2021 5 9* Normal 3 8-5 6%; PreDiabetic 5 7-6 4%; Diabetic >=6 5%; Glycemic control for adults with diabetes <7 0% % Final    EAG 03/28/2021 123  mg/dl Final    Cholesterol 03/28/2021 217* 50 - 200 mg/dL Final    Cholesterol:       Desirable         <200 mg/dl       Borderline         200-239 mg/dl       High              >239           Triglycerides 03/28/2021 152* <=150 mg/dL Final    Triglyceride:     Normal          <150 mg/dl     Borderline High 150-199 mg/dl     High            200-499 mg/dl        Very High       >499 mg/dl    Specimen collection should occur prior to N-Acetylcysteine or Metamizole administration due to the potential for falsely depressed results   HDL, Direct 03/28/2021 66  >=40 mg/dL Final    HDL Cholesterol:       Low     <41 mg/dL  Specimen collection should occur prior to Metamizole administration due to the potential for falsley depressed results   LDL Calculated 03/28/2021 121* 0 - 100 mg/dL Final    LDL Cholesterol:     Optimal           <100 mg/dl     Near Optimal      100-129 mg/dl     Above Optimal       Borderline High 130-159 mg/dl       High            160-189 mg/dl       Very High       >189 mg/dl         This screening LDL is a calculated result  It does not have the accuracy of the Direct Measured LDL in the monitoring of patients with hyperlipidemia and/or statin therapy  Direct Measure LDL (NCH636) must be ordered separately in these patients   Non-HDL-Chol (CHOL-HDL) 03/28/2021 151  mg/dl Final    TSH 3RD GENERATON 03/28/2021 2 060  0 358 - 3 740 uIU/mL Final    The recommended reference ranges for TSH during pregnancy are as follows:   First trimester 0 1 to 2 5 uIU/mL   Second trimester  0 2 to 3 0 uIU/mL   Third trimester 0 3 to 3 0 uIU/m    Note: Normal ranges may not apply to patients who are transgender, non-binary, or whose legal sex, sex at birth, and gender identity differ      Vit D, 25-Hydroxy 03/28/2021 37 4  30 0 - 100 0 ng/mL Final Appointment on 08/26/2020   Component Date Value Ref Range Status    Sodium 08/26/2020 139  136 - 145 mmol/L Final    Potassium 08/26/2020 4 5  3 5 - 5 3 mmol/L Final    Chloride 08/26/2020 107  100 - 108 mmol/L Final    CO2 08/26/2020 27  21 - 32 mmol/L Final    ANION GAP 08/26/2020 5  4 - 13 mmol/L Final    BUN 08/26/2020 13  5 - 25 mg/dL Final    Creatinine 08/26/2020 0 66  0 60 - 1 30 mg/dL Final    Standardized to IDMS reference method    Glucose, Fasting 08/26/2020 84  65 - 99 mg/dL Final    Specimen collection should occur prior to Sulfasalazine administration due to the potential for falsely depressed results  Specimen collection should occur prior to Sulfapyridine administration due to the potential for falsely elevated results   Calcium 08/26/2020 9 1  8 3 - 10 1 mg/dL Final    AST 08/26/2020 15  5 - 45 U/L Final    Specimen collection should occur prior to Sulfasalazine administration due to the potential for falsely depressed results   ALT 08/26/2020 22  12 - 78 U/L Final    Specimen collection should occur prior to Sulfasalazine and/or Sulfapyridine administration due to the potential for falsely depressed results   Alkaline Phosphatase 08/26/2020 69  46 - 116 U/L Final    Total Protein 08/26/2020 7 9  6 4 - 8 2 g/dL Final    Albumin 08/26/2020 3 5  3 5 - 5 0 g/dL Final    Total Bilirubin 08/26/2020 0 52  0 20 - 1 00 mg/dL Final    Use of this assay is not recommended for patients undergoing treatment with eltrombopag due to the potential for falsely elevated results   eGFR 08/26/2020 106  ml/min/1 73sq m Final    Iron Saturation 08/26/2020 18  % Final    TIBC 08/26/2020 393  250 - 450 ug/dL Final    Iron 08/26/2020 72  50 - 170 ug/dL Final    Patients treated with metal-binding drugs (ie  Deferoxamine) may have depressed iron values      Ferritin 08/26/2020 24  8 - 388 ng/mL Final   Office Visit on 08/21/2020   Component Date Value Ref Range Status    WBC 08/26/2020 10 17* 4 31 - 10 16 Thousand/uL Final    RBC 08/26/2020 4 65  3 81 - 5 12 Million/uL Final    Hemoglobin 08/26/2020 12 9  11 5 - 15 4 g/dL Final    Hematocrit 08/26/2020 41 7  34 8 - 46 1 % Final    MCV 08/26/2020 90  82 - 98 fL Final    MCH 08/26/2020 27 7  26 8 - 34 3 pg Final    MCHC 08/26/2020 30 9* 31 4 - 37 4 g/dL Final    RDW 08/26/2020 15 0  11 6 - 15 1 % Final    MPV 08/26/2020 10 2  8 9 - 12 7 fL Final    Platelets 05/73/0416 365  149 - 390 Thousands/uL Final    nRBC 08/26/2020 0  /100 WBCs Final    Neutrophils Relative 08/26/2020 54  43 - 75 % Final    Immat GRANS % 08/26/2020 0  0 - 2 % Final    Lymphocytes Relative 08/26/2020 37  14 - 44 % Final    Monocytes Relative 08/26/2020 6  4 - 12 % Final    Eosinophils Relative 08/26/2020 2  0 - 6 % Final    Basophils Relative 08/26/2020 1  0 - 1 % Final    Neutrophils Absolute 08/26/2020 5 49  1 85 - 7 62 Thousands/µL Final    Immature Grans Absolute 08/26/2020 0 03  0 00 - 0 20 Thousand/uL Final    Lymphocytes Absolute 08/26/2020 3 77  0 60 - 4 47 Thousands/µL Final    Monocytes Absolute 08/26/2020 0 60  0 17 - 1 22 Thousand/µL Final    Eosinophils Absolute 08/26/2020 0 23  0 00 - 0 61 Thousand/µL Final    Basophils Absolute 08/26/2020 0 05  0 00 - 0 10 Thousands/µL Final    Hemoglobin A1C 08/26/2020 5 7* Normal 3 8-5 6%; PreDiabetic 5 7-6 4%;  Diabetic >=6 5%; Glycemic control for adults with diabetes <7 0% % Final    EAG 08/26/2020 117  mg/dl Final

## 2021-04-23 NOTE — PRE-PROCEDURE INSTRUCTIONS
Pre-Surgery Instructions:   Medication Instructions    B Complex Vitamins (VITAMIN B COMPLEX PO) Instructed patient per Anesthesia Guidelines   cyclobenzaprine (FLEXERIL) 5 mg tablet Instructed patient to continue taking as prescribed up to and including DOS with sips of water   ferrous sulfate 324 (65 Fe) mg Instructed patient per Anesthesia Guidelines   LORazepam (ATIVAN) 0 5 mg tablet Instructed patient to continue taking as prescribed up to and including DOS with sips of water   multivitamin (THERAGRAN) TABS Instructed patient per Anesthesia Guidelines   onabotulinumtoxin A (BOTOX) 100 units N/A Pt gets every 3 months and is not due again for 1 month   sertraline (ZOLOFT) 50 mg tablet Instructed patient to continue taking as prescribed up to and including DOS with sips of water   vitamin B-12 (VITAMIN B-12) 1,000 mcg tablet Instructed patient per Anesthesia Guidelines   Vitamin D, Cholecalciferol, 1000 units CAPS Instructed patient per Anesthesia Guidelines  Med list reviewed as above  Per anesthesia guidelines pt will hold vitamins  Also instructed pt not to start any new vitamins/supplements preoperatively and to avoid NSAID's  3 days prior to surgery  Tylenol is acceptable if needed  Pt was instructed to shower morning of procedure as she normally does  Reviewed [facility]'s current covid visitor  policy and pt understands that it may change at any time  All information within "My Surgical Experience" pamphlet reviewed and patient verbalizes understanding and compliance  All questions answered

## 2021-04-27 ENCOUNTER — ANESTHESIA EVENT (OUTPATIENT)
Dept: PERIOP | Facility: AMBULARY SURGERY CENTER | Age: 46
End: 2021-04-27
Payer: COMMERCIAL

## 2021-04-27 ENCOUNTER — HOSPITAL ENCOUNTER (OUTPATIENT)
Facility: AMBULARY SURGERY CENTER | Age: 46
Setting detail: OUTPATIENT SURGERY
Discharge: HOME | End: 2021-04-27
Attending: OBSTETRICS & GYNECOLOGY | Admitting: OBSTETRICS & GYNECOLOGY
Payer: COMMERCIAL

## 2021-04-27 ENCOUNTER — ANESTHESIA (OUTPATIENT)
Dept: PERIOP | Facility: AMBULARY SURGERY CENTER | Age: 46
End: 2021-04-27
Payer: COMMERCIAL

## 2021-04-27 VITALS
RESPIRATION RATE: 16 BRPM | DIASTOLIC BLOOD PRESSURE: 92 MMHG | WEIGHT: 261 LBS | BODY MASS INDEX: 39.56 KG/M2 | OXYGEN SATURATION: 94 % | SYSTOLIC BLOOD PRESSURE: 139 MMHG | HEART RATE: 80 BPM | HEIGHT: 68 IN | TEMPERATURE: 98 F

## 2021-04-27 PROBLEM — Z98.890 HISTORY OF ENDOMETRIAL ABLATION: Status: ACTIVE | Noted: 2021-04-27

## 2021-04-27 LAB
EXT PREGNANCY TEST URINE: NEGATIVE
EXT. CONTROL: NORMAL

## 2021-04-27 PROCEDURE — 81025 URINE PREGNANCY TEST: CPT | Performed by: OBSTETRICS & GYNECOLOGY

## 2021-04-27 PROCEDURE — 58563 HYSTEROSCOPY ABLATION: CPT | Performed by: OBSTETRICS & GYNECOLOGY

## 2021-04-27 RX ORDER — FENTANYL CITRATE/PF 50 MCG/ML
25 SYRINGE (ML) INJECTION
Status: DISCONTINUED | OUTPATIENT
Start: 2021-04-27 | End: 2021-04-27 | Stop reason: HOSPADM

## 2021-04-27 RX ORDER — MIDAZOLAM HYDROCHLORIDE 2 MG/2ML
INJECTION, SOLUTION INTRAMUSCULAR; INTRAVENOUS AS NEEDED
Status: DISCONTINUED | OUTPATIENT
Start: 2021-04-27 | End: 2021-04-27

## 2021-04-27 RX ORDER — FENTANYL CITRATE 50 UG/ML
INJECTION, SOLUTION INTRAMUSCULAR; INTRAVENOUS AS NEEDED
Status: DISCONTINUED | OUTPATIENT
Start: 2021-04-27 | End: 2021-04-27

## 2021-04-27 RX ORDER — ONDANSETRON 2 MG/ML
INJECTION INTRAMUSCULAR; INTRAVENOUS AS NEEDED
Status: DISCONTINUED | OUTPATIENT
Start: 2021-04-27 | End: 2021-04-27

## 2021-04-27 RX ORDER — EPHEDRINE SULFATE 50 MG/ML
INJECTION INTRAVENOUS AS NEEDED
Status: DISCONTINUED | OUTPATIENT
Start: 2021-04-27 | End: 2021-04-27

## 2021-04-27 RX ORDER — SODIUM CHLORIDE, SODIUM LACTATE, POTASSIUM CHLORIDE, CALCIUM CHLORIDE 600; 310; 30; 20 MG/100ML; MG/100ML; MG/100ML; MG/100ML
INJECTION, SOLUTION INTRAVENOUS CONTINUOUS PRN
Status: DISCONTINUED | OUTPATIENT
Start: 2021-04-27 | End: 2021-04-27

## 2021-04-27 RX ORDER — ONDANSETRON 2 MG/ML
4 INJECTION INTRAMUSCULAR; INTRAVENOUS ONCE AS NEEDED
Status: DISCONTINUED | OUTPATIENT
Start: 2021-04-27 | End: 2021-04-27 | Stop reason: HOSPADM

## 2021-04-27 RX ORDER — PROPOFOL 10 MG/ML
INJECTION, EMULSION INTRAVENOUS AS NEEDED
Status: DISCONTINUED | OUTPATIENT
Start: 2021-04-27 | End: 2021-04-27

## 2021-04-27 RX ORDER — LIDOCAINE HYDROCHLORIDE 20 MG/ML
INJECTION, SOLUTION EPIDURAL; INFILTRATION; INTRACAUDAL; PERINEURAL AS NEEDED
Status: DISCONTINUED | OUTPATIENT
Start: 2021-04-27 | End: 2021-04-27

## 2021-04-27 RX ORDER — DEXAMETHASONE SODIUM PHOSPHATE 10 MG/ML
INJECTION, SOLUTION INTRAMUSCULAR; INTRAVENOUS AS NEEDED
Status: DISCONTINUED | OUTPATIENT
Start: 2021-04-27 | End: 2021-04-27

## 2021-04-27 RX ORDER — KETOROLAC TROMETHAMINE 30 MG/ML
INJECTION, SOLUTION INTRAMUSCULAR; INTRAVENOUS AS NEEDED
Status: DISCONTINUED | OUTPATIENT
Start: 2021-04-27 | End: 2021-04-27

## 2021-04-27 RX ORDER — ONDANSETRON 2 MG/ML
4 INJECTION INTRAMUSCULAR; INTRAVENOUS EVERY 6 HOURS PRN
Status: CANCELLED | OUTPATIENT
Start: 2021-04-27

## 2021-04-27 RX ORDER — IBUPROFEN 600 MG/1
600 TABLET ORAL EVERY 6 HOURS PRN
Status: DISCONTINUED | OUTPATIENT
Start: 2021-04-27 | End: 2021-04-27 | Stop reason: HOSPADM

## 2021-04-27 RX ORDER — ACETAMINOPHEN 325 MG/1
650 TABLET ORAL EVERY 6 HOURS PRN
Status: DISCONTINUED | OUTPATIENT
Start: 2021-04-27 | End: 2021-04-27 | Stop reason: HOSPADM

## 2021-04-27 RX ORDER — OXYCODONE HYDROCHLORIDE 5 MG/1
5 TABLET ORAL EVERY 4 HOURS PRN
Status: DISCONTINUED | OUTPATIENT
Start: 2021-04-27 | End: 2021-04-27 | Stop reason: HOSPADM

## 2021-04-27 RX ADMIN — EPHEDRINE SULFATE 5 MG: 50 INJECTION, SOLUTION INTRAVENOUS at 08:41

## 2021-04-27 RX ADMIN — FENTANYL CITRATE 50 MCG: 50 INJECTION, SOLUTION INTRAMUSCULAR; INTRAVENOUS at 08:47

## 2021-04-27 RX ADMIN — OXYCODONE HYDROCHLORIDE 5 MG: 5 TABLET ORAL at 10:09

## 2021-04-27 RX ADMIN — KETOROLAC TROMETHAMINE 30 MG: 30 INJECTION, SOLUTION INTRAMUSCULAR at 08:34

## 2021-04-27 RX ADMIN — PROPOFOL 200 MG: 10 INJECTION, EMULSION INTRAVENOUS at 08:34

## 2021-04-27 RX ADMIN — MIDAZOLAM HYDROCHLORIDE 2 MG: 1 INJECTION, SOLUTION INTRAMUSCULAR; INTRAVENOUS at 08:29

## 2021-04-27 RX ADMIN — LIDOCAINE HYDROCHLORIDE 100 MG: 20 INJECTION, SOLUTION EPIDURAL; INFILTRATION; INTRACAUDAL at 08:34

## 2021-04-27 RX ADMIN — SODIUM CHLORIDE, SODIUM LACTATE, POTASSIUM CHLORIDE, AND CALCIUM CHLORIDE: .6; .31; .03; .02 INJECTION, SOLUTION INTRAVENOUS at 08:32

## 2021-04-27 RX ADMIN — DEXAMETHASONE SODIUM PHOSPHATE 10 MG: 10 INJECTION, SOLUTION INTRAMUSCULAR; INTRAVENOUS at 08:34

## 2021-04-27 RX ADMIN — ONDANSETRON 4 MG: 2 INJECTION INTRAMUSCULAR; INTRAVENOUS at 08:28

## 2021-04-27 RX ADMIN — EPHEDRINE SULFATE 10 MG: 50 INJECTION, SOLUTION INTRAVENOUS at 08:44

## 2021-04-27 NOTE — OP NOTE
OPERATIVE REPORT  PATIENT NAME: Maribel Hernandez    :  1975  MRN: [id number]  Pt Location: [facility] OR ROOM 05    SURGERY DATE: 2021    Surgeon(s) and Role:     * Lester Cooper MD - Primary     * Cay Spurling, MD - Assisting    Preop Diagnosis:  Menorrhagia with regular cycle [N92 0]    Post-Op Diagnosis Codes:     * Menorrhagia with regular cycle [N92 0]    Procedure(s) (LRB):  ABLATION ENDOMETRIAL NOVASURE (N/A)    Specimen(s):  * No specimens in log *    Estimated Blood Loss:   Minimal    Drains:  * No LDAs found *    Anesthesia Type:   General LMA    Operative Indications:  Menorrhagia with regular cycle [N92 0]    Operative Findings:  Grossly normal external female genitalia  No vaginal lesions  Parous cervix with moderate descensus  Anteverted, mobile uterus, sounding to 9 5cm  On hysteroscopy, proliferative endometrial tissue noted throughout  After ablation, blanching white tissue noted throughout endometrial cavity    Complications:   None    Procedure and Technique:  Patient was identified in the preop holding area as well as the operating suite  Procedure was reviewed and patient consented verbally once again  She underwent successful induction of general anesthesia using LMA  She was placed in the dorsal lithotomy position using yellowfin stirrups  She had pneumatic compression boots in place  She had a Chlorhexidine vaginal prep and was draped in sterile fashion  A operative timeout was accomplished  Exam under anesthesia revealed no vaginal or cervical lesions  The cervix was parous with moderate descensus  Uterus was anteverted and normal in size  There were no adnexal masses noted  The anterior lip of the cervix was grasped using a single-tooth tenaculum  Uterus sounded in midposition fashion to 9 5 cm  The endocervix was dilated to accommodate the 5 mm hysteroscope  The hysteroscope was then introduced with saline as a distention medium    Excellent visualization of the endocervix and endometrial cavity was accomplished  Proliferative endometrial tissue noted throughout endometrial cavity, both ostia visualized  The endocervix was further dilated to accommodate a medium sharp curette and endometrial curettage was then undertaken with specimen being sent for routine pathology  The NovaSure ablation device was inserted through the cervix and seated into the endometrial cavity  Device was deployed and rotated in all directions to maximize expansion of the bipolar electrode  Uterine length was determined to be 6 5 cm and uterine width was determined to be 4 7 cm  A test of the system was performed and the uterine cavity was insufflated with CO2 to ensure cavity integrity and proper placement of the device  No leakage of gas was appreciated  After successful testing, the Novasure system was activated and bipolar cauterization with a Moisture Transport Vacuum System facilitated ablation of the endometrium in approximately 37 seconds of power  The Novasure system was completely retracted prior to its removal from the uterine cavity  Inspection of the bipolar electrode showed evidence of burnt endometrial tissue  The hysteroscope was then reinserted under direct visualization  Endometrial cavity showed blanching white endometrial tissue on both anterior and posterior walls of the uterus  Ablation was felt to be successful based on this evidence and the hysteroscope was withdrawn  On withdrawal of the hysteroscope from the uterine cavity, there was note of transition from white ablated endometrium to pink normal cevical mucosa  Total fluid input was 100cc of normal saline  The tenaculum was removed  The patient had excellent hemostasis at this time  She was cleansed and was awakened from anesthesia without incident and was taken to the recovery room in satisfactory condition  Dr Fabiola Celeste was present and participated in all key portions of the case      Patient Disposition:  PACU     SIGNATURE: Juan Antonio Ni MD  DATE: April 27, 2021  TIME: 9:10 AM

## 2021-04-27 NOTE — INTERVAL H&P NOTE
H&P reviewed  After examining the patient I find no changes in the patients condition since the H&P had been written      Vitals:    04/27/21 0742   BP: 154/94   Pulse: 87   Resp: 18   Temp: 98 1 °F (36 7 °C)   SpO2: 96%

## 2021-04-27 NOTE — DISCHARGE INSTRUCTIONS
Endometrial Ablation   WHAT YOU NEED TO KNOW:   Endometrial ablation is a procedure to remove the endometrium (lining of your uterus)  You may need this procedure if you have heavy or abnormal vaginal bleeding  DISCHARGE INSTRUCTIONS:   Call 911 for any of the following:   · You feel lightheaded, short of breath, and have chest pain  · You cough up blood  Seek care immediately if:   · Your arm or leg feels warm, tender, and painful  It may look swollen and red  · You feel dizzy, weak, and confused  · You cannot stop vomiting  · You have severe pain  · You are not able to urinate  Contact your healthcare provider if:   · You have a fever  · You have vaginal bleeding and it is not time for your monthly period  · The bleeding during your monthly period has not decreased  · You have pain when you urinate or see blood in your urine  · You have questions or concerns about your condition or care  Medicines:   · Medicines  can help decrease pain, calm your stomach, and control vomiting  · Take your medicine as directed  Contact your healthcare provider if you think your medicine is not helping or if you have side effects  Tell him or her if you are allergic to any medicine  Keep a list of the medicines, vitamins, and herbs you take  Include the amounts, and when and why you take them  Bring the list or the pill bottles to follow-up visits  Carry your medicine list with you in case of an emergency  Activity:  Ask when you can return to your usual activities  Do not have sex or use tampons or douches for 6 weeks after your procedure, or as directed  Birth control: You may still need to use birth control to prevent pregnancy  Pregnancy risks, such as a miscarriage and tubal pregnancy, are higher after this procedure  Talk to your healthcare provider about birth control or pregnancy after endometrial ablation    Follow up with your healthcare provider as directed:  Write down your questions so you remember to ask them during your visits  © Copyright [address] Information is for End User's use only and may not be sold, redistributed or otherwise used for commercial purposes  All illustrations and images included in CareNotes® are the copyrighted property of A D A M , Inc  or Malathi Mace  The above information is an  only  It is not intended as medical advice for individual conditions or treatments  Talk to your doctor, nurse or pharmacist before following any medical regimen to see if it is safe and effective for you

## 2021-04-27 NOTE — ANESTHESIA PREPROCEDURE EVALUATION
Procedure:  ABLATION ENDOMETRIAL NOVASURE (N/A Uterus)    Relevant Problems   GI/HEPATIC   (+) GERD (gastroesophageal reflux disease)      NEURO/PSYCH   (+) Anxiety   (+) LASHAUN (generalized anxiety disorder)   (+) Panic attack        Physical Exam    Airway    Mallampati score: III         Dental       Cardiovascular  Rhythm: regular, Rate: normal,     Pulmonary  Breath sounds clear to auscultation,     Other Findings  Small mouth      Anesthesia Plan  ASA Score- 3     Anesthesia Type- general with ASA Monitors  Additional Monitors:   Airway Plan: LMA  Plan Factors-Exercise tolerance (METS): >4 METS  Chart reviewed  Existing labs reviewed  Patient summary reviewed  Patient is not a current smoker  Induction- intravenous  Postoperative Plan-     Informed Consent- Anesthetic plan and risks discussed with patient and spouse  I personally reviewed this patient with the CRNA  Discussed and agreed on the Anesthesia Plan with the CRNA  Art Padilla

## 2021-04-27 NOTE — ANESTHESIA POSTPROCEDURE EVALUATION
Post-Op Assessment Note    CV Status:  Stable  Pain Score: 0    Pain management: adequate     Mental Status:  Awake and alert   Hydration Status:  Stable   PONV Controlled:  None   Airway Patency:  Patent and adequate      Post Op Vitals Reviewed: Yes      Staff: CRNA, Anesthesiologist         No complications documented      BP   120/75   Temp   97 3   Pulse  77   Resp   14   SpO2   99%

## 2021-05-11 ENCOUNTER — DOCUMENTATION (OUTPATIENT)
Dept: NEUROLOGY | Facility: CLINIC | Age: 46
End: 2021-05-11

## 2021-05-11 NOTE — PROGRESS NOTES
Patient is scheduled with Benny Harmon on 6/30/2021 in the [facility]

## 2021-05-11 NOTE — PROGRESS NOTES
Type Date User Summary Attachment   General 05/11/2021  2:44 PM Marcos Petty care coordination  -   Note    Botox- authorization #: [id number]- 2nd visit- valid from 3/1/2021 until 3/1/2022   Please use our stock      Thank you     Caity Demarco

## 2021-05-24 ENCOUNTER — OFFICE VISIT (OUTPATIENT)
Dept: OBGYN CLINIC | Facility: CLINIC | Age: 46
End: 2021-05-24

## 2021-05-24 VITALS
WEIGHT: 259 LBS | BODY MASS INDEX: 39.25 KG/M2 | SYSTOLIC BLOOD PRESSURE: 120 MMHG | DIASTOLIC BLOOD PRESSURE: 80 MMHG | HEIGHT: 68 IN

## 2021-05-24 DIAGNOSIS — Z48.89 POSTOPERATIVE VISIT: Primary | ICD-10-CM

## 2021-05-24 PROCEDURE — 99024 POSTOP FOLLOW-UP VISIT: CPT | Performed by: OBSTETRICS & GYNECOLOGY

## 2021-05-24 NOTE — PROGRESS NOTES
Patient returns to the office following hysteroscopy endometrial ablation  Patient has had light spotting since  She did not require any pain medication following the procedure  No problems voiding  Photos from surgery shown  Impression:  Stable status post endometrial ablation on 04/27/2021    Plan:  Observe menstrual cycles over the next few months    Return to office for yearly exam

## 2021-05-24 NOTE — PROGRESS NOTES
The patient is here for a post-op  She had an ablation on 4/27/21  The patient had some spotting after the surgery but it was very light  She also had some mild cramping after the surgery but no cramping now

## 2021-06-30 ENCOUNTER — PROCEDURE VISIT (OUTPATIENT)
Dept: NEUROLOGY | Facility: CLINIC | Age: 46
End: 2021-06-30
Payer: COMMERCIAL

## 2021-06-30 VITALS — TEMPERATURE: 98.6 F | DIASTOLIC BLOOD PRESSURE: 90 MMHG | HEART RATE: 83 BPM | SYSTOLIC BLOOD PRESSURE: 133 MMHG

## 2021-06-30 DIAGNOSIS — G43.709 CHRONIC MIGRAINE WITHOUT AURA WITHOUT STATUS MIGRAINOSUS, NOT INTRACTABLE: Primary | ICD-10-CM

## 2021-06-30 PROCEDURE — 64615 CHEMODENERV MUSC MIGRAINE: CPT | Performed by: PHYSICIAN ASSISTANT

## 2021-06-30 RX ORDER — CYCLOBENZAPRINE HCL 5 MG
5 TABLET ORAL AS NEEDED
Qty: 30 TABLET | Refills: 4 | Status: SHIPPED | OUTPATIENT
Start: 2021-06-30 | End: 2021-09-23 | Stop reason: SDUPTHER

## 2021-06-30 RX ORDER — PROCHLORPERAZINE MALEATE 10 MG
10 TABLET ORAL EVERY 6 HOURS PRN
Qty: 10 TABLET | Refills: 4 | Status: SHIPPED | OUTPATIENT
Start: 2021-06-30 | End: 2022-07-07 | Stop reason: SDUPTHER

## 2021-06-30 RX ORDER — RIMEGEPANT SULFATE 75 MG/75MG
75 TABLET, ORALLY DISINTEGRATING ORAL AS NEEDED
Qty: 8 TABLET | Refills: 11 | Status: SHIPPED | OUTPATIENT
Start: 2021-06-30 | End: 2022-08-01

## 2021-06-30 NOTE — PROGRESS NOTES
Universal Protocol   Consent: Verbal consent obtained  Written consent obtained  Risks and benefits: risks, benefits and alternatives were discussed  Consent given by: patient  Time out: Immediately prior to procedure a "time out" was called to verify the correct patient, procedure, equipment, support staff and site/side marked as required  Patient understanding: patient states understanding of the procedure being performed  Patient consent: the patient's understanding of the procedure matches consent given  Procedure consent: procedure consent matches procedure scheduled  Relevant documents: relevant documents present and verified  Patient identity confirmed: verbally with patient        Chemodenervation     Date/Time 6/30/2021 3:18 PM     Performed by  So Hernandez PA-C     Authorized by So Hernandez PA-C        Pre-procedure details      Prepped With: Alcohol     Anesthesia  (see MAR for exact dosages):      Anesthesia method:  None   Procedure details     Position:  Upright   Botox     Botox Type:  Type A    Brand:  Botox    mL's of Botulinum Toxin:  200    Final Concentration per CC:  50 units    Needle Gauge:  30 G 2 5 inch   Procedures     Botox Procedures: chronic headache      Indications: migraines     Injection Location      Head / Face:  L superior cervical paraspinal, R superior cervical paraspinal, L , R , L frontalis, R frontalis, L medial occipitalis, R medial occipitalis, procerus, R temporalis, L temporalis, R superior trapezius and L superior trapezius    L  injection amount:  5 unit(s)    R  injection amount:  5 unit(s)    L lateral frontalis:  5 unit(s)    R lateral frontalis:  5 unit(s)    L medial frontalis:  5 unit(s)    R medial frontalis:  5 unit(s)    L temporalis injection amount:  20 unit(s)    R temporalis injection amount:  20 unit(s)    Procerus injection amount:  5 unit(s)    L medial occipitalis injection amount:  15 unit(s)    R medial occipitalis injection amount:  15 unit(s)    L superior cervical paraspinal injection amount:  10 unit(s)    R superior cervical paraspinal injection amount:  10 unit(s)    L superior trapezius injection amount:  15 unit(s)    R superior trapezius injection amount:  15 unit(s)   Total Units     Total units used:  200    Total units discarded:  0   Post-procedure details      Chemodenervation:  Chronic migraine    Facial Nerve Location[de-identified]  Bilateral facial nerve    Patient tolerance of procedure:   Tolerated well, no immediate complications   Comments       5 units orbicularis oculi bilaterally  2 5 units lower frontalis bilaterally  7 5 temporalis bilaterally  15 units frontalis  All medically necessary

## 2021-08-04 ENCOUNTER — TELEPHONE (OUTPATIENT)
Dept: OBGYN CLINIC | Facility: CLINIC | Age: 46
End: 2021-08-04

## 2021-08-04 NOTE — TELEPHONE ENCOUNTER
The ablation will change her menstrual periods only decrease the bleeding    May be perimenopausal   Observe menstrual cycles over the next few months

## 2021-09-02 ENCOUNTER — TELEPHONE (OUTPATIENT)
Dept: OBGYN CLINIC | Facility: CLINIC | Age: 46
End: 2021-09-02

## 2021-09-23 DIAGNOSIS — G43.709 CHRONIC MIGRAINE WITHOUT AURA WITHOUT STATUS MIGRAINOSUS, NOT INTRACTABLE: ICD-10-CM

## 2021-09-23 DIAGNOSIS — F41.1 GAD (GENERALIZED ANXIETY DISORDER): ICD-10-CM

## 2021-09-23 DIAGNOSIS — F41.0 PANIC ATTACK: ICD-10-CM

## 2021-09-23 DIAGNOSIS — F41.9 ANXIETY: ICD-10-CM

## 2021-09-27 ENCOUNTER — TELEPHONE (OUTPATIENT)
Dept: INTERNAL MEDICINE CLINIC | Age: 46
End: 2021-09-27

## 2021-09-30 DIAGNOSIS — F41.9 ANXIETY: ICD-10-CM

## 2021-09-30 DIAGNOSIS — F41.0 PANIC ATTACK: ICD-10-CM

## 2021-09-30 DIAGNOSIS — F41.1 GAD (GENERALIZED ANXIETY DISORDER): ICD-10-CM

## 2021-10-01 RX ORDER — CYCLOBENZAPRINE HCL 5 MG
5 TABLET ORAL AS NEEDED
Qty: 30 TABLET | Refills: 0 | Status: SHIPPED | OUTPATIENT
Start: 2021-10-01 | End: 2022-07-07 | Stop reason: SDUPTHER

## 2021-10-01 NOTE — PROGRESS NOTES
[phone] Sertraline sent for pt for 30 days without any refills because pt is almost out and should not stop an SSRI abruptly  [phone]

## 2021-10-04 ENCOUNTER — PROCEDURE VISIT (OUTPATIENT)
Dept: NEUROLOGY | Facility: CLINIC | Age: 46
End: 2021-10-04
Payer: COMMERCIAL

## 2021-10-04 VITALS — HEART RATE: 84 BPM | DIASTOLIC BLOOD PRESSURE: 80 MMHG | TEMPERATURE: 97.4 F | SYSTOLIC BLOOD PRESSURE: 122 MMHG

## 2021-10-04 DIAGNOSIS — G43.709 CHRONIC MIGRAINE WITHOUT AURA WITHOUT STATUS MIGRAINOSUS, NOT INTRACTABLE: Primary | ICD-10-CM

## 2021-10-04 PROCEDURE — 64615 CHEMODENERV MUSC MIGRAINE: CPT | Performed by: PHYSICIAN ASSISTANT

## 2021-10-06 ENCOUNTER — OFFICE VISIT (OUTPATIENT)
Dept: INTERNAL MEDICINE CLINIC | Age: 46
End: 2021-10-06
Payer: COMMERCIAL

## 2021-10-06 VITALS
OXYGEN SATURATION: 98 % | BODY MASS INDEX: 39.9 KG/M2 | DIASTOLIC BLOOD PRESSURE: 84 MMHG | WEIGHT: 263.3 LBS | HEART RATE: 94 BPM | TEMPERATURE: 97.8 F | HEIGHT: 68 IN | SYSTOLIC BLOOD PRESSURE: 122 MMHG

## 2021-10-06 DIAGNOSIS — Z12.31 BREAST CANCER SCREENING BY MAMMOGRAM: ICD-10-CM

## 2021-10-06 DIAGNOSIS — F41.1 GAD (GENERALIZED ANXIETY DISORDER): Primary | ICD-10-CM

## 2021-10-06 DIAGNOSIS — G43.709 CHRONIC MIGRAINE WITHOUT AURA WITHOUT STATUS MIGRAINOSUS, NOT INTRACTABLE: ICD-10-CM

## 2021-10-06 DIAGNOSIS — Z00.00 ANNUAL PHYSICAL EXAM: ICD-10-CM

## 2021-10-06 DIAGNOSIS — F41.9 ANXIETY: ICD-10-CM

## 2021-10-06 DIAGNOSIS — M77.02 MEDIAL EPICONDYLITIS OF LEFT ELBOW: ICD-10-CM

## 2021-10-06 DIAGNOSIS — F41.0 PANIC ATTACK: ICD-10-CM

## 2021-10-06 DIAGNOSIS — G56.02 CARPAL TUNNEL SYNDROME OF LEFT WRIST: ICD-10-CM

## 2021-10-06 PROBLEM — E66.01 MORBID OBESITY (HCC): Status: RESOLVED | Noted: 2021-03-26 | Resolved: 2021-10-06

## 2021-10-06 PROBLEM — E66.812 OBESITY, CLASS II, BMI 35-39.9: Status: RESOLVED | Noted: 2017-03-27 | Resolved: 2021-10-06

## 2021-10-06 PROBLEM — E66.9 OBESITY, CLASS II, BMI 35-39.9: Status: RESOLVED | Noted: 2017-03-27 | Resolved: 2021-10-06

## 2021-10-06 PROCEDURE — 99214 OFFICE O/P EST MOD 30 MIN: CPT | Performed by: INTERNAL MEDICINE

## 2021-10-13 DIAGNOSIS — F41.9 ANXIETY: ICD-10-CM

## 2021-10-13 RX ORDER — LORAZEPAM 0.5 MG/1
1 TABLET ORAL DAILY PRN
Qty: 20 TABLET | Refills: 0 | Status: SHIPPED | OUTPATIENT
Start: 2021-10-13 | End: 2021-12-14 | Stop reason: SDUPTHER

## 2021-10-22 ENCOUNTER — HOSPITAL ENCOUNTER (OUTPATIENT)
Dept: RADIOLOGY | Facility: IMAGING CENTER | Age: 46
Discharge: HOME | End: 2021-10-22
Payer: COMMERCIAL

## 2021-10-22 VITALS — WEIGHT: 259 LBS | HEIGHT: 69 IN | BODY MASS INDEX: 38.36 KG/M2

## 2021-10-22 DIAGNOSIS — Z12.31 BREAST CANCER SCREENING BY MAMMOGRAM: ICD-10-CM

## 2021-10-22 PROCEDURE — 77067 SCR MAMMO BI INCL CAD: CPT

## 2021-10-22 PROCEDURE — 77063 BREAST TOMOSYNTHESIS BI: CPT

## 2021-11-01 ENCOUNTER — HOSPITAL ENCOUNTER (OUTPATIENT)
Dept: ULTRASOUND IMAGING | Facility: CLINIC | Age: 46
Discharge: HOME | End: 2021-11-01
Payer: COMMERCIAL

## 2021-11-01 VITALS — BODY MASS INDEX: 38.36 KG/M2 | WEIGHT: 259 LBS | HEIGHT: 69 IN

## 2021-11-01 DIAGNOSIS — R92.8 ABNORMAL MAMMOGRAM: ICD-10-CM

## 2021-11-01 PROCEDURE — 76642 ULTRASOUND BREAST LIMITED: CPT

## 2021-12-14 DIAGNOSIS — F41.1 GAD (GENERALIZED ANXIETY DISORDER): ICD-10-CM

## 2021-12-14 DIAGNOSIS — F41.9 ANXIETY: ICD-10-CM

## 2021-12-14 DIAGNOSIS — F41.0 PANIC ATTACK: ICD-10-CM

## 2021-12-15 RX ORDER — LORAZEPAM 0.5 MG/1
1 TABLET ORAL DAILY PRN
Qty: 20 TABLET | Refills: 0 | Status: SHIPPED | OUTPATIENT
Start: 2021-12-15 | End: 2022-01-28 | Stop reason: SDUPTHER

## 2022-01-05 ENCOUNTER — PROCEDURE VISIT (OUTPATIENT)
Dept: NEUROLOGY | Facility: CLINIC | Age: 47
End: 2022-01-05
Payer: COMMERCIAL

## 2022-01-05 VITALS — TEMPERATURE: 98.3 F | SYSTOLIC BLOOD PRESSURE: 118 MMHG | DIASTOLIC BLOOD PRESSURE: 66 MMHG | HEART RATE: 79 BPM

## 2022-01-05 DIAGNOSIS — G43.709 CHRONIC MIGRAINE WITHOUT AURA WITHOUT STATUS MIGRAINOSUS, NOT INTRACTABLE: Primary | ICD-10-CM

## 2022-01-05 PROCEDURE — 64615 CHEMODENERV MUSC MIGRAINE: CPT | Performed by: PHYSICIAN ASSISTANT

## 2022-01-05 NOTE — PROGRESS NOTES
Universal Protocol   Consent: Verbal consent obtained  Written consent obtained  Risks and benefits: risks, benefits and alternatives were discussed  Consent given by: patient  Time out: Immediately prior to procedure a "time out" was called to verify the correct patient, procedure, equipment, support staff and site/side marked as required  Patient understanding: patient states understanding of the procedure being performed  Patient consent: the patient's understanding of the procedure matches consent given  Procedure consent: procedure consent matches procedure scheduled  Relevant documents: relevant documents present and verified  Patient identity confirmed: verbally with patient        Chemodenervation     Date/Time 1/5/2022 10:07 AM     Performed by  Precious Andrade PA-C     Authorized by Precious Andrade PA-C        Pre-procedure details      Prepped With: Alcohol     Anesthesia  (see MAR for exact dosages):      Anesthesia method:  None   Procedure details     Position:  Upright   Botox     Botox Type:  Type A    Brand:  Botox    mL's of Botulinum Toxin:  200    Final Concentration per CC:  50 units    Needle Gauge:  30 G 2 5 inch   Procedures     Botox Procedures: chronic headache      Indications: migraines     Injection Location      Head / Face:  L superior cervical paraspinal, R superior cervical paraspinal, L , R , L frontalis, R frontalis, L medial occipitalis, R medial occipitalis, procerus, R temporalis, L temporalis, R superior trapezius and L superior trapezius    L  injection amount:  5 unit(s)    R  injection amount:  5 unit(s)    L lateral frontalis:  5 unit(s)    R lateral frontalis:  5 unit(s)    L medial frontalis:  5 unit(s)    R medial frontalis:  5 unit(s)    L temporalis injection amount:  20 unit(s)    R temporalis injection amount:  20 unit(s)    Procerus injection amount:  5 unit(s)    L medial occipitalis injection amount:  15 unit(s)    R medial occipitalis injection amount:  15 unit(s)    L superior cervical paraspinal injection amount:  10 unit(s)    R superior cervical paraspinal injection amount:  10 unit(s)    L superior trapezius injection amount:  15 unit(s)    R superior trapezius injection amount:  15 unit(s)   Total Units     Total units used:  200    Total units discarded:  0   Post-procedure details      Chemodenervation:  Chronic migraine    Facial Nerve Location[de-identified]  Bilateral facial nerve    Patient tolerance of procedure:   Tolerated well, no immediate complications   Comments       5 units orbicularis oculi bilaterally  2 5 units lower frontalis bilaterally  7 5 temporalis bilaterally  15 units frontalis  All medically necessary

## 2022-01-28 DIAGNOSIS — F41.1 GAD (GENERALIZED ANXIETY DISORDER): ICD-10-CM

## 2022-01-28 DIAGNOSIS — F41.9 ANXIETY: ICD-10-CM

## 2022-01-28 DIAGNOSIS — F41.0 PANIC ATTACK: ICD-10-CM

## 2022-01-28 RX ORDER — LORAZEPAM 0.5 MG/1
1 TABLET ORAL DAILY PRN
Qty: 20 TABLET | Refills: 0 | Status: SHIPPED | OUTPATIENT
Start: 2022-01-28 | End: 2022-06-30 | Stop reason: SDUPTHER

## 2022-01-28 NOTE — TELEPHONE ENCOUNTER
Patient would like it send to [facility] in [facility], I could not find the [place] location for some reason  They are hoping that it could be sent today since her  is working over there today

## 2022-02-07 ENCOUNTER — OFFICE VISIT (OUTPATIENT)
Dept: INTERNAL MEDICINE CLINIC | Age: 47
End: 2022-02-07
Payer: COMMERCIAL

## 2022-02-07 VITALS
TEMPERATURE: 98.3 F | HEIGHT: 69 IN | HEART RATE: 102 BPM | OXYGEN SATURATION: 98 % | BODY MASS INDEX: 39.71 KG/M2 | WEIGHT: 268.1 LBS | SYSTOLIC BLOOD PRESSURE: 104 MMHG | DIASTOLIC BLOOD PRESSURE: 80 MMHG

## 2022-02-07 DIAGNOSIS — H10.011 ACUTE FOLLICULAR CONJUNCTIVITIS OF RIGHT EYE: Primary | ICD-10-CM

## 2022-02-07 DIAGNOSIS — H11.441 CONJUNCTIVAL CYST OF RIGHT EYE: ICD-10-CM

## 2022-02-07 PROCEDURE — 99213 OFFICE O/P EST LOW 20 MIN: CPT | Performed by: STUDENT IN AN ORGANIZED HEALTH CARE EDUCATION/TRAINING PROGRAM

## 2022-02-07 RX ORDER — TOBRAMYCIN AND DEXAMETHASONE 3; 1 MG/ML; MG/ML
1 SUSPENSION/ DROPS OPHTHALMIC
Qty: 5 ML | Refills: 0 | Status: SHIPPED | OUTPATIENT
Start: 2022-02-07 | End: 2022-08-01 | Stop reason: ALTCHOICE

## 2022-02-07 RX ORDER — TOBRAMYCIN 3 MG/ML
SOLUTION/ DROPS OPHTHALMIC
COMMUNITY
Start: 2022-02-05 | End: 2022-08-01 | Stop reason: ALTCHOICE

## 2022-02-07 RX ORDER — NEOMYCIN SULFATE, POLYMYXIN B SULFATE, BACITRACIN ZINC, HYDROCORTISONE 3.5; 10000; 400; 1 MG/G; [USP'U]/G; [USP'U]/G; MG/G
OINTMENT OPHTHALMIC 2 TIMES DAILY
Qty: 3.5 G | Refills: 1 | Status: CANCELLED | OUTPATIENT
Start: 2022-02-07 | End: 2022-02-14

## 2022-03-21 ENCOUNTER — ANNUAL EXAM (OUTPATIENT)
Dept: OBGYN CLINIC | Facility: CLINIC | Age: 47
End: 2022-03-21
Payer: COMMERCIAL

## 2022-03-21 VITALS
SYSTOLIC BLOOD PRESSURE: 142 MMHG | BODY MASS INDEX: 40.77 KG/M2 | WEIGHT: 269 LBS | DIASTOLIC BLOOD PRESSURE: 84 MMHG | HEIGHT: 68 IN

## 2022-03-21 DIAGNOSIS — Z11.51 SCREENING FOR HPV (HUMAN PAPILLOMAVIRUS): ICD-10-CM

## 2022-03-21 DIAGNOSIS — Z01.419 ENCOUNTER FOR WELL WOMAN EXAM: Primary | ICD-10-CM

## 2022-03-21 DIAGNOSIS — Z12.4 CERVICAL CANCER SCREENING: ICD-10-CM

## 2022-03-21 DIAGNOSIS — Z12.31 ENCOUNTER FOR SCREENING MAMMOGRAM FOR BREAST CANCER: ICD-10-CM

## 2022-03-21 DIAGNOSIS — Z12.39 ENCOUNTER FOR SCREENING BREAST EXAMINATION: ICD-10-CM

## 2022-03-21 DIAGNOSIS — Z01.419 ROUTINE GYNECOLOGICAL EXAMINATION: ICD-10-CM

## 2022-03-21 PROCEDURE — G0145 SCR C/V CYTO,THINLAYER,RESCR: HCPCS | Performed by: PHYSICIAN ASSISTANT

## 2022-03-21 PROCEDURE — S0612 ANNUAL GYNECOLOGICAL EXAMINA: HCPCS | Performed by: PHYSICIAN ASSISTANT

## 2022-03-21 PROCEDURE — G0476 HPV COMBO ASSAY CA SCREEN: HCPCS | Performed by: PHYSICIAN ASSISTANT

## 2022-03-21 NOTE — PROGRESS NOTES
The patient is here for a yearly  The patient is due for a pap smear  Pap normal HPV neg 1/14/19  Her periods are lighter since having the ablation  The patient has regular periods  The patient has soreness in her left breast during her periods  She also has pain in her shoulders and back  The patient was interested in having a breast reduction  No vaginal, bowel, or bladder issues

## 2022-03-21 NOTE — PROGRESS NOTES
Assessment/Plan:    No problem-specific Assessment & Plan notes found for this encounter  Diagnoses and all orders for this visit:    Encounter for well woman exam    Routine gynecological examination  -     Liquid-based pap, screening    Encounter for screening breast examination    Screening for HPV (human papillomavirus)  -     Liquid-based pap, screening    Encounter for screening mammogram for breast cancer  -     Mammo screening bilateral w 3d & cad; Future    Cervical cancer screening    Other orders  -     Ferrous Sulfate (IRON PO); Take 600 mg by mouth in the morning          Subjective:      Patient ID: Juwan Jacobo is a 49 y o  female  Pt presents for her annual exam today--  She has no complaints except neck and back pain due to weight of her breasts--  + migraines  Her bra straps make indentations in her shoulders    She has reg, shorter, lighter bleeding since ablation  no pelvic pain  Bowel and bladder are regular  No other breast concerns today  Last mammo--10/21    pap today  rx mammo  Daily ca, d  On Fe  Would like letter for breast reduction      The following portions of the patient's history were reviewed and updated as appropriate: allergies, current medications, past family history, past medical history, past social history, past surgical history and problem list     Review of Systems   Constitutional: Negative for chills, fever and unexpected weight change  Gastrointestinal: Negative for abdominal pain, blood in stool, constipation and diarrhea  Genitourinary: Negative  Objective:      /84   Ht 5' 8" (1 727 m)   Wt 122 kg (269 lb)   LMP 03/17/2022 (Exact Date)   BMI 40 90 kg/m²          Physical Exam  Vitals and nursing note reviewed  Constitutional:       Appearance: She is well-developed  HENT:      Head: Normocephalic and atraumatic  Chest:   Breasts:      Right: No inverted nipple, mass, nipple discharge or skin change        Left: No inverted nipple, mass, nipple discharge or skin change  Abdominal:      Palpations: Abdomen is soft  Genitourinary:     Exam position: Supine  Labia:         Right: No rash, tenderness or lesion  Left: No rash, tenderness or lesion  Vagina: Normal       Cervix: No cervical motion tenderness, discharge or friability  Adnexa:         Right: No mass, tenderness or fullness  Left: No mass, tenderness or fullness  Musculoskeletal:      Cervical back: Normal range of motion  Lymphadenopathy:      Lower Body: No right inguinal adenopathy  No left inguinal adenopathy

## 2022-03-30 ENCOUNTER — TELEPHONE (OUTPATIENT)
Dept: NEUROLOGY | Facility: CLINIC | Age: 47
End: 2022-03-30

## 2022-03-30 LAB
LAB AP GYN PRIMARY INTERPRETATION: NORMAL
Lab: NORMAL

## 2022-03-30 NOTE — TELEPHONE ENCOUNTER
Submitted prior-authorization request via fax to [facility] for Botox 200 units-  on 3/29/2022   Awaiting approval/denial

## 2022-04-04 NOTE — TELEPHONE ENCOUNTER
Received approved authorization from [facility] via Fax       Approved   Botox 200 units   Auth# [id number]  Valid- 2/24/2022 until 2/24/2023  4 visits      Please use our [employer] [place] [employer]

## 2022-04-06 ENCOUNTER — PROCEDURE VISIT (OUTPATIENT)
Dept: NEUROLOGY | Facility: CLINIC | Age: 47
End: 2022-04-06
Payer: COMMERCIAL

## 2022-04-06 VITALS — HEART RATE: 94 BPM | TEMPERATURE: 97.5 F | SYSTOLIC BLOOD PRESSURE: 124 MMHG | DIASTOLIC BLOOD PRESSURE: 87 MMHG

## 2022-04-06 DIAGNOSIS — G43.709 CHRONIC MIGRAINE WITHOUT AURA WITHOUT STATUS MIGRAINOSUS, NOT INTRACTABLE: Primary | ICD-10-CM

## 2022-04-06 PROCEDURE — 64615 CHEMODENERV MUSC MIGRAINE: CPT | Performed by: PHYSICIAN ASSISTANT

## 2022-04-06 NOTE — PROGRESS NOTES
Universal Protocol   Consent: Verbal consent obtained  Written consent obtained  Risks and benefits: risks, benefits and alternatives were discussed  Consent given by: patient  Time out: Immediately prior to procedure a "time out" was called to verify the correct patient, procedure, equipment, support staff and site/side marked as required  Patient understanding: patient states understanding of the procedure being performed  Patient consent: the patient's understanding of the procedure matches consent given  Procedure consent: procedure consent matches procedure scheduled  Relevant documents: relevant documents present and verified  Patient identity confirmed: verbally with patient        Chemodenervation     Date/Time 4/6/2022 3:31 PM     Performed by  Leda Crespo PA-C     Authorized by Leda Crespo PA-C        Pre-procedure details      Prepped With: Alcohol     Anesthesia  (see MAR for exact dosages):      Anesthesia method:  None   Procedure details     Position:  Upright   Botox     Botox Type:  Type A    Brand:  Botox    mL's of Botulinum Toxin:  200    Final Concentration per CC:  50 units    Needle Gauge:  30 G 2 5 inch   Procedures     Botox Procedures: chronic headache      Indications: migraines     Injection Location      Head / Face:  L superior cervical paraspinal, R superior cervical paraspinal, L , R , L frontalis, R frontalis, L medial occipitalis, R medial occipitalis, procerus, R temporalis, L temporalis, R superior trapezius and L superior trapezius    L  injection amount:  5 unit(s)    R  injection amount:  5 unit(s)    L lateral frontalis:  5 unit(s)    R lateral frontalis:  5 unit(s)    L medial frontalis:  5 unit(s)    R medial frontalis:  5 unit(s)    L temporalis injection amount:  20 unit(s)    R temporalis injection amount:  20 unit(s)    Procerus injection amount:  5 unit(s)    L medial occipitalis injection amount:  15 unit(s)    R medial occipitalis injection amount:  15 unit(s)    L superior cervical paraspinal injection amount:  10 unit(s)    R superior cervical paraspinal injection amount:  10 unit(s)    L superior trapezius injection amount:  15 unit(s)    R superior trapezius injection amount:  15 unit(s)   Total Units     Total units used:  200    Total units discarded:  0   Post-procedure details      Chemodenervation:  Chronic migraine    Facial Nerve Location[de-identified]  Bilateral facial nerve    Patient tolerance of procedure:   Tolerated well, no immediate complications   Comments       5 units orbicularis oculi bilaterally  2 5 units lower frontalis bilaterally  7 5 temporalis bilaterally  15 units frontalis  All medically necessary

## 2022-04-11 ENCOUNTER — TELEPHONE (OUTPATIENT)
Dept: NEUROLOGY | Facility: CLINIC | Age: 47
End: 2022-04-11

## 2022-04-11 NOTE — TELEPHONE ENCOUNTER
Please let patient know I sent her letter to mo which was discussed at her last visit  Have her send me a message if the letter doesn't cover everything that it needs

## 2022-05-06 ENCOUNTER — NURSE TRIAGE (OUTPATIENT)
Dept: OTHER | Facility: OTHER | Age: 47
End: 2022-05-06

## 2022-05-07 NOTE — TELEPHONE ENCOUNTER
Patient with fever of 101   would like to know if Paxlovid can be sent to pharmacy  TT sent to on call at husbands request  Per Dr Jose Colbert patient needs evaluation before being given Paxlovid and with a fver of 101 an dcovid positive patient requires evaluation in care now or ED

## 2022-05-07 NOTE — TELEPHONE ENCOUNTER
Reason for Disposition   [1] Fever returns after gone for over 24 hours AND [2] symptoms worse or not improved    Answer Assessment - Initial Assessment Questions  1  COVID-19 DIAGNOSIS: "Who made your COVID-19 diagnosis?" "Was it confirmed by a positive lab test or self-test?" If not diagnosed by a doctor (or NP/PA), ask "Are there lots of cases (community spread) where you live?" Note: See [facility] health department website, if unsure  Fever 101 slight cough body aches chills Symptoms just started today Denies chest chest tightness SOB   2  COVID-19 EXPOSURE: "Was there any known exposure to COVID before the symptoms began?" [facility] Definition of close contact: within 6 feet (2 meters) for a total of 15 minutes or more over a 24-hour period  Yes  3  ONSET: "When did the COVID-19 symptoms start?"      Today  4  WORST SYMPTOM: "What is your worst symptom?" (e g , cough, fever, shortness of breath, muscle aches)     Denies  5  COUGH: "Do you have a cough?" If Yes, ask: "How bad is the cough?"       Yes mild  6  FEVER: "Do you have a fever?" If Yes, ask: "What is your temperature, how was it measured, and when did it start?"     yes  7  RESPIRATORY STATUS: "Describe your breathing?" (e g , shortness of breath, wheezing, unable to speak)       DEnies  8  BETTER-SAME-WORSE: "Are you getting better, staying the same or getting worse compared to yesterday?"  If getting worse, ask, "In what way?"     same  9  HIGH RISK DISEASE: "Do you have any chronic medical problems?" (e g , asthma, heart or lung disease, weak immune system, obesity, etc )     Denies  10  VACCINE: "Have you had the COVID-19 vaccine?" If Yes, ask: "Which one, how many shots, when did you get it?"       Vaccinated  11  BOOSTER: "Have you received your COVID-19 booster?" If Yes, ask: "Which one and when did you get it?"        Boosted   12  PREGNANCY: "Is there any chance you are pregnant?" "When was your last menstrual period?"       na  13   OTHER SYMPTOMS: "Do you have any other symptoms?"  (e g , chills, fatigue, headache, loss of smell or taste, muscle pain, sore throat)      fatigue    Protocols used: CORONAVIRUS (COVID-19) DIAGNOSED OR SUSPECTED-ADULT-

## 2022-05-07 NOTE — TELEPHONE ENCOUNTER
Regarding: COVID positive- medical advice   ----- Message from Caprice Guillermo sent at 5/6/2022  6:53 PM EDT -----  "she just has a fever 101 (tympanic), chills, achy, we did a home test and it is positive   I wonder if there is anything we could "

## 2022-05-09 NOTE — TELEPHONE ENCOUNTER
LM for patient to call  I spoke with her  and scheduled a virtual appt for 5/12/22 to address her symptoms and COVID positive testing  I asked that she call back to confirm the appt time

## 2022-05-10 ENCOUNTER — TELEMEDICINE (OUTPATIENT)
Dept: INTERNAL MEDICINE CLINIC | Facility: OTHER | Age: 47
End: 2022-05-10
Payer: COMMERCIAL

## 2022-05-10 VITALS — TEMPERATURE: 99 F | HEIGHT: 68 IN | WEIGHT: 257 LBS | BODY MASS INDEX: 38.95 KG/M2

## 2022-05-10 DIAGNOSIS — U07.1 COVID-19: Primary | ICD-10-CM

## 2022-05-10 PROCEDURE — 99213 OFFICE O/P EST LOW 20 MIN: CPT | Performed by: NURSE PRACTITIONER

## 2022-05-10 RX ORDER — BENZONATATE 200 MG/1
200 CAPSULE ORAL 3 TIMES DAILY PRN
Qty: 30 CAPSULE | Refills: 0 | Status: SHIPPED | OUTPATIENT
Start: 2022-05-10 | End: 2022-08-01 | Stop reason: ALTCHOICE

## 2022-05-10 NOTE — PROGRESS NOTES
COVID-19 Outpatient Progress Note    Assessment/Plan:  BMI Counseling: Body mass index is 39 08 kg/m²  The BMI is above normal  Nutrition recommendations include decreasing portion sizes, encouraging healthy choices of fruits and vegetables, decreasing fast food intake, consuming healthier snacks, limiting drinks that contain sugar, moderation in carbohydrate intake, increasing intake of lean protein, reducing intake of saturated and trans fat and reducing intake of cholesterol  Exercise recommendations include moderate physical activity 150 minutes/week and exercising 3-5 times per week  Rationale for BMI follow-up plan is due to patient being overweight or obese  Problem List Items Addressed This Visit        Other    COVID-19 - Primary    Relevant Medications    benzonatate (TESSALON) 200 MG capsule         Disposition:     Patient has COVID-19 infection  Based off CDC guidelines, they were recommended to isolate for 5 days from the date of the positive test  If they remain asymptomatic, isolation may be ended followed by 5 days of wearing a mask when around othes to minimize risk of infecting others  If they have a fever, continue to stay home until fever resolves for at least 24 hours  Discussed symptom directed medication options with patient  Discussed vitamin D, vitamin C, and/or zinc supplementation with patient  I have spent 15 minutes directly with the patient  Encounter provider ANNMARIE Verma    Provider located at [address] [place] [phone]    Recent Visits  No visits were found meeting these conditions    Showing recent visits within past 7 days and meeting all other requirements  Today's Visits  Date Type Provider Dept   05/10/22 [address] [place], [place] [address]AN[facility]AR[facility]   Showing today's visits and meeting all other requirements  Future Appointments  No visits were found meeting these conditions  Showing future appointments within next 150 days and meeting all other requirements     This virtual check-in was done via [employer] and patient was informed that this is not a secure, HIPAA-compliant platform  She agrees to proceed  Patient agrees to participate in a virtual check in via telephone or video visit instead of presenting to the office to address urgent/immediate medical needs  Patient is aware this is a billable service  After connecting through [facility], the patient was identified by name and date of birth  Hans Contreras was informed that this was a telemedicine visit and that the exam was being conducted confidentially over secure lines  My office door was closed  No one else was in the room  Hans Contreras acknowledged consent and understanding of privacy and security of the telemedicine visit  I informed the patient that I have reviewed her record in Epic and presented the opportunity for her to ask any questions regarding the visit today  The patient agreed to participate  Verification of patient location:  Patient is located in the following state in which I hold an active license: [place]    Subjective:   Hans Contreras is a 49 y o  female who has been screened for COVID-19  Symptom change since last report: improving  Patient's symptoms include fatigue, nasal congestion, sore throat, cough (productive ) and headache  Patient denies fever, chills, malaise, rhinorrhea, anosmia, loss of taste, shortness of breath, chest tightness, abdominal pain, nausea, vomiting, diarrhea and myalgias  - Date of symptom onset: 5/5/2022  - Date of positive COVID-19 test: 5/5/2022  Type of test: Home antigen  COVID-19 vaccination status: Fully vaccinated with booster    Maral Green has been staying home and has isolated themselves in her home   She is taking care to not share personal items and is cleaning all surfaces that are touched often, like counters, tabletops, and doorknobs using household cleaning sprays or wipes  She is wearing a mask when she leaves her room       Has been taking Mucinex     Lab Results   Component Value Date    SARSCOV2 Negative 2022     Past Medical History:   Diagnosis Date    Anemia     Anxiety     Depression     GERD (gastroesophageal reflux disease)     History of prior pregnancies         Migraine with aura and without status migrainosus, not intractable 2018    Obesity     Papanicolaou smear 2019    normal    Uterine bleeding, dysfunctional      Past Surgical History:   Procedure Laterality Date     SECTION       SECTION      [place] HYSTEROSCOPY,W/ENDOMETRIAL ABLATION N/A 2021    Procedure: ABLATION ENDOMETRIAL Darroll Amour;  Surgeon: Tino Lopez MD;  Location: [facility];  Service: Gynecology    TUBAL LIGATION       Current Outpatient Medications   Medication Sig Dispense Refill    B Complex Vitamins (VITAMIN B COMPLEX PO) Take by mouth daily       cyclobenzaprine (FLEXERIL) 5 mg tablet Take 1 tablet (5 mg total) by mouth as needed for muscle spasms (migraine) 30 tablet 0    Ferrous Sulfate (IRON PO) Take 600 mg by mouth in the morning      LORazepam (ATIVAN) 0 5 mg tablet Take 2 tablets (1 mg total) by mouth daily as needed for anxiety 20 tablet 0    multivitamin (THERAGRAN) TABS Take 1 tablet by mouth daily      onabotulinumtoxin A (BOTOX) 100 units Inject into a muscle every 3 (three) months For migraines done every 3 months      prochlorperazine (COMPAZINE) 10 mg tablet Take 1 tablet (10 mg total) by mouth every 6 (six) hours as needed (migraine) 10 tablet 4    Rimegepant Sulfate (Nurtec) 75 MG TBDP Take 75 mg by mouth as needed (migraine) Limit of 1 in 24 hours 8 tablet 11    sertraline (ZOLOFT) 50 mg tablet Take 1 tablet (50 mg total) by mouth daily 90 tablet 2    tobramycin (TOBREX) 0 3 % SOLN PLACE 1 DROP INTO THE AFFECTED EYE(S) EVERY 4 HOURS WHILE AWAKE UNTIL RESOLVED      tobramycin-dexamethasone (TOBRADEX) ophthalmic suspension Administer 1 drop to the right eye every 4 (four) hours while awake 5 mL 0    vitamin B-12 (VITAMIN B-12) 1,000 mcg tablet Take by mouth daily      Vitamin D, Cholecalciferol, 1000 units CAPS Take 1 tablet by mouth in the morning        benzonatate (TESSALON) 200 MG capsule Take 1 capsule (200 mg total) by mouth 3 (three) times a day as needed for cough 30 capsule 0     No current facility-administered medications for this visit  Allergies   Allergen Reactions    Amoxicillin Hives    Clavulanic Acid Hives    Moxifloxacin Hives    Sulfa Antibiotics Hives       Review of Systems   Constitutional: Positive for fatigue  Negative for activity change, appetite change, chills, diaphoresis and fever  HENT: Positive for congestion and sore throat  Negative for ear discharge, ear pain, postnasal drip, rhinorrhea, sinus pressure and sinus pain  Eyes: Negative for pain, discharge, itching and visual disturbance  Respiratory: Positive for cough (productive )  Negative for chest tightness, shortness of breath and wheezing  Cardiovascular: Negative for chest pain, palpitations and leg swelling  Gastrointestinal: Negative for abdominal pain, constipation, diarrhea, nausea and vomiting  Endocrine: Negative for polydipsia, polyphagia and polyuria  Genitourinary: Negative for difficulty urinating, dysuria and urgency  Musculoskeletal: Negative for arthralgias, back pain, myalgias and neck pain  Skin: Negative for rash and wound  Neurological: Positive for headaches  Negative for dizziness, weakness and numbness  Objective:    Vitals:    05/10/22 1038   Temp: 99 °F (37 2 °C)   TempSrc: Tympanic   Weight: 117 kg (257 lb)   Height: 5' 8" (1 727 m)       Physical Exam  Neurological:      Mental Status: She is alert and oriented to person, place, and time           VIRTUAL VISIT DISCLAIMER    Keisha Ribeiro verbally agrees to participate in [employer]  Pt is aware that [employer] could be limited without vital signs or the ability to perform a full hands-on physical Mindi Apple understands she or the provider may request at any time to terminate the video visit and request the patient to seek care or treatment in person

## 2022-05-13 ENCOUNTER — TELEPHONE (OUTPATIENT)
Dept: INTERNAL MEDICINE CLINIC | Age: 47
End: 2022-05-13

## 2022-05-13 NOTE — TELEPHONE ENCOUNTER
Pt called said she's still recovering from Covid but said she thinks she has a sinus infection on top of it  Says she's blowing out lots of green mucus and said that her ears and head hurt   Pt wanted to know if you could prescribe an antibiotic

## 2022-05-13 NOTE — TELEPHONE ENCOUNTER
I would recommend that she start if she has not already Mucinex and anithistimine and flonase  Likely symptoms are related to COVID  Recommend warm steamy showers to help with congestion, if no relief of symptoms recommend that she be seen in the office for evaluation

## 2022-06-07 ENCOUNTER — PROBLEM (OUTPATIENT)
Dept: URBAN - METROPOLITAN AREA CLINIC 6 | Facility: CLINIC | Age: 47
End: 2022-06-07

## 2022-06-07 DIAGNOSIS — H00.12: ICD-10-CM

## 2022-06-07 PROCEDURE — 92012 INTRM OPH EXAM EST PATIENT: CPT

## 2022-06-07 ASSESSMENT — VISUAL ACUITY
OS_SC: 20/25
OD_SC: 20/40-2

## 2022-06-07 ASSESSMENT — TONOMETRY
OS_IOP_MMHG: 17
OD_IOP_MMHG: 19

## 2022-06-13 ENCOUNTER — TELEPHONE (OUTPATIENT)
Dept: OBGYN CLINIC | Facility: CLINIC | Age: 47
End: 2022-06-13

## 2022-06-13 NOTE — TELEPHONE ENCOUNTER
Pt cld-For the past 2 times patient has had to go back for left breast ultrasounds after her screening mammograms for her left pain cysts  She wants to know if you would order a DX left breast U/S to be done with her screening mammogram??     [facility]

## 2022-06-30 DIAGNOSIS — F41.9 ANXIETY: ICD-10-CM

## 2022-06-30 DIAGNOSIS — F41.0 PANIC ATTACK: ICD-10-CM

## 2022-06-30 DIAGNOSIS — F41.1 GAD (GENERALIZED ANXIETY DISORDER): ICD-10-CM

## 2022-07-01 RX ORDER — LORAZEPAM 0.5 MG/1
1 TABLET ORAL DAILY PRN
Qty: 20 TABLET | Refills: 0 | Status: SHIPPED | OUTPATIENT
Start: 2022-07-01 | End: 2022-08-01 | Stop reason: SDUPTHER

## 2022-07-07 ENCOUNTER — PROCEDURE VISIT (OUTPATIENT)
Dept: NEUROLOGY | Facility: CLINIC | Age: 47
End: 2022-07-07
Payer: COMMERCIAL

## 2022-07-07 VITALS — SYSTOLIC BLOOD PRESSURE: 144 MMHG | DIASTOLIC BLOOD PRESSURE: 81 MMHG | TEMPERATURE: 97.8 F | HEART RATE: 94 BPM

## 2022-07-07 DIAGNOSIS — G43.709 CHRONIC MIGRAINE WITHOUT AURA WITHOUT STATUS MIGRAINOSUS, NOT INTRACTABLE: Primary | ICD-10-CM

## 2022-07-07 PROCEDURE — 64615 CHEMODENERV MUSC MIGRAINE: CPT | Performed by: PHYSICIAN ASSISTANT

## 2022-07-07 RX ORDER — CYCLOBENZAPRINE HCL 5 MG
5 TABLET ORAL AS NEEDED
Qty: 30 TABLET | Refills: 2 | Status: SHIPPED | OUTPATIENT
Start: 2022-07-07

## 2022-07-07 RX ORDER — PROCHLORPERAZINE MALEATE 10 MG
10 TABLET ORAL EVERY 6 HOURS PRN
Qty: 10 TABLET | Refills: 4 | Status: SHIPPED | OUTPATIENT
Start: 2022-07-07 | End: 2022-08-01

## 2022-07-07 NOTE — PROGRESS NOTES
Universal Protocol   Consent: Verbal consent obtained  Written consent obtained  Risks and benefits: risks, benefits and alternatives were discussed  Consent given by: patient  Time out: Immediately prior to procedure a "time out" was called to verify the correct patient, procedure, equipment, support staff and site/side marked as required  Patient understanding: patient states understanding of the procedure being performed  Patient consent: the patient's understanding of the procedure matches consent given  Procedure consent: procedure consent matches procedure scheduled  Relevant documents: relevant documents present and verified  Patient identity confirmed: verbally with patient        Chemodenervation     Date/Time 7/7/2022 12:07 PM     Performed by  John Laurent PA-C     Authorized by John Laurent PA-C        Pre-procedure details      Prepped With: Alcohol     Anesthesia  (see MAR for exact dosages):      Anesthesia method:  None   Procedure details     Position:  Upright   Botox     Botox Type:  Type A    Brand:  Botox    mL's of Botulinum Toxin:  200    Final Concentration per CC:  50 units    Needle Gauge:  30 G 2 5 inch   Procedures     Botox Procedures: chronic headache      Indications: migraines     Injection Location      Head / Face:  L superior cervical paraspinal, R superior cervical paraspinal, L , R , L frontalis, R frontalis, L medial occipitalis, R medial occipitalis, procerus, R temporalis, L temporalis, R superior trapezius and L superior trapezius    L  injection amount:  5 unit(s)    R  injection amount:  5 unit(s)    L lateral frontalis:  5 unit(s)    R lateral frontalis:  5 unit(s)    L medial frontalis:  5 unit(s)    R medial frontalis:  5 unit(s)    L temporalis injection amount:  20 unit(s)    R temporalis injection amount:  20 unit(s)    Procerus injection amount:  5 unit(s)    L medial occipitalis injection amount:  15 unit(s)    R medial occipitalis injection amount:  15 unit(s)    L superior cervical paraspinal injection amount:  10 unit(s)    R superior cervical paraspinal injection amount:  10 unit(s)    L superior trapezius injection amount:  15 unit(s)    R superior trapezius injection amount:  15 unit(s)   Total Units     Total units used:  200    Total units discarded:  0   Post-procedure details      Chemodenervation:  Chronic migraine    Facial Nerve Location[de-identified]  Bilateral facial nerve    Patient tolerance of procedure:   Tolerated well, no immediate complications   Comments      5 units orbicularis oculi bilaterally  2 5 units lower frontalis bilaterally  7 5 temporalis bilaterally  15 units frontalis  All medically necessary

## 2022-07-25 DIAGNOSIS — F41.1 GAD (GENERALIZED ANXIETY DISORDER): ICD-10-CM

## 2022-07-25 DIAGNOSIS — F41.9 ANXIETY: ICD-10-CM

## 2022-07-25 DIAGNOSIS — F41.0 PANIC ATTACK: ICD-10-CM

## 2022-08-01 ENCOUNTER — OFFICE VISIT (OUTPATIENT)
Dept: INTERNAL MEDICINE CLINIC | Age: 47
End: 2022-08-01
Payer: COMMERCIAL

## 2022-08-01 VITALS
DIASTOLIC BLOOD PRESSURE: 82 MMHG | HEIGHT: 68 IN | OXYGEN SATURATION: 96 % | HEART RATE: 92 BPM | TEMPERATURE: 98.3 F | SYSTOLIC BLOOD PRESSURE: 110 MMHG | BODY MASS INDEX: 41.69 KG/M2 | WEIGHT: 275.1 LBS

## 2022-08-01 DIAGNOSIS — Z12.11 COLON CANCER SCREENING: ICD-10-CM

## 2022-08-01 DIAGNOSIS — E66.01 MORBID OBESITY (HCC): ICD-10-CM

## 2022-08-01 DIAGNOSIS — K21.9 GASTROESOPHAGEAL REFLUX DISEASE WITHOUT ESOPHAGITIS: Primary | ICD-10-CM

## 2022-08-01 DIAGNOSIS — G43.709 CHRONIC MIGRAINE WITHOUT AURA WITHOUT STATUS MIGRAINOSUS, NOT INTRACTABLE: ICD-10-CM

## 2022-08-01 DIAGNOSIS — F41.1 GAD (GENERALIZED ANXIETY DISORDER): ICD-10-CM

## 2022-08-01 DIAGNOSIS — F41.9 ANXIETY: ICD-10-CM

## 2022-08-01 DIAGNOSIS — F41.0 PANIC ATTACK: ICD-10-CM

## 2022-08-01 PROCEDURE — 99215 OFFICE O/P EST HI 40 MIN: CPT | Performed by: INTERNAL MEDICINE

## 2022-08-01 RX ORDER — LORAZEPAM 0.5 MG/1
1 TABLET ORAL DAILY PRN
Qty: 20 TABLET | Refills: 0 | Status: SHIPPED | OUTPATIENT
Start: 2022-08-01

## 2022-08-01 RX ORDER — OMEPRAZOLE 20 MG/1
20 CAPSULE, DELAYED RELEASE ORAL DAILY
Qty: 90 CAPSULE | Refills: 0 | Status: SHIPPED | OUTPATIENT
Start: 2022-08-01

## 2022-08-01 NOTE — PROGRESS NOTES
Assessment/Plan:    GERD  -well controlled  -continue with omeprazole, which will refill today  -continue to avoid diets and behaviors that trigger symptoms    Generalized anxiety disorder with occasional panic attacks  -well controlled on sertraline  -will refill sertraline as requested  -will also refill lorazepam and patient was counseled to take it when absolutely needed  -the [address] [facility] [address] web site was queried and did not show misuse    Morbid obesity  -BMI today is 41 83  -patient was counseled to exercise as well as to eat a heart healthy diet and to avoid eating late at night   -will refer her to weight management    Colon cancer screen  -patient has never had a colonoscopy  -will refer her to Gastroenterology    Migraine headaches  -well controlled  -she was counseled to keep well hydrated  -continue with Botox injection per Neurology  -continue with multivitamins     Diagnoses and all orders for this visit:    Gastroesophageal reflux disease without esophagitis  -     omeprazole (PriLOSEC) 20 mg delayed release capsule; Take 1 capsule (20 mg total) by mouth daily    LASHAUN (generalized anxiety disorder)  -     sertraline (ZOLOFT) 50 mg tablet; Take 1 tablet (50 mg total) by mouth daily    Panic attack  -     sertraline (ZOLOFT) 50 mg tablet; Take 1 tablet (50 mg total) by mouth daily    Anxiety  -     sertraline (ZOLOFT) 50 mg tablet; Take 1 tablet (50 mg total) by mouth daily  -     LORazepam (ATIVAN) 0 5 mg tablet; Take 2 tablets (1 mg total) by mouth daily as needed for anxiety    BMI 40 0-44 9, adult ([facility] )  -     Ambulatory Referral to Weight Management; Future    Morbid obesity ([facility] )  -     Ambulatory Referral to Weight Management; Future    Colon cancer screening  -     Ambulatory Referral to Gastroenterology;  Future    Chronic migraine without aura without status migrainosus, not intractable    Other orders  -     Discontinue: OMEPRAZOLE PO; Take 1 tablet by mouth daily             Subjective: Patient ID: Phyllis Samayoa is a 49 y o  female  HPI  Pt presents for a follow up visit regarding her GERD, migraine headaches, anxiety disorder  She states that she has been taking her medications and at her anxiety and panic attacks are well controlled  Of note, she states that she recently had to travel to [facility]  She states that she had a severe attack of panic attack while traveling and needed to take 2 mg of lorazepam which enabled her to sleep all through the journey  Her father  recently on her trip to [facility]  She believes that the Zoloft is working for her but she complains that she is finding it difficult to lose wt  She states that she is eating a good diet and that she usually eats once a day  She does not calorie count but states that she has been trying to watch what she eats and she is also going through menopause which makes it difficult for her  migraine h/a are improved on the Botox injections  She uses Vicks vapor rub on her neck and that helps as well  She does not exercise but she eats late at night  She denies fever, chills, night sweats, headache, dizziness, chest pain, shortness of breath, palpitations, nausea, vomiting abdominal pain, diarrhea, constipation, myalgias, arthralgias  She has never had a colonoscopy done  She would like to referred to nutrition  She would like a refill of sertraline, lorazepam and also a prescription for omeprazole which helps her GERD      The following portions of the patient's history were reviewed and updated as appropriate:   She  has a past medical history of Anemia, Anxiety, Depression, GERD (gastroesophageal reflux disease), History of prior pregnancies, Migraine with aura and without status migrainosus, not intractable (2018), Obesity, Papanicolaou smear (2019), and Uterine bleeding, dysfunctional   She   Patient Active Problem List    Diagnosis Date Noted    COVID-19 05/10/2022    Carpal tunnel syndrome of left wrist 10/06/2021    Medial epicondylitis of left elbow 10/06/2021    History of endometrial ablation 2021    LASHAUN (generalized anxiety disorder) 2021    Panic attack 2021    BMI 40 0-44 9, adult ([facility] ) 2021    Sore throat 03/10/2021    Snoring 03/10/2021    Thrombocytosis 2019    Iron deficiency 2019    Chronic cough 2019    Weight gain 2019    Eustachian tube dysfunction, bilateral 2019    GERD (gastroesophageal reflux disease) 2018    Screening for breast cancer 2018    Macromastia 10/10/2018    Pre-diabetes 10/10/2018    Mixed dyslipidemia 10/10/2018    Chronic migraine without aura without status migrainosus, not intractable 2018    Seasonal allergies 10/09/2017     She  has a past surgical history that includes  section;  section; Tubal ligation; and pr hysteroscopy,w/endometrial ablation (N/A, 2021)  Her family history includes Cancer (age of onset: [de-identified]) in her father; No Known Problems in her daughter, maternal aunt, maternal aunt, maternal aunt, maternal aunt, maternal grandfather, maternal grandmother, mother, paternal aunt, paternal aunt, paternal aunt, paternal grandfather, paternal grandmother, sister, and sister; Prostate cancer in her father  She  reports that she has never smoked  She has never used smokeless tobacco  She reports that she does not drink alcohol and does not use drugs    Current Outpatient Medications   Medication Sig Dispense Refill    B Complex Vitamins (VITAMIN B COMPLEX PO) Take by mouth daily       cyclobenzaprine (FLEXERIL) 5 mg tablet Take 1 tablet (5 mg total) by mouth as needed for muscle spasms (migraine) 30 tablet 2    LORazepam (ATIVAN) 0 5 mg tablet Take 2 tablets (1 mg total) by mouth daily as needed for anxiety 20 tablet 0    multivitamin (THERAGRAN) TABS Take 1 tablet by mouth daily      omeprazole (PriLOSEC) 20 mg delayed release capsule Take 1 capsule (20 mg total) by mouth daily 90 capsule 0    onabotulinumtoxin A (BOTOX) 100 units Inject into a muscle every 3 (three) months For migraines done every 3 months      sertraline (ZOLOFT) 50 mg tablet Take 1 tablet (50 mg total) by mouth daily 90 tablet 1    vitamin B-12 (VITAMIN B-12) 1,000 mcg tablet Take by mouth daily      Vitamin D, Cholecalciferol, 1000 units CAPS Take 1 tablet by mouth in the morning        Ferrous Sulfate (IRON PO) Take 600 mg by mouth in the morning (Patient not taking: Reported on 8/1/2022)       No current facility-administered medications for this visit       Current Outpatient Medications on File Prior to Visit   Medication Sig    B Complex Vitamins (VITAMIN B COMPLEX PO) Take by mouth daily     cyclobenzaprine (FLEXERIL) 5 mg tablet Take 1 tablet (5 mg total) by mouth as needed for muscle spasms (migraine)    multivitamin (THERAGRAN) TABS Take 1 tablet by mouth daily    onabotulinumtoxin A (BOTOX) 100 units Inject into a muscle every 3 (three) months For migraines done every 3 months    vitamin B-12 (VITAMIN B-12) 1,000 mcg tablet Take by mouth daily    Vitamin D, Cholecalciferol, 1000 units CAPS Take 1 tablet by mouth in the morning      [DISCONTINUED] LORazepam (ATIVAN) 0 5 mg tablet Take 2 tablets (1 mg total) by mouth daily as needed for anxiety    [DISCONTINUED] OMEPRAZOLE PO Take 1 tablet by mouth daily    [DISCONTINUED] sertraline (ZOLOFT) 50 mg tablet Take 1 tablet (50 mg total) by mouth daily    Ferrous Sulfate (IRON PO) Take 600 mg by mouth in the morning (Patient not taking: Reported on 8/1/2022)    [DISCONTINUED] benzonatate (TESSALON) 200 MG capsule Take 1 capsule (200 mg total) by mouth 3 (three) times a day as needed for cough (Patient not taking: Reported on 8/1/2022)    [DISCONTINUED] prochlorperazine (COMPAZINE) 10 mg tablet Take 1 tablet (10 mg total) by mouth every 6 (six) hours as needed (migraine) (Patient not taking: Reported on 8/1/2022)    [DISCONTINUED] Rimegepant Sulfate (Nurtec) 75 MG TBDP Take 75 mg by mouth as needed (migraine) Limit of 1 in 24 hours (Patient not taking: Reported on 8/1/2022)    [DISCONTINUED] tobramycin (TOBREX) 0 3 % SOLN PLACE 1 DROP INTO THE AFFECTED EYE(S) EVERY 4 HOURS WHILE AWAKE UNTIL RESOLVED (Patient not taking: Reported on 8/1/2022)    [DISCONTINUED] tobramycin-dexamethasone (TOBRADEX) ophthalmic suspension Administer 1 drop to the right eye every 4 (four) hours while awake (Patient not taking: Reported on 8/1/2022)     No current facility-administered medications on file prior to visit  She is allergic to amoxicillin, clavulanic acid, moxifloxacin, and sulfa antibiotics       Review of Systems   Constitutional: Negative for activity change, chills, fatigue, fever and unexpected weight change  HENT: Negative for ear pain, postnasal drip, rhinorrhea, sinus pressure and sore throat  Eyes: Negative for pain  Respiratory: Positive for cough  Negative for choking, chest tightness, shortness of breath and wheezing  Cardiovascular: Negative for chest pain, palpitations and leg swelling  Gastrointestinal: Negative for abdominal pain, constipation, diarrhea, nausea and vomiting  Occasional water brash     Genitourinary: Negative for dysuria and hematuria  Musculoskeletal: Negative for arthralgias, back pain, gait problem, joint swelling, myalgias and neck stiffness  Skin: Negative for pallor and rash  Neurological: Negative for dizziness, tremors, seizures, syncope, light-headedness and headaches (much improved)  Hematological: Negative for adenopathy  Psychiatric/Behavioral: Positive for dysphoric mood and sleep disturbance  Negative for behavioral problems  The patient is nervous/anxious            Objective:      /82 (BP Location: Left arm, Patient Position: Sitting, Cuff Size: Large)   Pulse 92   Temp 98 3 °F (36 8 °C) (Temporal)   Ht 5' 8" (1 727 m)   Wt 125 kg (275 lb 1 6 oz) SpO2 96%   BMI 41 83 kg/m²          Physical Exam  Constitutional:       General: She is not in acute distress  Appearance: She is well-developed  She is not diaphoretic  HENT:      Head: Normocephalic and atraumatic  Right Ear: External ear normal       Left Ear: External ear normal       Nose: Nose normal       Mouth/Throat:      Mouth: Mucous membranes are dry  Pharynx: No oropharyngeal exudate  Eyes:      General: No scleral icterus  Right eye: No discharge  Left eye: No discharge  Conjunctiva/sclera: Conjunctivae normal       Pupils: Pupils are equal, round, and reactive to light  Neck:      Thyroid: No thyromegaly  Vascular: No JVD  Trachea: No tracheal deviation  Cardiovascular:      Rate and Rhythm: Normal rate and regular rhythm  Heart sounds: Normal heart sounds  No murmur heard  No friction rub  No gallop  Pulmonary:      Effort: Pulmonary effort is normal  No respiratory distress  Breath sounds: Normal breath sounds  No wheezing or rales  Chest:      Chest wall: No tenderness  Abdominal:      General: Bowel sounds are normal  There is no distension  Palpations: Abdomen is soft  There is no mass  Tenderness: There is no abdominal tenderness  There is no guarding or rebound  Musculoskeletal:         General: No tenderness or deformity  Normal range of motion  Cervical back: Normal range of motion and neck supple  Lymphadenopathy:      Cervical: No cervical adenopathy  Skin:     General: Skin is warm and dry  Coloration: Skin is not pale  Findings: No erythema or rash  Neurological:      Mental Status: She is alert and oriented to person, place, and time  Cranial Nerves: No cranial nerve deficit  Motor: No abnormal muscle tone  Coordination: Coordination normal       Deep Tendon Reflexes: Reflexes are normal and symmetric     Psychiatric:         Behavior: Behavior normal            Annual Exam on 03/21/2022   Component Date Value Ref Range Status    Case Report 03/21/2022    Final                    Value:Gynecologic Cytology Report                       Case: [id number]                                  Authorizing Provider:  Juan Luis Goodwin              Collected:           03/21/2022 Michael Calle PA-C                                                           Ordering Location:     [facility] [place]  Received:            03/21/2022 1427              First Screen:          Platt Bias                                                                  Rescreen:              Jeffrey Whipple                                                               Specimen:    LIQUID-BASED PAP, SCREENING, Cervix, Endocervical                                          Primary Interpretation 03/21/2022 Negative for intraepithelial lesion or malignancy   Final    Specimen Adequacy 03/21/2022 Satisfactory for evaluation  Absence of endocervical/transformation zone component  Final    Additional Information 03/21/2022    Final                    Value: This result contains rich text formatting which cannot be displayed here   HPV Other HR 03/21/2022 Negative  Negative Final    HPV types: 49,04,56,72,70,31,63,99,63,44,31 and 68 DNA are undetectable or below the pre-set threshold      HPV16 03/21/2022 Negative  Negative Final    HPV18 03/21/2022 Negative  Negative Final

## 2022-08-08 ENCOUNTER — APPOINTMENT (OUTPATIENT)
Dept: LAB | Age: 47
End: 2022-08-08
Payer: COMMERCIAL

## 2022-08-08 DIAGNOSIS — Z00.00 ANNUAL PHYSICAL EXAM: ICD-10-CM

## 2022-08-08 DIAGNOSIS — F41.1 GAD (GENERALIZED ANXIETY DISORDER): ICD-10-CM

## 2022-08-08 DIAGNOSIS — F41.0 PANIC ATTACK: ICD-10-CM

## 2022-08-08 DIAGNOSIS — Z00.8 HEALTH EXAMINATION IN POPULATION SURVEY: ICD-10-CM

## 2022-08-08 LAB
ALBUMIN SERPL BCP-MCNC: 3.5 G/DL (ref 3.5–5)
ALP SERPL-CCNC: 84 U/L (ref 46–116)
ALT SERPL W P-5'-P-CCNC: 21 U/L (ref 12–78)
ANION GAP SERPL CALCULATED.3IONS-SCNC: 7 MMOL/L (ref 4–13)
AST SERPL W P-5'-P-CCNC: 19 U/L (ref 5–45)
BASOPHILS # BLD AUTO: 0.07 THOUSANDS/ΜL (ref 0–0.1)
BASOPHILS NFR BLD AUTO: 1 % (ref 0–1)
BILIRUB SERPL-MCNC: 0.42 MG/DL (ref 0.2–1)
BUN SERPL-MCNC: 14 MG/DL (ref 5–25)
CALCIUM SERPL-MCNC: 9.6 MG/DL (ref 8.3–10.1)
CHLORIDE SERPL-SCNC: 104 MMOL/L (ref 96–108)
CHOLEST SERPL-MCNC: 239 MG/DL
CO2 SERPL-SCNC: 26 MMOL/L (ref 21–32)
CREAT SERPL-MCNC: 0.6 MG/DL (ref 0.6–1.3)
EOSINOPHIL # BLD AUTO: 0.26 THOUSAND/ΜL (ref 0–0.61)
EOSINOPHIL NFR BLD AUTO: 3 % (ref 0–6)
ERYTHROCYTE [DISTWIDTH] IN BLOOD BY AUTOMATED COUNT: 17.6 % (ref 11.6–15.1)
EST. AVERAGE GLUCOSE BLD GHB EST-MCNC: 134 MG/DL
GFR SERPL CREATININE-BSD FRML MDRD: 108 ML/MIN/1.73SQ M
GLUCOSE P FAST SERPL-MCNC: 82 MG/DL (ref 65–99)
HBA1C MFR BLD: 6.3 %
HCT VFR BLD AUTO: 37.9 % (ref 34.8–46.1)
HDLC SERPL-MCNC: 65 MG/DL
HGB BLD-MCNC: 11.5 G/DL (ref 11.5–15.4)
IMM GRANULOCYTES # BLD AUTO: 0.03 THOUSAND/UL (ref 0–0.2)
IMM GRANULOCYTES NFR BLD AUTO: 0 % (ref 0–2)
LDLC SERPL CALC-MCNC: 145 MG/DL (ref 0–100)
LYMPHOCYTES # BLD AUTO: 4.01 THOUSANDS/ΜL (ref 0.6–4.47)
LYMPHOCYTES NFR BLD AUTO: 41 % (ref 14–44)
MCH RBC QN AUTO: 24.3 PG (ref 26.8–34.3)
MCHC RBC AUTO-ENTMCNC: 30.3 G/DL (ref 31.4–37.4)
MCV RBC AUTO: 80 FL (ref 82–98)
MONOCYTES # BLD AUTO: 0.49 THOUSAND/ΜL (ref 0.17–1.22)
MONOCYTES NFR BLD AUTO: 5 % (ref 4–12)
NEUTROPHILS # BLD AUTO: 4.95 THOUSANDS/ΜL (ref 1.85–7.62)
NEUTS SEG NFR BLD AUTO: 50 % (ref 43–75)
NONHDLC SERPL-MCNC: 174 MG/DL
NRBC BLD AUTO-RTO: 0 /100 WBCS
PLATELET # BLD AUTO: 452 THOUSANDS/UL (ref 149–390)
PMV BLD AUTO: 10.1 FL (ref 8.9–12.7)
POTASSIUM SERPL-SCNC: 4.3 MMOL/L (ref 3.5–5.3)
PROT SERPL-MCNC: 8.1 G/DL (ref 6.4–8.4)
RBC # BLD AUTO: 4.73 MILLION/UL (ref 3.81–5.12)
SODIUM SERPL-SCNC: 137 MMOL/L (ref 135–147)
TRIGL SERPL-MCNC: 146 MG/DL
TSH SERPL DL<=0.05 MIU/L-ACNC: 1.61 UIU/ML (ref 0.45–4.5)
WBC # BLD AUTO: 9.81 THOUSAND/UL (ref 4.31–10.16)

## 2022-08-08 PROCEDURE — 80061 LIPID PANEL: CPT

## 2022-08-08 PROCEDURE — 80053 COMPREHEN METABOLIC PANEL: CPT

## 2022-08-08 PROCEDURE — 36415 COLL VENOUS BLD VENIPUNCTURE: CPT

## 2022-08-08 PROCEDURE — 85025 COMPLETE CBC W/AUTO DIFF WBC: CPT

## 2022-08-08 PROCEDURE — 84443 ASSAY THYROID STIM HORMONE: CPT

## 2022-08-08 PROCEDURE — 83036 HEMOGLOBIN GLYCOSYLATED A1C: CPT

## 2022-08-15 ENCOUNTER — OFFICE VISIT (OUTPATIENT)
Dept: INTERNAL MEDICINE CLINIC | Age: 47
End: 2022-08-15
Payer: COMMERCIAL

## 2022-08-15 VITALS
HEIGHT: 68 IN | DIASTOLIC BLOOD PRESSURE: 84 MMHG | WEIGHT: 275 LBS | TEMPERATURE: 98 F | HEART RATE: 97 BPM | SYSTOLIC BLOOD PRESSURE: 120 MMHG | OXYGEN SATURATION: 99 % | BODY MASS INDEX: 41.68 KG/M2

## 2022-08-15 DIAGNOSIS — D75.839 THROMBOCYTOSIS: ICD-10-CM

## 2022-08-15 DIAGNOSIS — R73.03 PRE-DIABETES: ICD-10-CM

## 2022-08-15 DIAGNOSIS — E61.1 IRON DEFICIENCY: Primary | ICD-10-CM

## 2022-08-15 DIAGNOSIS — E78.2 MIXED DYSLIPIDEMIA: ICD-10-CM

## 2022-08-15 PROCEDURE — 99214 OFFICE O/P EST MOD 30 MIN: CPT | Performed by: INTERNAL MEDICINE

## 2022-08-15 RX ORDER — FERROUS SULFATE TAB EC 324 MG (65 MG FE EQUIVALENT) 324 (65 FE) MG
324 TABLET DELAYED RESPONSE ORAL
Qty: 30 TABLET | Refills: 1 | Status: SHIPPED | OUTPATIENT
Start: 2022-08-15

## 2022-08-15 NOTE — PROGRESS NOTES
Assessment/Plan:    Hyperlipidemia  -worsening, last lipid panel done on August 8th, 2022 showed a total cholesterol of 239, up from 217 on March 28th, 2021, triglyceride of 146, down from 152, HDL of 65, down from 66 and LDL of 145, up from 121  -calculated 10 year ASCVD risk is 0 9%  -patient was counseled to eat a heart healthy diet and exercise    Iron deficiency  -CBC done on August 8th, 2022 showed a hemoglobin of 11 5, down from 12 2 on March 28th, 2021, MCV of 80, down from 83, RDW of 17 6, up from 16 4 and platelet count of 617, up from 357  -will prescribe ferrous sulfate for patient  -she was counseled to eat a balanced heart healthy diet with enough iron  Prediabetes  -hemoglobin A1c done on August 8th, 2022 was 6 3, up from 5 9 on March 28th, 2021  -will start patient on metformin  -she was counseled to diet and exercise and cut down on carbs and sweets  -follow-up in 4 months    Thrombocytosis  -likely secondary to iron deficiency anemia  -will treat iron-deficiency anemia and continue to monitor platelet count      The 10-year ASCVD risk score (Rudy Willis et al , 2013) is: 0 9%    Values used to calculate the score:      Age: 49 years      Sex: Female      Is Non- : No      Diabetic: No      Tobacco smoker: No      Systolic Blood Pressure: 819 mmHg      Is BP treated: No      HDL Cholesterol: 65 mg/dL      Total Cholesterol: 239 mg/dL       Diagnoses and all orders for this visit:    Iron deficiency  -     ferrous sulfate 324 (65 Fe) mg; Take 1 tablet (324 mg total) by mouth daily before breakfast    Mixed dyslipidemia    Pre-diabetes  -     metFORMIN (GLUCOPHAGE) 500 mg tablet; Take 1 tablet (500 mg total) by mouth 2 (two) times a day with meals    Thrombocytosis            Depression Screening and Follow-up Plan: Patient was screened for depression during today's encounter  They screened negative with a PHQ-2 score of 0        Subjective:      Patient ID: Jacob Rodney Jonathan Dill is a 49 y o  female  HPI  Patient presents for a follow-up visit regarding her recent lab work that was done  She wants to review her lab results  She hemoglobin A1c and her lipids were both elevated  She states that she eats late at night and tends to drink a lot of iced coffee using caramel  She does not calorie count and tends to skip meals sometimes  Will make an appointment with nutritionist  Would like to try a medication to help her  She is interested in metformin  Has been working on her diet already and eating more vegetables and fruits and cutting down on sweets and carbohydrates  No family hx of cva or mi  Her CBC also showed low MCV and elevated RDW  She has a history of iron deficiency  She states that she takes vitamins and has been heavy periods  She states that her periods sometimes run for 14-15 days  States that her mood is much improved  She denies fever, chills, chills, night sweats, headache, dizziness, chest pain, shortness a breath, palpitations, nausea, vomiting abdominal pain, diarrhea, constipation, myalgias, arthralgias      The following portions of the patient's history were reviewed and updated as appropriate:   She  has a past medical history of Anemia, Anxiety, Depression, GERD (gastroesophageal reflux disease), History of prior pregnancies, Migraine with aura and without status migrainosus, not intractable (4/23/2018), Obesity, Papanicolaou smear (01/2019), and Uterine bleeding, dysfunctional   She   Patient Active Problem List    Diagnosis Date Noted    COVID-19 05/10/2022    Carpal tunnel syndrome of left wrist 10/06/2021    Medial epicondylitis of left elbow 10/06/2021    History of endometrial ablation 04/27/2021    LASHAUN (generalized anxiety disorder) 03/26/2021    Panic attack 03/26/2021    BMI 40 0-44 9, adult ([facility] [address] ) 03/26/2021    Sore throat 03/10/2021    Snoring 03/10/2021    Thrombocytosis 09/19/2019    Iron deficiency 09/19/2019    Chronic cough 2019    Weight gain 2019    Eustachian tube dysfunction, bilateral 2019    GERD (gastroesophageal reflux disease) 2018    Screening for breast cancer 2018    Macromastia 10/10/2018    Pre-diabetes 10/10/2018    Mixed dyslipidemia 10/10/2018    Chronic migraine without aura without status migrainosus, not intractable 2018    Seasonal allergies 10/09/2017     She  has a past surgical history that includes  section;  section; Tubal ligation; and pr hysteroscopy,w/endometrial ablation (N/A, 2021)  Her family history includes Cancer (age of onset: [de-identified]) in her father; No Known Problems in her daughter, maternal aunt, maternal aunt, maternal aunt, maternal aunt, maternal grandfather, maternal grandmother, mother, paternal aunt, paternal aunt, paternal aunt, paternal grandfather, paternal grandmother, sister, and sister; Prostate cancer in her father  She  reports that she has never smoked  She has never used smokeless tobacco  She reports that she does not drink alcohol and does not use drugs    Current Outpatient Medications   Medication Sig Dispense Refill    B Complex Vitamins (VITAMIN B COMPLEX PO) Take by mouth daily       cyclobenzaprine (FLEXERIL) 5 mg tablet Take 1 tablet (5 mg total) by mouth as needed for muscle spasms (migraine) 30 tablet 2    ferrous sulfate 324 (65 Fe) mg Take 1 tablet (324 mg total) by mouth daily before breakfast 30 tablet 1    LORazepam (ATIVAN) 0 5 mg tablet Take 2 tablets (1 mg total) by mouth daily as needed for anxiety 20 tablet 0    metFORMIN (GLUCOPHAGE) 500 mg tablet Take 1 tablet (500 mg total) by mouth 2 (two) times a day with meals 60 tablet 3    multivitamin (THERAGRAN) TABS Take 1 tablet by mouth daily      omeprazole (PriLOSEC) 20 mg delayed release capsule Take 1 capsule (20 mg total) by mouth daily 90 capsule 0    onabotulinumtoxin A (BOTOX) 100 units Inject into a muscle every 3 (three) months For migraines done every 3 months      sertraline (ZOLOFT) 50 mg tablet Take 1 tablet (50 mg total) by mouth daily 90 tablet 1    vitamin B-12 (VITAMIN B-12) 1,000 mcg tablet Take by mouth daily      Vitamin D, Cholecalciferol, 1000 units CAPS Take 1 tablet by mouth in the morning         No current facility-administered medications for this visit  Current Outpatient Medications on File Prior to Visit   Medication Sig    B Complex Vitamins (VITAMIN B COMPLEX PO) Take by mouth daily     cyclobenzaprine (FLEXERIL) 5 mg tablet Take 1 tablet (5 mg total) by mouth as needed for muscle spasms (migraine)    LORazepam (ATIVAN) 0 5 mg tablet Take 2 tablets (1 mg total) by mouth daily as needed for anxiety    multivitamin (THERAGRAN) TABS Take 1 tablet by mouth daily    omeprazole (PriLOSEC) 20 mg delayed release capsule Take 1 capsule (20 mg total) by mouth daily    onabotulinumtoxin A (BOTOX) 100 units Inject into a muscle every 3 (three) months For migraines done every 3 months    sertraline (ZOLOFT) 50 mg tablet Take 1 tablet (50 mg total) by mouth daily    vitamin B-12 (VITAMIN B-12) 1,000 mcg tablet Take by mouth daily    Vitamin D, Cholecalciferol, 1000 units CAPS Take 1 tablet by mouth in the morning      [DISCONTINUED] Ferrous Sulfate (IRON PO) Take 600 mg by mouth in the morning     No current facility-administered medications on file prior to visit  She is allergic to amoxicillin, clavulanic acid, moxifloxacin, and sulfa antibiotics       Review of Systems   Constitutional: Negative for activity change, chills, fatigue, fever and unexpected weight change  HENT: Negative for ear pain, postnasal drip, rhinorrhea, sinus pressure and sore throat  Eyes: Negative for pain  Respiratory: Negative for cough, choking, chest tightness, shortness of breath and wheezing  Cardiovascular: Negative for chest pain, palpitations and leg swelling     Gastrointestinal: Negative for abdominal pain, constipation, diarrhea, nausea and vomiting  Genitourinary: Positive for menstrual problem (Menorrhagia)  Negative for dysuria and hematuria  Musculoskeletal: Negative for arthralgias, back pain, gait problem, joint swelling, myalgias and neck stiffness  Skin: Negative for pallor and rash  Neurological: Negative for dizziness, tremors, seizures, syncope, light-headedness and headaches  Hematological: Negative for adenopathy  Psychiatric/Behavioral: Negative for behavioral problems  Objective:      /84 (BP Location: Left arm, Patient Position: Sitting, Cuff Size: Large)   Pulse 97   Temp 98 °F (36 7 °C) (Temporal)   Ht 5' 8" (1 727 m)   Wt 125 kg (275 lb)   SpO2 99%   BMI 41 81 kg/m²          Physical Exam  Constitutional:       General: She is not in acute distress  Appearance: She is well-developed  She is not diaphoretic  HENT:      Head: Normocephalic and atraumatic  Right Ear: External ear normal       Left Ear: External ear normal       Nose: Nose normal       Mouth/Throat:      Mouth: Mucous membranes are dry  Pharynx: No oropharyngeal exudate  Eyes:      General: No scleral icterus  Right eye: No discharge  Left eye: No discharge  Conjunctiva/sclera: Conjunctivae normal       Pupils: Pupils are equal, round, and reactive to light  Neck:      Thyroid: No thyromegaly  Vascular: No JVD  Trachea: No tracheal deviation  Cardiovascular:      Rate and Rhythm: Normal rate and regular rhythm  Heart sounds: Murmur (1/6 systolic murmur maximal in the aortic valve region) heard  Systolic murmur is present with a grade of 1/6  No friction rub  No gallop  Pulmonary:      Effort: Pulmonary effort is normal  No respiratory distress  Breath sounds: Normal breath sounds  No wheezing or rales  Chest:      Chest wall: No tenderness  Abdominal:      General: Bowel sounds are normal  There is no distension        Palpations: Abdomen is soft  There is no mass  Tenderness: There is no abdominal tenderness  There is no guarding or rebound  Musculoskeletal:         General: No tenderness or deformity  Normal range of motion  Cervical back: Normal range of motion and neck supple  Lymphadenopathy:      Cervical: No cervical adenopathy  Skin:     General: Skin is warm and dry  Coloration: Skin is not pale  Findings: No erythema or rash  Neurological:      Mental Status: She is alert and oriented to person, place, and time  Cranial Nerves: No cranial nerve deficit  Motor: No abnormal muscle tone  Coordination: Coordination normal       Deep Tendon Reflexes: Reflexes are normal and symmetric  Psychiatric:         Behavior: Behavior normal            Appointment on 08/08/2022   Component Date Value Ref Range Status    Hemoglobin A1C 08/08/2022 6 3 (A) Normal 3 8-5 6%; PreDiabetic 5 7-6 4%;  Diabetic >=6 5%; Glycemic control for adults with diabetes <7 0% % Final    EAG 08/08/2022 134  mg/dl Final   Appointment on 08/08/2022   Component Date Value Ref Range Status    WBC 08/08/2022 9 81  4 31 - 10 16 Thousand/uL Final    RBC 08/08/2022 4 73  3 81 - 5 12 Million/uL Final    Hemoglobin 08/08/2022 11 5  11 5 - 15 4 g/dL Final    Hematocrit 08/08/2022 37 9  34 8 - 46 1 % Final    MCV 08/08/2022 80 (A) 82 - 98 fL Final    MCH 08/08/2022 24 3 (A) 26 8 - 34 3 pg Final    MCHC 08/08/2022 30 3 (A) 31 4 - 37 4 g/dL Final    RDW 08/08/2022 17 6 (A) 11 6 - 15 1 % Final    MPV 08/08/2022 10 1  8 9 - 12 7 fL Final    Platelets 36/64/0480 452 (A) 149 - 390 Thousands/uL Final    nRBC 08/08/2022 0  /100 WBCs Final    Neutrophils Relative 08/08/2022 50  43 - 75 % Final    Immat GRANS % 08/08/2022 0  0 - 2 % Final    Lymphocytes Relative 08/08/2022 41  14 - 44 % Final    Monocytes Relative 08/08/2022 5  4 - 12 % Final    Eosinophils Relative 08/08/2022 3  0 - 6 % Final    Basophils Relative 08/08/2022 1 0 - 1 % Final    Neutrophils Absolute 08/08/2022 4 95  1 85 - 7 62 Thousands/µL Final    Immature Grans Absolute 08/08/2022 0 03  0 00 - 0 20 Thousand/uL Final    Lymphocytes Absolute 08/08/2022 4 01  0 60 - 4 47 Thousands/µL Final    Monocytes Absolute 08/08/2022 0 49  0 17 - 1 22 Thousand/µL Final    Eosinophils Absolute 08/08/2022 0 26  0 00 - 0 61 Thousand/µL Final    Basophils Absolute 08/08/2022 0 07  0 00 - 0 10 Thousands/µL Final    TSH 3RD GENERATON 08/08/2022 1 610  0 450 - 4 500 uIU/mL Final    The recommended reference ranges for TSH during pregnancy are as follows:   First trimester 0 1 to 2 5 uIU/mL   Second trimester  0 2 to 3 0 uIU/mL   Third trimester 0 3 to 3 0 uIU/m    Note: Normal ranges may not apply to patients who are transgender, non-binary, or whose legal sex, sex at birth, and gender identity differ  Adult TSH (3rd generation) reference range follows the recommended guidelines of the American Thyroid Association, January, 2020   Sodium 08/08/2022 137  135 - 147 mmol/L Final    Potassium 08/08/2022 4 3  3 5 - 5 3 mmol/L Final    Chloride 08/08/2022 104  96 - 108 mmol/L Final    CO2 08/08/2022 26  21 - 32 mmol/L Final    ANION GAP 08/08/2022 7  4 - 13 mmol/L Final    BUN 08/08/2022 14  5 - 25 mg/dL Final    Creatinine 08/08/2022 0 60  0 60 - 1 30 mg/dL Final    Standardized to IDMS reference method    Glucose, Fasting 08/08/2022 82  65 - 99 mg/dL Final    Specimen collection should occur prior to Sulfasalazine administration due to the potential for falsely depressed results  Specimen collection should occur prior to Sulfapyridine administration due to the potential for falsely elevated results   Calcium 08/08/2022 9 6  8 3 - 10 1 mg/dL Final    AST 08/08/2022 19  5 - 45 U/L Final    Specimen collection should occur prior to Sulfasalazine administration due to the potential for falsely depressed results       ALT 08/08/2022 21  12 - 78 U/L Final    Specimen collection should occur prior to Sulfasalazine and/or Sulfapyridine administration due to the potential for falsely depressed results   Alkaline Phosphatase 08/08/2022 84  46 - 116 U/L Final    Total Protein 08/08/2022 8 1  6 4 - 8 4 g/dL Final    Albumin 08/08/2022 3 5  3 5 - 5 0 g/dL Final    Total Bilirubin 08/08/2022 0 42  0 20 - 1 00 mg/dL Final    Use of this assay is not recommended for patients undergoing treatment with eltrombopag due to the potential for falsely elevated results   eGFR 08/08/2022 108  ml/min/1 73sq m Final    Cholesterol 08/08/2022 239 (A) See Comment mg/dL Final    Cholesterol:         Pediatric <18 Years        Desirable          <170 mg/dL      Borderline High    170-199 mg/dL      High               >=200 mg/dL        Adult >=18 Years            Desirable         <200 mg/dL      Borderline High   200-239 mg/dL      High              >239 mg/dL      Triglycerides 08/08/2022 146  See Comment mg/dL Final    Triglyceride:     0-9Y            <75mg/dL     10Y-17Y         <90 mg/dL       >=18Y     Normal          <150 mg/dL     Borderline High 150-199 mg/dL     High            200-499 mg/dL        Very High       >499 mg/dL    Specimen collection should occur prior to N-Acetylcysteine or Metamizole administration due to the potential for falsely depressed results   HDL, Direct 08/08/2022 65  >=50 mg/dL Final    Specimen collection should occur prior to Metamizole administration due to the potential for falsley depressed results   LDL Calculated 08/08/2022 145 (A) 0 - 100 mg/dL Final    LDL Cholesterol:     Optimal           <100 mg/dl     Near Optimal      100-129 mg/dl     Above Optimal       Borderline High 130-159 mg/dl       High            160-189 mg/dl       Very High       >189 mg/dl         This screening LDL is a calculated result  It does not have the accuracy of the Direct Measured LDL in the monitoring of patients with hyperlipidemia and/or statin therapy     Direct Measure LDL (QOE501) must be ordered separately in these patients   Non-HDL-Chol (CHOL-HDL) 08/08/2022 174  mg/dl Final   Annual Exam on 03/21/2022   Component Date Value Ref Range Status    Case Report 03/21/2022    Final                    Value:Gynecologic Cytology Report                       Case: [id number]                                  Authorizing Provider:  Haider Calderon              Collected:           03/21/2022 Michael Calle PA-C                                                           Ordering Location:     [facility] [place]  Received:            03/21/2022 1427              First Screen:          Angy Dove                                                                  Rescreen:              Tito Oliveira                                                               Specimen:    LIQUID-BASED PAP, SCREENING, Cervix, Endocervical                                          Primary Interpretation 03/21/2022 Negative for intraepithelial lesion or malignancy   Final    Specimen Adequacy 03/21/2022 Satisfactory for evaluation  Absence of endocervical/transformation zone component  Final    Additional Information 03/21/2022    Final                    Value: This result contains rich text formatting which cannot be displayed here   HPV Other HR 03/21/2022 Negative  Negative Final    HPV types: 40,55,24,57,31,33,95,28,59,18,89 and 68 DNA are undetectable or below the pre-set threshold      HPV16 03/21/2022 Negative  Negative Final    HPV18 03/21/2022 Negative  Negative Final

## 2022-08-24 ENCOUNTER — CONSULTATION (OUTPATIENT)
Dept: URBAN - METROPOLITAN AREA CLINIC 6 | Facility: CLINIC | Age: 47
End: 2022-08-24

## 2022-08-24 DIAGNOSIS — H02.834: ICD-10-CM

## 2022-08-24 DIAGNOSIS — H02.831: ICD-10-CM

## 2022-08-24 PROCEDURE — 92012 INTRM OPH EXAM EST PATIENT: CPT

## 2022-08-24 PROCEDURE — 92285 EXTERNAL OCULAR PHOTOGRAPHY: CPT

## 2022-08-24 PROCEDURE — 92083 EXTENDED VISUAL FIELD XM: CPT

## 2022-08-24 ASSESSMENT — VISUAL ACUITY
OD_SC: 20/40+2
OD_PH: 20/25-
OS_SC: 20/25

## 2022-09-20 ENCOUNTER — OFFICE VISIT (OUTPATIENT)
Dept: INTERNAL MEDICINE CLINIC | Age: 47
End: 2022-09-20
Payer: COMMERCIAL

## 2022-09-20 VITALS
TEMPERATURE: 97.6 F | DIASTOLIC BLOOD PRESSURE: 84 MMHG | OXYGEN SATURATION: 98 % | BODY MASS INDEX: 41.83 KG/M2 | HEART RATE: 95 BPM | WEIGHT: 276 LBS | HEIGHT: 68 IN | SYSTOLIC BLOOD PRESSURE: 124 MMHG

## 2022-09-20 DIAGNOSIS — R11.0 NAUSEA: ICD-10-CM

## 2022-09-20 DIAGNOSIS — H81.12 BENIGN PAROXYSMAL POSITIONAL VERTIGO OF LEFT EAR: ICD-10-CM

## 2022-09-20 DIAGNOSIS — R42 VERTIGO: Primary | ICD-10-CM

## 2022-09-20 DIAGNOSIS — H61.22 IMPACTED CERUMEN OF LEFT EAR: ICD-10-CM

## 2022-09-20 PROCEDURE — 99214 OFFICE O/P EST MOD 30 MIN: CPT | Performed by: INTERNAL MEDICINE

## 2022-09-20 RX ORDER — ONDANSETRON 4 MG/1
4 TABLET, FILM COATED ORAL EVERY 8 HOURS PRN
Qty: 20 TABLET | Refills: 0 | Status: SHIPPED | OUTPATIENT
Start: 2022-09-20

## 2022-09-20 RX ORDER — MECLIZINE HYDROCHLORIDE 25 MG/1
25 TABLET ORAL EVERY 8 HOURS SCHEDULED
Qty: 30 TABLET | Refills: 0 | Status: SHIPPED | OUTPATIENT
Start: 2022-09-20

## 2022-09-20 NOTE — PROGRESS NOTES
Assessment/Plan:    Vertigo/BPPV   -possibly being triggered by cerumen impaction   - with positive Val-Hallpike maneuver  -will start patient on meclizine 25 mg every 8 hours as needed   -will refer her to physical therapy for vestibular repositioning exercises if symptoms are not resolved    Cerumen impaction   -will start patient on Debrox eardrops  -she may use it for 5 days and may return to the office for ear cleaning if needed    Nausea  -will start patient on Zofran as needed     Diagnoses and all orders for this visit:    Vertigo  -     meclizine (ANTIVERT) 25 mg tablet; Take 1 tablet (25 mg total) by mouth every 8 (eight) hours    Impacted cerumen of left ear  -     carbamide peroxide (DEBROX) 6 5 % otic solution; Administer 5 drops into the left ear 2 (two) times a day Use for 5 days    Nausea  -     ondansetron (ZOFRAN) 4 mg tablet; Take 1 tablet (4 mg total) by mouth every 8 (eight) hours as needed for nausea or vomiting    Benign paroxysmal positional vertigo of left ear          Subjective:      Patient ID: Derek Milner is a 49 y o  female  HPI  Pt presents with c/o of vertigo that started 5 days ago  She states that feels like the room is spinning and it lasts for seconds and comes when she gets up or bends down or turns her head  She also has some popping in her left ear and has a headache that feels like a pressure in her head, + nauseaous and feels off balance sometimes  She has had a similar condition in the past but states that that resolved much quicker than this 1  She is not on any new medications  Has been taking OTC meclizine - [place]  She denies fever, chills, night sweats, nasal congestion, rhinorrhea, sore throat, sinus pain or pressure, cough, chest pain, shortness of breath, palpitations, vomiting, abdominal pain, diarrhea, constipation, myalgias, arthralgias      The following portions of the patient's history were reviewed and updated as appropriate:   She  has a past medical history of Anemia, Anxiety, Depression, GERD (gastroesophageal reflux disease), History of prior pregnancies, Migraine with aura and without status migrainosus, not intractable (2018), Obesity, Papanicolaou smear (2019), and Uterine bleeding, dysfunctional   She   Patient Active Problem List    Diagnosis Date Noted    Vertigo 2022    Impacted cerumen of left ear 2022    Nausea 2022    Benign paroxysmal positional vertigo of left ear 2022    COVID-19 05/10/2022    Carpal tunnel syndrome of left wrist 10/06/2021    Medial epicondylitis of left elbow 10/06/2021    History of endometrial ablation 2021    LASHAUN (generalized anxiety disorder) 2021    Panic attack 2021    BMI 40 0-44 9, adult ([facility] [address] ) 2021    Sore throat 03/10/2021    Snoring 03/10/2021    Thrombocytosis 2019    Iron deficiency 2019    Chronic cough 2019    Weight gain 2019    Eustachian tube dysfunction, bilateral 2019    GERD (gastroesophageal reflux disease) 2018    Screening for breast cancer 2018    Macromastia 10/10/2018    Pre-diabetes 10/10/2018    Mixed dyslipidemia 10/10/2018    Chronic migraine without aura without status migrainosus, not intractable 2018    Seasonal allergies 10/09/2017     She  has a past surgical history that includes  section;  section; Tubal ligation; and pr hysteroscopy,w/endometrial ablation (N/A, 2021)  Her family history includes Cancer (age of onset: [de-identified]) in her father; No Known Problems in her daughter, maternal aunt, maternal aunt, maternal aunt, maternal aunt, maternal grandfather, maternal grandmother, mother, paternal aunt, paternal aunt, paternal aunt, paternal grandfather, paternal grandmother, sister, and sister; Prostate cancer in her father  She  reports that she has never smoked   She has never used smokeless tobacco  She reports that she does not drink alcohol and does not use drugs  Current Outpatient Medications   Medication Sig Dispense Refill    B Complex Vitamins (VITAMIN B COMPLEX PO) Take by mouth daily       carbamide peroxide (DEBROX) 6 5 % otic solution Administer 5 drops into the left ear 2 (two) times a day Use for 5 days 15 mL 0    cyclobenzaprine (FLEXERIL) 5 mg tablet Take 1 tablet (5 mg total) by mouth as needed for muscle spasms (migraine) 30 tablet 2    ferrous sulfate 324 (65 Fe) mg Take 1 tablet (324 mg total) by mouth daily before breakfast 30 tablet 1    LORazepam (ATIVAN) 0 5 mg tablet Take 2 tablets (1 mg total) by mouth daily as needed for anxiety 20 tablet 0    meclizine (ANTIVERT) 25 mg tablet Take 1 tablet (25 mg total) by mouth every 8 (eight) hours 30 tablet 0    metFORMIN (GLUCOPHAGE) 500 mg tablet Take 1 tablet (500 mg total) by mouth 2 (two) times a day with meals 60 tablet 3    multivitamin (THERAGRAN) TABS Take 1 tablet by mouth daily      omeprazole (PriLOSEC) 20 mg delayed release capsule Take 1 capsule (20 mg total) by mouth daily 90 capsule 0    onabotulinumtoxin A (BOTOX) 100 units Inject into a muscle every 3 (three) months For migraines done every 3 months      ondansetron (ZOFRAN) 4 mg tablet Take 1 tablet (4 mg total) by mouth every 8 (eight) hours as needed for nausea or vomiting 20 tablet 0    sertraline (ZOLOFT) 50 mg tablet Take 1 tablet (50 mg total) by mouth daily 90 tablet 1    vitamin B-12 (VITAMIN B-12) 1,000 mcg tablet Take by mouth daily      Vitamin D, Cholecalciferol, 1000 units CAPS Take 1 tablet by mouth in the morning         No current facility-administered medications for this visit       Current Outpatient Medications on File Prior to Visit   Medication Sig    B Complex Vitamins (VITAMIN B COMPLEX PO) Take by mouth daily     cyclobenzaprine (FLEXERIL) 5 mg tablet Take 1 tablet (5 mg total) by mouth as needed for muscle spasms (migraine)    ferrous sulfate 324 (65 Fe) mg Take 1 tablet (324 mg total) by mouth daily before breakfast    LORazepam (ATIVAN) 0 5 mg tablet Take 2 tablets (1 mg total) by mouth daily as needed for anxiety    metFORMIN (GLUCOPHAGE) 500 mg tablet Take 1 tablet (500 mg total) by mouth 2 (two) times a day with meals    multivitamin (THERAGRAN) TABS Take 1 tablet by mouth daily    omeprazole (PriLOSEC) 20 mg delayed release capsule Take 1 capsule (20 mg total) by mouth daily    onabotulinumtoxin A (BOTOX) 100 units Inject into a muscle every 3 (three) months For migraines done every 3 months    sertraline (ZOLOFT) 50 mg tablet Take 1 tablet (50 mg total) by mouth daily    vitamin B-12 (VITAMIN B-12) 1,000 mcg tablet Take by mouth daily    Vitamin D, Cholecalciferol, 1000 units CAPS Take 1 tablet by mouth in the morning       No current facility-administered medications on file prior to visit  She is allergic to amoxicillin, clavulanic acid, moxifloxacin, and sulfa antibiotics       Review of Systems   Constitutional: Negative for activity change, chills, fatigue, fever and unexpected weight change  HENT: Negative for ear pain (ear fullness), postnasal drip, rhinorrhea, sinus pressure, sore throat and tinnitus  Eyes: Negative for pain  Respiratory: Negative for cough, choking, chest tightness, shortness of breath and wheezing  Cardiovascular: Negative for chest pain, palpitations and leg swelling  Gastrointestinal: Positive for nausea  Negative for abdominal pain, constipation, diarrhea and vomiting  Genitourinary: Negative for dysuria and hematuria  Musculoskeletal: Negative for arthralgias, back pain, gait problem, joint swelling, myalgias and neck stiffness  Skin: Negative for pallor and rash  Neurological: Positive for dizziness ( vertigo) and headaches (Mild pressure headache)  Negative for tremors, seizures, syncope and light-headedness  Hematological: Negative for adenopathy  Psychiatric/Behavioral: Negative for behavioral problems  Objective:      /84 (BP Location: Left arm, Patient Position: Sitting, Cuff Size: Large)   Pulse 95   Temp 97 6 °F (36 4 °C) (Temporal)   Ht 5' 8" (1 727 m)   Wt 125 kg (276 lb)   SpO2 98% Comment: room air  BMI 41 97 kg/m²          Physical Exam  Constitutional:       General: She is not in acute distress  Appearance: She is well-developed  She is obese  She is not diaphoretic  Comments: BMI 41 97   HENT:      Head: Normocephalic and atraumatic  Right Ear: External ear normal       Left Ear: External ear normal  There is impacted cerumen (impacted cerumen in the left ext aud canal )  Nose: Nose normal       Mouth/Throat:      Mouth: Mucous membranes are dry  Pharynx: Posterior oropharyngeal erythema (oropharyngeal erythema with Mallampati class 4 oropharynx and dry mucous membranes) present  No oropharyngeal exudate  Eyes:      General: No scleral icterus  Right eye: No discharge  Left eye: No discharge  Extraocular Movements:      Right eye: Nystagmus present  Normal extraocular motion  Left eye: Nystagmus (horizontal nystagmus with positive [place] hallpike maneuver with beating to the left) present  Conjunctiva/sclera: Conjunctivae normal       Pupils: Pupils are equal, round, and reactive to light  Neck:      Thyroid: No thyromegaly  Vascular: No JVD  Trachea: No tracheal deviation  Cardiovascular:      Rate and Rhythm: Normal rate and regular rhythm  Heart sounds: Normal heart sounds  No murmur heard  No friction rub  No gallop  Pulmonary:      Effort: Pulmonary effort is normal  No respiratory distress  Breath sounds: Normal breath sounds  No wheezing or rales  Chest:      Chest wall: No tenderness  Abdominal:      General: Bowel sounds are normal  There is no distension  Palpations: Abdomen is soft  There is no mass  Tenderness: There is no abdominal tenderness  There is no guarding or rebound  Musculoskeletal:         General: No tenderness or deformity  Normal range of motion  Cervical back: Normal range of motion and neck supple  Lymphadenopathy:      Cervical: No cervical adenopathy  Skin:     General: Skin is warm and dry  Coloration: Skin is not pale  Findings: No erythema or rash  Neurological:      Mental Status: She is alert and oriented to person, place, and time  Cranial Nerves: No cranial nerve deficit  Motor: No abnormal muscle tone  Coordination: Coordination normal       Deep Tendon Reflexes: Reflexes are normal and symmetric  Comments: Cranial nerves 2-12 are intact bilaterally  Muscle strength is 5/5 in all extremities  Rapid alternating movement and finger-to-nose pointing test intact        Psychiatric:         Behavior: Behavior normal            Appointment on 08/08/2022   Component Date Value Ref Range Status    Hemoglobin A1C 08/08/2022 6 3 (A) Normal 3 8-5 6%; PreDiabetic 5 7-6 4%;  Diabetic >=6 5%; Glycemic control for adults with diabetes <7 0% % Final    EAG 08/08/2022 134  mg/dl Final   Appointment on 08/08/2022   Component Date Value Ref Range Status    WBC 08/08/2022 9 81  4 31 - 10 16 Thousand/uL Final    RBC 08/08/2022 4 73  3 81 - 5 12 Million/uL Final    Hemoglobin 08/08/2022 11 5  11 5 - 15 4 g/dL Final    Hematocrit 08/08/2022 37 9  34 8 - 46 1 % Final    MCV 08/08/2022 80 (A) 82 - 98 fL Final    MCH 08/08/2022 24 3 (A) 26 8 - 34 3 pg Final    MCHC 08/08/2022 30 3 (A) 31 4 - 37 4 g/dL Final    RDW 08/08/2022 17 6 (A) 11 6 - 15 1 % Final    MPV 08/08/2022 10 1  8 9 - 12 7 fL Final    Platelets 32/54/0880 452 (A) 149 - 390 Thousands/uL Final    nRBC 08/08/2022 0  /100 WBCs Final    Neutrophils Relative 08/08/2022 50  43 - 75 % Final    Immat GRANS % 08/08/2022 0  0 - 2 % Final    Lymphocytes Relative 08/08/2022 41  14 - 44 % Final    Monocytes Relative 08/08/2022 5  4 - 12 % Final    Eosinophils Relative 08/08/2022 3  0 - 6 % Final    Basophils Relative 08/08/2022 1  0 - 1 % Final    Neutrophils Absolute 08/08/2022 4 95  1 85 - 7 62 Thousands/µL Final    Immature Grans Absolute 08/08/2022 0 03  0 00 - 0 20 Thousand/uL Final    Lymphocytes Absolute 08/08/2022 4 01  0 60 - 4 47 Thousands/µL Final    Monocytes Absolute 08/08/2022 0 49  0 17 - 1 22 Thousand/µL Final    Eosinophils Absolute 08/08/2022 0 26  0 00 - 0 61 Thousand/µL Final    Basophils Absolute 08/08/2022 0 07  0 00 - 0 10 Thousands/µL Final    TSH 3RD GENERATON 08/08/2022 1 610  0 450 - 4 500 uIU/mL Final    The recommended reference ranges for TSH during pregnancy are as follows:   First trimester 0 1 to 2 5 uIU/mL   Second trimester  0 2 to 3 0 uIU/mL   Third trimester 0 3 to 3 0 uIU/m    Note: Normal ranges may not apply to patients who are transgender, non-binary, or whose legal sex, sex at birth, and gender identity differ  Adult TSH (3rd generation) reference range follows the recommended guidelines of the American Thyroid Association, January, 2020   Sodium 08/08/2022 137  135 - 147 mmol/L Final    Potassium 08/08/2022 4 3  3 5 - 5 3 mmol/L Final    Chloride 08/08/2022 104  96 - 108 mmol/L Final    CO2 08/08/2022 26  21 - 32 mmol/L Final    ANION GAP 08/08/2022 7  4 - 13 mmol/L Final    BUN 08/08/2022 14  5 - 25 mg/dL Final    Creatinine 08/08/2022 0 60  0 60 - 1 30 mg/dL Final    Standardized to IDMS reference method    Glucose, Fasting 08/08/2022 82  65 - 99 mg/dL Final    Specimen collection should occur prior to Sulfasalazine administration due to the potential for falsely depressed results  Specimen collection should occur prior to Sulfapyridine administration due to the potential for falsely elevated results   Calcium 08/08/2022 9 6  8 3 - 10 1 mg/dL Final    AST 08/08/2022 19  5 - 45 U/L Final    Specimen collection should occur prior to Sulfasalazine administration due to the potential for falsely depressed results   ALT 08/08/2022 21  12 - 78 U/L Final    Specimen collection should occur prior to Sulfasalazine and/or Sulfapyridine administration due to the potential for falsely depressed results   Alkaline Phosphatase 08/08/2022 84  46 - 116 U/L Final    Total Protein 08/08/2022 8 1  6 4 - 8 4 g/dL Final    Albumin 08/08/2022 3 5  3 5 - 5 0 g/dL Final    Total Bilirubin 08/08/2022 0 42  0 20 - 1 00 mg/dL Final    Use of this assay is not recommended for patients undergoing treatment with eltrombopag due to the potential for falsely elevated results   eGFR 08/08/2022 108  ml/min/1 73sq m Final    Cholesterol 08/08/2022 239 (A) See Comment mg/dL Final    Cholesterol:         Pediatric <18 Years        Desirable          <170 mg/dL      Borderline High    170-199 mg/dL      High               >=200 mg/dL        Adult >=18 Years            Desirable         <200 mg/dL      Borderline High   200-239 mg/dL      High              >239 mg/dL      Triglycerides 08/08/2022 146  See Comment mg/dL Final    Triglyceride:     0-9Y            <75mg/dL     10Y-17Y         <90 mg/dL       >=18Y     Normal          <150 mg/dL     Borderline High 150-199 mg/dL     High            200-499 mg/dL        Very High       >499 mg/dL    Specimen collection should occur prior to N-Acetylcysteine or Metamizole administration due to the potential for falsely depressed results   HDL, Direct 08/08/2022 65  >=50 mg/dL Final    Specimen collection should occur prior to Metamizole administration due to the potential for falsley depressed results   LDL Calculated 08/08/2022 145 (A) 0 - 100 mg/dL Final    LDL Cholesterol:     Optimal           <100 mg/dl     Near Optimal      100-129 mg/dl     Above Optimal       Borderline High 130-159 mg/dl       High            160-189 mg/dl       Very High       >189 mg/dl         This screening LDL is a calculated result     It does not have the accuracy of the Direct Measured LDL in the monitoring of patients with hyperlipidemia and/or statin therapy  Direct Measure LDL (MMI417) must be ordered separately in these patients   Non-HDL-Chol (CHOL-HDL) 08/08/2022 174  mg/dl Final   Annual Exam on 03/21/2022   Component Date Value Ref Range Status    Case Report 03/21/2022    Final                    Value:Gynecologic Cytology Report                       Case: [id number]                                  Authorizing Provider:  Gael Travis              Collected:           03/21/2022 Michael Calle PA-C                                                           Ordering Location:     [facility]  Received:            03/21/2022 1427              First Screen:          Messi Screen                                                                  Rescreen:              Grover Bonilla                                                               Specimen:    LIQUID-BASED PAP, SCREENING, Cervix, Endocervical                                          Primary Interpretation 03/21/2022 Negative for intraepithelial lesion or malignancy   Final    Specimen Adequacy 03/21/2022 Satisfactory for evaluation  Absence of endocervical/transformation zone component  Final    Additional Information 03/21/2022    Final                    Value: This result contains rich text formatting which cannot be displayed here   HPV Other HR 03/21/2022 Negative  Negative Final    HPV types: 80,72,63,47,05,56,63,63,67,26,01 and 68 DNA are undetectable or below the pre-set threshold      HPV16 03/21/2022 Negative  Negative Final    HPV18 03/21/2022 Negative  Negative Final

## 2022-09-29 ENCOUNTER — TELEPHONE (OUTPATIENT)
Dept: INTERNAL MEDICINE CLINIC | Age: 47
End: 2022-09-29

## 2022-09-29 NOTE — TELEPHONE ENCOUNTER
Pt called the office and she asked if she can be started on a low dose statin  She said the last time she was here she spoke to you regarding the slightly elevated cholesterol  She said she spoke to her mother in [facility] who told her it was genetic

## 2022-09-30 DIAGNOSIS — E78.2 MIXED DYSLIPIDEMIA: Primary | ICD-10-CM

## 2022-09-30 DIAGNOSIS — Z79.899 ON STATIN THERAPY: ICD-10-CM

## 2022-09-30 RX ORDER — ATORVASTATIN CALCIUM 10 MG/1
10 TABLET, FILM COATED ORAL DAILY
Qty: 30 TABLET | Refills: 3 | Status: SHIPPED | OUTPATIENT
Start: 2022-09-30

## 2022-09-30 NOTE — PROGRESS NOTES
Patient sent a message via [employer] that she would like to be started on low-dose statin because of her rising cholesterol  Will start her on atorvastatin 10 mg daily    Will check a hepatic function panel in 1 month

## 2022-09-30 NOTE — TELEPHONE ENCOUNTER
Please can you let her know that I have ordered Atorvastatin 10 mg daily and sent it to [facility]  I have also ordered a blood test to check her liver in about one month  She should let us know if she has any adverse reaction to the Lipitor such as muscle pains, abdominal pain etc   Thanks!

## 2022-10-06 ENCOUNTER — PROCEDURE VISIT (OUTPATIENT)
Dept: NEUROLOGY | Facility: CLINIC | Age: 47
End: 2022-10-06
Payer: COMMERCIAL

## 2022-10-06 VITALS — DIASTOLIC BLOOD PRESSURE: 99 MMHG | HEART RATE: 89 BPM | SYSTOLIC BLOOD PRESSURE: 146 MMHG | TEMPERATURE: 97.5 F

## 2022-10-06 DIAGNOSIS — G43.709 CHRONIC MIGRAINE WITHOUT AURA WITHOUT STATUS MIGRAINOSUS, NOT INTRACTABLE: Primary | ICD-10-CM

## 2022-10-06 PROCEDURE — 64615 CHEMODENERV MUSC MIGRAINE: CPT | Performed by: PHYSICIAN ASSISTANT

## 2022-10-06 NOTE — PROGRESS NOTES
Universal Protocol   Consent: Verbal consent obtained  Written consent obtained  Risks and benefits: risks, benefits and alternatives were discussed  Consent given by: patient  Time out: Immediately prior to procedure a "time out" was called to verify the correct patient, procedure, equipment, support staff and site/side marked as required  Patient understanding: patient states understanding of the procedure being performed  Patient consent: the patient's understanding of the procedure matches consent given  Procedure consent: procedure consent matches procedure scheduled  Relevant documents: relevant documents present and verified  Patient identity confirmed: verbally with patient        Chemodenervation     Date/Time 10/6/2022 11:16 AM     Performed by  Kymberly Roberts PA-C     Authorized by Kymberly Roberts PA-C        Pre-procedure details      Prepped With: Alcohol     Anesthesia  (see MAR for exact dosages):      Anesthesia method:  None   Procedure details     Position:  Upright   Botox     Botox Type:  Type A    Brand:  Botox    mL's of Botulinum Toxin:  200    Final Concentration per CC:  50 units    Needle Gauge:  30 G 2 5 inch   Procedures     Botox Procedures: chronic headache      Indications: migraines     Injection Location      Head / Face:  L superior cervical paraspinal, R superior cervical paraspinal, L , R , L frontalis, R frontalis, L medial occipitalis, R medial occipitalis, procerus, R temporalis, L temporalis, R superior trapezius and L superior trapezius    L  injection amount:  5 unit(s)    R  injection amount:  5 unit(s)    L lateral frontalis:  5 unit(s)    R lateral frontalis:  5 unit(s)    L medial frontalis:  5 unit(s)    R medial frontalis:  5 unit(s)    L temporalis injection amount:  20 unit(s)    R temporalis injection amount:  20 unit(s)    Procerus injection amount:  5 unit(s)    L medial occipitalis injection amount:  15 unit(s)    R medial occipitalis injection amount:  15 unit(s)    L superior cervical paraspinal injection amount:  10 unit(s)    R superior cervical paraspinal injection amount:  10 unit(s)    L superior trapezius injection amount:  15 unit(s)    R superior trapezius injection amount:  15 unit(s)   Total Units     Total units used:  200    Total units discarded:  0   Post-procedure details      Chemodenervation:  Chronic migraine    Facial Nerve Location[de-identified]  Bilateral facial nerve    Patient tolerance of procedure:   Tolerated well, no immediate complications   Comments      5 units orbicularis oculi bilaterally  2 5 units lower frontalis bilaterally  7 5 temporalis bilaterally  15 units frontalis  All medically necessary

## 2022-10-24 ENCOUNTER — CONSULT (OUTPATIENT)
Dept: BARIATRICS | Facility: CLINIC | Age: 47
End: 2022-10-24
Payer: COMMERCIAL

## 2022-10-24 VITALS
HEIGHT: 67 IN | BODY MASS INDEX: 43.49 KG/M2 | RESPIRATION RATE: 16 BRPM | SYSTOLIC BLOOD PRESSURE: 128 MMHG | WEIGHT: 277.1 LBS | HEART RATE: 98 BPM | DIASTOLIC BLOOD PRESSURE: 80 MMHG

## 2022-10-24 DIAGNOSIS — K21.9 GASTROESOPHAGEAL REFLUX DISEASE WITHOUT ESOPHAGITIS: ICD-10-CM

## 2022-10-24 DIAGNOSIS — E78.5 HYPERLIPIDEMIA: ICD-10-CM

## 2022-10-24 DIAGNOSIS — G43.909 MIGRAINE: ICD-10-CM

## 2022-10-24 DIAGNOSIS — E66.01 MORBID OBESITY (HCC): ICD-10-CM

## 2022-10-24 DIAGNOSIS — F41.1 GAD (GENERALIZED ANXIETY DISORDER): ICD-10-CM

## 2022-10-24 PROCEDURE — 99243 OFF/OP CNSLTJ NEW/EST LOW 30: CPT | Performed by: PHYSICIAN ASSISTANT

## 2022-10-24 NOTE — PATIENT INSTRUCTIONS
Assessment/Plan:    Follow up in approximately 2 months   Goals:  Food log (ie ) www myfitnesspal com,sparkpeople  com,loseit com,calorieking  com,etc  baritastic  No sugary beverages  At least 64oz of water daily  Increase physical activity by 10 minutes daily   Gradually increase physical activity to a goal of 5 days per week for 30 minutes of MODERATE intensity PLUS 2 days per week of FULL BODY resistance training  5-10 servings of fruits and vegetables per day and 25-35 grams of dietary fiber per day, gradually increasing  0546-4944 calories

## 2022-10-24 NOTE — PROGRESS NOTES
-Discussed options of [facility]-Intensive Lifestyle Intervention Program, Very Low Calorie Diet-VLCD, Conservative Program, Lesvia-En-Y Gastric Bypass and Vertical Sleeve Gastrectomy and the role of weight loss medications   -Initial weight loss goal of 5-10% weight loss for improved health  -Screening labs utd - pt is prediabetic   - patient would also like to learn about surgical options - will send link for seminar   - contraception is tubal ligation   - pt is on zoloft and feels like this has contributed to her weight gain, we discussed this med can have weight gaining side effects - pt PCP prescribed her metformin to help with her sugars but pt did not take   - she is interested in injectables - will check coverage with insurance   - pt walks her dog daily - track steps and increase weekly , at least 30 mins daily   - start food logging - [id number] anamaria/day sample menus provided     Patient denies personal and family history of MEN2 tumors and medullary thyroid/thyroid carcinoma  Patient denies personal history of pancreatitis  Assessment/Plan:    Follow up in approximately 2 months   Goals:  Food log (ie ) www myfitnesspal com,sparkpeople  com,loseit com,calorieking  com,etc  baritastic  No sugary beverages  At least 64oz of water daily  Increase physical activity by 10 minutes daily   Gradually increase physical activity to a goal of 5 days per week for 30 minutes of MODERATE intensity PLUS 2 days per week of FULL BODY resistance training  5-10 servings of fruits and vegetables per day and 25-35 grams of dietary fiber per day, gradually increasing  1537-2812 calories       Diagnoses and all orders for this visit:    BMI 40 0-44 9, adult ([facility] 75 )  -     Ambulatory Referral to Weight Management    Morbid obesity ([facility] 75 )  -     Ambulatory Referral to Weight Management    Gastroesophageal reflux disease without esophagitis    LASHAUN (generalized anxiety disorder)    Migraine    Hyperlipidemia        Subjective: Chief Complaint   Patient presents with   • Consult     MWM consult; waist 47 5in; goal wt 185; stop bang      Patient ID: Gideon Goodson  is a 49 y o  female with excess weight/obesity here to pursue weight management  Past Medical History:   Diagnosis Date   • Anemia    • Anxiety    • Depression    • GERD (gastroesophageal reflux disease)    • History of prior pregnancies        • Migraine with aura and without status migrainosus, not intractable 2018   • Obesity    • Papanicolaou smear 2019    normal   • Uterine bleeding, dysfunctional      HPI:  Obesity/Excess Weight:  Severity: class III  Onset:  Years     Modifiers: Diet and Exercise and Commercial Weight Loss Programs-ie  Weight Watchers, [place], Nutrisystem, etc   Contributing factors: Pregnancy, family genes , stress   Associated symptoms: fatigue and clothes do not fit, fatigue   Colonoscopy- unsure     Goals: lose about 100 lbs   Tobacco: no  Alcohol: no  STOPBAN/8    The following portions of the patient's history were reviewed and updated as appropriate: allergies, current medications, past family history, past medical history, past social history, past surgical history and problem list     Review of Systems   Constitutional: Negative  Respiratory: Negative  Cardiovascular: Negative  Gastrointestinal: Negative  Neurological: Negative  Psychiatric/Behavioral: Negative  Objective:    /80   Pulse 98   Resp 16   Ht 5' 7" (1 702 m)   Wt 126 kg (277 lb 1 6 oz)   Breastfeeding No   BMI 43 40 kg/m²     Physical Exam  Vitals and nursing note reviewed  Constitutional:       Appearance: Normal appearance  She is obese  HENT:      Head: Normocephalic and atraumatic  Eyes:      Extraocular Movements: Extraocular movements intact  Pupils: Pupils are equal, round, and reactive to light  Cardiovascular:      Rate and Rhythm: Normal rate     Pulmonary:      Effort: Pulmonary effort is normal  Musculoskeletal:         General: Normal range of motion  Cervical back: Normal range of motion  Skin:     General: Skin is warm and dry  Neurological:      General: No focal deficit present  Mental Status: She is alert and oriented to person, place, and time     Psychiatric:         Mood and Affect: Mood normal

## 2022-10-26 ENCOUNTER — TELEPHONE (OUTPATIENT)
Dept: INTERNAL MEDICINE CLINIC | Age: 47
End: 2022-10-26

## 2022-10-26 NOTE — TELEPHONE ENCOUNTER
Pt called the office and stated that you guys spoke about her starting a weight loss medication and at the time she didn't want to  She did see weight management who told her that she would have to have surgery and she said surgery isn't for her so she is asking if you would please prescribe a weight loss medication for her?

## 2022-11-01 ENCOUNTER — HOSPITAL ENCOUNTER (OUTPATIENT)
Dept: RADIOLOGY | Facility: IMAGING CENTER | Age: 47
Discharge: HOME | End: 2022-11-01

## 2022-11-01 VITALS — BODY MASS INDEX: 38.34 KG/M2 | WEIGHT: 253 LBS | HEIGHT: 68 IN

## 2022-11-01 DIAGNOSIS — Z12.31 ENCOUNTER FOR SCREENING MAMMOGRAM FOR BREAST CANCER: ICD-10-CM

## 2022-11-01 NOTE — TELEPHONE ENCOUNTER
I called and left patient a message on her voicemail  Since she is already seeing bariatrics, I will defer to them with regards to prescribing medications for weight loss  I also told her that she does not have to have surgery, but can let them know that she wants medications instead

## 2022-11-19 PROBLEM — H61.22 IMPACTED CERUMEN OF LEFT EAR: Status: RESOLVED | Noted: 2022-09-20 | Resolved: 2022-11-19

## 2022-11-30 DIAGNOSIS — F41.1 GAD (GENERALIZED ANXIETY DISORDER): ICD-10-CM

## 2022-11-30 DIAGNOSIS — K21.9 GASTROESOPHAGEAL REFLUX DISEASE WITHOUT ESOPHAGITIS: ICD-10-CM

## 2022-11-30 DIAGNOSIS — F41.9 ANXIETY: ICD-10-CM

## 2022-11-30 DIAGNOSIS — F41.0 PANIC ATTACK: ICD-10-CM

## 2022-11-30 DIAGNOSIS — E78.2 MIXED DYSLIPIDEMIA: ICD-10-CM

## 2022-11-30 DIAGNOSIS — Z79.899 ON STATIN THERAPY: ICD-10-CM

## 2022-11-30 RX ORDER — OMEPRAZOLE 20 MG/1
20 CAPSULE, DELAYED RELEASE ORAL DAILY
Qty: 90 CAPSULE | Refills: 0 | Status: SHIPPED | OUTPATIENT
Start: 2022-11-30

## 2022-11-30 RX ORDER — ATORVASTATIN CALCIUM 10 MG/1
10 TABLET, FILM COATED ORAL DAILY
Qty: 30 TABLET | Refills: 0 | Status: CANCELLED | OUTPATIENT
Start: 2022-11-30

## 2022-12-06 ENCOUNTER — TELEPHONE (OUTPATIENT)
Dept: BARIATRICS | Facility: CLINIC | Age: 47
End: 2022-12-06

## 2022-12-06 NOTE — TELEPHONE ENCOUNTER
Called patient and left a message to give the office a call back to reschedule her 12/12/22 appointment with Bhavana Sheehan PA-C that she cancelled through My Chart

## 2023-01-05 ENCOUNTER — PROCEDURE VISIT (OUTPATIENT)
Dept: NEUROLOGY | Facility: CLINIC | Age: 48
End: 2023-01-05

## 2023-01-05 VITALS — HEART RATE: 87 BPM | TEMPERATURE: 97.5 F | DIASTOLIC BLOOD PRESSURE: 78 MMHG | SYSTOLIC BLOOD PRESSURE: 124 MMHG

## 2023-01-05 DIAGNOSIS — G43.709 CHRONIC MIGRAINE WITHOUT AURA WITHOUT STATUS MIGRAINOSUS, NOT INTRACTABLE: Primary | ICD-10-CM

## 2023-01-05 NOTE — PROGRESS NOTES
Universal Protocol   Consent: Verbal consent obtained  Written consent obtained  Risks and benefits: risks, benefits and alternatives were discussed  Consent given by: patient  Time out: Immediately prior to procedure a "time out" was called to verify the correct patient, procedure, equipment, support staff and site/side marked as required  Patient understanding: patient states understanding of the procedure being performed  Patient consent: the patient's understanding of the procedure matches consent given  Procedure consent: procedure consent matches procedure scheduled  Relevant documents: relevant documents present and verified  Patient identity confirmed: verbally with patient        Chemodenervation     Date/Time 1/5/2023 1:11 PM     Performed by  Salima Wahl PA-C     Authorized by Salima Wahl PA-C        Pre-procedure details      Prepped With: Alcohol     Anesthesia  (see MAR for exact dosages):      Anesthesia method:  None   Procedure details     Position:  Upright   Botox     Botox Type:  Type A    Brand:  Botox    mL's of Botulinum Toxin:  200    Final Concentration per CC:  50 units    Needle Gauge:  30 G 2 5 inch   Procedures     Botox Procedures: chronic headache      Indications: migraines     Injection Location      Head / Face:  L superior cervical paraspinal, R superior cervical paraspinal, L , R , L frontalis, R frontalis, L medial occipitalis, R medial occipitalis, procerus, R temporalis, L temporalis, R superior trapezius and L superior trapezius    L  injection amount:  5 unit(s)    R  injection amount:  5 unit(s)    L lateral frontalis:  5 unit(s)    R lateral frontalis:  5 unit(s)    L medial frontalis:  5 unit(s)    R medial frontalis:  5 unit(s)    L temporalis injection amount:  20 unit(s)    R temporalis injection amount:  20 unit(s)    Procerus injection amount:  5 unit(s)    L medial occipitalis injection amount:  15 unit(s)    R medial occipitalis injection amount:  15 unit(s)    L superior cervical paraspinal injection amount:  10 unit(s)    R superior cervical paraspinal injection amount:  10 unit(s)    L superior trapezius injection amount:  15 unit(s)    R superior trapezius injection amount:  15 unit(s)   Total Units     Total units used:  200    Total units discarded:  0   Post-procedure details      Chemodenervation:  Chronic migraine    Facial Nerve Location[de-identified]  Bilateral facial nerve    Patient tolerance of procedure:   Tolerated well, no immediate complications   Comments      5 units orbicularis oculi bilaterally  5 units procerus  7 5 temporalis bilaterally  15 units frontalis  All medically necessary

## 2023-01-27 ENCOUNTER — TELEPHONE (OUTPATIENT)
Dept: OTHER | Facility: OTHER | Age: 48
End: 2023-01-27

## 2023-02-03 ENCOUNTER — OFFICE VISIT (OUTPATIENT)
Dept: BARIATRICS | Facility: CLINIC | Age: 48
End: 2023-02-03

## 2023-02-03 VITALS
SYSTOLIC BLOOD PRESSURE: 122 MMHG | RESPIRATION RATE: 16 BRPM | DIASTOLIC BLOOD PRESSURE: 82 MMHG | WEIGHT: 284.4 LBS | HEIGHT: 68 IN | BODY MASS INDEX: 43.1 KG/M2 | HEART RATE: 93 BPM

## 2023-02-03 DIAGNOSIS — E78.2 MIXED DYSLIPIDEMIA: ICD-10-CM

## 2023-02-03 DIAGNOSIS — E66.01 OBESITY, CLASS III, BMI 40-49.9 (MORBID OBESITY) (HCC): Primary | ICD-10-CM

## 2023-02-03 DIAGNOSIS — F41.1 GAD (GENERALIZED ANXIETY DISORDER): ICD-10-CM

## 2023-02-03 DIAGNOSIS — R73.03 PRE-DIABETES: ICD-10-CM

## 2023-02-03 PROBLEM — E66.813 OBESITY, CLASS III, BMI 40-49.9 (MORBID OBESITY): Status: ACTIVE | Noted: 2023-02-03

## 2023-02-03 NOTE — PROGRESS NOTES
Assessment/Plan:    Pre-diabetes  -should improve with weight loss, dietary, and lifestyle changes      LASHAUN (generalized anxiety disorder)  Controlled on zoloft    Obesity, Class III, BMI 40-49 9 (morbid obesity) ([facility] 75 )  -Patient is pursuing   -Initial weight loss goal of 5-10% weight loss for improved health  -Screening labs 8/8/22 and had elevated A1C and lipids    Initial:177 1  Current:184 4  Change:+7 3 lb  Goal:lose at least 40 lb by end of summer    Goals:  -recommend continuing with protein based breakfast and lower calorie coffee  Discussed trying to swap out milk for protein shake as an option  -to increase water to at least 64 oz daily  -recommend starting back to the gym at least 2 times a week  -to continue to concentrate on small portion of carbo (1/2 cup), 4 oz meat and larger portion of vegetable with meals  To start on wegovy  She has made multiple dietary attempts in the past including meal planning, food tracking, premade meals and has difficulty sustaining weight loss  Discussed medication options  Patient denies personal and family history of MCT and MEN2 tumors  Patient denies personal history of pancreatitis  Side effects discussed but not limited to diarrhea, bloating, constipation, GI upset, heartburn, increased heart rate, headache, low blood sugar, fatigue and dizziness  Titration and medication administration discussed  Mixed dyslipidemia  On lipitor  -should improve with weight loss, dietary, and lifestyle changes          Follow up in approximately RD visit in 2-3 weeks and medical provider in 6 weeks     Diagnoses and all orders for this visit:    Obesity, Class III, BMI 40-49 9 (morbid obesity) ([facility] 75 )  -     Semaglutide-Weight Management (WEGOVY) 0 25 MG/0 5ML; Inject 0 5 mL (0 25 mg total) under the skin once a week    Pre-diabetes  -     Semaglutide-Weight Management (WEGOVY) 0 25 MG/0 5ML;  Inject 0 5 mL (0 25 mg total) under the skin once a week    LASHAUN (generalized anxiety disorder)    Mixed dyslipidemia          Subjective:   Chief Complaint   Patient presents with   • Follow-up     MWM 3mth f/u; waist 48 5in        Patient ID: Rhys East  is a 51 y o  female with excess weight/obesity here to pursue weight managment  Patient is pursuing Conservative Program      HPI   She has gone through multiple diet programs in the past  Her dad passed away and she did gain some weight after and she because more off tract with her eating  She is now very motivated to lose weight  She does not feel comfortable in her body  She is starting to go [employer] and feels she needs a boost     She does have a history of insulin resistance as well and is worried she could become diabetic  She works as a  currently but did work in the past with [employer] from [employer]:   02/03/23 129 kg (284 lb 6 4 oz)   11/01/22 115 kg (253 lb)   10/24/22 126 kg (277 lb 1 6 oz)   09/20/22 125 kg (276 lb)   08/15/22 125 kg (275 lb)   08/01/22 125 kg (275 lb 1 6 oz)   05/10/22 117 kg (257 lb)   03/21/22 122 kg (269 lb)   02/07/22 122 kg (268 lb 1 6 oz)   11/01/21 117 kg (259 lb)       Food logging:no, has done for prior for [employer]  Increased appetite/cravings:yes  Fruit/Vegetable servings:  Exercise:just joined the gym   Hydration: coffee, little water-trying to increase    Diet Recall  B: iced coffee(heavy on the cream and sugar-now lower calorie milks) before but now doing protein shake or bar     L: mid afternoon-varies  D: concentrating now on more protein and vegetable and less start      The following portions of the patient's history were reviewed and updated as appropriate: She  has a past medical history of Anemia, Anxiety, Depression, GERD (gastroesophageal reflux disease), History of prior pregnancies, Migraine with aura and without status migrainosus, not intractable (4/23/2018), Obesity, Papanicolaou smear (01/2019), and Uterine bleeding, dysfunctional   She   Patient Active Problem List    Diagnosis Date Noted   • Obesity, Class III, BMI 40-49 9 (morbid obesity) ([facility] ) 2023   • Vertigo 2022   • Nausea 2022   • Benign paroxysmal positional vertigo of left ear 2022   • COVID-19 05/10/2022   • Carpal tunnel syndrome of left wrist 10/06/2021   • Medial epicondylitis of left elbow 10/06/2021   • History of endometrial ablation 2021   • LASHAUN (generalized anxiety disorder) 2021   • Panic attack 2021   • BMI 40 0-44 9, adult ([facility] ) 2021   • Sore throat 03/10/2021   • Snoring 03/10/2021   • Thrombocytosis 2019   • Iron deficiency 2019   • Chronic cough 2019   • Weight gain 2019   • Eustachian tube dysfunction, bilateral 2019   • GERD (gastroesophageal reflux disease) 2018   • Screening for breast cancer 2018   • Macromastia 10/10/2018   • Pre-diabetes 10/10/2018   • Mixed dyslipidemia 10/10/2018   • Chronic migraine without aura without status migrainosus, not intractable 2018   • Seasonal allergies 10/09/2017     She  has a past surgical history that includes  section;  section; Tubal ligation; and pr hysteroscopy endometrial ablation (N/A, 2021)  Her family history includes Cancer (age of onset: [de-identified]) in her father; No Known Problems in her daughter, maternal aunt, maternal aunt, maternal aunt, maternal aunt, maternal grandfather, maternal grandmother, mother, paternal aunt, paternal aunt, paternal aunt, paternal grandfather, paternal grandmother, sister, and sister; Prostate cancer in her father; Stroke in her father  She  reports that she has never smoked  She has never used smokeless tobacco  She reports that she does not drink alcohol and does not use drugs    Current Outpatient Medications   Medication Sig Dispense Refill   • atorvastatin (LIPITOR) 10 mg tablet Take 1 tablet (10 mg total) by mouth daily 30 tablet 3   • B Complex Vitamins (VITAMIN B COMPLEX PO) Take by mouth daily      • cyclobenzaprine (FLEXERIL) 5 mg tablet Take 1 tablet (5 mg total) by mouth as needed for muscle spasms (migraine) 30 tablet 2   • ferrous sulfate 324 (65 Fe) mg Take 1 tablet (324 mg total) by mouth daily before breakfast 30 tablet 1   • LORazepam (ATIVAN) 0 5 mg tablet Take 2 tablets (1 mg total) by mouth daily as needed for anxiety 20 tablet 0   • multivitamin (THERAGRAN) TABS Take 1 tablet by mouth daily     • omeprazole (PriLOSEC) 20 mg delayed release capsule Take 1 capsule (20 mg total) by mouth daily 90 capsule 0   • onabotulinumtoxin A (BOTOX) 100 units Inject into a muscle every 3 (three) months For migraines done every 3 months     • Semaglutide-Weight Management (WEGOVY) 0 25 MG/0 5ML Inject 0 5 mL (0 25 mg total) under the skin once a week 2 mL 0   • sertraline (ZOLOFT) 50 mg tablet Take 1 tablet (50 mg total) by mouth daily 90 tablet 1   • vitamin B-12 (VITAMIN B-12) 1,000 mcg tablet Take by mouth daily     • Vitamin D, Cholecalciferol, 1000 units CAPS Take 1 tablet by mouth in the morning       • carbamide peroxide (DEBROX) 6 5 % otic solution Administer 5 drops into the left ear 2 (two) times a day Use for 5 days 15 mL 0   • meclizine (ANTIVERT) 25 mg tablet Take 1 tablet (25 mg total) by mouth every 8 (eight) hours 30 tablet 0   • metFORMIN (GLUCOPHAGE) 500 mg tablet Take 1 tablet (500 mg total) by mouth 2 (two) times a day with meals 60 tablet 3   • ondansetron (ZOFRAN) 4 mg tablet Take 1 tablet (4 mg total) by mouth every 8 (eight) hours as needed for nausea or vomiting (Patient not taking: Reported on 2/3/2023) 20 tablet 0     No current facility-administered medications for this visit       Current Outpatient Medications on File Prior to Visit   Medication Sig   • atorvastatin (LIPITOR) 10 mg tablet Take 1 tablet (10 mg total) by mouth daily   • B Complex Vitamins (VITAMIN B COMPLEX PO) Take by mouth daily    • cyclobenzaprine (FLEXERIL) 5 mg tablet Take 1 tablet (5 mg total) by mouth as needed for muscle spasms (migraine)   • ferrous sulfate 324 (65 Fe) mg Take 1 tablet (324 mg total) by mouth daily before breakfast   • LORazepam (ATIVAN) 0 5 mg tablet Take 2 tablets (1 mg total) by mouth daily as needed for anxiety   • multivitamin (THERAGRAN) TABS Take 1 tablet by mouth daily   • omeprazole (PriLOSEC) 20 mg delayed release capsule Take 1 capsule (20 mg total) by mouth daily   • onabotulinumtoxin A (BOTOX) 100 units Inject into a muscle every 3 (three) months For migraines done every 3 months   • sertraline (ZOLOFT) 50 mg tablet Take 1 tablet (50 mg total) by mouth daily   • vitamin B-12 (VITAMIN B-12) 1,000 mcg tablet Take by mouth daily   • Vitamin D, Cholecalciferol, 1000 units CAPS Take 1 tablet by mouth in the morning     • carbamide peroxide (DEBROX) 6 5 % otic solution Administer 5 drops into the left ear 2 (two) times a day Use for 5 days   • meclizine (ANTIVERT) 25 mg tablet Take 1 tablet (25 mg total) by mouth every 8 (eight) hours   • metFORMIN (GLUCOPHAGE) 500 mg tablet Take 1 tablet (500 mg total) by mouth 2 (two) times a day with meals   • ondansetron (ZOFRAN) 4 mg tablet Take 1 tablet (4 mg total) by mouth every 8 (eight) hours as needed for nausea or vomiting (Patient not taking: Reported on 2/3/2023)     No current facility-administered medications on file prior to visit  She is allergic to amoxicillin, clavulanic acid, moxifloxacin, and sulfa antibiotics       Review of Systems   Constitutional: Negative for fever  Respiratory: Negative for shortness of breath  Cardiovascular: Negative for chest pain and palpitations  Gastrointestinal: Negative for abdominal pain, constipation, diarrhea and vomiting  Genitourinary: Negative for difficulty urinating  Musculoskeletal: Negative for arthralgias  Skin: Negative for rash  Neurological: Negative for headaches  Psychiatric/Behavioral: Negative for dysphoric mood   The patient is not nervous/anxious  Objective:    /82   Pulse 93   Resp 16   Ht 5' 8" (1 727 m)   Wt 129 kg (284 lb 6 4 oz)   BMI 43 24 kg/m²      Physical Exam  Vitals and nursing note reviewed  Constitutional:       General: She is not in acute distress  Appearance: She is well-developed  She is obese  HENT:      Head: Normocephalic and atraumatic  Eyes:      Conjunctiva/sclera: Conjunctivae normal    Neck:      Thyroid: No thyromegaly  Pulmonary:      Effort: Pulmonary effort is normal  No respiratory distress  Skin:     Findings: No rash (visible)  Neurological:      Mental Status: She is alert and oriented to person, place, and time     Psychiatric:         Mood and Affect: Mood normal          Behavior: Behavior normal

## 2023-02-07 ENCOUNTER — TELEPHONE (OUTPATIENT)
Dept: BARIATRICS | Facility: CLINIC | Age: 48
End: 2023-02-07

## 2023-02-07 NOTE — TELEPHONE ENCOUNTER
LM about prior auth approval for [facility] and 4 to 5 percent weight loss needed for renewal, Pharmacy was called

## 2023-02-20 ENCOUNTER — TELEPHONE (OUTPATIENT)
Dept: BARIATRICS | Facility: CLINIC | Age: 48
End: 2023-02-20

## 2023-02-20 DIAGNOSIS — E66.01 OBESITY, CLASS III, BMI 40-49.9 (MORBID OBESITY) (HCC): Primary | ICD-10-CM

## 2023-02-20 DIAGNOSIS — R73.03 PRE-DIABETES: ICD-10-CM

## 2023-02-27 ENCOUNTER — OFFICE VISIT (OUTPATIENT)
Dept: NEUROLOGY | Facility: CLINIC | Age: 48
End: 2023-02-27

## 2023-02-27 VITALS
SYSTOLIC BLOOD PRESSURE: 108 MMHG | TEMPERATURE: 98 F | HEART RATE: 90 BPM | BODY MASS INDEX: 43.21 KG/M2 | HEIGHT: 68 IN | WEIGHT: 285.1 LBS | DIASTOLIC BLOOD PRESSURE: 72 MMHG

## 2023-02-27 DIAGNOSIS — G43.009 MIGRAINE WITHOUT AURA AND WITHOUT STATUS MIGRAINOSUS, NOT INTRACTABLE: ICD-10-CM

## 2023-02-27 DIAGNOSIS — G43.709 CHRONIC MIGRAINE WITHOUT AURA WITHOUT STATUS MIGRAINOSUS, NOT INTRACTABLE: Primary | ICD-10-CM

## 2023-02-27 RX ORDER — CYCLOBENZAPRINE HCL 5 MG
5 TABLET ORAL AS NEEDED
Qty: 30 TABLET | Refills: 2 | Status: SHIPPED | OUTPATIENT
Start: 2023-02-27

## 2023-02-27 RX ORDER — RIMEGEPANT SULFATE 75 MG/75MG
75 TABLET, ORALLY DISINTEGRATING ORAL AS NEEDED
Qty: 8 TABLET | Refills: 3 | Status: SHIPPED | OUTPATIENT
Start: 2023-02-27

## 2023-02-27 RX ORDER — TRETINOIN 0.5 MG/G
CREAM TOPICAL
COMMUNITY
Start: 2023-02-23

## 2023-02-27 NOTE — ASSESSMENT & PLAN NOTE
Preventive therapy for headaches:  Continue Botox, patient seems to doing really well with Botox  Abortive therapy for headaches: At onset of her severe headache patients take Nurtec 75 mg  Limit of 1 in 24 hours  May also use Toradol with food    May use Cyclobenzaprine 5 mg at bedtime on the nights of your migraines

## 2023-02-27 NOTE — PROGRESS NOTES
[facility]  PATIENT:  Davey Todd  MRN:  [id number]  :  1975  DATE OF SERVICE:  2023      Assessment/Plan:     Chronic migraine without aura without status migrainosus, not intractable  Preventive therapy for headaches:  Continue Botox, patient seems to doing really well with Botox  Abortive therapy for headaches: At onset of her severe headache patients take Nurtec 75 mg  Limit of 1 in 24 hours  May also use Toradol with food  May use Cyclobenzaprine 5 mg at bedtime on the nights of your migraines         Problem List Items Addressed This Visit        Cardiovascular and Mediastinum    Chronic migraine without aura without status migrainosus, not intractable - Primary     Preventive therapy for headaches:  Continue Botox, patient seems to doing really well with Botox  Abortive therapy for headaches: At onset of her severe headache patients take Nurtec 75 mg  Limit of 1 in 24 hours  May also use Toradol with food  May use Cyclobenzaprine 5 mg at bedtime on the nights of your migraines         Relevant Medications    Rimegepant Sulfate (Nurtec) 75 MG TBDP    cyclobenzaprine (FLEXERIL) 5 mg tablet   Other Visit Diagnoses     Migraine without aura and without status migrainosus, not intractable        Relevant Medications    Rimegepant Sulfate (Nurtec) 75 MG TBDP    cyclobenzaprine (FLEXERIL) 5 mg tablet              History of Present Illness: We had the pleasure of evaluating Davey Todd in neurological consultation today for headaches  As you know,  she is a 51 y o  right handed  female  She owns a hair salon      QTc: 3/28/2021 435 ms     Chronic migraine headaches:  Current preventative:  B complex, iron, multivitamin, B12, vitamin D  Cycled endocrine  Sertraline, lorazepam  Botox    Current abortive:  Prochlorperazine    Prior Preventative:  Mag,   topamax    Prior Abortive:   Toradol,   Decadron,   aleve, advil/ ibuprofen  sumatriptan     Non-Medical/Alternative Treatments used in the past for headaches: Massage- improved neck pain, PT, TENS unit- made neck pain worse     Headache trigger: Fatigue, Stress/Tension, bright lights, missing meals  Aura - “heavy feeling” in the head and neck pain sometimes; sometimes (not with every headache) scotomas/ colorful lights when closing eyes/ spots in eyes lasting about 10 minutes before headaches     Since last seen headaches have improved with Botox injections q 3 months   Since starting botox, the patient reports greater than 7 days of migraine relief from baseline, correlated with headache diary, decreased abortive medication use and decreased ER visits  Peggy Keith has been getting Botox for over 6 years and has been life changing for her        Current pain 0/10  How often do the headaches occur -   migraines: 1-2 a month prior to Botox 4+ a week (16-20);   TTH: 0-2 per week   What time of the day do the headaches start - Variable; occasionally wakes up with them  How long do the headaches last - 45 minutes to 2 hours (prior to Botox all day)  Where are they located - frontal, eyes, radiate back into the occipital and neck discomfort L>R  What is the intensity of pain -   Tension headaches: 7-8/10  severe headaches: 8-9/10  Describe your usual headache - Throbbing, Pounding    Associated symptoms:   -       Decrease of appetite, nausea   -       Photophobia, phonophobia  -       Problem with concentration  -       Blurred vision  -       Light-headed or dizzy, stiff or sore neck  -       Hands or feet tingle or feel numb  -       Prefer to be alone and in a dark room, unable to work     Number of days missed per month because of headaches:  Work (or school) days: occasionally  Social or Family activities: misses less     What time of the year do headaches occur more frequently? summer  Have you seen someone else for headaches or pain? No  Have you had trigger point injection performed and how often? No  Have you had Botox injection performed and how often? Yes   Have you had epidural injections or transforaminal injections performed? Yes, with childbirth     Have you used CBD or THC for your headaches and how often? No     Are you current pregnant or planning on getting pregnant? No     Have you ever had any Brain imaging? no      Reviewed old notes from physician seen in the past- see above HPI for summary of previous encounters            Past Medical History:   Diagnosis Date   • Anemia    • Anxiety    • Depression    • GERD (gastroesophageal reflux disease)    • History of prior pregnancies        • Migraine with aura and without status migrainosus, not intractable 2018   • Obesity    • Papanicolaou smear 2019    normal   • Uterine bleeding, dysfunctional        Patient Active Problem List   Diagnosis   • Chronic migraine without aura without status migrainosus, not intractable   • Seasonal allergies   • Macromastia   • Pre-diabetes   • Mixed dyslipidemia   • GERD (gastroesophageal reflux disease)   • Screening for breast cancer   • Eustachian tube dysfunction, bilateral   • Chronic cough   • Weight gain   • Thrombocytosis   • Iron deficiency   • Sore throat   • Snoring   • LASHAUN (generalized anxiety disorder)   • Panic attack   • BMI 40 0-44 9, adult ([facility])   • History of endometrial ablation   • Carpal tunnel syndrome of left wrist   • Medial epicondylitis of left elbow   • COVID-19   • Vertigo   • Nausea   • Benign paroxysmal positional vertigo of left ear   • Obesity, Class III, BMI 40-49 9 (morbid obesity) ([facility])       Medications:      Current Outpatient Medications   Medication Sig Dispense Refill   • atorvastatin (LIPITOR) 10 mg tablet Take 1 tablet (10 mg total) by mouth daily 30 tablet 3   • B Complex Vitamins (VITAMIN B COMPLEX PO) Take by mouth daily      • cyclobenzaprine (FLEXERIL) 5 mg tablet Take 1 tablet (5 mg total) by mouth as needed for muscle spasms (migraine) 30 tablet 2   • ferrous sulfate 324 (65 Fe) mg Take 1 tablet (324 mg total) by mouth daily before breakfast 30 tablet 1   • LORazepam (ATIVAN) 0 5 mg tablet Take 2 tablets (1 mg total) by mouth daily as needed for anxiety 20 tablet 0   • meclizine (ANTIVERT) 25 mg tablet Take 1 tablet (25 mg total) by mouth every 8 (eight) hours 30 tablet 0   • multivitamin (THERAGRAN) TABS Take 1 tablet by mouth daily     • omeprazole (PriLOSEC) 20 mg delayed release capsule Take 1 capsule (20 mg total) by mouth daily 90 capsule 0   • onabotulinumtoxin A (BOTOX) 100 units Inject into a muscle every 3 (three) months For migraines done every 3 months     • ondansetron (ZOFRAN) 4 mg tablet Take 1 tablet (4 mg total) by mouth every 8 (eight) hours as needed for nausea or vomiting 20 tablet 0   • Rimegepant Sulfate (Nurtec) 75 MG TBDP Take 75 mg by mouth as needed (migraine) Limit of 1 in 24 hours 8 tablet 3   • Semaglutide-Weight Management (WEGOVY) 0 5 MG/0 5ML Inject 0 5 mL (0 5 mg total) under the skin once a week for 28 days 2 mL 0   • sertraline (ZOLOFT) 50 mg tablet Take 1 tablet (50 mg total) by mouth daily 90 tablet 1   • vitamin B-12 (VITAMIN B-12) 1,000 mcg tablet Take by mouth daily     • Vitamin D, Cholecalciferol, 1000 units CAPS Take 1 tablet by mouth in the morning       • carbamide peroxide (DEBROX) 6 5 % otic solution Administer 5 drops into the left ear 2 (two) times a day Use for 5 days (Patient not taking: Reported on 2/27/2023) 15 mL 0   • metFORMIN (GLUCOPHAGE) 500 mg tablet Take 1 tablet (500 mg total) by mouth 2 (two) times a day with meals (Patient not taking: Reported on 2/27/2023) 60 tablet 3   • tretinoin (REFISSA) 0 05 % cream  (Patient not taking: Reported on 2/27/2023)       No current facility-administered medications for this visit  Allergies:       Allergies   Allergen Reactions   • Amoxicillin Hives   • Clavulanic Acid Hives   • Moxifloxacin Hives   • Sulfa Antibiotics Hives Family History:     Family History   Problem Relation Age of Onset   • No Known Problems Mother    • Cancer Father [de-identified]        bladder   • Prostate cancer Father    • Stroke Father    • No Known Problems Sister    • No Known Problems Sister    • No Known Problems Daughter    • No Known Problems Maternal Aunt    • No Known Problems Maternal Aunt    • No Known Problems Maternal Aunt    • No Known Problems Maternal Aunt    • No Known Problems Paternal Aunt    • No Known Problems Paternal Aunt    • No Known Problems Paternal Aunt    • No Known Problems Maternal Grandmother    • No Known Problems Maternal Grandfather    • No Known Problems Paternal Grandmother    • No Known Problems Paternal Grandfather        Social History:     Social History     Socioeconomic History   • Marital status: /Civil Union     Spouse name: Not on file   • Number of children: Not on file   • Years of education: Not on file   • Highest education level: Not on file   Occupational History   • Not on file   Tobacco Use   • Smoking status: Never   • Smokeless tobacco: Never   Vaping Use   • Vaping Use: Never used   Substance and Sexual Activity   • Alcohol use: No   • Drug use: No   • Sexual activity: Yes     Partners: Male     Birth control/protection: Female Sterilization   Other Topics Concern   • Not on file   Social History Narrative   • Not on file     Social Determinants of Health     Financial Resource Strain: Not on file   Food Insecurity: Not on file   Transportation Needs: Not on file   Physical Activity: Not on file   Stress: Not on file   Social Connections: Not on file   Intimate Partner Violence: Not on file   Housing Stability: Not on file      I have reviewed the patient's medical, social and surgical history as well as medications in detail and updated the computerized patient record        Objective:   Physical Exam:                                                                   Vitals:               /72 (BP Location: Left arm, Patient Position: Sitting, Cuff Size: Large)   Pulse 90   Temp 98 °F (36 7 °C) (Temporal)   Ht 5' 8" (1 727 m)   Wt 129 kg (285 lb 1 6 oz)   BMI 43 35 kg/m²   BP Readings from Last 3 Encounters:   02/27/23 108/72   02/03/23 122/82   01/05/23 124/78     Pulse Readings from Last 3 Encounters:   02/27/23 90   02/03/23 93   01/05/23 87              CONSTITUTIONAL: Well developed, well nourished, well groomed  No dysmorphic features  Eyes:  EOM normal      Neck:  Normal ROM, neck supple  HEENT:  Normocephalic atraumatic  Chest:  Respirations regular and unlabored  Psychiatric:  Normal behavior and appropriate affect      MENTAL STATUS  Orientation: Alert and oriented x 3  Fund of knowledge: Intact  MOTOR (Upper and lower extremities)   Bulk/tone/abnormal movement: Normal muscle bulk and tone  COORDINATION   Station/Gait: Normal baseline gait  Review of Systems:   Review of Systems  Constitutional: Negative  Negative for appetite change and fever  HENT: Negative  Negative for hearing loss, tinnitus, trouble swallowing and voice change  Eyes: Negative  Negative for photophobia, pain and visual disturbance  Respiratory: Negative  Negative for shortness of breath  Cardiovascular: Negative  Negative for palpitations  Gastrointestinal: Negative  Negative for nausea and vomiting  Endocrine: Negative  Negative for cold intolerance  Genitourinary: Negative  Negative for dysuria, frequency and urgency  Musculoskeletal: Negative  Negative for gait problem, myalgias and neck pain  Skin: Negative  Negative for rash  Allergic/Immunologic: Negative  Neurological: Negative  Negative for dizziness, tremors, seizures, syncope, facial asymmetry, speech difficulty, weakness, light-headedness, numbness and headaches  Hematological: Negative  Does not bruise/bleed easily  Psychiatric/Behavioral: Negative    Negative for confusion, hallucinations and sleep disturbance           I have spent a total time of 36  minutes on 02/27/23 in caring for this patient including Prognosis, Risks and benefits of tx options, Instructions for management, Patient and family education, Risk factor reductions, Counseling / Coordination of care, Documenting in the medical record, Reviewing / ordering tests, medicine, procedures   and Obtaining or reviewing history          Author:  Lexii Stephens PA-C 2/27/2023 2:09 PM

## 2023-02-27 NOTE — PATIENT INSTRUCTIONS
Preventive therapy for headaches:  Continue Botox, patient seems to doing really well with Botox  Abortive therapy for headaches: At onset of her severe headache patients take Nurtec 75 mg  Limit of 1 in 24 hours  May also use Toradol with food  May use Cyclobenzaprine 5 mg at bedtime on the nights of your migraines      Neck pain:   - We discussed the role of neck pathology and poor posture, with straightening of the normal cervical lordosis, in headaches  We discussed how tightening of the neck muscles can irritate the nerves in the occipital region of her head and cause or worsen head pain  We also discussed and demonstrated neck strengthening and relaxation exercises, as well as giving written instructions on these exercises  - We talked about the importance of good posture for improving shoulder, neck, and head pain  The patient was given visualization exercises for correcting posture, which patient will practice at home  If this simple exercise does not help improve the posture, we will consider formal physical therapy in the future  Medication overuse headaches:   - We discussed medication overuse headache [facility]) and how to avoid it in the future  It was explained that all analgesics have the potential to cause medication overuse headache [facility]) and analgesic overuse can negate the effectiveness of headache preventive measures  After successful [address] treatment, preventive medications for an underlying primary headache disorder have a greater chance for success  Avoid medications with narcotics, barbiturates, or caffeine in them as these can cause rebound headaches after very few doses and can interfere with other headache medicine efficacy  Taking any analgesics for more than 2-3 days a week can cause medication overuse headache  Reproductive age women: Should take folic acid daily when taking anti-seizure drugs especially Depakote       [place] Prescription Drug Monitoring Program report was reviewed and was appropriate      Headache management instructions  - When patient has a moderate to severe headache, they should seek rest, initiate relaxation and apply cold compresses to the head  - Maintain regular sleep schedule  Adults need at least 7-8 hours of uninterrupted a night  - Limit over the counter medications such as Tylenol, Ibuprofen, Aleve, Excedrin  (No more than 3 times a week)  - Maintain headache diary  We discussed an PATTIE for a smart phone is "Migraine nan"  - Limit caffeine to 1-2 cups 8 to 16 oz a day or less  - Avoid dietary trigger  (aged cheese, peanuts, MSG, aspartame and nitrates)  - Patient is to have regular frequent meals to prevent headache onset  - Please drink at least 64 ounces of water a day to help remain hydrated  Please call with any questions or concerns   Office number is [phone]

## 2023-03-01 ENCOUNTER — TELEPHONE (OUTPATIENT)
Dept: NEUROLOGY | Facility: CLINIC | Age: 48
End: 2023-03-01

## 2023-03-02 NOTE — TELEPHONE ENCOUNTER
Called PA dept, spoke to Shae and advised of the below  He verbalized understanding and he is unsure why it was showing duplicate  PA initiated via phone    -Nurtec 75 mg, 8 tabs per 30 days   -Sig: Take 75 mg by mouth as needed (migraine) Limit of 1 in 24 hours  -dx code (G43 009)  -continuation  -tried and failed: Toradol, Decadron, aleve, advil/ ibuprofen,  sumatriptan  -acute migraine tx  -not using in combination w/ another CGRP acute meds  -fda approved  -rebound HA been ruled out  -age fda labeling  -no contraindication   Office notes faxed to [phone]    Case# [id number]    4-15 days turnaround time    Awaiting determination

## 2023-03-08 DIAGNOSIS — F41.0 PANIC ATTACK: ICD-10-CM

## 2023-03-08 DIAGNOSIS — F41.1 GAD (GENERALIZED ANXIETY DISORDER): ICD-10-CM

## 2023-03-08 DIAGNOSIS — F41.9 ANXIETY: ICD-10-CM

## 2023-03-08 DIAGNOSIS — E78.2 MIXED DYSLIPIDEMIA: ICD-10-CM

## 2023-03-08 DIAGNOSIS — Z79.899 ON STATIN THERAPY: ICD-10-CM

## 2023-03-08 RX ORDER — ATORVASTATIN CALCIUM 10 MG/1
10 TABLET, FILM COATED ORAL DAILY
Qty: 30 TABLET | Refills: 0 | Status: SHIPPED | OUTPATIENT
Start: 2023-03-08

## 2023-03-08 NOTE — TELEPHONE ENCOUNTER
LOV: 9/20/22  NOV: 12/19/22 ( canceled appt ) 1/30/23 no showed     Patient needs to schedule a follow up appt  Sent message via my chart I will send over a month supply of the atorvastatin  The zoloft was filled already today   Left message on my chart to call to schedule

## 2023-03-19 ENCOUNTER — TELEPHONE (OUTPATIENT)
Dept: OTHER | Facility: OTHER | Age: 48
End: 2023-03-19

## 2023-03-19 NOTE — TELEPHONE ENCOUNTER
Patient is calling regarding cancelling an appointment      Date/Time: 03/20/2023 @ 1030 yasmin/ Radha     Patient was rescheduled: YES [] NO [x]    Patient requesting call back to reschedule: YES [x] NO []

## 2023-03-20 ENCOUNTER — TELEPHONE (OUTPATIENT)
Dept: BARIATRICS | Facility: CLINIC | Age: 48
End: 2023-03-20

## 2023-03-20 DIAGNOSIS — R11.0 NAUSEA: ICD-10-CM

## 2023-03-20 RX ORDER — ONDANSETRON 4 MG/1
4 TABLET, FILM COATED ORAL EVERY 8 HOURS PRN
Qty: 20 TABLET | Refills: 0 | Status: SHIPPED | OUTPATIENT
Start: 2023-03-20 | End: 2023-06-26 | Stop reason: SDUPTHER

## 2023-03-20 NOTE — TELEPHONE ENCOUNTER
Twyla is out on maternity  Is there a reason to believe that these side effects are due to the [facility] NIKHIL PORRAS? I see that she has been on this medication since early February  Has she had this issue on and off the entire time on the starting dose of Wegovy? If so she needs to stop Wegovy  If this is a new thing that just presented itself now then I believe this is not likely to be due to the [facility]JUAN PORRAS and recommend that she follows up with her primary care doctor      Clarence Rodríguez

## 2023-03-20 NOTE — TELEPHONE ENCOUNTER
Pt spouse called stating the pt is experiening 2 to 3 days of nausea and recently 1 day of diarrhea wanted to know if they can have Zofran for the nausea?

## 2023-03-20 NOTE — TELEPHONE ENCOUNTER
Pt started med early February, she states she was ok, until her med increase last week, that's when the nausea started

## 2023-03-21 ENCOUNTER — TELEPHONE (OUTPATIENT)
Dept: BARIATRICS | Facility: CLINIC | Age: 48
End: 2023-03-21

## 2023-03-21 DIAGNOSIS — E66.01 OBESITY, CLASS III, BMI 40-49.9 (MORBID OBESITY) (HCC): ICD-10-CM

## 2023-03-21 NOTE — TELEPHONE ENCOUNTER
Patient was informed regarding the Zofran script  She is asking for the next dosage of Wegovy to be sent to the pharm

## 2023-03-21 NOTE — TELEPHONE ENCOUNTER
Called pt  to inform him the Zofran was sent to the pharmacy  He stated his wife needed a refill for the [facility][place], wants to know if the dose stays the same or will it increase

## 2023-03-23 ENCOUNTER — OFFICE VISIT (OUTPATIENT)
Dept: INTERNAL MEDICINE CLINIC | Facility: OTHER | Age: 48
End: 2023-03-23

## 2023-03-23 VITALS
BODY MASS INDEX: 43.35 KG/M2 | DIASTOLIC BLOOD PRESSURE: 80 MMHG | SYSTOLIC BLOOD PRESSURE: 136 MMHG | HEIGHT: 68 IN | OXYGEN SATURATION: 98 % | TEMPERATURE: 97.4 F | HEART RATE: 100 BPM

## 2023-03-23 DIAGNOSIS — R10.9 FLANK PAIN: Primary | ICD-10-CM

## 2023-03-23 DIAGNOSIS — N30.00 ACUTE CYSTITIS WITHOUT HEMATURIA: ICD-10-CM

## 2023-03-23 DIAGNOSIS — Z12.11 SCREENING FOR COLON CANCER: ICD-10-CM

## 2023-03-23 DIAGNOSIS — R73.03 PRE-DIABETES: ICD-10-CM

## 2023-03-23 LAB
SL AMB  POCT GLUCOSE, UA: NORMAL
SL AMB LEUKOCYTE ESTERASE,UA: NORMAL
SL AMB POCT BILIRUBIN,UA: NORMAL
SL AMB POCT BLOOD,UA: NORMAL
SL AMB POCT CLARITY,UA: NORMAL
SL AMB POCT COLOR,UA: YELLOW
SL AMB POCT HEMOGLOBIN AIC: 5.8 (ref ?–6.5)
SL AMB POCT KETONES,UA: NORMAL
SL AMB POCT NITRITE,UA: NORMAL
SL AMB POCT PH,UA: 7
SL AMB POCT SPECIFIC GRAVITY,UA: 1.02
SL AMB POCT URINE PROTEIN: NORMAL
SL AMB POCT UROBILINOGEN: 0.2

## 2023-03-23 RX ORDER — NITROFURANTOIN 25; 75 MG/1; MG/1
100 CAPSULE ORAL 2 TIMES DAILY
Qty: 10 CAPSULE | Refills: 0 | Status: SHIPPED | OUTPATIENT
Start: 2023-03-23 | End: 2023-03-28

## 2023-03-23 NOTE — PROGRESS NOTES
Assessment/Plan:     Diagnoses and all orders for this visit:    Flank pain  -     POCT urine dip auto non-scope    Screening for colon cancer  -     Ambulatory referral for colonoscopy; Future    Pre-diabetes  -     POCT hemoglobin A1c    Acute cystitis without hematuria  -     nitrofurantoin (MACROBID) 100 mg capsule; Take 1 capsule (100 mg total) by mouth 2 (two) times a day for 5 days             M*Modal software was used to dictate this note  It may contain errors with dictating incorrect words or incorrect spelling  Please contact the provider directly with any questions  Subjective:   Chief Complaint   Patient presents with   • Possible UTI     Lower Flank/side pain, lower abdominal discomfort, urgency           Patient ID: Mily Pollard is a 51 y o  female  HPI  This is a very pleasant 51 years young lady who is here today for the regular follow-up but also she is having some problems with the flank pain and also some pressure in the suprapubic area with some frequency urgency of the urination no nausea or vomiting or diarrhea she is on her weight loss medication Wegovy and for that reason she gets some nausea she was diagnosed with the prediabetic her last hemoglobin A1c was 6 3 which is mostly in the diabetic range but and today I checked her hemoglobin A1c it was 5 8 I will tell her to continue with the Starlett Laws and follow-up with weight management and with her PCP Dr Alejandra Browning  Slightly high blood pressure with systolic 546 need to monitor weight as an outpatient  And is to continue with the present management and follow-up also I talked to her about diet exercise and weight loss    The following portions of the patient's history were reviewed and updated as appropriate: allergies, current medications, past family history, past medical history, past social history, past surgical history and problem list     Review of Systems   Constitutional: Positive for fatigue  Negative for chills     HENT: Negative for congestion, ear pain, hearing loss, postnasal drip, sinus pressure, sore throat and voice change  Eyes: Negative for pain, discharge and visual disturbance  Respiratory: Negative for cough, chest tightness and shortness of breath  Cardiovascular: Negative for chest pain, palpitations and leg swelling  Gastrointestinal: Negative for abdominal pain, blood in stool, diarrhea, nausea and rectal pain  Genitourinary: Positive for dysuria, flank pain, frequency and urgency  Negative for difficulty urinating  Musculoskeletal: Negative for arthralgias and joint swelling  Skin: Negative for rash  Allergic/Immunologic: Negative for environmental allergies and food allergies  Neurological: Negative for dizziness, tremors, weakness, numbness and headaches  Hematological: Negative for adenopathy  Psychiatric/Behavioral: Negative for behavioral problems and hallucinations           Past Medical History:   Diagnosis Date   • Anemia    • Anxiety    • Depression    • GERD (gastroesophageal reflux disease)    • History of prior pregnancies        • Migraine with aura and without status migrainosus, not intractable 2018   • Obesity    • Papanicolaou smear 2019    normal   • Uterine bleeding, dysfunctional          Current Outpatient Medications:   •  atorvastatin (LIPITOR) 10 mg tablet, Take 1 tablet (10 mg total) by mouth daily, Disp: 30 tablet, Rfl: 0  •  B Complex Vitamins (VITAMIN B COMPLEX PO), Take by mouth daily , Disp: , Rfl:   •  cyclobenzaprine (FLEXERIL) 5 mg tablet, Take 1 tablet (5 mg total) by mouth as needed for muscle spasms (migraine), Disp: 30 tablet, Rfl: 2  •  ferrous sulfate 324 (65 Fe) mg, Take 1 tablet (324 mg total) by mouth daily before breakfast, Disp: 30 tablet, Rfl: 1  •  LORazepam (ATIVAN) 0 5 mg tablet, Take 2 tablets (1 mg total) by mouth daily as needed for anxiety, Disp: 20 tablet, Rfl: 0  •  meclizine (ANTIVERT) 25 mg tablet, Take 1 tablet (25 mg total) by mouth every 8 (eight) hours, Disp: 30 tablet, Rfl: 0  •  multivitamin (THERAGRAN) TABS, Take 1 tablet by mouth daily, Disp: , Rfl:   •  omeprazole (PriLOSEC) 20 mg delayed release capsule, Take 1 capsule (20 mg total) by mouth daily, Disp: 90 capsule, Rfl: 0  •  onabotulinumtoxin A (BOTOX) 100 units, Inject into a muscle every 3 (three) months For migraines done every 3 months, Disp: , Rfl:   •  ondansetron (ZOFRAN) 4 mg tablet, Take 1 tablet (4 mg total) by mouth every 8 (eight) hours as needed for nausea or vomiting, Disp: 20 tablet, Rfl: 0  •  Rimegepant Sulfate (Nurtec) 75 MG TBDP, Take 75 mg by mouth as needed (migraine) Limit of 1 in 24 hours, Disp: 8 tablet, Rfl: 3  •  Semaglutide-Weight Management (WEGOVY) 0 5 MG/0 5ML, Inject 0 5 mL (0 5 mg total) under the skin once a week for 28 days, Disp: 2 mL, Rfl: 0  •  sertraline (ZOLOFT) 50 mg tablet, TAKE ONE TABLET BY MOUTH EVERY DAY, Disp: 90 tablet, Rfl: 0  •  vitamin B-12 (VITAMIN B-12) 1,000 mcg tablet, Take by mouth daily, Disp: , Rfl:   •  Vitamin D, Cholecalciferol, 1000 units CAPS, Take 1 tablet by mouth in the morning  , Disp: , Rfl:   •  carbamide peroxide (DEBROX) 6 5 % otic solution, Administer 5 drops into the left ear 2 (two) times a day Use for 5 days (Patient not taking: Reported on 3/23/2023), Disp: 15 mL, Rfl: 0  •  tretinoin (REFISSA) 0 05 % cream, , Disp: , Rfl:     Allergies   Allergen Reactions   • Amoxicillin Hives   • Clavulanic Acid Hives   • Moxifloxacin Hives   • Sulfa Antibiotics Hives       Social History   Past Surgical History:   Procedure Laterality Date   •  SECTION     •  SECTION     • MT HYSTEROSCOPY ENDOMETRIAL ABLATION N/A 2021    Procedure: ABLATION ENDOMETRIAL Shermon Lamar;  Surgeon: Dino Maddox MD;  Location: [facility];  Service: Gynecology   • TUBAL LIGATION       Family History   Problem Relation Age of Onset   • No Known Problems Mother    • Cancer Father [de-identified]        bladder   • Prostate cancer Father    • Stroke Father    • No Known Problems Sister    • No Known Problems Sister    • No Known Problems Daughter    • No Known Problems Maternal Aunt    • No Known Problems Maternal Aunt    • No Known Problems Maternal Aunt    • No Known Problems Maternal Aunt    • No Known Problems Paternal Aunt    • No Known Problems Paternal Aunt    • No Known Problems Paternal Aunt    • No Known Problems Maternal Grandmother    • No Known Problems Maternal Grandfather    • No Known Problems Paternal Grandmother    • No Known Problems Paternal Grandfather        Objective:  /80 (BP Location: Left arm, Patient Position: Sitting, Cuff Size: Large)   Pulse 100   Temp (!) 97 4 °F (36 3 °C) (Temporal)   Ht 5' 8" (1 727 m)   SpO2 98%   BMI 43 35 kg/m²        Physical Exam  Constitutional:       Appearance: She is well-developed  She is obese  HENT:      Right Ear: External ear normal    Eyes:      Conjunctiva/sclera: Conjunctivae normal       Pupils: Pupils are equal, round, and reactive to light  Neck:      Thyroid: No thyromegaly  Vascular: No JVD  Cardiovascular:      Rate and Rhythm: Normal rate and regular rhythm  Heart sounds: Normal heart sounds  Pulmonary:      Breath sounds: Normal breath sounds  Abdominal:      General: Bowel sounds are normal       Palpations: Abdomen is soft  Musculoskeletal:         General: Normal range of motion  Cervical back: Normal range of motion  Lymphadenopathy:      Cervical: No cervical adenopathy  Skin:     General: Skin is dry  Neurological:      Mental Status: She is alert and oriented to person, place, and time  Deep Tendon Reflexes: Reflexes are normal and symmetric     Psychiatric:         Behavior: Behavior normal

## 2023-03-28 ENCOUNTER — APPOINTMENT (OUTPATIENT)
Dept: LAB | Age: 48
End: 2023-03-28

## 2023-03-28 ENCOUNTER — TELEPHONE (OUTPATIENT)
Dept: INTERNAL MEDICINE CLINIC | Facility: OTHER | Age: 48
End: 2023-03-28

## 2023-03-28 ENCOUNTER — HOSPITAL ENCOUNTER (OUTPATIENT)
Dept: RADIOLOGY | Age: 48
Discharge: HOME | End: 2023-03-28

## 2023-03-28 DIAGNOSIS — N30.00 ACUTE CYSTITIS WITHOUT HEMATURIA: ICD-10-CM

## 2023-03-28 DIAGNOSIS — E78.2 MIXED DYSLIPIDEMIA: ICD-10-CM

## 2023-03-28 DIAGNOSIS — N30.00 ACUTE CYSTITIS WITHOUT HEMATURIA: Primary | ICD-10-CM

## 2023-03-28 DIAGNOSIS — Z79.899 ON STATIN THERAPY: ICD-10-CM

## 2023-03-28 LAB
ALBUMIN SERPL BCP-MCNC: 3.5 G/DL (ref 3.5–5)
ALP SERPL-CCNC: 93 U/L (ref 46–116)
ALT SERPL W P-5'-P-CCNC: 19 U/L (ref 12–78)
AST SERPL W P-5'-P-CCNC: 12 U/L (ref 5–45)
BACTERIA UR QL AUTO: ABNORMAL /HPF
BILIRUB DIRECT SERPL-MCNC: 0.17 MG/DL (ref 0–0.2)
BILIRUB SERPL-MCNC: 0.49 MG/DL (ref 0.2–1)
BILIRUB UR QL STRIP: NEGATIVE
CLARITY UR: CLEAR
COLOR UR: YELLOW
GLUCOSE UR STRIP-MCNC: NEGATIVE MG/DL
HGB UR QL STRIP.AUTO: NEGATIVE
KETONES UR STRIP-MCNC: NEGATIVE MG/DL
LEUKOCYTE ESTERASE UR QL STRIP: ABNORMAL
MUCOUS THREADS UR QL AUTO: ABNORMAL
NITRITE UR QL STRIP: NEGATIVE
NON-SQ EPI CELLS URNS QL MICRO: ABNORMAL /HPF
PH UR STRIP.AUTO: 6 [PH]
PROT SERPL-MCNC: 7.8 G/DL (ref 6.4–8.4)
PROT UR STRIP-MCNC: ABNORMAL MG/DL
RBC #/AREA URNS AUTO: ABNORMAL /HPF
SP GR UR STRIP.AUTO: 1.02 (ref 1–1.03)
UROBILINOGEN UR STRIP-ACNC: <2 MG/DL
WBC #/AREA URNS AUTO: ABNORMAL /HPF

## 2023-03-28 PROCEDURE — 81001 URINALYSIS AUTO W/SCOPE: CPT | Performed by: INTERNAL MEDICINE

## 2023-03-28 RX ORDER — CEPHALEXIN 500 MG/1
500 CAPSULE ORAL EVERY 8 HOURS SCHEDULED
Qty: 15 CAPSULE | Refills: 0 | Status: SHIPPED | OUTPATIENT
Start: 2023-03-28 | End: 2023-04-02

## 2023-03-28 NOTE — TELEPHONE ENCOUNTER
Patient will do repeat urine and I gave her the number for central scheduling for US  She is stating that the pain is unbearable and tylenol hasn't been helping  Do you have any other suggestions for patient

## 2023-03-28 NOTE — TELEPHONE ENCOUNTER
Patient was seen on 3/23 with Dr Lucio Das for a UTI and was prescribed Macrobid  Patient stated that she finished the course of antibiotics but her symptoms have remained and now have worsened  She now has bilateral flank pain which she didn't have before and continues to have pressure, bruning, pain and chills with no fever, and urgency to void  Patient stated that she has tried macrobid in the past but it also didn't work  She is asking what else she could take  Please advise

## 2023-03-28 NOTE — TELEPHONE ENCOUNTER
She need to get the urine test before she can start the antibiotic and also she need to get the ultrasound of her kidneys

## 2023-03-28 NOTE — TELEPHONE ENCOUNTER
Patient would like to know if a script can be called in she is still having UTI symptoms burning, pelvic pain

## 2023-03-29 ENCOUNTER — TELEPHONE (OUTPATIENT)
Dept: INTERNAL MEDICINE CLINIC | Facility: OTHER | Age: 48
End: 2023-03-29

## 2023-03-29 NOTE — TELEPHONE ENCOUNTER
[facility] notifying patient

## 2023-03-29 NOTE — TELEPHONE ENCOUNTER
Her urine is clear she does not need any antibiotic we will check the ultrasound and then will go from there

## 2023-03-31 ENCOUNTER — APPOINTMENT (OUTPATIENT)
Dept: LAB | Age: 48
End: 2023-03-31

## 2023-03-31 ENCOUNTER — TELEPHONE (OUTPATIENT)
Dept: NEUROLOGY | Facility: CLINIC | Age: 48
End: 2023-03-31

## 2023-03-31 DIAGNOSIS — Z01.812 PRE-OPERATIVE LABORATORY EXAMINATION: ICD-10-CM

## 2023-03-31 DIAGNOSIS — Z01.818 OTHER SPECIFIED PRE-OPERATIVE EXAMINATION: ICD-10-CM

## 2023-03-31 DIAGNOSIS — H02.34 BLEPHAROCHALASIS OF LEFT UPPER EYELID: ICD-10-CM

## 2023-03-31 DIAGNOSIS — H02.31 BLEPHAROCHALASIS OF RIGHT UPPER EYELID: ICD-10-CM

## 2023-03-31 LAB
ANION GAP SERPL CALCULATED.3IONS-SCNC: 1 MMOL/L (ref 4–13)
BUN SERPL-MCNC: 17 MG/DL (ref 5–25)
CALCIUM SERPL-MCNC: 9.5 MG/DL (ref 8.3–10.1)
CHLORIDE SERPL-SCNC: 107 MMOL/L (ref 96–108)
CO2 SERPL-SCNC: 29 MMOL/L (ref 21–32)
CREAT SERPL-MCNC: 0.72 MG/DL (ref 0.6–1.3)
ERYTHROCYTE [DISTWIDTH] IN BLOOD BY AUTOMATED COUNT: 15.4 % (ref 11.6–15.1)
GFR SERPL CREATININE-BSD FRML MDRD: 98 ML/MIN/1.73SQ M
GLUCOSE P FAST SERPL-MCNC: 99 MG/DL (ref 65–99)
HCT VFR BLD AUTO: 41.4 % (ref 34.8–46.1)
HGB BLD-MCNC: 12.9 G/DL (ref 11.5–15.4)
MCH RBC QN AUTO: 27.2 PG (ref 26.8–34.3)
MCHC RBC AUTO-ENTMCNC: 31.2 G/DL (ref 31.4–37.4)
MCV RBC AUTO: 87 FL (ref 82–98)
PLATELET # BLD AUTO: 341 THOUSANDS/UL (ref 149–390)
PMV BLD AUTO: 10.1 FL (ref 8.9–12.7)
POTASSIUM SERPL-SCNC: 4.6 MMOL/L (ref 3.5–5.3)
RBC # BLD AUTO: 4.74 MILLION/UL (ref 3.81–5.12)
SODIUM SERPL-SCNC: 137 MMOL/L (ref 135–147)
WBC # BLD AUTO: 10.79 THOUSAND/UL (ref 4.31–10.16)

## 2023-03-31 NOTE — TELEPHONE ENCOUNTER
Submitted Re-Authorization request via fax to [facility] for:     Botox-200 units: [id number], [id number]  DX: [phone], Chronic Migraine         Awaiting approval/denial response      Will follow up on the status of pending authorization requested

## 2023-04-06 DIAGNOSIS — E78.2 MIXED DYSLIPIDEMIA: ICD-10-CM

## 2023-04-06 DIAGNOSIS — Z79.899 ON STATIN THERAPY: ICD-10-CM

## 2023-04-06 DIAGNOSIS — K21.9 GASTROESOPHAGEAL REFLUX DISEASE WITHOUT ESOPHAGITIS: ICD-10-CM

## 2023-04-06 RX ORDER — OMEPRAZOLE 20 MG/1
20 CAPSULE, DELAYED RELEASE ORAL DAILY
Qty: 90 CAPSULE | Refills: 1 | Status: SHIPPED | OUTPATIENT
Start: 2023-04-06

## 2023-04-06 RX ORDER — ATORVASTATIN CALCIUM 10 MG/1
10 TABLET, FILM COATED ORAL DAILY
Qty: 30 TABLET | Refills: 5 | Status: SHIPPED | OUTPATIENT
Start: 2023-04-06

## 2023-04-07 NOTE — TELEPHONE ENCOUNTER
[facility]  Spoke with Asuncion Patito in the [facility] Department regarding auth status on the Botox: Boby Luna & [id number] that was requested that I have not yet received a response back on  Asuncion Caputo provided me with the following authorization info:    Approved:  & [id number]  Botox-200 units  Auth# [id number]  Valid: 4/05/2023 until 4/04/2024  4 visits     Please use our Stock  Asuncion Patito will also be faxing me a copy of the Approved Botox Authorization for our records per my request   Call-Ref# [id number]

## 2023-04-12 PROBLEM — H02.839 DERMATOCHALASIS: Status: ACTIVE | Noted: 2023-04-12

## 2023-04-20 ENCOUNTER — TELEPHONE (OUTPATIENT)
Dept: GYNECOLOGY | Facility: CLINIC | Age: 48
End: 2023-04-20

## 2023-04-20 NOTE — TELEPHONE ENCOUNTER
The patient called because she recently had a blepharoplasty on 4/18/23 and it will take at least three weeks for her to recover  The patient said that she wanted to put off scheduling her hysterectomy until she has recovered  She said she will give her a call when she is ready to schedule the hysterectomy

## 2023-05-09 ENCOUNTER — TELEPHONE (OUTPATIENT)
Dept: BARIATRICS | Facility: CLINIC | Age: 48
End: 2023-05-09

## 2023-05-09 DIAGNOSIS — E66.01 OBESITY, CLASS III, BMI 40-49.9 (MORBID OBESITY) (HCC): Primary | ICD-10-CM

## 2023-05-17 DIAGNOSIS — Z79.899 ON STATIN THERAPY: ICD-10-CM

## 2023-05-17 DIAGNOSIS — E78.2 MIXED DYSLIPIDEMIA: ICD-10-CM

## 2023-05-17 DIAGNOSIS — K21.9 GASTROESOPHAGEAL REFLUX DISEASE WITHOUT ESOPHAGITIS: ICD-10-CM

## 2023-05-17 RX ORDER — ATORVASTATIN CALCIUM 10 MG/1
10 TABLET, FILM COATED ORAL DAILY
Qty: 90 TABLET | Refills: 1 | Status: SHIPPED | OUTPATIENT
Start: 2023-05-17

## 2023-05-17 RX ORDER — OMEPRAZOLE 20 MG/1
20 CAPSULE, DELAYED RELEASE ORAL DAILY
Qty: 90 CAPSULE | Refills: 0 | Status: SHIPPED | OUTPATIENT
Start: 2023-05-17

## 2023-06-07 ENCOUNTER — OFFICE VISIT (OUTPATIENT)
Dept: BARIATRICS | Facility: CLINIC | Age: 48
End: 2023-06-07
Payer: COMMERCIAL

## 2023-06-07 VITALS
BODY MASS INDEX: 41.5 KG/M2 | HEART RATE: 92 BPM | WEIGHT: 280.2 LBS | SYSTOLIC BLOOD PRESSURE: 124 MMHG | RESPIRATION RATE: 16 BRPM | HEIGHT: 69 IN | DIASTOLIC BLOOD PRESSURE: 84 MMHG

## 2023-06-07 DIAGNOSIS — R73.03 PRE-DIABETES: ICD-10-CM

## 2023-06-07 DIAGNOSIS — E66.01 OBESITY, CLASS III, BMI 40-49.9 (MORBID OBESITY) (HCC): Primary | ICD-10-CM

## 2023-06-07 PROCEDURE — 99214 OFFICE O/P EST MOD 30 MIN: CPT | Performed by: PHYSICIAN ASSISTANT

## 2023-06-07 NOTE — ASSESSMENT & PLAN NOTE
-should improve with weight loss, dietary, and lifestyle changes  -on wegovy which should help with blood sugar control

## 2023-06-07 NOTE — ASSESSMENT & PLAN NOTE
-Patient is pursuing conservative program  -Initial weight loss goal of 5-10% weight loss for improved health  -Screening labs 8/8/22 and had elevated A1C and lipids    Initial:277 1  Current:280 2  Change:+3 1  Goal:lose at least 40 lb by end of summer    Goals:  -recommend continuing with food logging and making sure to track calories from coffee and drinks as well  -to increase water to at least 64 oz daily  -discussed increasing activity either by walking at a different pace or changing cardio routine and using stepper  Recommend at least 1 time a week resistance training  To continue on wegovy 1 7 mg and titrate to 2 4mg  Start weight was 284 4 lb on 2/3/23  Patient denies personal and family history of MCT and MEN2 tumors  Patient denies personal history of pancreatitis  Side effects discussed but not limited to diarrhea, bloating, constipation, GI upset, heartburn, increased heart rate, headache, low blood sugar, fatigue and dizziness  Titration and medication administration discussed

## 2023-06-07 NOTE — PROGRESS NOTES
Assessment/Plan:    Obesity, Class III, BMI 40-49 9 (morbid obesity) ([facility] )  -Patient is pursuing conservative program  -Initial weight loss goal of 5-10% weight loss for improved health  -Screening labs 8/8/22 and had elevated A1C and lipids    Initial:277 1  Current:280 2  Change:+3 1  Goal:lose at least 40 lb by end of summer    Goals:  -recommend continuing with food logging and making sure to track calories from coffee and drinks as well  -to increase water to at least 64 oz daily  -discussed increasing activity either by walking at a different pace or changing cardio routine and using stepper  Recommend at least 1 time a week resistance training  To continue on wegovy 1 7 mg and titrate to 2 4mg  Start weight was 284 4 lb on 2/3/23  Patient denies personal and family history of MCT and MEN2 tumors  Patient denies personal history of pancreatitis  Side effects discussed but not limited to diarrhea, bloating, constipation, GI upset, heartburn, increased heart rate, headache, low blood sugar, fatigue and dizziness  Titration and medication administration discussed  Pre-diabetes  -should improve with weight loss, dietary, and lifestyle changes  -on wegovy which should help with blood sugar control        Follow up in approximately 3 months with Non-Surgical Physician/Advanced Practitioner and 6 weeks for nurse weight check        Diagnoses and all orders for this visit:    Obesity, Class III, BMI 40-49 9 (morbid obesity) ([facility] )  -     Semaglutide-Weight Management (WEGOVY) 2 4 MG/0 75ML; Inject 0 75 mL (2 4 mg total) under the skin once a week    Pre-diabetes          Subjective:   Chief Complaint   Patient presents with   • Follow-up     MWM 6wk f/u; waist 47 5in        Patient ID: Ariel Angulo  is a 51 y o  female with excess weight/obesity here to pursue weight managment  Patient is pursuing Conservative Program      HPI  Here for MWM followup  She is on wegovy 1 7mg right now    She started it at a weight of 284 4 lb  She has been monitoring her food intake  She was having nausea but now it has resolved  She is feeling the medication has decreased her appetite  She is not having sugar cravings either  She does feel it is helping but weight is not significantly changed  Currently on her menses and she typically gains around 7lb with this    Also feels she is going through menopause  Wt Readings from Last 10 Encounters:   06/07/23 127 kg (280 lb 3 2 oz)   04/18/23 127 kg (279 lb)   04/10/23 127 kg (279 lb)   02/27/23 129 kg (285 lb 1 6 oz)   02/03/23 129 kg (284 lb 6 4 oz)   11/01/22 115 kg (253 lb)   10/24/22 126 kg (277 lb 1 6 oz)   09/20/22 125 kg (276 lb)   08/15/22 125 kg (275 lb)   08/01/22 125 kg (275 lb 1 6 oz)       Food logging:usually 1200 calories  Increased appetite/cravings:controlled  Fruit/Vegetable servings:  Exercise:walking her dog 2 times a day about 2-3 miles  Hydration:SF [place] vanilla in coffee with almond or oat milk or sometimes cream    Diet Recall:  B: overnight oats or ice coffee with almond milk  D: grilled meat w/vegetables    Colonoscopy-Completed    The following portions of the patient's history were reviewed and updated as appropriate:   She  has a past medical history of Anemia, Anxiety, Depression, GERD (gastroesophageal reflux disease), History of prior pregnancies, Hyperlipidemia, Migraine with aura and without status migrainosus, not intractable (04/23/2018), Obesity, Papanicolaou smear (01/2019), and Uterine bleeding, dysfunctional   She   Patient Active Problem List    Diagnosis Date Noted   • Dermatochalasis 04/12/2023   • Obesity, Class III, BMI 40-49 9 (morbid obesity) ([facility] ) 02/03/2023   • Vertigo 09/20/2022   • Nausea 09/20/2022   • Benign paroxysmal positional vertigo of left ear 09/20/2022   • COVID-19 05/10/2022   • Carpal tunnel syndrome of left wrist 10/06/2021   • Medial epicondylitis of left elbow 10/06/2021   • History of endometrial ablation 2021   • LASHAUN (generalized anxiety disorder) 2021   • Panic attack 2021   • BMI 40 0-44 9, adult ([facility] 2021   • Sore throat 03/10/2021   • Snoring 03/10/2021   • Thrombocytosis 2019   • Iron deficiency 2019   • Chronic cough 2019   • Weight gain 2019   • Eustachian tube dysfunction, bilateral 2019   • GERD (gastroesophageal reflux disease) 2018   • Screening for breast cancer 2018   • Macromastia 10/10/2018   • Pre-diabetes 10/10/2018   • Mixed dyslipidemia 10/10/2018   • Chronic migraine without aura without status migrainosus, not intractable 2018   • Seasonal allergies 10/09/2017     She  has a past surgical history that includes  section;  section; Tubal ligation; pr hysteroscopy endometrial ablation (N/A, 2021); and pr blepharoplasty upper eyelid w/excessive skin (Bilateral, 2023)  Her family history includes Cancer (age of onset: [de-identified]) in her father; No Known Problems in her daughter, maternal aunt, maternal aunt, maternal aunt, maternal aunt, maternal grandfather, maternal grandmother, mother, paternal aunt, paternal aunt, paternal aunt, paternal grandfather, paternal grandmother, sister, and sister; Prostate cancer in her father; Stroke in her father  She  reports that she has never smoked  She has never used smokeless tobacco  She reports that she does not drink alcohol and does not use drugs    Current Outpatient Medications   Medication Sig Dispense Refill   • atorvastatin (LIPITOR) 10 mg tablet Take 1 tablet (10 mg total) by mouth daily 90 tablet 1   • B Complex Vitamins (VITAMIN B COMPLEX PO) Take by mouth daily      • cyclobenzaprine (FLEXERIL) 5 mg tablet Take 1 tablet (5 mg total) by mouth as needed for muscle spasms (migraine) 30 tablet 2   • ferrous sulfate 324 (65 Fe) mg Take 1 tablet (324 mg total) by mouth daily before breakfast 30 tablet 1   • LORazepam (ATIVAN) 0 5 mg tablet Take 2 tablets (1 mg total) by mouth daily as needed for anxiety 20 tablet 0   • meclizine (ANTIVERT) 25 mg tablet Take 1 tablet (25 mg total) by mouth every 8 (eight) hours 30 tablet 0   • multivitamin (THERAGRAN) TABS Take 1 tablet by mouth daily     • omeprazole (PriLOSEC) 20 mg delayed release capsule Take 1 capsule (20 mg total) by mouth daily 90 capsule 0   • onabotulinumtoxin A (BOTOX) 100 units Inject into a muscle every 3 (three) months For migraines done every 3 months     • ondansetron (ZOFRAN) 4 mg tablet Take 1 tablet (4 mg total) by mouth every 8 (eight) hours as needed for nausea or vomiting 20 tablet 0   • Semaglutide-Weight Management (WEGOVY) 2 4 MG/0 75ML Inject 0 75 mL (2 4 mg total) under the skin once a week 3 mL 0   • sertraline (ZOLOFT) 50 mg tablet TAKE ONE TABLET BY MOUTH EVERY DAY 90 tablet 0   • vitamin B-12 (VITAMIN B-12) 1,000 mcg tablet Take by mouth daily     • Vitamin D, Cholecalciferol, 1000 units CAPS Take 1 tablet by mouth in the morning         No current facility-administered medications for this visit       Current Outpatient Medications on File Prior to Visit   Medication Sig   • atorvastatin (LIPITOR) 10 mg tablet Take 1 tablet (10 mg total) by mouth daily   • B Complex Vitamins (VITAMIN B COMPLEX PO) Take by mouth daily    • cyclobenzaprine (FLEXERIL) 5 mg tablet Take 1 tablet (5 mg total) by mouth as needed for muscle spasms (migraine)   • ferrous sulfate 324 (65 Fe) mg Take 1 tablet (324 mg total) by mouth daily before breakfast   • LORazepam (ATIVAN) 0 5 mg tablet Take 2 tablets (1 mg total) by mouth daily as needed for anxiety   • meclizine (ANTIVERT) 25 mg tablet Take 1 tablet (25 mg total) by mouth every 8 (eight) hours   • multivitamin (THERAGRAN) TABS Take 1 tablet by mouth daily   • omeprazole (PriLOSEC) 20 mg delayed release capsule Take 1 capsule (20 mg total) by mouth daily   • onabotulinumtoxin A (BOTOX) 100 units Inject into a muscle every 3 (three) months For migraines done every 3 "months   • ondansetron (ZOFRAN) 4 mg tablet Take 1 tablet (4 mg total) by mouth every 8 (eight) hours as needed for nausea or vomiting   • sertraline (ZOLOFT) 50 mg tablet TAKE ONE TABLET BY MOUTH EVERY DAY   • vitamin B-12 (VITAMIN B-12) 1,000 mcg tablet Take by mouth daily   • Vitamin D, Cholecalciferol, 1000 units CAPS Take 1 tablet by mouth in the morning     • [DISCONTINUED] Semaglutide-Weight Management (WEGOVY) 1 7 MG/0 75ML Inject 0 75 mL (1 7 mg total) under the skin once a week   • [DISCONTINUED] Rimegepant Sulfate (Nurtec) 75 MG TBDP Take 75 mg by mouth as needed (migraine) Limit of 1 in 24 hours     No current facility-administered medications on file prior to visit  She is allergic to amoxicillin, clavulanic acid, moxifloxacin, and sulfa antibiotics       Review of Systems   Constitutional: Negative for fever  Respiratory: Negative for shortness of breath  Cardiovascular: Negative for chest pain and palpitations  Gastrointestinal: Negative for abdominal pain, constipation, diarrhea and vomiting  Genitourinary: Positive for menstrual problem  Negative for difficulty urinating  Musculoskeletal: Negative for arthralgias and back pain  Skin: Negative for rash  Neurological: Negative for headaches  Psychiatric/Behavioral: Negative for dysphoric mood  The patient is not nervous/anxious  Objective:    /84   Pulse 92   Resp 16   Ht 5' 8 5\" (1 74 m)   Wt 127 kg (280 lb 3 2 oz)   BMI 41 98 kg/m²      Physical Exam  Vitals and nursing note reviewed  Constitutional:       General: She is not in acute distress  Appearance: She is well-developed  She is obese  HENT:      Head: Normocephalic and atraumatic  Eyes:      Conjunctiva/sclera: Conjunctivae normal    Neck:      Thyroid: No thyromegaly  Pulmonary:      Effort: Pulmonary effort is normal  No respiratory distress  Skin:     Findings: No rash (visible)     Neurological:      Mental Status: She is alert and " oriented to person, place, and time     Psychiatric:         Mood and Affect: Mood normal          Behavior: Behavior normal

## 2023-06-26 DIAGNOSIS — R11.0 NAUSEA: ICD-10-CM

## 2023-06-26 RX ORDER — ONDANSETRON 4 MG/1
4 TABLET, FILM COATED ORAL EVERY 8 HOURS PRN
Qty: 40 TABLET | Refills: 0 | Status: SHIPPED | OUTPATIENT
Start: 2023-06-26

## 2023-06-27 ENCOUNTER — APPOINTMENT (OUTPATIENT)
Dept: LAB | Age: 48
End: 2023-06-27

## 2023-06-27 DIAGNOSIS — Z00.8 HEALTH EXAMINATION IN POPULATION SURVEY: ICD-10-CM

## 2023-06-27 LAB
CHOLEST SERPL-MCNC: 180 MG/DL
EST. AVERAGE GLUCOSE BLD GHB EST-MCNC: 117 MG/DL
HBA1C MFR BLD: 5.7 %
HDLC SERPL-MCNC: 64 MG/DL
LDLC SERPL CALC-MCNC: 89 MG/DL (ref 0–100)
NONHDLC SERPL-MCNC: 116 MG/DL
TRIGL SERPL-MCNC: 133 MG/DL

## 2023-06-27 PROCEDURE — 83036 HEMOGLOBIN GLYCOSYLATED A1C: CPT

## 2023-06-27 PROCEDURE — 80061 LIPID PANEL: CPT

## 2023-06-27 PROCEDURE — 36415 COLL VENOUS BLD VENIPUNCTURE: CPT

## 2023-07-17 ENCOUNTER — PROCEDURE VISIT (OUTPATIENT)
Dept: NEUROLOGY | Facility: CLINIC | Age: 48
End: 2023-07-17
Payer: COMMERCIAL

## 2023-07-17 VITALS — SYSTOLIC BLOOD PRESSURE: 123 MMHG | DIASTOLIC BLOOD PRESSURE: 96 MMHG | HEART RATE: 85 BPM | TEMPERATURE: 98.1 F

## 2023-07-17 DIAGNOSIS — G43.709 CHRONIC MIGRAINE WITHOUT AURA WITHOUT STATUS MIGRAINOSUS, NOT INTRACTABLE: Primary | ICD-10-CM

## 2023-07-17 DIAGNOSIS — G43.009 MIGRAINE WITHOUT AURA AND WITHOUT STATUS MIGRAINOSUS, NOT INTRACTABLE: ICD-10-CM

## 2023-07-17 PROCEDURE — 64615 CHEMODENERV MUSC MIGRAINE: CPT | Performed by: PHYSICIAN ASSISTANT

## 2023-07-17 RX ORDER — RIMEGEPANT SULFATE 75 MG/75MG
75 TABLET, ORALLY DISINTEGRATING ORAL EVERY OTHER DAY
Qty: 16 TABLET | Refills: 11 | Status: SHIPPED | OUTPATIENT
Start: 2023-07-17

## 2023-07-17 NOTE — PROGRESS NOTES
Universal Protocol   Consent: Verbal consent obtained. Written consent obtained. Risks and benefits: risks, benefits and alternatives were discussed  Consent given by: patient  Time out: Immediately prior to procedure a "time out" was called to verify the correct patient, procedure, equipment, support staff and site/side marked as required. Patient understanding: patient states understanding of the procedure being performed  Patient consent: the patient's understanding of the procedure matches consent given  Procedure consent: procedure consent matches procedure scheduled  Relevant documents: relevant documents present and verified  Patient identity confirmed: verbally with patient        Chemodenervation     Date/Time 7/17/2023 2:00 PM     Performed by  Melva Luong PA-C   Authorized by Melva Luong PA-C       Pre-procedure details      Prepped With: Alcohol     Anesthesia  (see MAR for exact dosages):      Anesthesia method:  None   Procedure details     Position:  Upright   Botox     Botox Type:  Type A    Brand:  Botox    mL's of Botulinum Toxin:  200    Final Concentration per CC:  50 units    Needle Gauge:  30 G 2.5 inch   Procedures     Botox Procedures: chronic headache      Indications: migraines     Injection Location      Head / Face:  L superior cervical paraspinal, R superior cervical paraspinal, L , R , L frontalis, R frontalis, L medial occipitalis, R medial occipitalis, procerus, R temporalis, L temporalis, R superior trapezius and L superior trapezius    L  injection amount:  5 unit(s)    R  injection amount:  5 unit(s)    L lateral frontalis:  5 unit(s)    R lateral frontalis:  5 unit(s)    L medial frontalis:  5 unit(s)    R medial frontalis:  5 unit(s)    L temporalis injection amount:  20 unit(s)    R temporalis injection amount:  20 unit(s)    Procerus injection amount:  5 unit(s)    L medial occipitalis injection amount:  15 unit(s)    R medial occipitalis injection amount:  15 unit(s)    L superior cervical paraspinal injection amount:  10 unit(s)    R superior cervical paraspinal injection amount:  10 unit(s)    L superior trapezius injection amount:  15 unit(s)    R superior trapezius injection amount:  15 unit(s)   Total Units     Total units used:  200    Total units discarded:  0   Post-procedure details      Chemodenervation:  Chronic migraine    Facial Nerve Location[de-identified]  Bilateral facial nerve    Patient tolerance of procedure:   Tolerated well, no immediate complications   Comments      5 units orbicularis oculi bilaterally  10 temporalis bilaterally  15 units frontalis  All medically necessary

## 2023-07-18 DIAGNOSIS — E66.01 OBESITY, CLASS III, BMI 40-49.9 (MORBID OBESITY) (HCC): ICD-10-CM

## 2023-07-18 RX ORDER — SEMAGLUTIDE 2.4 MG/.75ML
INJECTION, SOLUTION SUBCUTANEOUS
Qty: 3 ML | Refills: 0 | Status: SHIPPED | OUTPATIENT
Start: 2023-07-18

## 2023-08-21 ENCOUNTER — OFFICE VISIT (OUTPATIENT)
Dept: BARIATRICS | Facility: CLINIC | Age: 48
End: 2023-08-21

## 2023-08-21 VITALS
HEIGHT: 68 IN | WEIGHT: 276 LBS | RESPIRATION RATE: 16 BRPM | DIASTOLIC BLOOD PRESSURE: 85 MMHG | SYSTOLIC BLOOD PRESSURE: 120 MMHG | BODY MASS INDEX: 41.83 KG/M2 | HEART RATE: 99 BPM

## 2023-08-21 DIAGNOSIS — R63.5 ABNORMAL WEIGHT GAIN: Primary | ICD-10-CM

## 2023-08-21 PROCEDURE — RECHECK

## 2023-08-21 NOTE — PROGRESS NOTES
Patient last visit weight:  Patient current visit weight:    If you are taking phentermine or other oral weight loss medications, are you experiencing any of the following symptoms:  Headache:   Blurred Vision:   Chest Pain:   Palpitations: Insomnia:   SPECIFY ORAL MEDICATION AND DOSAGE:     If you are taking an injectable medication,  are you experiencing any of the following symptoms:  Bloating: NO  Nausea: yes, after the first shot, next day fine. Vomiting: NO  Constipation: No  Diarrhea:No  SPECIFY INJECTABLE MEDICATION AND CURRENT DOSAGE: Wegovy      Vitals:    - Is BP less than 100/60?  - Is BP greater than 140/90?  - Is HR greater than 100? **If yes to any of the above, have patient relax and repeat in 5-10 minutes**    Repeat values:    - Is BP less than 100/60?  - Is BP greater than 140/90?  - Is HR greater than 100?   **If values remain outside of ranges above, please consult provider for next steps**

## 2023-08-22 DIAGNOSIS — Z79.899 ON STATIN THERAPY: ICD-10-CM

## 2023-08-22 DIAGNOSIS — F41.1 GAD (GENERALIZED ANXIETY DISORDER): ICD-10-CM

## 2023-08-22 DIAGNOSIS — F41.9 ANXIETY: ICD-10-CM

## 2023-08-22 DIAGNOSIS — K21.9 GASTROESOPHAGEAL REFLUX DISEASE WITHOUT ESOPHAGITIS: ICD-10-CM

## 2023-08-22 DIAGNOSIS — E78.2 MIXED DYSLIPIDEMIA: ICD-10-CM

## 2023-08-22 DIAGNOSIS — F41.0 PANIC ATTACK: ICD-10-CM

## 2023-08-22 DIAGNOSIS — R11.0 NAUSEA: ICD-10-CM

## 2023-08-22 RX ORDER — OMEPRAZOLE 20 MG/1
20 CAPSULE, DELAYED RELEASE ORAL DAILY
Qty: 90 CAPSULE | Refills: 0 | Status: SHIPPED | OUTPATIENT
Start: 2023-08-22

## 2023-08-22 RX ORDER — ATORVASTATIN CALCIUM 10 MG/1
10 TABLET, FILM COATED ORAL DAILY
Qty: 90 TABLET | Refills: 0 | Status: CANCELLED | OUTPATIENT
Start: 2023-08-22

## 2023-08-22 RX ORDER — ONDANSETRON 4 MG/1
4 TABLET, FILM COATED ORAL EVERY 8 HOURS PRN
Qty: 20 TABLET | Refills: 0 | Status: SHIPPED | OUTPATIENT
Start: 2023-08-22

## 2023-08-23 ENCOUNTER — TELEPHONE (OUTPATIENT)
Dept: BARIATRICS | Facility: CLINIC | Age: 48
End: 2023-08-23

## 2023-08-23 NOTE — TELEPHONE ENCOUNTER
----- Message from [address] [place], [place] [address], PA-C sent at 8/22/2023  3:36 PM EDT -----  Can you see if she would be interested in scheduling the a dietician. She has not had much weight loss with wegovy and is up to 3% currently. If medication is not helping in the future we will need to find a different option.   I would recommend the dietiican as a first step  ----- Message -----  From: Davian Wren  Sent: 8/21/2023   1:03 PM EDT  To: Twyla Gilbert PA-C

## 2023-08-24 ENCOUNTER — ANNUAL EXAM (OUTPATIENT)
Dept: GYNECOLOGY | Facility: CLINIC | Age: 48
End: 2023-08-24
Payer: COMMERCIAL

## 2023-08-24 VITALS
BODY MASS INDEX: 42.13 KG/M2 | WEIGHT: 278 LBS | DIASTOLIC BLOOD PRESSURE: 84 MMHG | HEIGHT: 68 IN | SYSTOLIC BLOOD PRESSURE: 130 MMHG

## 2023-08-24 DIAGNOSIS — N92.0 MENORRHAGIA WITH REGULAR CYCLE: ICD-10-CM

## 2023-08-24 DIAGNOSIS — Z12.11 SCREENING FOR COLORECTAL CANCER: Primary | ICD-10-CM

## 2023-08-24 DIAGNOSIS — Z12.31 SCREENING MAMMOGRAM FOR BREAST CANCER: ICD-10-CM

## 2023-08-24 DIAGNOSIS — Z12.12 SCREENING FOR COLORECTAL CANCER: Primary | ICD-10-CM

## 2023-08-24 PROCEDURE — S0612 ANNUAL GYNECOLOGICAL EXAMINA: HCPCS | Performed by: OBSTETRICS & GYNECOLOGY

## 2023-08-24 NOTE — PROGRESS NOTES
Assessment/Plan:  Normal breast and GYN exam  Normal Pap smear negative HPV 2022  Normal mammogram 2022  Endometrial ablation   Menorrhagia    Plan: Rx mammogram.  LAVH bilateral salpingectomy discussed. Patient has an appointment with bariatric surgery next week. Follow-up with the weight loss management team  Subjective: G3, P2 ,      Patient ID: Nivia Chandra is a 51 y.o. female presents for annual exam complaining of continued heavy menstrual cycles. Failed endometrial ablation . Scheduled for hysterectomy but canceled. Followed by weight loss management and was placed on Wegovy. Has only lost 4 pounds and having many side effects. Scheduled for an appointment with bariatric surgery next week. Denies any pelvic pain or dyspareunia. Denies any breast bowel or bladder issues. No change in family history. Medications reviewed. Patient is on her feet working all day. Her menstrual cycles are bothering her. She also is focused on losing weight. I told her I do not think it would be a good idea to do both surgeries at the same time. I rather have her go through bariatric surgery and lose weight first.  Her menstrual cycles may change following weight loss. Review of Systems   Constitutional: Negative. Negative for fatigue, fever and unexpected weight change. HENT: Negative. Eyes: Negative. Respiratory: Negative. Negative for chest tightness, shortness of breath, wheezing and stridor. Cardiovascular: Negative. Negative for chest pain, palpitations and leg swelling. Gastrointestinal: Negative. Negative for abdominal pain, blood in stool, diarrhea, nausea, rectal pain and vomiting. Endocrine: Negative. Genitourinary: Negative for dysuria, frequency, vaginal bleeding, vaginal discharge and vaginal pain. Heavy menstrual cycles   Musculoskeletal: Negative. Skin: Negative. Allergic/Immunologic: Negative.     Neurological: Negative. Hematological: Negative. Psychiatric/Behavioral: Negative. All other systems reviewed and are negative. Objective:      Ht 5' 8" (1.727 m)   Wt 126 kg (278 lb)   LMP 08/12/2023 (Exact Date)   BMI 42.27 kg/m²          Physical Exam  Constitutional:       Appearance: She is well-developed. She is obese. HENT:      Head: Normocephalic and atraumatic. Neck:      Thyroid: No thyromegaly. Trachea: No tracheal deviation. Cardiovascular:      Rate and Rhythm: Normal rate and regular rhythm. Heart sounds: Normal heart sounds. Pulmonary:      Effort: Pulmonary effort is normal. No respiratory distress. Breath sounds: Normal breath sounds. No stridor. No wheezing or rales. Chest:      Chest wall: No tenderness. Breasts:     Breasts are symmetrical.      Right: No inverted nipple, mass, nipple discharge, skin change or tenderness. Left: No inverted nipple, mass, nipple discharge, skin change or tenderness. Abdominal:      General: Bowel sounds are normal. There is no distension. Palpations: Abdomen is soft. There is no mass. Tenderness: There is no abdominal tenderness. There is no guarding or rebound. Hernia: No hernia is present. There is no hernia in the left inguinal area. Genitourinary:     Labia:         Right: No rash, tenderness, lesion or injury. Left: No rash, tenderness, lesion or injury. Vagina: Normal. No signs of injury and foreign body. No vaginal discharge, erythema, tenderness or bleeding. Cervix: No cervical motion tenderness, discharge or friability. Uterus: Not deviated, not enlarged, not fixed and not tender. Adnexa:         Right: No mass, tenderness or fullness. Left: No mass, tenderness or fullness. Rectum: No mass, anal fissure, external hemorrhoid or internal hemorrhoid. Comments: Urethra and [place]'s glands. No uterine prolapse cystocele or rectocele.   Musculoskeletal: Cervical back: Normal range of motion and neck supple. Lymphadenopathy:      Lower Body: No right inguinal adenopathy. No left inguinal adenopathy. Skin:     General: Skin is warm and dry. Neurological:      Mental Status: She is alert and oriented to person, place, and time. Psychiatric:         Behavior: Behavior normal.         Thought Content:  Thought content normal.         Judgment: Judgment normal.

## 2023-09-11 ENCOUNTER — OFFICE VISIT (OUTPATIENT)
Dept: BARIATRICS | Facility: CLINIC | Age: 48
End: 2023-09-11

## 2023-09-11 VITALS
HEART RATE: 83 BPM | HEIGHT: 67 IN | OXYGEN SATURATION: 98 % | WEIGHT: 280 LBS | TEMPERATURE: 98.3 F | BODY MASS INDEX: 43.95 KG/M2 | SYSTOLIC BLOOD PRESSURE: 120 MMHG | DIASTOLIC BLOOD PRESSURE: 88 MMHG

## 2023-09-11 DIAGNOSIS — E66.01 OBESITY, CLASS III, BMI 40-49.9 (MORBID OBESITY) (HCC): Primary | ICD-10-CM

## 2023-09-11 DIAGNOSIS — E61.1 IRON DEFICIENCY: ICD-10-CM

## 2023-09-11 DIAGNOSIS — E78.2 MIXED DYSLIPIDEMIA: ICD-10-CM

## 2023-09-11 DIAGNOSIS — K21.9 GASTROESOPHAGEAL REFLUX DISEASE WITHOUT ESOPHAGITIS: ICD-10-CM

## 2023-09-11 DIAGNOSIS — E66.01 MORBID OBESITY (HCC): Primary | ICD-10-CM

## 2023-09-11 DIAGNOSIS — D75.839 THROMBOCYTOSIS: ICD-10-CM

## 2023-09-11 DIAGNOSIS — Z01.818 PRE-OPERATIVE CLEARANCE: ICD-10-CM

## 2023-09-11 DIAGNOSIS — R73.03 PRE-DIABETES: ICD-10-CM

## 2023-09-11 PROCEDURE — RECHECK

## 2023-09-11 NOTE — PROGRESS NOTES
Bariatric Nutrition Assessment Note    Type of surgery    Preop  Surgery Date: TBD- Pt has 4 month program requirement     Surgeon: Dr. Luke Sal  51 y.o.  female     Wt with BMI of 25: 159.3 lbs  Pre-Op Excess Wt: 120.7 lbs   Ht 5' 7" (1.702 m)   Wt 127 kg (280 lb)   LMP 08/12/2023 (Exact Date)   BMI 43.85 kg/m²      NAFLD Fibrosis Score is: -.728    NAFLD Score Correlated Fibrosis Severity   <-1.455 F0-F2   -1.455-0.676 Indeterminate Score   >0.676 F3-F4   **Fibrosis Severity Scale: F0 = no fibrosis; F1= mild fibrosis; F2 = moderate fibrosis; F3 = severe fibrosis; F4 = cirrhosis    NAFLD Score Component Values:  Component Value Date   Age: 51 y.o.     BMI: 43.85 kg/m²    IFG or DM: Yes    AST: 12 U/L 3/28/2023   ALT: 19 U/L 3/28/2023   Platelet: 356 Thousands/uL 3/31/2023   Albumin: 3.5 g/dL 3/28/2023       Kurt- Marcelo Bon Equation:    Estimated calories for weight loss 6017-2015 kcal per day  ( 1-2# per wk wt loss - sedentary )  Estimated protein needs  grams per day (1.0-1.5 gms/kg IBW )   Estimated fluid needs 72-85 ounces per day  (30-35 ml/kg IBW )      Weight History   Onset of Obesity: Childhood  Family history of obesity: Yes  Wt Loss Attempts: Commercial Programs ([employer], Arlyn Levo, etc.)  Counseling with  MD  Exercise  Self Created Diets (Portion Control, Healthy Food Choices, etc.)  Prescribed Wegovy for about 2-3 months but did not have a good response- nausea and diarrhea   Patient has tried the above for 6 months or more with insufficient weight loss or weight regain, which is why patient has requested to be evaluated for weight loss surgery today  Maximum Wt Lost: 18 years ago while on [employer] - lost to 140 pounds with diet and exercise.        Review of History and Medications   Past Medical History:   Diagnosis Date   • Anemia    • Anxiety    • Depression    • GERD (gastroesophageal reflux disease)    • History of prior pregnancies        • Hyperlipidemia    • Migraine with aura and without status migrainosus, not intractable 2018   • Obesity    • Papanicolaou smear 2019    normal   • Uterine bleeding, dysfunctional      Past Surgical History:   Procedure Laterality Date   •  SECTION     •  SECTION     • AK BLEPHAROPLASTY UPPER EYELID W/EXCESSIVE SKIN Bilateral 2023    Procedure: BLEPHAROPLASTY UPPER;  Surgeon: Nolan Zhao MD;  Location: [facility] MAIN OR;  Service: Plastics   • AK HYSTEROSCOPY ENDOMETRIAL ABLATION N/A 2021    Procedure: Carlie Preston;  Surgeon: Melodye Barthel, MD;  Location: [facility] MAIN OR;  Service: Gynecology   • TUBAL LIGATION       Social History     Socioeconomic History   • Marital status: /Civil Union     Spouse name: Not on file   • Number of children: Not on file   • Years of education: Not on file   • Highest education level: Not on file   Occupational History   • Not on file   Tobacco Use   • Smoking status: Never   • Smokeless tobacco: Never   Vaping Use   • Vaping Use: Never used   Substance and Sexual Activity   • Alcohol use: No   • Drug use: No   • Sexual activity: Yes     Partners: Male     Birth control/protection: Female Sterilization   Other Topics Concern   • Not on file   Social History Narrative   • Not on file     Social Determinants of Health     Financial Resource Strain: Not on file   Food Insecurity: Not on file   Transportation Needs: Not on file   Physical Activity: Not on file   Stress: Not on file   Social Connections: Not on file   Intimate Partner Violence: Not on file   Housing Stability: Not on file       Current Outpatient Medications:   •  atorvastatin (LIPITOR) 10 mg tablet, Take 1 tablet (10 mg total) by mouth daily, Disp: 90 tablet, Rfl: 1  •  B Complex Vitamins (VITAMIN B COMPLEX PO), Take by mouth daily , Disp: , Rfl:   •  cyclobenzaprine (FLEXERIL) 5 mg tablet, Take 1 tablet (5 mg total) by mouth as needed for muscle spasms (migraine), Disp: 30 tablet, Rfl: 2  •  ferrous sulfate 324 (65 Fe) mg, Take 1 tablet (324 mg total) by mouth daily before breakfast, Disp: 30 tablet, Rfl: 1  •  LORazepam (ATIVAN) 0.5 mg tablet, Take 2 tablets (1 mg total) by mouth daily as needed for anxiety, Disp: 20 tablet, Rfl: 0  •  meclizine (ANTIVERT) 25 mg tablet, Take 1 tablet (25 mg total) by mouth every 8 (eight) hours, Disp: 30 tablet, Rfl: 0  •  multivitamin (THERAGRAN) TABS, Take 1 tablet by mouth daily, Disp: , Rfl:   •  omeprazole (PriLOSEC) 20 mg delayed release capsule, Take 1 capsule (20 mg total) by mouth daily, Disp: 90 capsule, Rfl: 0  •  onabotulinumtoxin A (BOTOX) 100 units, Inject into a muscle every 3 (three) months For migraines done every 3 months, Disp: , Rfl:   •  ondansetron (ZOFRAN) 4 mg tablet, Take 1 tablet (4 mg total) by mouth every 8 (eight) hours as needed for nausea or vomiting, Disp: 20 tablet, Rfl: 0  •  rimegepant sulfate (Nurtec) 75 mg TBDP, Take 1 tablet (75 mg total) by mouth every other day, Disp: 16 tablet, Rfl: 11  •  sertraline (ZOLOFT) 50 mg tablet, Take 1 tablet (50 mg total) by mouth daily, Disp: 90 tablet, Rfl: 0  •  vitamin B-12 (VITAMIN B-12) 1,000 mcg tablet, Take by mouth daily, Disp: , Rfl:   •  Vitamin D, Cholecalciferol, 1000 units CAPS, Take 1 tablet by mouth in the morning  , Disp: , Rfl:   •  Wegovy 2.4 MG/0.75ML, Inject 0.75 mL (2.4 mg total) under the skin once a week, Disp: 3 mL, Rfl: 0     Food Intake and Lifestyle Assessment   Food Intake Assessment completed via usual diet recall  Wakes up at 9/9: 30 am   Coffee with SF vanilla creamer   Breakfast: 10/10:30 am   One scrambled egg with 1/2 bagel and FF cream cheese and 1/2 fruit ( nectarine or orange )   Snack: 0   Lunch: sometimes skip - eat lunch about twice per week   Snack: 2 pm / 3 pm - trail mix   Dinner: 8:30/9:00 pm   Will cook - will make grilled salmon or chicken  and mashed potatoes or mac and cheese   Snack: 0  Beverage intake: water, coffee/tea and gatorade   Protein supplement: none currently   Estimated protein intake per day: ~40 grams per day   Estimated fluid intake per day: coffee or gatorarade ( sometimes ) , she has stopped soda   Water 5 glasses per day of water ~40 ounces per day , 16 ounces cup of coffee,   Meals eaten away from home: twice per week on weekends   Typical meal pattern: 2-3 meals per day and 0-1 snacks per day  Eating Behaviors: Large portion sizes  Food allergies or intolerances: NKFA- sometimes eggs upsets her stomach   Allergies   Allergen Reactions   • Amoxicillin Hives   • Clavulanic Acid Hives   • Moxifloxacin Hives   • Sulfa Antibiotics Hives     Cultural or Worship considerations: [place]     Physical Assessment  Physical Activity  Types of exercise: Walking  Dog daily for 45 minutes per day   Current physical limitations: none noted     Psychosocial Assessment   Support systems: spouse  Socioeconomic factors: owns hair salon   40 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 Patient educated and handouts provided.   Surgical changes to stomach / GI  Capacity of post-surgery stomach  Diet progression  Adequate hydration  Sugar and fat restriction to decrease "dumping syndrome"  Fat restriction to decrease steatorrhea  Expected weight loss  Weight loss plateaus/ possibility of weight regain  Exercise  Suggestions for pre-op diet  Nutrition considerations after surgery  Protein supplements  Meal planning and preparation  Appropriate carbohydrate, protein, and fat intake, and food/fluid choices to maximize safe weight loss, nutrient intake, and tolerance   Dietary and lifestyle changes  Possible problems with poor eating habits  Intuitive eating  Techniques for self monitoring and keeping daily food journal  Potential for food intolerance after surgery, and ways to deal with them including: lactose intolerance, nausea, reflux, vomiting, diarrhea, food intolerance, appetite changes, gas  Vitamin / Mineral supplementation of Multivitamin with minerals, Calcium, Vitamin B12, Iron, Fat Soluble vitamins and Vitamin D  Post-operative pregnancy guidelines    Patient is not currently pregnant and doesn't desire to become pregnant a minimum of one year post-op- pt with failed ablation, considering hysterectomy after bariatric surgery     Education provided to: patient    Barriers to learning: No barriers identified    Readiness to change: preparation    Prior research on procedure: discussed with provider and friends or family    Comprehension: needs reinforcement and verbalizes understanding     Expected Compliance: good  Recommendations  Pt is an appropriate candidate for surgery.  Yes    Evaluation / Monitoring  Dietitian to Monitor: Eating pattern as discussed Tolerance of nutrition prescription Body weight Lab values Physical activity Bowel pattern    Goals  Eliminate sugar sweetened beverages, Complete lession plans 1-6, Eat 3 meals per day and Eliminate mindless snacking   Download alex and start food logging  Decrease sweetened coffee intake - suggest Iced [place] coffee protein for am , then eat lunch, after noon snack and dinner  Increase calorie free beverages intake to 70-85 ounces per day     ** referred to hepatology     Time Spent:   45 Minutes

## 2023-09-11 NOTE — PROGRESS NOTES
Bariatric Behavioral Health Evaluation    Presenting Problem:   Manuel Rodrigues  is a 51 y.o.   female    :  1975   Patient presented with overall concerns of obesity. Stated that weight has impacted quality of life and has current/ future concerns with lack of mobility,/ movement,  chronic pain, and overall health. Has attempted various weight loss plans in the past including Rx, gym with exercise, weight watchers. Patient is Interested in exploring bariatric surgery. as an option for  weight loss goals. Was taking Wegovy:  But has since stopped     Is the patient seeking Bariatric Surgery? YES  Several clients are post surgery. With success. She is considering the sleeve. Realizes Post- Op Requirements? Yes, and will learn more meeting with the dietitians and social workers through the process     Pre-morbid level of function and history of present illness:  GERD with Rx. (may avoid citrus and acidic)  Struggles with weight loss. Stressors and Supports:  and entire family are supportive.  works for [employer] . Living situation:   and , 24 yo son . Dog Marianne Griffin. (cocker spaniel)   She does the cooking and the shopping. Family dinners with son and   24 yo daughter - moved to [place]  A lot of grilling for dinner. Entree, vegetable and maybe a light dessert. Stated her  and son are "gym rats"     No identified sweet tooth. Denies grazing and picking   May skip breakfast.     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 Off  and Monday:  9am - 9pm  T - S  Standing most of the time. Does have mats that she stand on. Easy light options for food during work. Physical Activity:  Joint pain   Daily walks with her dog.     Family History (medical, traditions, culture, rules/routines around food):  [place] Heritage      No identified weight concerns in the family  Father : cancer ( 2 yrs ago)     Mental Health, Trauma and Substance use Assessment    Psychiatric/Psychological Treatment Diagnosis:   Depression and Anxiety  With Rx followed by PCP  History of panic attacks. Feels her weight impacts her depression   Feels her Anxiety is now managed and reduced. History of Eating Disorder:  none     Outpatient Counselor:  No      Psychiatrist: No     Have you had any Mental Health Higher level of care (ED, PHP or Inpatient ) Treatment? No      Drug and/or Alcohol use and treatment history:  None - does not drink    Have you had any Substance Use Treatment? No     Tobacco/Vaping History: none  Patient agrees to remain nicotine free post surgery. Domestic Violence: No     Abuse or Trauma History: none      Risk Assessment    Identified support system intact. Risk of harm to self or others:  none noted during evaluation  No HI/SI      Presence of Audio/Visual Hallucinations: Not reported during evaluation     Access to weapons: Not reported during evaluation     Observation: this interview only (SW and RD will follow [facility] through the bariatric program)     Based on the previous information, the client presents the following risk of harm to self or others:  Low risk        Physical/Mental Health Status:              Appearance: appropriate           Sociability: average           Affect: appropriate           Mood: calm           Thought Process: coherent           Speech: normal           Content: no impairment           Orientation: person  Yes , place  Yes , time  Yes , normal attention span  Yes , normal memory  Yes   and normal judgement  Yes            Insight: emotional  good       BARIATRIC SURGERY EDUCATION CHECKLIST    Patient has received the following education related to the bariatric surgery process and understands:    Patients may be required to complete a psychiatric evaluation and receive clearance for surgery from mental health provider.     Patients who undergo weight loss surgery are at higher risk of increased mental health concerns and suicide attempts. Patients may be required to complete a full substance abuse evaluation and then complete all treatment recommendations prior to surgery. If diagnosis of abuse/dependence results, patient may be required to remain sober for one (1) year before having bariatric surgery. Patients on psychiatric medications should check with their provider to discuss psychiatric medications and the changes in absorption. Patient should discuss all time release medications with provider and take all medications as prescribed. The recommendation is that there is no use of any tobacco products, Hookah or vapes for the bariatric post-operation patient. Bariatric surgery patients should not consume alcohol as a post-operative patient as it may increase risk of numerous health conditions including but not limited to alcohol abuse and ulcers. There is a possibility of weight regain if patient does not follow all program guidelines and recommendations. Bariatric surgery patients should exercise thirty (30) to sixty (60) minutes per day to maintain post-surgical weight loss. Research indicates that bariatric patients are more successful when they see a therapist for up to two (2) years post-op. Patients will follow all medical and dietary recommendations provided. Patient will keep all scheduled appointments and follow up with their physician for a minimum of five (5) years. Patient will take all vitamins as recommended. Post-operative vitamins are life-long. There is a goal month set. All requirements should be met by this time. Don't wait to get started! There is a deadline month set. All requirements must be finished by this time and if not, the patient will be halted in the surgery process.  The patient can be referred to the medical weight management program or can come back to the surgical program once the unfinished tasks from the previous program are completed. Female patients of childbearing years are informed that pregnancy is not recommended until 12 - 18 months post-op. Recommendations: Recommended for surgery  yes    Social Service Note:  Patient presented for behavioral health evaluation for the bariatric program.   Positive for Mental Health with diagnosis of Depression with Rx. Followed by PCP. Denied history of therapy. Denied history of Psychiatry. No reported Drug and/or Alcohol abuse or treatment. No current tobacco use. Patient educated regarding the impact of nicotine and alcohol on the post surgery bariatric patient. Patient has a negative family history of tobacco and alcohol addiction. Patient has not reported contraindications for bariatric surgery. Patient will begin making changes with relationship with food through behavior modifications and mindful techniques. Tracking food intake for one week every three months can assist with weight maintenance and self accountability for post surgery success.  and Dietitian follow up visits are available for pre and post surgery support. Bariatric support group is available monthly. Patient verbalized the ability to start making changes and create a healthy relationship around nutrition. Patient meets criteria for surgery at this time and is referred to the bariatric surgeon.         Rocky Colunga LCSW

## 2023-09-27 ENCOUNTER — OFFICE VISIT (OUTPATIENT)
Dept: INTERNAL MEDICINE CLINIC | Age: 48
End: 2023-09-27
Payer: COMMERCIAL

## 2023-09-27 VITALS
OXYGEN SATURATION: 98 % | HEIGHT: 67 IN | TEMPERATURE: 97.6 F | BODY MASS INDEX: 44.34 KG/M2 | SYSTOLIC BLOOD PRESSURE: 124 MMHG | HEART RATE: 70 BPM | WEIGHT: 282.5 LBS | DIASTOLIC BLOOD PRESSURE: 86 MMHG

## 2023-09-27 DIAGNOSIS — G43.109 MIGRAINE WITH AURA AND WITHOUT STATUS MIGRAINOSUS, NOT INTRACTABLE: ICD-10-CM

## 2023-09-27 DIAGNOSIS — K21.9 GASTROESOPHAGEAL REFLUX DISEASE WITHOUT ESOPHAGITIS: Primary | ICD-10-CM

## 2023-09-27 DIAGNOSIS — M76.60 ACHILLES TENDON PAIN: ICD-10-CM

## 2023-09-27 DIAGNOSIS — E61.1 IRON DEFICIENCY: ICD-10-CM

## 2023-09-27 DIAGNOSIS — R05.3 CHRONIC COUGH: ICD-10-CM

## 2023-09-27 DIAGNOSIS — R73.03 PRE-DIABETES: ICD-10-CM

## 2023-09-27 DIAGNOSIS — E78.2 MIXED DYSLIPIDEMIA: ICD-10-CM

## 2023-09-27 DIAGNOSIS — F41.9 ANXIETY: ICD-10-CM

## 2023-09-27 DIAGNOSIS — E66.01 MORBID OBESITY WITH BODY MASS INDEX (BMI) OF 40.0 TO 44.9 IN ADULT (HCC): ICD-10-CM

## 2023-09-27 DIAGNOSIS — F41.1 GAD (GENERALIZED ANXIETY DISORDER): ICD-10-CM

## 2023-09-27 PROCEDURE — 99215 OFFICE O/P EST HI 40 MIN: CPT | Performed by: INTERNAL MEDICINE

## 2023-09-27 RX ORDER — LORAZEPAM 0.5 MG/1
1 TABLET ORAL DAILY PRN
Qty: 20 TABLET | Refills: 0 | Status: SHIPPED | OUTPATIENT
Start: 2023-09-27

## 2023-09-27 RX ORDER — FLUOXETINE HYDROCHLORIDE 20 MG/1
20 CAPSULE ORAL DAILY
Qty: 60 CAPSULE | Refills: 1 | Status: SHIPPED | OUTPATIENT
Start: 2023-09-27

## 2023-09-27 NOTE — PROGRESS NOTES
Assessment/Plan:    GERD  -Stable  -Continue with omeprazole and continue to avoid diets and behaviors that trigger symptoms    Migraine headaches  -Well-controlled  -Continue to keep well-hydrated  -Continue with Nurtec, multivitamins, B complex, Botox injections and continue to follow with neurology. Prediabetes  -Stable  -Continue to cut back on carbohydrates and sweets and continue with exercise    Generalized anxiety disorder  -Well-controlled but patient has gained  weight on sertraline  -We will switch her from her low dose sertraline 50 mg to fluoxetine 20 mg daily.  -She was counseled to monitor herself for worsening symptoms and to call if she notices that. -We will refill her lorazepam to be used as needed as she is traveling to [facility] and will likely need it.  -The [facility] website was queried and did not show misuse    Dyslipidemia  -Well-controlled  -Continue with atorvastatin and heart healthy diet with exercise    Achilles tendon pain, left-sided  -We will order an ultrasound of the left ankle and lower leg  -Continue with analgesics as needed  -We will follow-up with the result and progression to podiatry as needed    Chronic cough  -Possibly secondary to GERD versus other pathology  -We will order an x-ray of the chest  -Continue with omeprazole    Iron deficiency anemia  -Stable  -Continue with ferrous sulfate and a balanced diet    Morbid obesity with BMI 44.25  -We discussed the advantages and disadvantages of bariatric surgery  -We will give her a referral to bariatric surgery as requested     Diagnoses and all orders for this visit:    Gastroesophageal reflux disease without esophagitis    Migraine with aura and without status migrainosus, not intractable    Pre-diabetes    LASHAUN (generalized anxiety disorder)  -     FLUoxetine (PROzac) 20 mg capsule;  Take 1 capsule (20 mg total) by mouth daily    Mixed dyslipidemia    Morbid obesity with body mass index (BMI) of 40.0 to 44.9 in adult [facility])  -     Ambulatory Referral to Weight Management; Future    Anxiety  -     LORazepam (ATIVAN) 0.5 mg tablet; Take 2 tablets (1 mg total) by mouth daily as needed for anxiety  -     FLUoxetine (PROzac) 20 mg capsule; Take 1 capsule (20 mg total) by mouth daily    Iron deficiency    Achilles tendon pain  -     US extremity soft tissue; Future    Chronic cough  -     XR chest pa & lateral; Future          Subjective:      Patient ID: Laurian Cheadle is a 51 y.o. female. HPI  Patient presents for a follow-up visit regarding her GERD, migraine headaches, prediabetes, generalized anxiety disorder, dyslipidemia, iron deficiency anemia and high BMI. She states that she has been taking her medications as prescribed. Today pt complains of pain in left achilles tendon for the past year and a half. Had a left ankle injury when she was younger. She states that the pain starts when she stands for a little while and pain radiates up to her calf. 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 Feels like a ann marie horse sometimes and sometimes goes into her plantar fascia along her left foot arch. Migraine headaches are controlled   Still taking the zoloft but states that she has not needed the ativan  However, she states that she is going on a trip soon to [facility] and needs a refill of her ativan. The first trip after her fathers death  She also complains that she was dieting and taking wegovy and states that it did not work for her as she did not lose wt. Spoke to her obgyn and is considering going for bariaritc sx   States that diets do not work for her   States that her body has changed and she believes that this is why she is no longer losing weight. Of note, she has tried weight watchers in the past.  She states that she feels so bad that she avoids taking pictures and going out because of her weight.   She admits that she has gained weight since she was placed on sertraline even though it works for her. She states that her friend lost weight through bariatric surgery and she wants to do it as well. Has an appointment with bariatrics and needs a referral   She states that her GERD is stable on her omeprazole. Has a little cough and is scheduled for an egd but would also like to have a chest x-ray ordered. Of note, cough has been going on for over a month.     The following portions of the patient's history were reviewed and updated as appropriate:   She  has a past medical history of Anemia, Anxiety, Depression, GERD (gastroesophageal reflux disease), History of prior pregnancies, Hyperlipidemia, Migraine with aura and without status migrainosus, not intractable (2018), Obesity, Papanicolaou smear (2019), and Uterine bleeding, dysfunctional.  She   Patient Active Problem List    Diagnosis Date Noted   • Dermatochalasis 2023   • Obesity, Class III, BMI 40-49.9 (morbid obesity) ([address]) 2023   • Vertigo 2022   • Nausea 2022   • Benign paroxysmal positional vertigo of left ear 2022   • COVID-19 05/10/2022   • Carpal tunnel syndrome of left wrist 10/06/2021   • Medial epicondylitis of left elbow 10/06/2021   • History of endometrial ablation 2021   • LASHAUN (generalized anxiety disorder) 2021   • Panic attack 2021   • BMI 40.0-44.9, adult ([address]) 2021   • Sore throat 03/10/2021   • Snoring 03/10/2021   • Thrombocytosis 2019   • Iron deficiency 2019   • Chronic cough 2019   • Weight gain 2019   • Eustachian tube dysfunction, bilateral 2019   • GERD (gastroesophageal reflux disease) 2018   • Screening for breast cancer 2018   • Macromastia 10/10/2018   • Pre-diabetes 10/10/2018   • Mixed dyslipidemia 10/10/2018   • Chronic migraine without aura without status migrainosus, not intractable 2018   • Seasonal allergies 10/09/2017     She  has a past surgical history that includes  section;  section; Tubal ligation; pr hysteroscopy endometrial ablation (N/A, 2021); and pr blepharoplasty upper eyelid w/excessive skin (Bilateral, 2023). Her family history includes Cancer (age of onset: 80) in her father; No Known Problems in her daughter, maternal aunt, maternal aunt, maternal aunt, maternal aunt, maternal grandfather, maternal grandmother, mother, paternal aunt, paternal aunt, paternal aunt, paternal grandfather, paternal grandmother, sister, and sister; Prostate cancer in her father; Stroke in her father. She  reports that she has never smoked. She has never used smokeless tobacco. She reports that she does not drink alcohol and does not use drugs.   Current Outpatient Medications   Medication Sig Dispense Refill   • atorvastatin (LIPITOR) 10 mg tablet Take 1 tablet (10 mg total) by mouth daily 90 tablet 1   • B Complex Vitamins (VITAMIN B COMPLEX PO) Take by mouth daily      • cyclobenzaprine (FLEXERIL) 5 mg tablet Take 1 tablet (5 mg total) by mouth as needed for muscle spasms (migraine) 30 tablet 2   • ferrous sulfate 324 (65 Fe) mg Take 1 tablet (324 mg total) by mouth daily before breakfast 30 tablet 1   • FLUoxetine (PROzac) 20 mg capsule Take 1 capsule (20 mg total) by mouth daily 60 capsule 1   • LORazepam (ATIVAN) 0.5 mg tablet Take 2 tablets (1 mg total) by mouth daily as needed for anxiety 20 tablet 0   • meclizine (ANTIVERT) 25 mg tablet Take 1 tablet (25 mg total) by mouth every 8 (eight) hours 30 tablet 0   • multivitamin (THERAGRAN) TABS Take 1 tablet by mouth daily     • omeprazole (PriLOSEC) 20 mg delayed release capsule Take 1 capsule (20 mg total) by mouth daily 90 capsule 0   • onabotulinumtoxin A (BOTOX) 100 units Inject into a muscle every 3 (three) months For migraines done every 3 months     • ondansetron (ZOFRAN) 4 mg tablet Take 1 tablet (4 mg total) by mouth every 8 (eight) hours as needed for nausea or vomiting 20 tablet 0   • rimegepant sulfate (Nurtec) 75 mg TBDP Take 1 tablet (75 mg total) by mouth every other day 16 tablet 11   • vitamin B-12 (VITAMIN B-12) 1,000 mcg tablet Take by mouth daily     • Vitamin D, Cholecalciferol, 1000 units CAPS Take 1 tablet by mouth in the morning         No current facility-administered medications for this visit. Current Outpatient Medications on File Prior to Visit   Medication Sig   • atorvastatin (LIPITOR) 10 mg tablet Take 1 tablet (10 mg total) by mouth daily   • B Complex Vitamins (VITAMIN B COMPLEX PO) Take by mouth daily    • cyclobenzaprine (FLEXERIL) 5 mg tablet Take 1 tablet (5 mg total) by mouth as needed for muscle spasms (migraine)   • ferrous sulfate 324 (65 Fe) mg Take 1 tablet (324 mg total) by mouth daily before breakfast   • meclizine (ANTIVERT) 25 mg tablet Take 1 tablet (25 mg total) by mouth every 8 (eight) hours   • multivitamin (THERAGRAN) TABS Take 1 tablet by mouth daily   • omeprazole (PriLOSEC) 20 mg delayed release capsule Take 1 capsule (20 mg total) by mouth daily   • onabotulinumtoxin A (BOTOX) 100 units Inject into a muscle every 3 (three) months For migraines done every 3 months   • ondansetron (ZOFRAN) 4 mg tablet Take 1 tablet (4 mg total) by mouth every 8 (eight) hours as needed for nausea or vomiting   • rimegepant sulfate (Nurtec) 75 mg TBDP Take 1 tablet (75 mg total) by mouth every other day   • vitamin B-12 (VITAMIN B-12) 1,000 mcg tablet Take by mouth daily   • Vitamin D, Cholecalciferol, 1000 units CAPS Take 1 tablet by mouth in the morning     • [DISCONTINUED] LORazepam (ATIVAN) 0.5 mg tablet Take 2 tablets (1 mg total) by mouth daily as needed for anxiety   • [DISCONTINUED] sertraline (ZOLOFT) 50 mg tablet Take 1 tablet (50 mg total) by mouth daily   • [DISCONTINUED] Wegovy 2.4 MG/0.75ML Inject 0.75 mL (2.4 mg total) under the skin once a week (Patient not taking: Reported on 9/11/2023)     No current facility-administered medications on file prior to visit.      She is allergic to amoxicillin, clavulanic acid, moxifloxacin, and sulfa antibiotics. .    Review of Systems   Constitutional: Negative for activity change, chills, fatigue, fever and unexpected weight change. HENT: Negative for ear pain, postnasal drip, rhinorrhea, sinus pressure and sore throat. Eyes: Negative for pain. Respiratory: Negative for cough, choking, chest tightness, shortness of breath and wheezing. Cardiovascular: Negative for chest pain, palpitations and leg swelling. Gastrointestinal: Negative for abdominal pain, constipation, diarrhea, nausea and vomiting. Genitourinary: Negative for dysuria and hematuria. Musculoskeletal: Positive for arthralgias (left foot and ankle pain ). Negative for back pain, gait problem, joint swelling, myalgias and neck stiffness. Skin: Negative for pallor and rash. Neurological: Negative for dizziness, tremors, seizures, syncope, light-headedness and headaches. Hematological: Negative for adenopathy. Psychiatric/Behavioral: Negative for behavioral problems. The patient is nervous/anxious. Objective:      /86 (BP Location: Left arm, Patient Position: Sitting, Cuff Size: Adult)   Pulse 70   Temp 97.6 °F (36.4 °C) (Temporal)   Ht 5' 7" (1.702 m)   Wt 128 kg (282 lb 8 oz)   SpO2 98% Comment: ra  BMI 44.25 kg/m²          Physical Exam  Constitutional:       General: She is not in acute distress. Appearance: She is well-developed. She is not diaphoretic. HENT:      Head: Normocephalic and atraumatic. Right Ear: External ear normal.      Left Ear: External ear normal.      Nose: Nose normal.      Mouth/Throat:      Mouth: Mucous membranes are dry. Pharynx: No oropharyngeal exudate. Eyes:      General: No scleral icterus. Right eye: No discharge. Left eye: No discharge. Conjunctiva/sclera: Conjunctivae normal.      Pupils: Pupils are equal, round, and reactive to light. Neck:      Thyroid: No thyromegaly. Vascular: No JVD. Trachea: No tracheal deviation. Cardiovascular:      Rate and Rhythm: Normal rate and regular rhythm. Heart sounds: Normal heart sounds. No murmur heard. No friction rub. No gallop. Pulmonary:      Effort: Pulmonary effort is normal. No respiratory distress. Breath sounds: Normal breath sounds. No wheezing or rales. Chest:      Chest wall: No tenderness. Abdominal:      General: Bowel sounds are normal. There is no distension. Palpations: Abdomen is soft. There is no mass. Tenderness: There is no abdominal tenderness. There is no guarding or rebound. Musculoskeletal:         General: No tenderness or deformity. Normal range of motion. Cervical back: Normal range of motion and neck supple. Left ankle:      Left Achilles Tendon: Tenderness (tenderness and swelling of the left achilles tendon ) present. Lymphadenopathy:      Cervical: No cervical adenopathy. Skin:     General: Skin is warm and dry. Coloration: Skin is not pale. Findings: No erythema or rash. Neurological:      Mental Status: She is alert and oriented to person, place, and time. Cranial Nerves: No cranial nerve deficit. Motor: No abnormal muscle tone. Coordination: Coordination normal.      Deep Tendon Reflexes: Reflexes are normal and symmetric. Psychiatric:         Behavior: Behavior normal.           Appointment on 06/27/2023   Component Date Value Ref Range Status   • Hemoglobin A1C 06/27/2023 5.7 (H)  Normal 3.8-5.6%; PreDiabetic 5.7-6.4%;  Diabetic >=6.5%; Glycemic control for adults with diabetes <7.0% % Final   • EAG 06/27/2023 117  mg/dl Final   • Cholesterol 06/27/2023 180  See Comment mg/dL Final    Cholesterol:         Pediatric <18 Years        Desirable          <170 mg/dL      Borderline High    170-199 mg/dL      High               >=200 mg/dL        Adult >=18 Years            Desirable         <200 mg/dL      Borderline High   200-239 mg/dL      High              >239 mg/dL     • Triglycerides 06/27/2023 133  See Comment mg/dL Final    Triglyceride:     0-9Y            <75mg/dL     10Y-17Y         <90 mg/dL       >=18Y     Normal          <150 mg/dL     Borderline High 150-199 mg/dL     High            200-499 mg/dL        Very High       >499 mg/dL    Specimen collection should occur prior to N-Acetylcysteine or Metamizole administration due to the potential for falsely depressed results. • HDL, Direct 06/27/2023 64  >=50 mg/dL Final    Specimen collection should occur prior to Metamizole administration due to the potential for falsley depressed results. • LDL Calculated 06/27/2023 89  0 - 100 mg/dL Final    LDL Cholesterol:     Optimal           <100 mg/dl     Near Optimal      100-129 mg/dl     Above Optimal       Borderline High 130-159 mg/dl       High            160-189 mg/dl       Very High       >189 mg/dl         This screening LDL is a calculated result. It does not have the accuracy of the Direct Measured LDL in the monitoring of patients with hyperlipidemia and/or statin therapy. Direct Measure LDL (MLI038) must be ordered separately in these patients.    • Non-HDL-Chol (CHOL-HDL) 06/27/2023 116  mg/dl Final   Admission on 04/18/2023, Discharged on 04/18/2023   Component Date Value Ref Range Status   • EXT Preg Test, Ur 04/18/2023 Negative  Negative Final   • Control 04/18/2023 Valid  Valid Final   Appointment on 03/31/2023   Component Date Value Ref Range Status   • WBC 03/31/2023 10.79 (H)  4.31 - 10.16 Thousand/uL Final   • RBC 03/31/2023 4.74  3.81 - 5.12 Million/uL Final   • Hemoglobin 03/31/2023 12.9  11.5 - 15.4 g/dL Final   • Hematocrit 03/31/2023 41.4  34.8 - 46.1 % Final   • MCV 03/31/2023 87  82 - 98 fL Final   • MCH 03/31/2023 27.2  26.8 - 34.3 pg Final   • MCHC 03/31/2023 31.2 (L)  31.4 - 37.4 g/dL Final   • RDW 03/31/2023 15.4 (H)  11.6 - 15.1 % Final   • Platelets 53/74/4567 341  149 - 390 Thousands/uL Final   • MPV 03/31/2023 10.1  8.9 - 12.7 fL Final   • Sodium 03/31/2023 137  135 - 147 mmol/L Final   • Potassium 03/31/2023 4.6  3.5 - 5.3 mmol/L Final   • Chloride 03/31/2023 107  96 - 108 mmol/L Final   • CO2 03/31/2023 29  21 - 32 mmol/L Final   • ANION GAP 03/31/2023 1 (L)  4 - 13 mmol/L Final   • BUN 03/31/2023 17  5 - 25 mg/dL Final   • Creatinine 03/31/2023 0.72  0.60 - 1.30 mg/dL Final    Standardized to IDMS reference method   • Glucose, Fasting 03/31/2023 99  65 - 99 mg/dL Final    Specimen collection should occur prior to Sulfasalazine administration due to the potential for falsely depressed results. Specimen collection should occur prior to Sulfapyridine administration due to the potential for falsely elevated results. • Calcium 03/31/2023 9.5  8.3 - 10.1 mg/dL Final   • eGFR 03/31/2023 98  ml/min/1.73sq m Final   Appointment on 03/28/2023   Component Date Value Ref Range Status   • Total Bilirubin 03/28/2023 0.49  0.20 - 1.00 mg/dL Final    Use of this assay is not recommended for patients undergoing treatment with eltrombopag due to the potential for falsely elevated results. • Bilirubin, Direct 03/28/2023 0.17  0.00 - 0.20 mg/dL Final   • Alkaline Phosphatase 03/28/2023 93  46 - 116 U/L Final   • AST 03/28/2023 12  5 - 45 U/L Final    Specimen collection should occur prior to Sulfasalazine and/or Sulfapyridine administration due to the potential for falsely depressed results. • ALT 03/28/2023 19  12 - 78 U/L Final    Specimen collection should occur prior to Sulfasalazine and/or Sulfapyridine administration due to the potential for falsely depressed results.     • Total Protein 03/28/2023 7.8  6.4 - 8.4 g/dL Final   • Albumin 03/28/2023 3.5  3.5 - 5.0 g/dL Final   Orders Only on 03/28/2023   Component Date Value Ref Range Status   • Color, UA 03/28/2023 Yellow   Final   • Clarity, UA 03/28/2023 Clear   Final   • Specific Gravity, UA 03/28/2023 1.020  1.005 - 1.030 Final   • pH, UA 03/28/2023 6.0  4.5, 5. 0, 5.5, 6.0, 6.5, 7.0, 7.5, 8.0 Final   • Leukocytes, UA 03/28/2023 Trace (A)  Negative Final   • Nitrite, UA 03/28/2023 Negative  Negative Final   • Protein, UA 03/28/2023 Trace (A)  Negative mg/dl Final   • Glucose, UA 03/28/2023 Negative  Negative mg/dl Final   • Ketones, UA 03/28/2023 Negative  Negative mg/dl Final   • Urobilinogen, UA 03/28/2023 <2.0  <2.0 mg/dl mg/dl Final   • Bilirubin, UA 03/28/2023 Negative  Negative Final   • Occult Blood, UA 03/28/2023 Negative  Negative Final   • RBC, UA 03/28/2023 1-2  None Seen, 1-2 /hpf Final   • WBC, UA 03/28/2023 1-2  None Seen, 1-2 /hpf Final   • Epithelial Cells 03/28/2023 Occasional  None Seen, Occasional /hpf Final   • Bacteria, UA 03/28/2023 Occasional  None Seen, Occasional /hpf Final   • MUCUS THREADS 03/28/2023 Innumerable (A)  None Seen Final   Office Visit on 03/23/2023   Component Date Value Ref Range Status   •  COLOR,UA 03/23/2023 Yellow   Final   • CLARITY,UA 03/23/2023 slightly cloudy   Final   • SPECIFIC GRAVITY,UA 03/23/2023 1.020   Final   •  PH,UA 03/23/2023 7.0   Final   • LEUKOCYTE ESTERASE,UA 03/23/2023 Trace   Final   • NITRITE,UA 03/23/2023 Neg   Final   • GLUCOSE, UA 03/23/2023 Neg   Final   • KETONES,UA 03/23/2023 Neg   Final   • BILIRUBIN,UA 03/23/2023 Neg   Final   • BLOOD,UA 03/23/2023 Neg   Final   • POCT URINE PROTEIN 03/23/2023 Neg   Final   • SL AMB POCT UROBILINOGEN 03/23/2023 0.2   Final   • Hemoglobin A1C 03/23/2023 5.8  6.5 Final

## 2023-10-12 ENCOUNTER — CONSULT (OUTPATIENT)
Dept: BARIATRICS | Facility: CLINIC | Age: 48
End: 2023-10-12
Payer: COMMERCIAL

## 2023-10-12 VITALS
SYSTOLIC BLOOD PRESSURE: 124 MMHG | HEIGHT: 64 IN | DIASTOLIC BLOOD PRESSURE: 80 MMHG | BODY MASS INDEX: 48.91 KG/M2 | TEMPERATURE: 97.5 F | WEIGHT: 286.5 LBS | HEART RATE: 84 BPM

## 2023-10-12 DIAGNOSIS — R73.03 PRE-DIABETES: ICD-10-CM

## 2023-10-12 DIAGNOSIS — E66.01 OBESITY, CLASS III, BMI 40-49.9 (MORBID OBESITY) (HCC): Primary | ICD-10-CM

## 2023-10-12 DIAGNOSIS — F41.1 GAD (GENERALIZED ANXIETY DISORDER): ICD-10-CM

## 2023-10-12 DIAGNOSIS — K21.9 GASTROESOPHAGEAL REFLUX DISEASE WITHOUT ESOPHAGITIS: ICD-10-CM

## 2023-10-12 DIAGNOSIS — N62 MACROMASTIA: ICD-10-CM

## 2023-10-12 DIAGNOSIS — E78.2 MIXED DYSLIPIDEMIA: ICD-10-CM

## 2023-10-12 DIAGNOSIS — G43.709 CHRONIC MIGRAINE WITHOUT AURA WITHOUT STATUS MIGRAINOSUS, NOT INTRACTABLE: ICD-10-CM

## 2023-10-12 PROCEDURE — 99244 OFF/OP CNSLTJ NEW/EST MOD 40: CPT | Performed by: SURGERY

## 2023-10-12 NOTE — PROGRESS NOTES
BARIATRIC INITIAL CONSULT - BARIATRIC SURGERY    Krish Labor Lambrinides 51 y.o. female MRN: [id number]  Unit/Bed#:  Encounter: [id number]      HPI:  Brittany Davis is a 51 y.o. female who presents with a longstanding history of morbid obesity and inability to sustain a meaningful weight loss. Here today to discuss bariatric options. Visit type: initial visit    Symptoms: excess weight and inability to loss weight    Associated Symptoms: depressed mood and anxiety    Associated Conditions: glucose intolerance, hyperlipidemia, and abdominal obesity  Disease Complications:  Dyslipidemia and chronic migraine  Weight Loss Interest: high  Previous Diet Trials: low calorie     Exercise Frequency:infrequency  Types of Exercise: walking        Review of Systems   All other systems reviewed and are negative.       Historical Information   Past Medical History:   Diagnosis Date    Anemia     Anxiety     Depression     GERD (gastroesophageal reflux disease)     History of prior pregnancies         Hyperlipidemia     Migraine with aura and without status migrainosus, not intractable 2018    Obesity     Papanicolaou smear 2019    normal    Uterine bleeding, dysfunctional      Past Surgical History:   Procedure Laterality Date     SECTION       SECTION      [place] BLEPHAROPLASTY UPPER EYELID W/EXCESSIVE SKIN Bilateral 2023    Procedure: BLEPHAROPLASTY UPPER;  Surgeon: Nolan Zhao MD;  Location: [address] [place] [address] MAIN OR;  Service: Plastics    [place] HYSTEROSCOPY ENDOMETRIAL ABLATION N/A 2021    Procedure: Carlie Preston;  Surgeon: Melodye Barthel, MD;  Location: [facility] MAIN OR;  Service: Gynecology    TUBAL LIGATION       Social History   Social History     Substance and Sexual Activity   Alcohol Use No     Social History     Substance and Sexual Activity   Drug Use No     Social History     Tobacco Use   Smoking Status Never   Smokeless Tobacco Never     Family History: non-contributory    Meds/Allergies   all medications and allergies reviewed  Allergies   Allergen Reactions    Amoxicillin Hives    Clavulanic Acid Hives    Moxifloxacin Hives    Sulfa Antibiotics Hives       Objective       Current Vitals:   Blood Pressure: 124/80 (10/12/23 1449)  Pulse: 84 (10/12/23 1449)  Temperature: 97.5 °F (36.4 °C) (10/12/23 1449)  Temp Source: Tympanic (10/12/23 1449)  Height: 5' 4" (162.6 cm) (10/12/23 1449)  Weight - Scale: 130 kg (286 lb 8 oz) (10/12/23 1449)        Invasive Devices       None                   Physical Exam  Vitals reviewed. Constitutional:       General: She is not in acute distress. Appearance: She is well-developed. She is not diaphoretic. HENT:      Head: Normocephalic and atraumatic. Right Ear: External ear normal.      Left Ear: External ear normal.      Nose: Nose normal.   Eyes:      General: No scleral icterus. Right eye: No discharge. Left eye: No discharge. Conjunctiva/sclera: Conjunctivae normal.   Neurological:      Mental Status: She is alert and oriented to person, place, and time. Psychiatric:         Behavior: Behavior normal.         Thought Content: Thought content normal.         Judgment: Judgment normal.         Lab Results: I have personally reviewed pertinent lab results. Imaging: I have personally reviewed pertinent reports. EKG, Pathology, and Other Studies: I have personally reviewed pertinent reports. Assessment/PLAN:    51 y.o. female with a long standing h/o of obesity and inability to sustain any meaningful weight loss on her own despite several attempts. She is interested in the Laparoscopic Sleeve gastrectomy. Has a friend that had the sleeve and she has been reading about this. I have discussed both surgical options with her.     As a part of her pre op evaluation, she will be referred to a cardiologist for risk stratification and for a sleep evaluation and consult to rule out obstructive sleep apnea if deemed necessary based on her score. She needs an EGD to evaluate the anatomy of her GI tract prior to the operation. I have spent over 30 minutes with her face to face in the office today discussing her options and details of the surgery. We have seen an animation of the surgery on the computer that illustrates how the operation is done and how the anatomy will be altered with the procedure. Over 50% of this was coordinating care. I have used the [facility] bariatric surgical risk/benefit calculator and we have discussed the results as part of the preop education. She was given the opportunity to ask questions and I have answered all of them. I have discussed and educated the patient with regards to the components of our multidisciplinary program and the importance of compliance and follow up in the post operative period. The patient was also instructed with regards to the importance of behavior modification, nutritional counseling, support meeting attendance and lifestyle changes that are important to ensure success. Although there is a great statistical chance of improvement or even resolution of most of her associated comorbidities, the results vary from patient to patient and they largely depend on her commitment and compliance. I have encouraged her to lose some weight prior to the operation.       Martha Bojorquez MD  10/12/2023  2:58 PM

## 2023-10-12 NOTE — H&P (VIEW-ONLY)
BARIATRIC INITIAL CONSULT - BARIATRIC SURGERY    Verdene Phalen Lambrinides 51 y.o. female MRN: [id number]  Unit/Bed#:  Encounter: [id number]      HPI:  Andra Maldonado is a 51 y.o. female who presents with a longstanding history of morbid obesity and inability to sustain a meaningful weight loss. Here today to discuss bariatric options. Visit type: initial visit    Symptoms: excess weight and inability to loss weight    Associated Symptoms: depressed mood and anxiety    Associated Conditions: glucose intolerance, hyperlipidemia, and abdominal obesity  Disease Complications:  Dyslipidemia and chronic migraine  Weight Loss Interest: high  Previous Diet Trials: low calorie     Exercise Frequency:infrequency  Types of Exercise: walking        Review of Systems   All other systems reviewed and are negative.       Historical Information   Past Medical History:   Diagnosis Date    Anemia     Anxiety     Depression     GERD (gastroesophageal reflux disease)     History of prior pregnancies         Hyperlipidemia     Migraine with aura and without status migrainosus, not intractable 2018    Obesity     Papanicolaou smear 2019    normal    Uterine bleeding, dysfunctional      Past Surgical History:   Procedure Laterality Date     SECTION       SECTION      [place] BLEPHAROPLASTY UPPER EYELID W/EXCESSIVE SKIN Bilateral 2023    Procedure: BLEPHAROPLASTY UPPER;  Surgeon: Familia Castillo MD;  Location: [address] [place] [address] MAIN OR;  Service: Plastics    [place] HYSTEROSCOPY ENDOMETRIAL ABLATION N/A 2021    Procedure: Bakari Enciso;  Surgeon: Balbina Goldstein MD;  Location: [facility] MAIN OR;  Service: Gynecology    TUBAL LIGATION       Social History   Social History     Substance and Sexual Activity   Alcohol Use No     Social History     Substance and Sexual Activity   Drug Use No     Social History     Tobacco Use   Smoking Status Never   Smokeless Tobacco Never     Family History: non-contributory    Meds/Allergies   all medications and allergies reviewed  Allergies   Allergen Reactions    Amoxicillin Hives    Clavulanic Acid Hives    Moxifloxacin Hives    Sulfa Antibiotics Hives       Objective       Current Vitals:   Blood Pressure: 124/80 (10/12/23 1449)  Pulse: 84 (10/12/23 1449)  Temperature: 97.5 °F (36.4 °C) (10/12/23 1449)  Temp Source: Tympanic (10/12/23 1449)  Height: 5' 4" (162.6 cm) (10/12/23 1449)  Weight - Scale: 130 kg (286 lb 8 oz) (10/12/23 1449)        Invasive Devices       None                   Physical Exam  Vitals reviewed. Constitutional:       General: She is not in acute distress. Appearance: She is well-developed. She is not diaphoretic. HENT:      Head: Normocephalic and atraumatic. Right Ear: External ear normal.      Left Ear: External ear normal.      Nose: Nose normal.   Eyes:      General: No scleral icterus. Right eye: No discharge. Left eye: No discharge. Conjunctiva/sclera: Conjunctivae normal.   Neurological:      Mental Status: She is alert and oriented to person, place, and time. Psychiatric:         Behavior: Behavior normal.         Thought Content: Thought content normal.         Judgment: Judgment normal.         Lab Results: I have personally reviewed pertinent lab results. Imaging: I have personally reviewed pertinent reports. EKG, Pathology, and Other Studies: I have personally reviewed pertinent reports. Assessment/PLAN:    51 y.o. female with a long standing h/o of obesity and inability to sustain any meaningful weight loss on her own despite several attempts. She is interested in the Laparoscopic Sleeve gastrectomy. Has a friend that had the sleeve and she has been reading about this. I have discussed both surgical options with her.     As a part of her pre op evaluation, she will be referred to a cardiologist for risk stratification and for a sleep evaluation and consult to rule out obstructive sleep apnea if deemed necessary based on her score. She needs an EGD to evaluate the anatomy of her GI tract prior to the operation. I have spent over 30 minutes with her face to face in the office today discussing her options and details of the surgery. We have seen an animation of the surgery on the computer that illustrates how the operation is done and how the anatomy will be altered with the procedure. Over 50% of this was coordinating care. I have used the [facility] bariatric surgical risk/benefit calculator and we have discussed the results as part of the preop education. She was given the opportunity to ask questions and I have answered all of them. I have discussed and educated the patient with regards to the components of our multidisciplinary program and the importance of compliance and follow up in the post operative period. The patient was also instructed with regards to the importance of behavior modification, nutritional counseling, support meeting attendance and lifestyle changes that are important to ensure success. Although there is a great statistical chance of improvement or even resolution of most of her associated comorbidities, the results vary from patient to patient and they largely depend on her commitment and compliance. I have encouraged her to lose some weight prior to the operation.       Merline Brilliant, MD  10/12/2023  2:58 PM

## 2023-10-13 ENCOUNTER — PREP FOR PROCEDURE (OUTPATIENT)
Dept: BARIATRICS | Facility: CLINIC | Age: 48
End: 2023-10-13

## 2023-10-13 DIAGNOSIS — E66.01 MORBID OBESITY (HCC): Primary | ICD-10-CM

## 2023-10-16 ENCOUNTER — PROCEDURE VISIT (OUTPATIENT)
Dept: NEUROLOGY | Facility: CLINIC | Age: 48
End: 2023-10-16
Payer: COMMERCIAL

## 2023-10-16 VITALS — HEART RATE: 86 BPM | TEMPERATURE: 98.6 F | DIASTOLIC BLOOD PRESSURE: 89 MMHG | SYSTOLIC BLOOD PRESSURE: 133 MMHG

## 2023-10-16 DIAGNOSIS — G43.709 CHRONIC MIGRAINE WITHOUT AURA WITHOUT STATUS MIGRAINOSUS, NOT INTRACTABLE: Primary | ICD-10-CM

## 2023-10-16 PROCEDURE — 64615 CHEMODENERV MUSC MIGRAINE: CPT | Performed by: PHYSICIAN ASSISTANT

## 2023-10-18 ENCOUNTER — TELEPHONE (OUTPATIENT)
Dept: BARIATRICS | Facility: CLINIC | Age: 48
End: 2023-10-18

## 2023-10-18 NOTE — TELEPHONE ENCOUNTER
Called and LVM to ensure pt knew their EGD was scheduled for next week 10/25/2023. Let pt know to call us with any concerns or questions.

## 2023-10-19 ENCOUNTER — HOSPITAL ENCOUNTER (OUTPATIENT)
Dept: RADIOLOGY | Facility: HOSPITAL | Age: 48
Discharge: HOME | End: 2023-10-19
Attending: INTERNAL MEDICINE
Payer: COMMERCIAL

## 2023-10-19 DIAGNOSIS — M76.60 ACHILLES TENDON PAIN: ICD-10-CM

## 2023-10-19 DIAGNOSIS — M67.874 ACHILLES TENDINOSIS OF LEFT ANKLE: Primary | ICD-10-CM

## 2023-10-19 PROCEDURE — 76882 US LMTD JT/FCL EVL NVASC XTR: CPT

## 2023-10-24 RX ORDER — SODIUM CHLORIDE 9 MG/ML
125 INJECTION, SOLUTION INTRAVENOUS CONTINUOUS
Status: CANCELLED | OUTPATIENT
Start: 2023-10-24

## 2023-10-25 ENCOUNTER — HOSPITAL ENCOUNTER (OUTPATIENT)
Dept: GASTROENTEROLOGY | Facility: HOSPITAL | Age: 48
Setting detail: OUTPATIENT SURGERY
Discharge: HOME | End: 2023-10-25
Attending: SURGERY
Payer: COMMERCIAL

## 2023-10-25 ENCOUNTER — ANESTHESIA (OUTPATIENT)
Dept: GASTROENTEROLOGY | Facility: HOSPITAL | Age: 48
End: 2023-10-25

## 2023-10-25 ENCOUNTER — ANESTHESIA EVENT (OUTPATIENT)
Dept: GASTROENTEROLOGY | Facility: HOSPITAL | Age: 48
End: 2023-10-25

## 2023-10-25 VITALS
DIASTOLIC BLOOD PRESSURE: 98 MMHG | OXYGEN SATURATION: 96 % | BODY MASS INDEX: 48.83 KG/M2 | WEIGHT: 286 LBS | HEIGHT: 64 IN | TEMPERATURE: 98.1 F | SYSTOLIC BLOOD PRESSURE: 137 MMHG | RESPIRATION RATE: 16 BRPM | HEART RATE: 87 BPM

## 2023-10-25 DIAGNOSIS — E66.01 MORBID OBESITY (HCC): ICD-10-CM

## 2023-10-25 LAB
EXT PREGNANCY TEST URINE: NEGATIVE
EXT. CONTROL: NORMAL

## 2023-10-25 PROCEDURE — 81025 URINE PREGNANCY TEST: CPT | Performed by: ANESTHESIOLOGY

## 2023-10-25 PROCEDURE — 88305 TISSUE EXAM BY PATHOLOGIST: CPT | Performed by: PATHOLOGY

## 2023-10-25 PROCEDURE — 43239 EGD BIOPSY SINGLE/MULTIPLE: CPT | Performed by: SURGERY

## 2023-10-25 RX ORDER — SODIUM CHLORIDE 9 MG/ML
125 INJECTION, SOLUTION INTRAVENOUS CONTINUOUS
Status: DISCONTINUED | OUTPATIENT
Start: 2023-10-25 | End: 2023-10-29 | Stop reason: HOSPADM

## 2023-10-25 RX ORDER — PROPOFOL 10 MG/ML
INJECTION, EMULSION INTRAVENOUS AS NEEDED
Status: DISCONTINUED | OUTPATIENT
Start: 2023-10-25 | End: 2023-10-25

## 2023-10-25 RX ORDER — LIDOCAINE HYDROCHLORIDE 20 MG/ML
INJECTION, SOLUTION EPIDURAL; INFILTRATION; INTRACAUDAL; PERINEURAL AS NEEDED
Status: DISCONTINUED | OUTPATIENT
Start: 2023-10-25 | End: 2023-10-25

## 2023-10-25 RX ADMIN — LIDOCAINE HYDROCHLORIDE 100 MG: 20 INJECTION, SOLUTION EPIDURAL; INFILTRATION; INTRACAUDAL at 09:15

## 2023-10-25 RX ADMIN — SODIUM CHLORIDE: 0.9 INJECTION, SOLUTION INTRAVENOUS at 09:12

## 2023-10-25 RX ADMIN — PROPOFOL 30 MG: 10 INJECTION, EMULSION INTRAVENOUS at 09:18

## 2023-10-25 RX ADMIN — PROPOFOL 150 MG: 10 INJECTION, EMULSION INTRAVENOUS at 09:15

## 2023-10-25 NOTE — INTERVAL H&P NOTE
H&P reviewed. After examining the patient I find no changes in the patients condition since the H&P had been written.     Vitals:    10/25/23 0835   BP: 156/91   Pulse: 86   Resp: 16   Temp: 98.1 °F (36.7 °C)   SpO2: 96%

## 2023-10-25 NOTE — ANESTHESIA PREPROCEDURE EVALUATION
Procedure:  EGD    Relevant Problems   CARDIO   (+) Chronic migraine without aura without status migrainosus, not intractable      GI/HEPATIC   (+) GERD (gastroesophageal reflux disease)      NEURO/PSYCH   (+) Chronic migraine without aura without status migrainosus, not intractable   (+) LASHAUN (generalized anxiety disorder)   (+) Panic attack      Nervous and Auditory   (+) Benign paroxysmal positional vertigo of left ear      Hematopoietic and Hemostatic   (+) Thrombocytosis      Other   (+) Chronic cough   (+) Obesity, Class III, BMI 40-49.9 (morbid obesity) (HCC)   (+) Pre-diabetes   (+) Snoring        Physical Exam    Airway  Comment: Limited by habitus  Mallampati score: IV  TM Distance: <3 FB  Neck ROM: limited     Dental   No notable dental hx     Cardiovascular  Rhythm: regular, Rate: normal    Pulmonary   Breath sounds clear to auscultation    Other Findings        Anesthesia Plan  ASA Score- 3     Anesthesia Type- IV sedation with anesthesia with ASA Monitors. Additional Monitors:     Airway Plan:            Plan Factors-Exercise tolerance (METS): >4 METS. Chart reviewed. Existing labs reviewed. Patient is not a current smoker. Obstructive sleep apnea risk education given perioperatively. Induction- intravenous. Postoperative Plan-     Informed Consent- Anesthetic plan and risks discussed with patient.

## 2023-10-25 NOTE — ANESTHESIA POSTPROCEDURE EVALUATION
Post-Op Assessment Note    CV Status:  Stable    Pain management: adequate     Mental Status:  Alert and awake   Hydration Status:  Euvolemic   PONV Controlled:  Controlled   Airway Patency:  Patent      Post Op Vitals Reviewed: Yes      Staff: Anesthesiologist, CRNA         No notable events documented.     BP      Temp      Pulse     Resp      SpO2      /98   Pulse 87   Temp 98.1 °F (36.7 °C) (Temporal)   Resp 16   Ht 5' 4" (1.626 m)   Wt 130 kg (286 lb)   LMP 10/25/2023 (Exact Date)   SpO2 96%   BMI 49.09 kg/m²

## 2023-10-27 ENCOUNTER — TELEPHONE (OUTPATIENT)
Age: 48
End: 2023-10-27

## 2023-10-27 ENCOUNTER — TELEPHONE (OUTPATIENT)
Dept: OBGYN CLINIC | Facility: HOSPITAL | Age: 48
End: 2023-10-27

## 2023-10-27 DIAGNOSIS — A04.8 BACTERIAL INFECTION DUE TO H. PYLORI: Primary | ICD-10-CM

## 2023-10-27 DIAGNOSIS — A04.8 H. PYLORI INFECTION: Primary | ICD-10-CM

## 2023-10-27 PROCEDURE — 88305 TISSUE EXAM BY PATHOLOGIST: CPT | Performed by: PATHOLOGY

## 2023-10-27 RX ORDER — OMEPRAZOLE 20 MG/1
20 CAPSULE, DELAYED RELEASE ORAL 2 TIMES DAILY
Qty: 28 CAPSULE | Refills: 0 | Status: SHIPPED | OUTPATIENT
Start: 2023-10-27 | End: 2023-11-10

## 2023-10-27 RX ORDER — METRONIDAZOLE 500 MG/1
500 TABLET ORAL EVERY 8 HOURS SCHEDULED
Qty: 42 TABLET | Refills: 0 | Status: SHIPPED | OUTPATIENT
Start: 2023-10-27 | End: 2023-11-10

## 2023-10-27 RX ORDER — CLARITHROMYCIN 500 MG/1
500 TABLET, COATED ORAL EVERY 12 HOURS SCHEDULED
Qty: 28 TABLET | Refills: 0 | Status: SHIPPED | OUTPATIENT
Start: 2023-10-27 | End: 2023-11-10

## 2023-10-27 RX ORDER — AMOXICILLIN 500 MG/1
1000 TABLET, FILM COATED ORAL 2 TIMES DAILY
Qty: 56 TABLET | Refills: 0 | OUTPATIENT
Start: 2023-10-27 | End: 2023-11-10

## 2023-10-27 NOTE — TELEPHONE ENCOUNTER
Pt diagnosed with H-Pylori per  . Prec/ pack is ordered            Allergies ** Amoxicillin. Clavulanic Acid ,Moxifloxacin ,Sulfa Antibiotics .

## 2023-11-03 ENCOUNTER — HOSPITAL ENCOUNTER (OUTPATIENT)
Dept: RADIOLOGY | Facility: IMAGING CENTER | Age: 48
End: 2023-11-03
Payer: COMMERCIAL

## 2023-11-03 VITALS — BODY MASS INDEX: 41.07 KG/M2 | WEIGHT: 271 LBS | HEIGHT: 68 IN

## 2023-11-03 DIAGNOSIS — Z12.31 SCREENING MAMMOGRAM FOR BREAST CANCER: ICD-10-CM

## 2023-11-03 PROCEDURE — 77063 BREAST TOMOSYNTHESIS BI: CPT

## 2023-11-03 PROCEDURE — 77067 SCR MAMMO BI INCL CAD: CPT

## 2023-11-13 ENCOUNTER — TELEPHONE (OUTPATIENT)
Dept: BARIATRICS | Facility: CLINIC | Age: 48
End: 2023-11-13

## 2023-11-13 NOTE — PROGRESS NOTES
Behavioral Health Follow Up Note:      3 / 4  Weight Check:  Surgeon: Dr. Arthur Carrizales    Starting weight 280  #  Today's weight 287.7   #      Surgery month:  Jan/ FEb  Surgery deadline: may    Mental health and wellness - Patient is appropriately making changes based off the information contained in the bariatric manual.  Patient is modifying behaviors and demonstrating adapting to change. Encouraged mindful behaviors. Usually carries Atkins bars in her purse. Will also eat Bj bars (does read the label) - pulled up [employer] bar label on the Web and there are 21g sugar in the flavor she was eating. Son is an avid exercise/      Eating behaviors - stated she is skipping meals. Was shopping all day yesterday and only had a coffee. Had an egg sandwich this morning. Various foods in the house high in protein. Stated there is Meat in the home and they pre cook on Sundays on the grill.      Activity -  history of movement     Progress toward program requirements    Workflow:  Psych and/or D+A additional documentation  n/a  PCP Letter: 9/27/2023  Support Group: RECOMMENDED  Surgeon Appt.: 10/12/2023  EGD : 10/25/2023  Cardiac Risk Assessment: end of November  Sleep Studies: N/A  Bloodwork: pending       Goals:  read all labels

## 2023-11-15 ENCOUNTER — OFFICE VISIT (OUTPATIENT)
Dept: BARIATRICS | Facility: CLINIC | Age: 48
End: 2023-11-15

## 2023-11-15 DIAGNOSIS — Z79.899 ON STATIN THERAPY: ICD-10-CM

## 2023-11-15 DIAGNOSIS — E78.2 MIXED DYSLIPIDEMIA: ICD-10-CM

## 2023-11-15 DIAGNOSIS — E66.01 OBESITY, CLASS III, BMI 40-49.9 (MORBID OBESITY) (HCC): Primary | ICD-10-CM

## 2023-11-15 PROCEDURE — RECHECK

## 2023-11-15 RX ORDER — ATORVASTATIN CALCIUM 10 MG/1
10 TABLET, FILM COATED ORAL DAILY
Qty: 90 TABLET | Refills: 1 | Status: SHIPPED | OUTPATIENT
Start: 2023-11-15

## 2023-11-17 DIAGNOSIS — N92.0 MENORRHAGIA WITH REGULAR CYCLE: Primary | ICD-10-CM

## 2023-11-17 RX ORDER — MEDROXYPROGESTERONE ACETATE 10 MG/1
10 TABLET ORAL DAILY
Qty: 10 TABLET | Refills: 0 | Status: SHIPPED | OUTPATIENT
Start: 2023-11-17 | End: 2023-11-27

## 2023-11-17 NOTE — TELEPHONE ENCOUNTER
Pt having heavy bleeding. She is scheduled for LAVH, belinda salpingectomy 2/6/24. She asks if we could give her medication to help the bleeding. Pre [place]esa pt advised we will give her Provera 10 mg daily for 10 days. Script sent to pharmacy.

## 2023-11-20 ENCOUNTER — HOSPITAL ENCOUNTER (OUTPATIENT)
Dept: ULTRASOUND IMAGING | Facility: CLINIC | Age: 48
Discharge: HOME | End: 2023-11-20
Payer: COMMERCIAL

## 2023-11-20 DIAGNOSIS — R92.8 ABNORMAL SCREENING MAMMOGRAM: ICD-10-CM

## 2023-11-20 PROCEDURE — 76642 ULTRASOUND BREAST LIMITED: CPT

## 2023-11-29 ENCOUNTER — OFFICE VISIT (OUTPATIENT)
Dept: PODIATRY | Facility: CLINIC | Age: 48
End: 2023-11-29
Payer: COMMERCIAL

## 2023-11-29 ENCOUNTER — APPOINTMENT (OUTPATIENT)
Dept: RADIOLOGY | Age: 48
End: 2023-11-29
Payer: COMMERCIAL

## 2023-11-29 VITALS
HEART RATE: 81 BPM | WEIGHT: 282 LBS | SYSTOLIC BLOOD PRESSURE: 143 MMHG | HEIGHT: 68 IN | BODY MASS INDEX: 42.74 KG/M2 | DIASTOLIC BLOOD PRESSURE: 86 MMHG

## 2023-11-29 DIAGNOSIS — M25.572 ACUTE LEFT ANKLE PAIN: ICD-10-CM

## 2023-11-29 DIAGNOSIS — G89.29 CHRONIC PAIN OF LEFT ANKLE: Primary | ICD-10-CM

## 2023-11-29 DIAGNOSIS — M67.874 ACHILLES TENDINOSIS OF LEFT ANKLE: ICD-10-CM

## 2023-11-29 DIAGNOSIS — M25.572 CHRONIC PAIN OF LEFT ANKLE: Primary | ICD-10-CM

## 2023-11-29 PROCEDURE — 99204 OFFICE O/P NEW MOD 45 MIN: CPT | Performed by: PODIATRIST

## 2023-11-29 PROCEDURE — 73610 X-RAY EXAM OF ANKLE: CPT

## 2023-11-29 NOTE — PROGRESS NOTES
Assessment/Plan:       Diagnoses and all orders for this visit:    Chronic pain of left ankle  -     X-ray ankle left 3+ views; Future  -     Ambulatory referral to Physical Therapy; Future    Achilles tendinosis of left ankle  -     Ambulatory Referral to Podiatry  -     Ambulatory referral to Physical Therapy; Future      Diagnosis and options discussed with patient  Patient agreeable to the plan as stated below    PAtients left achilles tendon is thicker and tender at the watershed area. Given the history this is likely a chronic injury. Physical therapy daily and formal PT  RICE  Stretch before and after work  Check progress in 6 weeks    XRay 3 views of the left ankle personally read by Dr. Blane Melgar in office today and discussed with patient:    There is no acute fracture or dislocation. No significant degenerative changes, small plantar spur. No lytic or blastic osseous lesion. Soft tissues are unremarkable. Subjective:      Patient ID: Roberth Upton is a 51 y.o. female. PAtient has had left ankle and achilles pain for a couple of years. 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 She gets sharp pain on the back of the heel up the back of the calf. She tried some massage and ice but it isn't going away. Int he past she did go to PT but it didn't help much (she only went for a week). The back of her achilles sees harder and thicker than the right. The following portions of the patient's history were reviewed and updated as appropriate: allergies, current medications, past family history, past medical history, past social history, past surgical history, and problem list.    Review of Systems    Constitutional: Negative. Respiratory: Negative for cough and shortness of breath. Gastrointestinal: Negative for diarrhea, nausea and vomiting. Musculoskeletal: achilles pain  Skin: Negative for rash or wound. Neurological: Negative for weakness, numbness and headaches. Objective:      /86   Pulse 81   Ht 5' 8" (1.727 m)   Wt 128 kg (282 lb)   BMI 42.88 kg/m²          Physical Exam  Vitals reviewed. Constitutional:       Appearance: She is obese. She is not ill-appearing or diaphoretic. Cardiovascular:      Rate and Rhythm: Normal rate. Pulses: Normal pulses. Pulmonary:      Effort: Pulmonary effort is normal. No respiratory distress. Musculoskeletal:      Left ankle: No swelling, deformity, ecchymosis or lacerations. No tenderness. Normal range of motion. Anterior drawer test negative. Normal pulse. Left Achilles Tendon: Tenderness and defect (thickening of tendon at watershed area is TTP. PF strength is 5/5, normal chambers test.) present. Chambers's test negative. Skin:     Capillary Refill: Capillary refill takes less than 2 seconds. Neurological:      Mental Status: She is alert.

## 2023-11-29 NOTE — PATIENT INSTRUCTIONS
Ankle Exercises   AMBULATORY CARE:   What you need to know about ankle exercises: Ankle exercises help strengthen your ankle and improve its function after injury. These are beginning exercises. Ask your healthcare provider if you need to see a physical therapist for more advanced exercises. General guidelines for ankle exercises:   Do these exercises 3 to 5 days a week, or as directed by your healthcare provider. Ask if you should do the exercises on each ankle. Do the exercises in the order that your healthcare provider recommends. This will help prevent swelling, chronic pain, and reinjury. Start with range of motion exercises. Then move to strengthening exercises, and finally to balancing exercises. Warm up before you do ankle exercises. Walk or ride a stationary bike for 5 to 10 minutes to prepare your ankle for movement. Stop if you feel pain. It is normal to feel some discomfort at first but you should not feel pain. Tell your doctor or physical therapist if you have pain while you exercise. Regular exercise will help decrease your discomfort over time. How to perform range of motion exercises safely:  Begin with range of motion exercises to improve flexibility. Ask your healthcare provider when you can progress to strengthening exercises. Ankle alphabet:  Sit on a chair so that your feet do not touch the floor. Use your big toe to write each letter of the alphabet. Use only your foot and ankle, and keep your movements small. Do 2 sets. Calf stretches:      Sitting calf stretches with a towel:  Sit on the floor with both legs out straight in front of you. Loop a towel around the ball of your injured foot. Grasp the ends of the towel and pull it toward you. Keep your leg and back straight. Do not lean forward as you pull the towel. Hold for 30 seconds. Then relax for 30 seconds. Do 2 sets of 10.          Standing calf stretches:  Stand facing a wall with the foot that is not injured forward and your knee slightly bent. Keep the leg with the injured foot straight and behind you with your toes pointed in slightly. With both heels flat on the floor, press your hips forward. Do not arch your back. Hold for 30 seconds, and then relax for 30 seconds. Do 2 sets of 10. Repeat with your leg bent. Do 2 sets of 10. How to perform strengthening exercises safely:  After you can perform range of motion exercises without pain, you may begin strengthening exercises. Ask your healthcare provider when you can progress to balancing exercises. Ankle movement in 4 directions:  Sit on the floor with your legs straight in front of you. Keep your heels on the floor for support. Dorsiflexion:  Begin with your toes pointing straight up. Pull your toes toward your body. Slowly return to the starting position. Do 3 sets of 5. Plantar flexion:  Begin with your toes pointing straight up. Push your toes away from your body. Slowly return to the starting position. Do 3 sets of 5. Inversion:  Begin with your toes pointing straight up. Push your toes inward, toward each other. Slowly return to the starting position. Do 3 sets of 5. Eversion:  Begin with your toes pointing straight up. Push your toes outward, away from each other. Slowly return to the starting position. Do 3 sets of 5. Toe curls with a towel:  Sit on a chair so that both of your feet are flat on the floor. Place a small towel on the floor in front of your injured foot. Grab the center of the towel with your toes and curl the towel toward you. Relax and repeat. Do 1 set of 5. [place] pick-ups:  Sit on a chair so that both of your feet are flat on the floor. Place 20 marbles on the floor in front of your injured foot. Use your toes to  one marble at a time and place it into a bowl. Repeat until you have picked up all the marbles. Do 1 set.      Heel raises:      Single leg heel raises:  Stand with your weight evenly on both feet. Hold on to a chair or a wall for balance. Lift the foot that is not injured off the floor so all your weight is placed on your injured foot. Raise the heel of your injured foot as high as you can. Slowly lower your heel to the floor. Do 1 set of 10. Double leg heel raises:  Stand with your weight evenly on both feet. Hold on to a chair or a wall for balance. Raise both of your heels as high as you can. Slowly lower your heels to the floor. Do 1 set of 10. Heel and toe walks:      Heel walks:  Begin in a standing position. Lift your toes off the floor and walk on your heels. Keep your toes lifted as high as possible. Do 2 sets of 10. Toe walks:  Begin in a standing position. Lift your heels off the floor and walk on the balls and toes of your feet. Keep your heels lifted as high as possible. Do 2 sets of 10. How to perform a balance exercise safely:  After you can perform strengthening exercises without pain, you may do this beginning balancing exercise. Ask your healthcare provider for more advanced balance exercises. Single leg stance:  Stand with your weight evenly on both feet, or hold on to a chair or a wall. Do not lean to the side. Lift the foot that is not injured off the floor so all your weight is placed on your injured foot. Balance on your injured foot. Ask your healthcare provider how long to hold this position. Call your doctor or physical therapist if:   You have new pain, or your pain becomes worse. You have questions or concerns about your condition, care, or exercise program.    © Copyright [employer] 2023 Information is for End User's use only and may not be sold, redistributed or otherwise used for commercial purposes. The above information is an  only. It is not intended as medical advice for individual conditions or treatments.  Talk to your doctor, nurse or pharmacist before following any medical regimen to see if it is safe and effective for you.

## 2023-12-14 NOTE — PROGRESS NOTES
Bariatric Nutrition Assessment Note    Type of surgery    Preop  Surgery Date: TBD- Pt has 4 month program requirement   Today is 4/4 of program requirement     Surgeon: Dr. Luke Sal  51 y.o.  female     Wt with BMI of 25: 159.3 lbs  Pre-Op Excess Wt: 120.7 lbs   Wt 132 kg (290 lb 4.8 oz)   BMI 44.14 kg/m²      Pt is up 2.5 # x 1 month - reports she has bloating with her period      NAFLD Fibrosis Score is: -. 819    NAFLD Score Correlated Fibrosis Severity   <-1.455 F0-F2   -1.455-0.676 Indeterminate Score   >0.676 F3-F4   **Fibrosis Severity Scale: F0 = no fibrosis; F1= mild fibrosis; F2 = moderate fibrosis; F3 = severe fibrosis; F4 = cirrhosis    NAFLD Score Component Values:  Component Value Date   Age: 51 y.o.     BMI: 42.88 kg/m²    IFG or DM: Yes    AST: 12 U/L 3/28/2023   ALT: 19 U/L 3/28/2023   Platelet: 400 Thousands/uL 3/31/2023   Albumin: 3.5 g/dL 3/28/2023       Jair Robertson Bon Equation:    Estimated calories for weight loss 5360-7001 kcal per day  ( 1-2# per wk wt loss - sedentary )  Estimated protein needs  grams per day (1.0-1.5 gms/kg IBW )   Estimated fluid needs 72-85 ounces per day  (30-35 ml/kg IBW )      Weight History   Onset of Obesity: Childhood  Family history of obesity: Yes  Wt Loss Attempts: Commercial Programs ([employer], Arlyn Levo, etc.)  Counseling with  MD  Exercise  Self Created Diets (Portion Control, Healthy Food Choices, etc.)  Prescribed Wegovy for about 2-3 months but did not have a good response- nausea and diarrhea   Patient has tried the above for 6 months or more with insufficient weight loss or weight regain, which is why patient has requested to be evaluated for weight loss surgery today  Maximum Wt Lost: 18 years ago while on [employer] - lost to 140 pounds with diet and exercise.        Review of History and Medications   Past Medical History:   Diagnosis Date    Anemia     Anxiety     Depression     GERD (gastroesophageal reflux disease)     History of prior pregnancies         Hyperlipidemia     Migraine with aura and without status migrainosus, not intractable 2018    Obesity     Papanicolaou smear 2019    normal    Uterine bleeding, dysfunctional      Past Surgical History:   Procedure Laterality Date     SECTION       SECTION      TX BLEPHAROPLASTY UPPER EYELID W/EXCESSIVE SKIN Bilateral 2023    Procedure: BLEPHAROPLASTY UPPER;  Surgeon: Rio Hassan MD;  Location: [address] [place] [address] MAIN OR;  Service: Plastics    TX HYSTEROSCOPY ENDOMETRIAL ABLATION N/A 2021    Procedure: Gorge Hasting;  Surgeon: Pato Quiroz MD;  Location: [facility] MAIN OR;  Service: Gynecology    TUBAL LIGATION       Social History     Socioeconomic History    Marital status: /Civil Union     Spouse name: Not on file    Number of children: Not on file    Years of education: Not on file    Highest education level: Not on file   Occupational History    Not on file   Tobacco Use    Smoking status: Never    Smokeless tobacco: Never   Vaping Use    Vaping status: Never Used   Substance and Sexual Activity    Alcohol use: No    Drug use: No    Sexual activity: Yes     Partners: Male     Birth control/protection: Female Sterilization   Other Topics Concern    Not on file   Social History Narrative    Not on file     Social Determinants of Health     Financial Resource Strain: Not on file   Food Insecurity: Not on file   Transportation Needs: Not on file   Physical Activity: Not on file   Stress: Not on file   Social Connections: Not on file   Intimate Partner Violence: Not on file   Housing Stability: Not on file       Current Outpatient Medications:     atorvastatin (LIPITOR) 10 mg tablet, Take 1 tablet (10 mg total) by mouth daily, Disp: 90 tablet, Rfl: 1    B Complex Vitamins (VITAMIN B COMPLEX PO), Take by mouth daily , Disp: , Rfl:     cyclobenzaprine (FLEXERIL) 5 mg tablet, Take 1 tablet (5 mg total) by mouth as needed for muscle spasms (migraine), Disp: 30 tablet, Rfl: 2    ferrous sulfate 324 (65 Fe) mg, Take 1 tablet (324 mg total) by mouth daily before breakfast, Disp: 30 tablet, Rfl: 1    FLUoxetine (PROzac) 20 mg capsule, Take 1 capsule (20 mg total) by mouth daily, Disp: 60 capsule, Rfl: 1    LORazepam (ATIVAN) 0.5 mg tablet, Take 2 tablets (1 mg total) by mouth daily as needed for anxiety, Disp: 20 tablet, Rfl: 0    meclizine (ANTIVERT) 25 mg tablet, Take 1 tablet (25 mg total) by mouth every 8 (eight) hours, Disp: 30 tablet, Rfl: 0    medroxyPROGESTERone (PROVERA) 10 mg tablet, Take 1 tablet (10 mg total) by mouth daily for 10 days, Disp: 10 tablet, Rfl: 0    multivitamin (THERAGRAN) TABS, Take 1 tablet by mouth daily, Disp: , Rfl:     omeprazole (PriLOSEC) 20 mg delayed release capsule, Take 1 capsule (20 mg total) by mouth daily, Disp: 90 capsule, Rfl: 0    omeprazole (PriLOSEC) 20 mg delayed release capsule, Take 1 capsule (20 mg total) by mouth 2 (two) times a day for 14 days, Disp: 28 capsule, Rfl: 0    onabotulinumtoxin A (BOTOX) 100 units, Inject into a muscle every 3 (three) months For migraines done every 3 months, Disp: , Rfl:     ondansetron (ZOFRAN) 4 mg tablet, Take 1 tablet (4 mg total) by mouth every 8 (eight) hours as needed for nausea or vomiting, Disp: 20 tablet, Rfl: 0    rimegepant sulfate (Nurtec) 75 mg TBDP, Take 1 tablet (75 mg total) by mouth every other day, Disp: 16 tablet, Rfl: 11    vitamin B-12 (VITAMIN B-12) 1,000 mcg tablet, Take by mouth daily, Disp: , Rfl:     Vitamin D, Cholecalciferol, 1000 units CAPS, Take 1 tablet by mouth in the morning  , Disp: , Rfl:      Food Intake and Lifestyle Assessment   Food Intake Assessment completed via usual diet recall  Wakes up at 9/9: 30 am   Coffee with SF vanilla creamer   Breakfast: 10/10:30 am   One scrambled egg with 1/2 bagel and FF cream cheese and 1/2 fruit ( nectarine or orange )   Snack: 0   Lunch: sometimes skip - eat lunch about twice per week   Snack: 2 pm / 3 pm - trail mix   Dinner: 8:30/9:00 pm   Will cook - will make grilled salmon or chicken  and mashed potatoes or mac and cheese   Snack: 0  Beverage intake: water, coffee/tea and gatorade   Protein supplement: none currently   Estimated protein intake per day: ~40 grams per day   Estimated fluid intake per day: coffee or gatorarade ( sometimes ) , she has stopped soda   Water 5 glasses per day of water ~40 ounces per day , 16 ounces cup of coffee,   Meals eaten away from home: twice per week on weekends   Typical meal pattern: 2-3 meals per day and 0-1 snacks per day  Eating Behaviors: Large portion sizes  Food allergies or intolerances: NKFA- sometimes eggs upsets her stomach   Allergies   Allergen Reactions    Amoxicillin Hives    Clavulanic Acid Hives    Moxifloxacin Hives    Sulfa Antibiotics Hives     Cultural or Latter-day considerations: [place]     Physical Assessment  Physical Activity  Types of exercise: Walking  Dog daily for 45 minutes per day   Current physical limitations: none noted     Psychosocial Assessment   Support systems: spouse  Socioeconomic factors: owns hair salon   40 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 Patient educated and handouts provided.   Surgical changes to stomach / GI  Capacity of post-surgery stomach  Diet progression  Adequate hydration  Sugar and fat restriction to decrease "dumping syndrome"  Fat restriction to decrease steatorrhea  Expected weight loss  Weight loss plateaus/ possibility of weight regain  Exercise  Suggestions for pre-op diet  Nutrition considerations after surgery  Protein supplements  Meal planning and preparation  Appropriate carbohydrate, protein, and fat intake, and food/fluid choices to maximize safe weight loss, nutrient intake, and tolerance   Dietary and lifestyle changes  Possible problems with poor eating habits  Intuitive eating  Techniques for self monitoring and keeping daily food journal  Potential for food intolerance after surgery, and ways to deal with them including: lactose intolerance, nausea, reflux, vomiting, diarrhea, food intolerance, appetite changes, gas  Vitamin / Mineral supplementation of Multivitamin with minerals, Calcium, Vitamin B12, Iron, Fat Soluble vitamins and Vitamin D  Post-operative pregnancy guidelines    Patient is not currently pregnant and doesn't desire to become pregnant a minimum of one year post-op- pt with failed ablation, considering hysterectomy after bariatric surgery     Education provided to: patient    Barriers to learning: No barriers identified    Readiness to change: preparation    Prior research on procedure: discussed with provider and friends or family    Comprehension: needs reinforcement and verbalizes understanding     Expected Compliance: good  Recommendations  Pt is an appropriate candidate for surgery. Yes    Workflow: (Incomplete in [facility]):  Psych and/or D+A Clearance: n/a  Blood Work: ordered  PCP letter: done  Surgeon Appt: done  EGD: done  Cardiac Risk Assessment with [id number]  Sleep Studies: N/A  Nicotine test: n/A  Pre-Operative Program:   NAFLD Score Calculated: YES  Hepatology consult: referral placed. Evaluation / Monitoring  Dietitian to Monitor: Eating pattern as discussed Tolerance of nutrition prescription Body weight Lab values Physical activity Bowel pattern  She has switched from coffee to tea with fat free creamer.  She has increased her fluid intake to 70-80 ounces per day  She is keeping a food log with [employer].  Reports that she has her period now, and she usually gains 7# . Discussed hysterectomy and she has decided to wait until after her weight loss surgery to have the procedure. Instructed patient to further discuss with gyn as recommendation is 6  months post op before having procedure.       Goals  Eliminate sugar sweetened beverages, Complete lession plans 1-6, Eat 3 meals per day and Eliminate mindless snacking   Practice 30/60 rule  Time meals to take 15-20 minutes     Time Spent:   30  Minutes

## 2023-12-15 ENCOUNTER — OFFICE VISIT (OUTPATIENT)
Dept: BARIATRICS | Facility: CLINIC | Age: 48
End: 2023-12-15

## 2023-12-15 VITALS — BODY MASS INDEX: 44.14 KG/M2 | WEIGHT: 290.3 LBS

## 2023-12-15 DIAGNOSIS — E66.01 OBESITY, CLASS III, BMI 40-49.9 (MORBID OBESITY) (HCC): Primary | ICD-10-CM

## 2023-12-15 PROCEDURE — RECHECK: Performed by: DIETITIAN, REGISTERED

## 2024-01-10 ENCOUNTER — PATIENT MESSAGE (OUTPATIENT)
Dept: INTERNAL MEDICINE CLINIC | Age: 49
End: 2024-01-10

## 2024-01-10 ENCOUNTER — OFFICE VISIT (OUTPATIENT)
Dept: GASTROENTEROLOGY | Facility: CLINIC | Age: 49
End: 2024-01-10
Payer: COMMERCIAL

## 2024-01-10 VITALS
BODY MASS INDEX: 44.41 KG/M2 | DIASTOLIC BLOOD PRESSURE: 70 MMHG | WEIGHT: 293 LBS | HEART RATE: 106 BPM | SYSTOLIC BLOOD PRESSURE: 128 MMHG | HEIGHT: 68 IN | TEMPERATURE: 97.2 F

## 2024-01-10 DIAGNOSIS — Z12.11 COLON CANCER SCREENING: ICD-10-CM

## 2024-01-10 DIAGNOSIS — E66.01 MORBID OBESITY (HCC): Primary | ICD-10-CM

## 2024-01-10 DIAGNOSIS — E78.5 HYPERLIPIDEMIA, UNSPECIFIED HYPERLIPIDEMIA TYPE: ICD-10-CM

## 2024-01-10 DIAGNOSIS — Z01.818 PRE-OP EVALUATION: ICD-10-CM

## 2024-01-10 DIAGNOSIS — K29.50 CHRONIC GASTRITIS WITHOUT BLEEDING, UNSPECIFIED GASTRITIS TYPE: ICD-10-CM

## 2024-01-10 DIAGNOSIS — F41.1 GAD (GENERALIZED ANXIETY DISORDER): Primary | ICD-10-CM

## 2024-01-10 DIAGNOSIS — A04.8 H. PYLORI INFECTION: ICD-10-CM

## 2024-01-10 DIAGNOSIS — R16.0 HEPATOMEGALY: ICD-10-CM

## 2024-01-10 PROCEDURE — 99214 OFFICE O/P EST MOD 30 MIN: CPT | Performed by: INTERNAL MEDICINE

## 2024-01-10 RX ORDER — POLYETHYLENE GLYCOL 3350 17 G/17G
238 POWDER, FOR SOLUTION ORAL ONCE
Qty: 238 G | Refills: 0 | Status: SHIPPED | OUTPATIENT
Start: 2024-01-10 | End: 2024-01-10

## 2024-01-10 NOTE — PATIENT INSTRUCTIONS
Please have the blood work done at your earliest convenience.  Please schedule ultrasound and elastography.  Continue to follow with your Bariatric team.  
dr schwarz

## 2024-01-10 NOTE — PROGRESS NOTES
[facility] - Outpatient Consultation  Jaquan Ribeiro 48 y.o. female MRN: [id number]  Encounter: [id number]    PCP:  Fany Wiley DO, [phone]  Referring Provider:  No ref. provider found,         ASSESSMENT AND PLAN:      Jose De Jesusdib obesity (BMI 44.7), mild hepatomegaly, risk for fatty liver disease, with NAFLD fibrosis score -1.89.    I discussed with Ms. Ribeiro the natural history of fatty liver disease, including risks for development, and risk for progression to cirrhosis and its complications.  We reviewed that there are no current medications that are FDA approved for the specific management of fatty liver disease.  I explained that there are many, many compounds (both new and medications currently used for other indications) that are currently being studied to treat several aspects of fatty liver disease, including decreasing hepatic fat, hepatic inflammation, hepatic fibrosis, as well reducing risk factors for the development of fatty liver (primarily through weight loss).  We do not currently have active clinical trials through my office, but medication called Resmetirom may soon be FDA approved in late March.  I would let patient know if she qualifies for treatment.    I explained that even if liver enzymes are normal, it does not rule out low levels of active fatty inflammation in the liver. Ms. Ribeiro will have additional blood work to screen for viral hepatitis and hepatitis A and B immunity testing    NAFLD Fibrosis Score is a non-invasive calculation based on patient characteristics and basic routine labs (Age, BMI, AST, ALT, Platelet count, Albumin and presence/absence of insulin resistance).  Ms. Ribeiro's NAFLD Fibrosis score is -1.89.  Scores less than -1.455 correlate well with absence of advanced fibrosis, with a high negative predictive value.    I have requested lab based PATEL Fibrosure and abdominal ultrasound elastography with fat  quantification (UGAP) to further assess hepatic fibrosis and steatosis in a non-invasive manner.    I counseled Ms. Ribeiro on the importance of weight loss through lifestyle modification, primary diet and exercise, and the benefits of seeking input of a dietician/nutritionist as well as consultation with a medical weight loss specialist or bariatric surgery team.    Ms. Ribeiro will have the testing done as outlined above and follow-up in 6 months.      CRC Screening:  Patient has not yet had colonoscopy.  She denies family history of colon cancer.  She denies diarrhea or constipation.  She denies any evidence of GI bleeding.  Discussed with her the procedure of colonoscopy and have recommended she have routine colonoscopy at hospital location with MiraLAX/Dulcolax prep.    GERD:  Symptoms are currently well-controlled on PPI.  EGD done on 10/25/2023 showed gastritis with random gastric biopsy showing H Pylori infection.  She completely treatment a few days ago.  She will have H Pylori stool testing in 4 weeks.  She is aware to hold PPI for 2 weeks prior.    FOLLOW-UP:  Return in about 6 months (around 7/10/2024).    VISIT DIAGNOSES AND ORDERS:      1. Morbid obesity (HCC)    2. Hepatomegaly    3. Hyperlipidemia, unspecified hyperlipidemia type    4. Pre-op evaluation    5. Colon cancer screening      Orders Placed This Encounter   Procedures    US elastography/UGAP    US abdomen complete    Hepatitis A antibody, total    Hepatitis B surface antibody    Hepatitis panel, acute    PATEL Fibrosure    Colonoscopy     ______________________________________________________________________    HPI:  Ms. Jaquan Ribeiro is a 48 y.o. female with medical history as outlined below, including morbid obesity with a BMI of 44.7, GERD, recent H. pylori infection status posttreatment, who comes in today for initial consultation due to risk for fatty liver disease.    She denies a known personal or family history of  disease.  She denies a history of alcohol use.  Does have prediabetes, mixed dyslipidemia.    Ms. Ribeiro denies recent or history of yellow eyes/skin, dark urine, GI bleeding, abdominal distention with fluid, lower extremity swelling, easy bruising, excessive bleeding, pruritus or confusion.  She denies abdominal pain, nausea, vomiting, heartburn, reflux, difficulty swallowing, early satiety, bloating, diarrhea, constipation or straining with passing stools.      REVIEW OF SYSTEMS:    Review of Systems  Per HPI    Historical Information   Patient Active Problem List   Diagnosis    Chronic migraine without aura without status migrainosus, not intractable    Seasonal allergies    Macromastia    Pre-diabetes    Mixed dyslipidemia    GERD (gastroesophageal reflux disease)    Screening for breast cancer    Eustachian tube dysfunction, bilateral    Chronic cough    Weight gain    Thrombocytosis    Iron deficiency    Sore throat    Snoring    LASHAUN (generalized anxiety disorder)    Panic attack    BMI 40.0-44.9, adult ([facility])    History of endometrial ablation    Carpal tunnel syndrome of left wrist    Medial epicondylitis of left elbow    COVID-19    Vertigo    Nausea    Benign paroxysmal positional vertigo of left ear    Obesity, Class III, BMI 40-49.9 (morbid obesity) ([facility])    Dermatochalasis     Past Medical History:   Diagnosis Date    Anemia     Anxiety     Depression     GERD (gastroesophageal reflux disease)     History of prior pregnancies         Hyperlipidemia     Migraine with aura and without status migrainosus, not intractable 2018    Obesity     Papanicolaou smear 2019    normal    Uterine bleeding, dysfunctional      Past Surgical History:   Procedure Laterality Date     SECTION       SECTION      SC BLEPHAROPLASTY UPPER EYELID W/EXCESSIVE SKIN Bilateral 2023    Procedure: BLEPHAROPLASTY UPPER;  Surgeon: Horacio Wing MD;  Location:  MAIN OR;  Service: Plastics    SC  HYSTEROSCOPY ENDOMETRIAL ABLATION N/A 4/27/2021    Procedure: ABLATION ENDOMETRIAL NOVASURE;  Surgeon: Eagle Santiago MD;  Location: [facility]  MAIN OR;  Service: Gynecology    TUBAL LIGATION       Social History   Social History     Substance and Sexual Activity   Alcohol Use No     Social History     Substance and Sexual Activity   Drug Use No     Social History     Tobacco Use   Smoking Status Never   Smokeless Tobacco Never     Family History   Problem Relation Age of Onset    No Known Problems Mother     Cancer Father 80        bladder    Prostate cancer Father     Stroke Father     No Known Problems Sister     No Known Problems Sister     No Known Problems Daughter     No Known Problems Maternal Aunt     No Known Problems Maternal Aunt     No Known Problems Maternal Aunt     No Known Problems Maternal Aunt     No Known Problems Paternal Aunt     No Known Problems Paternal Aunt     No Known Problems Paternal Aunt     No Known Problems Maternal Grandmother     No Known Problems Maternal Grandfather     No Known Problems Paternal Grandmother     No Known Problems Paternal Grandfather        Meds/Allergies       Current Outpatient Medications:     atorvastatin (LIPITOR) 10 mg tablet    B Complex Vitamins (VITAMIN B COMPLEX PO)    cyclobenzaprine (FLEXERIL) 5 mg tablet    ferrous sulfate 324 (65 Fe) mg    LORazepam (ATIVAN) 0.5 mg tablet    meclizine (ANTIVERT) 25 mg tablet    multivitamin (THERAGRAN) TABS    omeprazole (PriLOSEC) 20 mg delayed release capsule    onabotulinumtoxin A (BOTOX) 100 units    ondansetron (ZOFRAN) 4 mg tablet    polyethylene glycol (MiraLax) 17 GM/SCOOP powder    rimegepant sulfate (Nurtec) 75 mg TBDP    vitamin B-12 (VITAMIN B-12) 1,000 mcg tablet    Vitamin D, Cholecalciferol, 1000 units CAPS    FLUoxetine (PROzac) 20 mg capsule    medroxyPROGESTERone (PROVERA) 10 mg tablet    omeprazole (PriLOSEC) 20 mg delayed release capsule    Allergies   Allergen Reactions    Amoxicillin Hives     "Clavulanic Acid Hives    Moxifloxacin Hives    Sulfa Antibiotics Hives           Objective     Blood pressure 128/70, pulse (!) 106, temperature (!) 97.2 °F (36.2 °C), temperature source Tympanic, height 5' 8\" (1.727 m), weight 133 kg (294 lb), not currently breastfeeding. Body mass index is 44.7 kg/m².        PHYSICAL EXAM:           Physical Exam  Vitals reviewed.   Constitutional:       General: She is not in acute distress.     Appearance: Normal appearance. She is obese. She is not ill-appearing.   HENT:      Head: Normocephalic and atraumatic.      Nose: Nose normal.      Mouth/Throat:      Mouth: Mucous membranes are moist.      Pharynx: Oropharynx is clear.   Eyes:      General: No scleral icterus.     Extraocular Movements: Extraocular movements intact.   Cardiovascular:      Rate and Rhythm: Normal rate and regular rhythm.      Heart sounds: No murmur heard.  Pulmonary:      Effort: Pulmonary effort is normal. No respiratory distress.      Breath sounds: Normal breath sounds.   Abdominal:      General: Abdomen is flat.      Palpations: Abdomen is soft. There is hepatomegaly (Liver edge palpable 1 to 2 fingerbreadths below the right costal margin). There is no shifting dullness, fluid wave or splenomegaly.      Tenderness: There is no abdominal tenderness.      Hernia: No hernia is present.   Genitourinary:     Comments: deferred  Musculoskeletal:         General: No swelling. Normal range of motion.      Cervical back: Normal range of motion and neck supple. No tenderness.   Skin:     General: Skin is warm.      Coloration: Skin is not jaundiced.      Findings: No bruising or rash.   Neurological:      General: No focal deficit present.      Mental Status: She is alert and oriented to person, place, and time.   Psychiatric:         Mood and Affect: Mood normal.              Lab Results:   Lab Results   Component Value Date    SODIUM 137 03/31/2023    K 4.6 03/31/2023    BUN 17 03/31/2023    CREATININE 0.72 " "03/31/2023    TBILI 0.49 03/28/2023    ALKPHOS 93 03/28/2023    ALB 3.5 03/28/2023    AST 12 03/28/2023    ALT 19 03/28/2023    WBC 10.79 (H) 03/31/2023     03/31/2023    HGB 12.9 03/31/2023     No results found for: \"AFP\"  Lab Results   Component Value Date    IRON 72 08/26/2020    FERRITIN 24 08/26/2020    CONCFE 18 08/26/2020     Lab Results   Component Value Date    HGBA1C 5.7 (H) 06/27/2023     NAFLD Fibrosis Score: -1.89 at 3/31/2023 11:33 AM  Calculated from:  SGOT/AST: 12 U/L at 3/28/2023  1:34 PM  SGPT/ALT: 19 U/L at 3/28/2023  1:34 PM  Serum Albumin: 3.5 g/dL at 3/28/2023  1:34 PM  Platelets: 341 Thousands/uL at 3/31/2023 11:33 AM  Age: 48 years  BMI: 43.4 at 3/23/2023 12:57 PM  Weight (recorded): 129.32 kg at 2/27/2023 10:46 AM  Height: 172.70 cm at 3/23/2023 12:57 PM  Has Diabetes: No        Radiology Results:   I have reviewed the results of any radiology studies performed within the last 90 days. This includes:      No results found.      Dionte Arcos MD  Hepatology/Gastroenterology  "

## 2024-01-11 ENCOUNTER — TELEPHONE (OUTPATIENT)
Age: 49
End: 2024-01-11

## 2024-01-11 NOTE — TELEPHONE ENCOUNTER
Patient is asking if it was possible  to back on sertraline (ZOLOFT) 50 mg. Per patient, the current medication she is on is making her gain weight and would like to go back to sertraline (ZOLOFT) 50 mg

## 2024-01-17 ENCOUNTER — OFFICE VISIT (OUTPATIENT)
Dept: CARDIOLOGY CLINIC | Facility: CLINIC | Age: 49
End: 2024-01-17
Payer: COMMERCIAL

## 2024-01-17 VITALS
SYSTOLIC BLOOD PRESSURE: 126 MMHG | DIASTOLIC BLOOD PRESSURE: 82 MMHG | WEIGHT: 290 LBS | BODY MASS INDEX: 43.95 KG/M2 | HEART RATE: 75 BPM | HEIGHT: 68 IN

## 2024-01-17 DIAGNOSIS — E66.01 OBESITY, CLASS III, BMI 40-49.9 (MORBID OBESITY) (HCC): Primary | ICD-10-CM

## 2024-01-17 DIAGNOSIS — Z01.810 PREOP CARDIOVASCULAR EXAM: ICD-10-CM

## 2024-01-17 DIAGNOSIS — E78.2 MIXED DYSLIPIDEMIA: ICD-10-CM

## 2024-01-17 PROCEDURE — 93000 ELECTROCARDIOGRAM COMPLETE: CPT | Performed by: INTERNAL MEDICINE

## 2024-01-17 PROCEDURE — 99243 OFF/OP CNSLTJ NEW/EST LOW 30: CPT | Performed by: INTERNAL MEDICINE

## 2024-01-17 NOTE — PROGRESS NOTES
Consultation - Cardiology   Jaquan Ribeiro 49 y.o. female MRN: [id number]  Unit/Bed#:  Encounter: [id number]  Physician Requesting Consult: No att. providers found  Reason for Consult / Principal Problem: Preop cardiac evaluation    Assessment:  Obesity  Hypercholesterolemia    Plan:  She has good exercise tolerance without any cardiac symptoms. ECG is normal . She has no cardiac history.   I feel that her cardiac risk for bariatric surgery is low and she is cleared without further cardiac testing needed.  RTO as needed      History of Present Illness     HPI: Jaquan Ribeiro is a 49 y.o. year old female who denies any past cardiac history. She is seen for cardiac evaluation prior to possible bariatric surgery.  She is active and denies CP, significant SOB, LE edema.  She denies CAD, HTN, DM, CHF, cardiac arrhythmia. She denies smoking, FH of CAD.    /82  ECG - NSR, normal      Review of Systems:    Alert awake oriented, comfortable, denies any complaints  No fevers chills nausea vomiting  No weakness, dizziness, seizures  no cough, shortness of breath, or wheezing  Denies any palpitations, chest pain, diaphoresis  Denies leg edema, pain or paresthesias  Denies any skin rashes  Denies abdominal pain, bloody stools, masses  Denies any depression or suicidal ideations      Historical Information   Past Medical History:   Diagnosis Date    Anemia     Anxiety     Depression     GERD (gastroesophageal reflux disease)     History of prior pregnancies         Hyperlipidemia     Migraine with aura and without status migrainosus, not intractable 2018    Obesity     Papanicolaou smear 2019    normal    Uterine bleeding, dysfunctional      Past Surgical History:   Procedure Laterality Date     SECTION       SECTION      OR BLEPHAROPLASTY UPPER EYELID W/EXCESSIVE SKIN Bilateral 2023    Procedure: BLEPHAROPLASTY UPPER;  Surgeon: Horacio Wing MD;  Location:  MAIN OR;  Service:  "Plastics    [place] HYSTEROSCOPY ENDOMETRIAL ABLATION N/A 4/27/2021    Procedure: ABLATION ENDOMETRIAL NOVASURE;  Surgeon: Eagle Santiago MD;  Location: [facility];  Service: Gynecology    TUBAL LIGATION       Social History     Substance and Sexual Activity   Alcohol Use No     Social History     Substance and Sexual Activity   Drug Use No     Social History     Tobacco Use   Smoking Status Never   Smokeless Tobacco Never     Family History: non-contributory    Meds/Allergies   all current active meds have been reviewed  Allergies   Allergen Reactions    Amoxicillin Hives    Clavulanic Acid Hives    Moxifloxacin Hives    Sulfa Antibiotics Hives       Objective   Vitals: Blood pressure 126/82, pulse 75, height 5' 8\" (1.727 m), weight 132 kg (290 lb), not currently breastfeeding., Body mass index is 44.09 kg/m².,   Weight (last 2 days)       Date/Time Weight    01/17/24 0903 132 (290)                      Physical Exam:  GEN: Jaquan Ribeiro appears well, alert and oriented x 3, pleasant and cooperative   HEENT: pupils equal, round, and reactive to light; extraocular muscles intact  NECK: supple, no carotid bruits   HEART: regular rhythm, normal S1 and S2, no murmurs, clicks, gallops or rubs   LUNGS: clear to auscultation bilaterally; no wheezes, rales, or rhonchi   ABDOMEN: normal bowel sounds, soft, no tenderness, no distention  EXTREMITIES: peripheral pulses normal; no clubbing, cyanosis, or edema  NEURO: no focal findings   SKIN: normal without suspicious lesions on exposed skin    Lab Results:   No visits with results within 1 Day(s) from this visit.   Latest known visit with results is:   Hospital Outpatient Visit on 10/25/2023   Component Date Value Ref Range Status    EXT Preg Test, Ur 10/25/2023 Negative  Negative Final    Control 10/25/2023 Valid  Valid Final    Case Report 10/25/2023    Final                    Value:Surgical Pathology Report                         Case: [id number]                         "           Authorizing Provider:  Mann Edwards MD        Collected:           10/25/2023 0919              Ordering Location:     [facility]        Received:            10/25/2023 1311                                     [facility]                                                          Pathologist:           Tone Casas DO                                                            Specimen:    Stomach, biopsy retrieved by cold biopsy forceps, R/O H. pylori                            Final Diagnosis 10/25/2023    Final                    Value:This result contains rich text formatting which cannot be displayed here.    Additional Information 10/25/2023    Final                    Value:This result contains rich text formatting which cannot be displayed here.    Gross Description 10/25/2023    Final                    Value:This result contains rich text formatting which cannot be displayed here.                       Imaging: I have personally reviewed pertinent reports.

## 2024-01-23 ENCOUNTER — PROCEDURE VISIT (OUTPATIENT)
Dept: NEUROLOGY | Facility: CLINIC | Age: 49
End: 2024-01-23
Payer: COMMERCIAL

## 2024-01-23 VITALS — HEART RATE: 101 BPM | TEMPERATURE: 98.2 F | SYSTOLIC BLOOD PRESSURE: 137 MMHG | DIASTOLIC BLOOD PRESSURE: 87 MMHG

## 2024-01-23 DIAGNOSIS — G43.709 CHRONIC MIGRAINE WITHOUT AURA WITHOUT STATUS MIGRAINOSUS, NOT INTRACTABLE: Primary | ICD-10-CM

## 2024-01-23 PROCEDURE — 64615 CHEMODENERV MUSC MIGRAINE: CPT | Performed by: PHYSICIAN ASSISTANT

## 2024-01-23 NOTE — PROGRESS NOTES
"Universal Protocol   Consent: Verbal consent obtained. Written consent obtained.  Risks and benefits: risks, benefits and alternatives were discussed  Consent given by: patient  Time out: Immediately prior to procedure a \"time out\" was called to verify the correct patient, procedure, equipment, support staff and site/side marked as required.  Patient understanding: patient states understanding of the procedure being performed  Patient consent: the patient's understanding of the procedure matches consent given  Procedure consent: procedure consent matches procedure scheduled  Relevant documents: relevant documents present and verified  Patient identity confirmed: verbally with patient      Chemodenervation     Date/Time  1/23/2024 2:00 PM     Performed by  Sadie Fontanez PA-C   Authorized by  Sadie Fontanez PA-C     Pre-procedure details      Prepped With: Alcohol     Anesthesia  (see MAR for exact dosages):     Anesthesia method:  None   Procedure details      Position:  Upright   Botox      Botox Type:  Type A    Brand:  Botox    mL's of Botulinum Toxin:  200    Final Concentration per CC:  50 units    Needle Gauge:  30 G 2.5 inch   Procedures      Botox Procedures: chronic headache      Indications: migraines     Injection Location      Head / Face:  L superior cervical paraspinal, R superior cervical paraspinal, L , R , L frontalis, R frontalis, L medial occipitalis, R medial occipitalis, procerus, R temporalis, L temporalis, R superior trapezius and L superior trapezius    L  injection amount:  5 unit(s)    R  injection amount:  5 unit(s)    L lateral frontalis:  5 unit(s)    R lateral frontalis:  5 unit(s)    L medial frontalis:  5 unit(s)    R medial frontalis:  5 unit(s)    L temporalis injection amount:  20 unit(s)    R temporalis injection amount:  20 unit(s)    Procerus injection amount:  5 unit(s)    L medial occipitalis injection amount:  15 unit(s)    R medial " occipitalis injection amount:  15 unit(s)    L superior cervical paraspinal injection amount:  10 unit(s)    R superior cervical paraspinal injection amount:  10 unit(s)    L superior trapezius injection amount:  15 unit(s)    R superior trapezius injection amount:  15 unit(s)   Total Units      Total units used:  200    Total units discarded:  0   Post-procedure details      Chemodenervation:  Chronic migraine    Facial Nerve Location::  Bilateral facial nerve    Patient tolerance of procedure:  Tolerated well, no immediate complications   Comments        5 units orbicularis oculi bilaterally  10 temporalis bilaterally  15 units frontalis (7.5 lower)  All medically necessary

## 2024-01-24 ENCOUNTER — HOSPITAL ENCOUNTER (OUTPATIENT)
Dept: RADIOLOGY | Facility: IMAGING CENTER | Age: 49
Discharge: HOME | End: 2024-01-24
Payer: COMMERCIAL

## 2024-01-24 DIAGNOSIS — E66.01 MORBID OBESITY (HCC): ICD-10-CM

## 2024-01-24 DIAGNOSIS — E78.5 HYPERLIPIDEMIA, UNSPECIFIED HYPERLIPIDEMIA TYPE: ICD-10-CM

## 2024-01-24 DIAGNOSIS — R16.0 HEPATOMEGALY: ICD-10-CM

## 2024-01-24 DIAGNOSIS — Z01.818 PRE-OP EVALUATION: ICD-10-CM

## 2024-01-24 PROCEDURE — 76700 US EXAM ABDOM COMPLETE: CPT

## 2024-01-24 PROCEDURE — 76981 USE PARENCHYMA: CPT

## 2024-01-24 NOTE — PROGRESS NOTES
Behavioral Health Follow Up Note:       4 / 4  Weight Check:  Surgeon: Dr. Mann Edwards     Starting weight 280  #  Today's weight 294.1   #        Surgery month:  Jan/ Feb  Surgery deadline: may     Mental health and wellness - Patient is appropriately making changes based off the information contained in the bariatric manual.  Patient is modifying behaviors and demonstrating adapting to change.   Cancelled her hysterectomy after discussing with her provider.  Avoid photographs.   Having colonoscopy on Feb 15:   Returning to  for a weight check on Feb 16th.   Currently on menstrual cycle.   Planning a trip to [facility].     Eating behaviors - stated she is skipping meals.   Roasted chicken with vinegar salad. Eating throughout the day:  fruit, yogurt, dinner.  Stops eating after 7pm.     Activity -  just came from the gym     Progress toward program requirements     Workflow:  Psych and/or D+A additional documentation  n/a  PCP Letter: 9/27/2023  Support Group: RECOMMENDED  Surgeon Appt.: 10/12/2023  EGD : 10/25/2023  Cardiac Risk Assessment:  done  Sleep Studies: N/A  Bloodwork: pending        Goals:  weight goal 280 or less.

## 2024-01-25 ENCOUNTER — OFFICE VISIT (OUTPATIENT)
Dept: BARIATRICS | Facility: CLINIC | Age: 49
End: 2024-01-25

## 2024-01-25 VITALS — BODY MASS INDEX: 44.72 KG/M2 | WEIGHT: 293 LBS

## 2024-01-25 DIAGNOSIS — E66.01 OBESITY, CLASS III, BMI 40-49.9 (MORBID OBESITY) (HCC): Primary | ICD-10-CM

## 2024-01-25 PROCEDURE — RECHECK

## 2024-02-05 ENCOUNTER — TELEPHONE (OUTPATIENT)
Dept: GASTROENTEROLOGY | Facility: CLINIC | Age: 49
End: 2024-02-05

## 2024-02-05 NOTE — TELEPHONE ENCOUNTER
----- Message from Dionte Arcos MD sent at 2/5/2024  8:18 AM EST -----  Can you please call patient with results/message/questions as outlined in message to patient.  I sent my chart message and it went unviewed for 5 days.  Let me know if patient has any additional questions.     Thank you.    V

## 2024-02-07 ENCOUNTER — TELEPHONE (OUTPATIENT)
Age: 49
End: 2024-02-07

## 2024-02-07 NOTE — TELEPHONE ENCOUNTER
Patient called office requesting Miralax/dulcolax procedure prep to be sent to [facility]. Procedure directions explained in full detail via telephone. Procedure directions sent via [facility]. Patient will contact our office with further questions.

## 2024-02-14 ENCOUNTER — ANESTHESIA EVENT (OUTPATIENT)
Dept: ANESTHESIOLOGY | Facility: HOSPITAL | Age: 49
End: 2024-02-14

## 2024-02-14 ENCOUNTER — ANESTHESIA (OUTPATIENT)
Dept: ANESTHESIOLOGY | Facility: HOSPITAL | Age: 49
End: 2024-02-14

## 2024-02-14 NOTE — ANESTHESIA PREPROCEDURE EVALUATION
Procedure:  PRE-OP ONLY    Relevant Problems   CARDIO   (+) Chronic migraine without aura without status migrainosus, not intractable      GI/HEPATIC   (+) GERD (gastroesophageal reflux disease)      NEURO/PSYCH   (+) Chronic migraine without aura without status migrainosus, not intractable   (+) LASHAUN (generalized anxiety disorder)   (+) Panic attack      Other   (+) Morbid obesity (HCC)

## 2024-02-15 ENCOUNTER — ANESTHESIA (OUTPATIENT)
Dept: GASTROENTEROLOGY | Facility: HOSPITAL | Age: 49
End: 2024-02-15

## 2024-02-15 ENCOUNTER — HOSPITAL ENCOUNTER (OUTPATIENT)
Dept: GASTROENTEROLOGY | Facility: HOSPITAL | Age: 49
Setting detail: OUTPATIENT SURGERY
End: 2024-02-15
Attending: INTERNAL MEDICINE
Payer: COMMERCIAL

## 2024-02-15 ENCOUNTER — ANESTHESIA EVENT (OUTPATIENT)
Dept: GASTROENTEROLOGY | Facility: HOSPITAL | Age: 49
End: 2024-02-15

## 2024-02-15 VITALS
SYSTOLIC BLOOD PRESSURE: 142 MMHG | DIASTOLIC BLOOD PRESSURE: 80 MMHG | HEIGHT: 68 IN | RESPIRATION RATE: 16 BRPM | BODY MASS INDEX: 44.41 KG/M2 | WEIGHT: 293 LBS | OXYGEN SATURATION: 95 % | HEART RATE: 90 BPM | TEMPERATURE: 97.1 F

## 2024-02-15 DIAGNOSIS — Z12.11 COLON CANCER SCREENING: ICD-10-CM

## 2024-02-15 PROCEDURE — G0121 COLON CA SCRN NOT HI RSK IND: HCPCS | Performed by: INTERNAL MEDICINE

## 2024-02-15 RX ORDER — PROPOFOL 10 MG/ML
INJECTION, EMULSION INTRAVENOUS CONTINUOUS PRN
Status: DISCONTINUED | OUTPATIENT
Start: 2024-02-15 | End: 2024-02-15

## 2024-02-15 RX ORDER — PROPOFOL 10 MG/ML
INJECTION, EMULSION INTRAVENOUS AS NEEDED
Status: DISCONTINUED | OUTPATIENT
Start: 2024-02-15 | End: 2024-02-15

## 2024-02-15 RX ORDER — SODIUM CHLORIDE 9 MG/ML
INJECTION, SOLUTION INTRAVENOUS CONTINUOUS PRN
Status: DISCONTINUED | OUTPATIENT
Start: 2024-02-15 | End: 2024-02-15

## 2024-02-15 RX ADMIN — PROPOFOL 150 MG: 10 INJECTION, EMULSION INTRAVENOUS at 08:30

## 2024-02-15 RX ADMIN — PROPOFOL 50 MG: 10 INJECTION, EMULSION INTRAVENOUS at 08:39

## 2024-02-15 RX ADMIN — SODIUM CHLORIDE: 0.9 INJECTION, SOLUTION INTRAVENOUS at 08:20

## 2024-02-15 RX ADMIN — PROPOFOL 100 MCG/KG/MIN: 10 INJECTION, EMULSION INTRAVENOUS at 08:30

## 2024-02-15 RX ADMIN — PROPOFOL 50 MG: 10 INJECTION, EMULSION INTRAVENOUS at 08:44

## 2024-02-15 NOTE — ANESTHESIA POSTPROCEDURE EVALUATION
Post-Op Assessment Note    CV Status:  Stable  Pain Score: 0    Pain management: adequate       Mental Status:  Alert and awake   Hydration Status:  Euvolemic and stable   PONV Controlled:  None   Airway Patency:  Patent     Post Op Vitals Reviewed: Yes    No anethesia notable event occurred.    Staff: CRNA               /94 (02/15/24 0851)    Temp      Pulse (!) 109 (02/15/24 0851)   Resp 19 (02/15/24 0851)    SpO2 98 % (02/15/24 0851)

## 2024-02-15 NOTE — H&P
History and Physical - [facility]  Jaquan Ribeiro 49 y.o. female MRN: [id number]                  HPI: Jaquan Ribeiro is a 49 y.o. year old female who presents for index screening colonoscopy.      REVIEW OF SYSTEMS: Per the HPI, and otherwise unremarkable.    Historical Information   Past Medical History:   Diagnosis Date    Anemia     Anxiety     Depression     GERD (gastroesophageal reflux disease)     History of prior pregnancies         Hyperlipidemia     Migraine with aura and without status migrainosus, not intractable 2018    Obesity     Papanicolaou smear 2019    normal    Uterine bleeding, dysfunctional      Past Surgical History:   Procedure Laterality Date     SECTION       SECTION      [place] BLEPHAROPLASTY UPPER EYELID W/EXCESSIVE SKIN Bilateral 2023    Procedure: BLEPHAROPLASTY UPPER;  Surgeon: Horacio Wing MD;  Location:  MAIN OR;  Service: Plastics    [place] HYSTEROSCOPY ENDOMETRIAL ABLATION N/A 2021    Procedure: ABLATION ENDOMETRIAL NOVASURE;  Surgeon: Eagle Santiago MD;  Location: [facility];  Service: Gynecology    TUBAL LIGATION       Social History   Social History     Substance and Sexual Activity   Alcohol Use No     Social History     Substance and Sexual Activity   Drug Use No     Social History     Tobacco Use   Smoking Status Never   Smokeless Tobacco Never     Family History   Problem Relation Age of Onset    No Known Problems Mother     Cancer Father 80        bladder    Prostate cancer Father     Stroke Father     No Known Problems Sister     No Known Problems Sister     No Known Problems Daughter     No Known Problems Maternal Aunt     No Known Problems Maternal Aunt     No Known Problems Maternal Aunt     No Known Problems Maternal Aunt     No Known Problems Paternal Aunt     No Known Problems Paternal Aunt     No Known Problems Paternal Aunt     No Known Problems Maternal Grandmother     No Known Problems Maternal  "Grandfather     No Known Problems Paternal Grandmother     No Known Problems Paternal Grandfather        Meds/Allergies       Current Outpatient Medications:     atorvastatin (LIPITOR) 10 mg tablet    B Complex Vitamins (VITAMIN B COMPLEX PO)    ferrous sulfate 324 (65 Fe) mg    multivitamin (THERAGRAN) TABS    omeprazole (PriLOSEC) 20 mg delayed release capsule    sertraline (ZOLOFT) 50 mg tablet    vitamin B-12 (VITAMIN B-12) 1,000 mcg tablet    Vitamin D, Cholecalciferol, 1000 units CAPS    cyclobenzaprine (FLEXERIL) 5 mg tablet    LORazepam (ATIVAN) 0.5 mg tablet    meclizine (ANTIVERT) 25 mg tablet    medroxyPROGESTERone (PROVERA) 10 mg tablet    omeprazole (PriLOSEC) 20 mg delayed release capsule    onabotulinumtoxin A (BOTOX) 100 units    ondansetron (ZOFRAN) 4 mg tablet    polyethylene glycol (MiraLax) 17 GM/SCOOP powder    rimegepant sulfate (Nurtec) 75 mg TBDP    Allergies   Allergen Reactions    Amoxicillin Hives    Clavulanic Acid Hives    Moxifloxacin Hives    Sulfa Antibiotics Hives       Objective     /88   Pulse 88   Temp 97.6 °F (36.4 °C) (Tympanic)   Resp 18   Ht 5' 8\" (1.727 m)   Wt 133 kg (294 lb)   SpO2 92%   BMI 44.70 kg/m²       PHYSICAL EXAM    Gen: NAD  Head: NCAT  CV: RRR  CHEST: Clear  ABD: soft, NT/ND  EXT: no edema      ASSESSMENT/PLAN:  This is a 49 y.o. year old female here for EGD, and she is stable and optimized for her procedure.        "

## 2024-02-15 NOTE — ANESTHESIA PREPROCEDURE EVALUATION
"Procedure:  COLONOSCOPY    Relevant Problems   CARDIO   (+) Chronic migraine without aura without status migrainosus, not intractable      GI/HEPATIC   (+) GERD (gastroesophageal reflux disease)      NEURO/PSYCH   (+) Chronic migraine without aura without status migrainosus, not intractable   (+) LASHAUN (generalized anxiety disorder)   (+) Panic attack      Other   (+) Obesity, Class III, BMI 40-49.9 (morbid obesity) (HCC)      Recent labs personally reviewed:  Lab Results   Component Value Date    WBC 10.79 (H) 03/31/2023    HGB 12.9 03/31/2023     03/31/2023     Lab Results   Component Value Date     09/02/2015    K 4.6 03/31/2023    BUN 17 03/31/2023    CREATININE 0.72 03/31/2023    GLUCOSE 88 09/02/2015     No results found for: \"PTT\"   No results found for: \"INR\"    Lab Results   Component Value Date    HGBA1C 5.7 (H) 06/27/2023         Physical Exam    Airway    Mallampati score: II  TM Distance: >3 FB  Neck ROM: full     Dental   No notable dental hx     Cardiovascular      Pulmonary      Other Findings  post-pubertal.      Anesthesia Plan  ASA Score- 3     Anesthesia Type- IV sedation with anesthesia with ASA Monitors.         Additional Monitors:     Airway Plan:            Plan Factors-Exercise tolerance (METS): >4 METS.    Chart reviewed.    Patient summary reviewed.    Patient is not a current smoker.              Induction- intravenous.    Postoperative Plan-     Informed Consent- Anesthetic plan and risks discussed with patient.  I personally reviewed this patient with the CRNA. Discussed and agreed on the Anesthesia Plan with the CRNA..                "

## 2024-02-16 ENCOUNTER — TELEPHONE (OUTPATIENT)
Dept: BARIATRICS | Facility: CLINIC | Age: 49
End: 2024-02-16

## 2024-02-16 ENCOUNTER — APPOINTMENT (OUTPATIENT)
Dept: LAB | Age: 49
End: 2024-02-16
Payer: COMMERCIAL

## 2024-02-16 ENCOUNTER — CLINICAL SUPPORT (OUTPATIENT)
Dept: BARIATRICS | Facility: CLINIC | Age: 49
End: 2024-02-16

## 2024-02-16 VITALS — BODY MASS INDEX: 44.03 KG/M2 | WEIGHT: 289.6 LBS

## 2024-02-16 DIAGNOSIS — E61.1 IRON DEFICIENCY: ICD-10-CM

## 2024-02-16 DIAGNOSIS — Z01.818 PRE-OP EVALUATION: ICD-10-CM

## 2024-02-16 DIAGNOSIS — N99.85 POST ENDOMETRIAL ABLATION SYNDROME: ICD-10-CM

## 2024-02-16 DIAGNOSIS — E66.01 OBESITY, CLASS III, BMI 40-49.9 (MORBID OBESITY) (HCC): Primary | ICD-10-CM

## 2024-02-16 DIAGNOSIS — N92.0 MENORRHAGIA WITH REGULAR CYCLE: ICD-10-CM

## 2024-02-16 DIAGNOSIS — Z01.818 PRE-OPERATIVE CLEARANCE: ICD-10-CM

## 2024-02-16 DIAGNOSIS — E78.2 MIXED DYSLIPIDEMIA: ICD-10-CM

## 2024-02-16 DIAGNOSIS — E78.5 HYPERLIPIDEMIA, UNSPECIFIED HYPERLIPIDEMIA TYPE: ICD-10-CM

## 2024-02-16 DIAGNOSIS — R16.0 HEPATOMEGALY: ICD-10-CM

## 2024-02-16 DIAGNOSIS — E66.01 MORBID OBESITY (HCC): ICD-10-CM

## 2024-02-16 DIAGNOSIS — D75.839 THROMBOCYTOSIS: ICD-10-CM

## 2024-02-16 DIAGNOSIS — K21.9 GASTROESOPHAGEAL REFLUX DISEASE WITHOUT ESOPHAGITIS: ICD-10-CM

## 2024-02-16 DIAGNOSIS — E66.01 OBESITY, CLASS III, BMI 40-49.9 (MORBID OBESITY) (HCC): ICD-10-CM

## 2024-02-16 DIAGNOSIS — R73.03 PRE-DIABETES: ICD-10-CM

## 2024-02-16 LAB
ABO GROUP BLD: NORMAL
ALBUMIN SERPL BCP-MCNC: 4.2 G/DL (ref 3.5–5)
ALP SERPL-CCNC: 77 U/L (ref 34–104)
ALT SERPL W P-5'-P-CCNC: 22 U/L (ref 7–52)
ANION GAP SERPL CALCULATED.3IONS-SCNC: 6 MMOL/L
AST SERPL W P-5'-P-CCNC: 20 U/L (ref 13–39)
BASOPHILS # BLD AUTO: 0.04 THOUSANDS/ÂΜL (ref 0–0.1)
BASOPHILS NFR BLD AUTO: 1 % (ref 0–1)
BILIRUB SERPL-MCNC: 0.53 MG/DL (ref 0.2–1)
BLD GP AB SCN SERPL QL: NEGATIVE
BUN SERPL-MCNC: 12 MG/DL (ref 5–25)
CALCIUM SERPL-MCNC: 9.4 MG/DL (ref 8.4–10.2)
CHLORIDE SERPL-SCNC: 102 MMOL/L (ref 96–108)
CO2 SERPL-SCNC: 30 MMOL/L (ref 21–32)
CREAT SERPL-MCNC: 0.59 MG/DL (ref 0.6–1.3)
EOSINOPHIL # BLD AUTO: 0.15 THOUSAND/ÂΜL (ref 0–0.61)
EOSINOPHIL NFR BLD AUTO: 2 % (ref 0–6)
ERYTHROCYTE [DISTWIDTH] IN BLOOD BY AUTOMATED COUNT: 15.9 % (ref 11.6–15.1)
EST. AVERAGE GLUCOSE BLD GHB EST-MCNC: 137 MG/DL
GFR SERPL CREATININE-BSD FRML MDRD: 107 ML/MIN/1.73SQ M
GLUCOSE P FAST SERPL-MCNC: 86 MG/DL (ref 65–99)
HAV AB SER QL IA: NORMAL
HAV IGM SER QL: NORMAL
HBA1C MFR BLD: 6.4 %
HBV CORE IGM SER QL: NORMAL
HBV SURFACE AB SER-ACNC: <3 MIU/ML
HBV SURFACE AG SER QL: NORMAL
HCT VFR BLD AUTO: 41.3 % (ref 34.8–46.1)
HCV AB SER QL: NORMAL
HGB BLD-MCNC: 13.1 G/DL (ref 11.5–15.4)
IMM GRANULOCYTES # BLD AUTO: 0.02 THOUSAND/UL (ref 0–0.2)
IMM GRANULOCYTES NFR BLD AUTO: 0 % (ref 0–2)
LYMPHOCYTES # BLD AUTO: 3.1 THOUSANDS/ÂΜL (ref 0.6–4.47)
LYMPHOCYTES NFR BLD AUTO: 39 % (ref 14–44)
MCH RBC QN AUTO: 26.9 PG (ref 26.8–34.3)
MCHC RBC AUTO-ENTMCNC: 31.7 G/DL (ref 31.4–37.4)
MCV RBC AUTO: 85 FL (ref 82–98)
MONOCYTES # BLD AUTO: 0.45 THOUSAND/ÂΜL (ref 0.17–1.22)
MONOCYTES NFR BLD AUTO: 6 % (ref 4–12)
NEUTROPHILS # BLD AUTO: 4.18 THOUSANDS/ÂΜL (ref 1.85–7.62)
NEUTS SEG NFR BLD AUTO: 52 % (ref 43–75)
NRBC BLD AUTO-RTO: 0 /100 WBCS
PLATELET # BLD AUTO: 339 THOUSANDS/UL (ref 149–390)
PMV BLD AUTO: 10.2 FL (ref 8.9–12.7)
POTASSIUM SERPL-SCNC: 4.2 MMOL/L (ref 3.5–5.3)
PROT SERPL-MCNC: 7.6 G/DL (ref 6.4–8.4)
RBC # BLD AUTO: 4.87 MILLION/UL (ref 3.81–5.12)
RH BLD: POSITIVE
SODIUM SERPL-SCNC: 138 MMOL/L (ref 135–147)
SPECIMEN EXPIRATION DATE: NORMAL
TSH SERPL DL<=0.05 MIU/L-ACNC: 1.11 UIU/ML (ref 0.45–4.5)
WBC # BLD AUTO: 7.94 THOUSAND/UL (ref 4.31–10.16)

## 2024-02-16 PROCEDURE — 84460 ALANINE AMINO (ALT) (SGPT): CPT

## 2024-02-16 PROCEDURE — 86901 BLOOD TYPING SEROLOGIC RH(D): CPT

## 2024-02-16 PROCEDURE — 82172 ASSAY OF APOLIPOPROTEIN: CPT

## 2024-02-16 PROCEDURE — 80053 COMPREHEN METABOLIC PANEL: CPT

## 2024-02-16 PROCEDURE — 83036 HEMOGLOBIN GLYCOSYLATED A1C: CPT

## 2024-02-16 PROCEDURE — 80074 ACUTE HEPATITIS PANEL: CPT

## 2024-02-16 PROCEDURE — 86706 HEP B SURFACE ANTIBODY: CPT

## 2024-02-16 PROCEDURE — 82247 BILIRUBIN TOTAL: CPT

## 2024-02-16 PROCEDURE — 83883 ASSAY NEPHELOMETRY NOT SPEC: CPT

## 2024-02-16 PROCEDURE — 86900 BLOOD TYPING SEROLOGIC ABO: CPT

## 2024-02-16 PROCEDURE — 86850 RBC ANTIBODY SCREEN: CPT

## 2024-02-16 PROCEDURE — 85025 COMPLETE CBC W/AUTO DIFF WBC: CPT

## 2024-02-16 PROCEDURE — RECHECK: Performed by: DIETITIAN, REGISTERED

## 2024-02-16 PROCEDURE — 82977 ASSAY OF GGT: CPT

## 2024-02-16 PROCEDURE — 82947 ASSAY GLUCOSE BLOOD QUANT: CPT

## 2024-02-16 PROCEDURE — 84443 ASSAY THYROID STIM HORMONE: CPT

## 2024-02-16 PROCEDURE — 83010 ASSAY OF HAPTOGLOBIN QUANT: CPT

## 2024-02-16 PROCEDURE — 36415 COLL VENOUS BLD VENIPUNCTURE: CPT

## 2024-02-16 PROCEDURE — 84478 ASSAY OF TRIGLYCERIDES: CPT

## 2024-02-16 PROCEDURE — 82465 ASSAY BLD/SERUM CHOLESTEROL: CPT

## 2024-02-16 PROCEDURE — 86708 HEPATITIS A ANTIBODY: CPT

## 2024-02-16 PROCEDURE — 84450 TRANSFERASE (AST) (SGOT): CPT

## 2024-02-16 NOTE — PROGRESS NOTES
Bariatric Nutrition Assessment Note    Type of surgery    Preop  Surgery Date: TBD- Pt completed 4 month program requirement   Surgeon: Dr. Mann Edwards    Nutrition Assessment   Jaquan Ribeiro  49 y.o.  female     Wt with BMI of 25: 159.3 lbs  Pre-Op Excess Wt: 120.7 lbs   Wt 131 kg (289 lb 9.6 oz)   BMI 44.03 kg/m²       - 4.5# over past month   + 9.7# since starting the program   Pt reports she was taking Wegovy but had issues with nausea and vomiting so she stopped.   As a result, she has rebound weight gain from discontinuing the medication.     NAFLD Fibrosis Score is: -.611    NAFLD Score Correlated Fibrosis Severity   <-1.455 F0-F2   -1.455-0.676 Indeterminate Score   >0.676 F3-F4   **Fibrosis Severity Scale: F0 = no fibrosis; F1= mild fibrosis; F2 = moderate fibrosis; F3 = severe fibrosis; F4 = cirrhosis    NAFLD Score Component Values:  Component Value Date   Age: 49 y.o.     BMI: 44.7 kg/m²    IFG or DM: Yes    AST: 12 U/L 3/28/2023   ALT: 19 U/L 3/28/2023   Platelet: 341 Thousands/uL 3/31/2023   Albumin: 3.5 g/dL 3/28/2023       Jair Dillard Equation:    Estimated calories for weight loss 4056-4053 kcal per day  ( 1-2# per wk wt loss - sedentary )  Estimated protein needs  grams per day (1.0-1.5 gms/kg IBW )   Estimated fluid needs 72-85 ounces per day  (30-35 ml/kg IBW )      Weight History   Onset of Obesity: Childhood  Family history of obesity: Yes  Wt Loss Attempts: Commercial Programs (Weight Watchers, [employer], etc.)  Counseling with  MD  Exercise  Self Created Diets (Portion Control, Healthy Food Choices, etc.)  Prescribed Wegovy for about 2-3 months but did not have a good response- nausea and diarrhea   Patient has tried the above for 6 months or more with insufficient weight loss or weight regain, which is why patient has requested to be evaluated for weight loss surgery today  Maximum Wt Lost: 18 years ago while on WW - lost to 140 pounds with diet and exercise.        Review of History and Medications   Past Medical History:   Diagnosis Date    Anemia     Anxiety     Depression     GERD (gastroesophageal reflux disease)     History of prior pregnancies         Hyperlipidemia     Migraine with aura and without status migrainosus, not intractable 2018    Obesity     Papanicolaou smear 2019    normal    Uterine bleeding, dysfunctional      Past Surgical History:   Procedure Laterality Date     SECTION       SECTION      NY BLEPHAROPLASTY UPPER EYELID W/EXCESSIVE SKIN Bilateral 2023    Procedure: BLEPHAROPLASTY UPPER;  Surgeon: Horacio Wing MD;  Location:  MAIN OR;  Service: Plastics    NY HYSTEROSCOPY ENDOMETRIAL ABLATION N/A 2021    Procedure: ABLATION ENDOMETRIAL NOVASURE;  Surgeon: Eagle Santiago MD;  Location: [facility];  Service: Gynecology    TUBAL LIGATION       Social History     Socioeconomic History    Marital status: /Civil Union     Spouse name: Not on file    Number of children: Not on file    Years of education: Not on file    Highest education level: Not on file   Occupational History    Not on file   Tobacco Use    Smoking status: Never    Smokeless tobacco: Never   Vaping Use    Vaping status: Never Used   Substance and Sexual Activity    Alcohol use: No    Drug use: No    Sexual activity: Yes     Partners: Male     Birth control/protection: Female Sterilization   Other Topics Concern    Not on file   Social History Narrative    Not on file     Social Determinants of Health     Financial Resource Strain: Not on file   Food Insecurity: Not on file   Transportation Needs: Not on file   Physical Activity: Not on file   Stress: Not on file   Social Connections: Not on file   Intimate Partner Violence: Not on file   Housing Stability: Not on file       Current Outpatient Medications:     atorvastatin (LIPITOR) 10 mg tablet, Take 1 tablet (10 mg total) by mouth daily, Disp: 90 tablet, Rfl: 1    B Complex Vitamins  (VITAMIN B COMPLEX PO), Take by mouth daily , Disp: , Rfl:     cyclobenzaprine (FLEXERIL) 5 mg tablet, Take 1 tablet (5 mg total) by mouth as needed for muscle spasms (migraine), Disp: 30 tablet, Rfl: 2    ferrous sulfate 324 (65 Fe) mg, Take 1 tablet (324 mg total) by mouth daily before breakfast, Disp: 30 tablet, Rfl: 1    LORazepam (ATIVAN) 0.5 mg tablet, Take 2 tablets (1 mg total) by mouth daily as needed for anxiety, Disp: 20 tablet, Rfl: 0    meclizine (ANTIVERT) 25 mg tablet, Take 1 tablet (25 mg total) by mouth every 8 (eight) hours (Patient not taking: Reported on 1/17/2024), Disp: 30 tablet, Rfl: 0    medroxyPROGESTERone (PROVERA) 10 mg tablet, Take 1 tablet (10 mg total) by mouth daily for 10 days (Patient not taking: Reported on 1/10/2024), Disp: 10 tablet, Rfl: 0    multivitamin (THERAGRAN) TABS, Take 1 tablet by mouth daily, Disp: , Rfl:     omeprazole (PriLOSEC) 20 mg delayed release capsule, Take 1 capsule (20 mg total) by mouth daily, Disp: 90 capsule, Rfl: 0    omeprazole (PriLOSEC) 20 mg delayed release capsule, Take 1 capsule (20 mg total) by mouth 2 (two) times a day for 14 days, Disp: 28 capsule, Rfl: 0    onabotulinumtoxin A (BOTOX) 100 units, Inject into a muscle every 3 (three) months For migraines done every 3 months, Disp: , Rfl:     ondansetron (ZOFRAN) 4 mg tablet, Take 1 tablet (4 mg total) by mouth every 8 (eight) hours as needed for nausea or vomiting, Disp: 20 tablet, Rfl: 0    polyethylene glycol (MiraLax) 17 GM/SCOOP powder, Take 238 g by mouth once for 1 dose Take 238 g my mouth. Use as directed (Patient not taking: Reported on 1/17/2024), Disp: 238 g, Rfl: 0    rimegepant sulfate (Nurtec) 75 mg TBDP, Take 1 tablet (75 mg total) by mouth every other day (Patient taking differently: Take 75 mg by mouth if needed), Disp: 16 tablet, Rfl: 11    sertraline (ZOLOFT) 50 mg tablet, Take 1 tablet (50 mg total) by mouth daily, Disp: 90 tablet, Rfl: 0    vitamin B-12 (VITAMIN B-12) 1,000  mcg tablet, Take by mouth daily, Disp: , Rfl:     Vitamin D, Cholecalciferol, 1000 units CAPS, Take 1 tablet by mouth in the morning  , Disp: , Rfl:      Food Intake and Lifestyle Assessment   Food Intake Assessment completed via usual diet recall  Wakes up at 9/9: 30 am   Coffee with SF vanilla creamer   Breakfast: 10/10:30 am   One scrambled egg with 1/2 bagel and FF cream cheese and 1/2 fruit ( nectarine or orange )   Snack: 0   Lunch: sometimes skip - eat lunch about twice per week   Snack: 2 pm / 3 pm - trail mix   Dinner: 8:30/9:00 pm   Will cook - will make grilled salmon or chicken  and mashed potatoes or mac and cheese   Snack: 0  Beverage intake: water, coffee/tea and gatorade   Protein supplement: none currently   Estimated protein intake per day: ~40 grams per day   Estimated fluid intake per day: coffee or gatorarade ( sometimes ) , she has stopped soda   Water 5 glasses per day of water ~40 ounces per day , 16 ounces cup of coffee,   Meals eaten away from home: twice per week on weekends   Typical meal pattern: 2-3 meals per day and 0-1 snacks per day  Eating Behaviors: Large portion sizes  Food allergies or intolerances:   NKFA- sometimes eggs upsets her stomach   Allergies   Allergen Reactions    Amoxicillin Hives    Clavulanic Acid Hives    Moxifloxacin Hives    Sulfa Antibiotics Hives     Cultural or Shinto considerations: [place]     Physical Assessment  Physical Activity  Types of exercise: Walking  Dog daily for 45 minutes per day   Current physical limitations: none noted     Psychosocial Assessment   Support systems: spouse  Socioeconomic factors: owns hair salon   40 to 45 hours per week on her feet  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       Patient educated and handouts provided.  Surgical changes to stomach / GI  Capacity of post-surgery stomach  Diet progression  Adequate hydration  Sugar and fat restriction to decrease \"dumping syndrome\"  Fat restriction to decrease steatorrhea  Expected weight loss  Weight loss plateaus/ possibility of weight regain  Exercise  Suggestions for pre-op diet  Nutrition considerations after surgery  Protein supplements  Meal planning and preparation  Appropriate carbohydrate, protein, and fat intake, and food/fluid choices to maximize safe weight loss, nutrient intake, and tolerance   Dietary and lifestyle changes  Possible problems with poor eating habits  Intuitive eating  Techniques for self monitoring and keeping daily food journal  Potential for food intolerance after surgery, and ways to deal with them including: lactose intolerance, nausea, reflux, vomiting, diarrhea, food intolerance, appetite changes, gas  Vitamin / Mineral supplementation of Multivitamin with minerals, Calcium, Vitamin B12, Iron, Fat Soluble vitamins and Vitamin D  Post-operative pregnancy guidelines    Patient is not currently pregnant and doesn't desire to become pregnant a minimum of one year post-op- pt with failed ablation, considering hysterectomy after bariatric surgery     Education provided to: patient    Barriers to learning: No barriers identified    Readiness to change: preparation    Prior research on procedure: discussed with provider and friends or family    Comprehension: needs reinforcement and verbalizes understanding     Expected Compliance: good  Recommendations  Pt is an appropriate candidate for surgery. Yes    Workflow: (Incomplete in Bold):  Psych and/or D+A Clearance: n/a  Blood Work: ordered  PCP letter: done  Surgeon Appt: done  EGD: done  Cardiac Risk Assessment with [id number]  Sleep Studies: N/A  Nicotine test: " n/A  Pre-Operative Program: 4/4  NAFLD Score Calculated: YES  Hepatology consult: done       Evaluation / Monitoring  Dietitian to Monitor: Eating pattern as discussed Tolerance of nutrition prescription Body weight Lab values Physical activity Bowel pattern  She has switched from coffee to tea with fat free creamer. She has increased her fluid intake to 70-80 ounces per day  She is keeping a food log with [employer].  .  Pt has been drinking 2 shakes and one meal, with a snack as needed to lose weight in preparation for surgery.  She is practicing the 30/30 rule and timing her meals to take at least 15 minutes with chewing her food well.  Overall making changes in preparation for surgery.     Goals- continued  Eliminate sugar sweetened beverages, Complete lession plans 1-6, Eat 3 meals per day and Eliminate mindless snacking   Practice 30/60 rule  Time meals to take 15-20 minutes   Drink 2 shakes, eat one  meal and one snack per day     Time Spent:   30  Minutes

## 2024-02-16 NOTE — TELEPHONE ENCOUNTER
Patient's  calling stating his wife was in today and was told she still does not qualify for surgery. He stated she was given the name of someone to contact for more information but they are having trouble finding it. He would like someone to discuss with him why she does not qualify yet. Please advise

## 2024-02-20 LAB
A2 MACROGLOB SERPL-MCNC: 125 MG/DL (ref 110–276)
ALT SERPL W P-5'-P-CCNC: 25 IU/L (ref 0–40)
APO A-I SERPL-MCNC: 141 MG/DL (ref 116–209)
AST SERPL W P-5'-P-CCNC: 25 IU/L (ref 0–40)
BILIRUB SERPL-MCNC: 0.3 MG/DL (ref 0–1.2)
CHOLEST SERPL-MCNC: 158 MG/DL (ref 100–199)
FIBROSIS SCORING:: ABNORMAL
FIBROSIS STAGE SERPL QL: ABNORMAL
GGT SERPL-CCNC: 23 IU/L (ref 0–60)
GLUCOSE SERPL-MCNC: 88 MG/DL (ref 70–99)
HAPTOGLOB SERPL-MCNC: 295 MG/DL (ref 42–296)
INTERPRETATION: ABNORMAL
LABORATORY COMMENT REPORT: ABNORMAL
LIVER FIBR SCORE SERPL CALC.FIBROSURE: 0.03 (ref 0–0.21)
NASH GRADE SERPL QL: ABNORMAL
NASH SCORE SERPL: 0.33 (ref 0–0.25)
REF LAB TEST METHOD: ABNORMAL
SL AMB NASH SCORING: ABNORMAL
SL AMB STEATOSIS GRADE: ABNORMAL
SL AMB STEATOSIS SCORE: 0.5 (ref 0–0.4)
STEATOSIS SCORING: ABNORMAL
TEST PERFORMANCE INFO SPEC: ABNORMAL
TRIGL SERPL-MCNC: 116 MG/DL (ref 0–149)

## 2024-02-21 PROBLEM — Z12.39 SCREENING FOR BREAST CANCER: Status: RESOLVED | Noted: 2018-11-19 | Resolved: 2024-02-21

## 2024-03-08 NOTE — PROGRESS NOTES
Behavioral Health Follow Up Note:       Completed 4 months of  Weight Checks    Surgeon: Dr. Baljinder Edwards     Starting weight 280  #  Today's weight 288.5   #  Pending weight loss to submit        Surgery month:  Jan/ Feb  Surgery deadline: may     Mental health and wellness - Patient is appropriately making changes based off the information contained in the bariatric manual.  Patient is modifying behaviors and demonstrating adapting to change.   making mindful choices. Traveling to [facility] end of june/ early July.     Eating behaviors - reduced breads and coffee.  Focused on protein.  Utilizing protein shakes.       Activity -   injury on her leg.  Hinders her movement.     Progress toward program requirements     Workflow:  Psych and/or D+A additional documentation  n/a  PCP Letter: 9/27/2023  Support Group: RECOMMENDED  Surgeon Appt.: 10/12/2023  EGD : 10/25/2023  Cardiac Risk Assessment:  done  Sleep Studies: N/A  Bloodwork: pending        Goals:  weight goal 280 or less.

## 2024-03-11 ENCOUNTER — CLINICAL SUPPORT (OUTPATIENT)
Dept: BARIATRICS | Facility: CLINIC | Age: 49
End: 2024-03-11

## 2024-03-11 VITALS — BODY MASS INDEX: 43.87 KG/M2 | WEIGHT: 288.5 LBS

## 2024-03-11 DIAGNOSIS — E66.01 OBESITY, CLASS III, BMI 40-49.9 (MORBID OBESITY) (HCC): Primary | ICD-10-CM

## 2024-03-11 PROCEDURE — RECHECK

## 2024-03-14 DIAGNOSIS — E78.2 MIXED DYSLIPIDEMIA: ICD-10-CM

## 2024-03-14 DIAGNOSIS — Z79.899 ON STATIN THERAPY: ICD-10-CM

## 2024-03-14 DIAGNOSIS — A04.8 BACTERIAL INFECTION DUE TO H. PYLORI: ICD-10-CM

## 2024-03-14 DIAGNOSIS — F41.1 GAD (GENERALIZED ANXIETY DISORDER): ICD-10-CM

## 2024-03-14 RX ORDER — ATORVASTATIN CALCIUM 10 MG/1
10 TABLET, FILM COATED ORAL DAILY
Qty: 90 TABLET | Refills: 1 | Status: SHIPPED | OUTPATIENT
Start: 2024-03-14

## 2024-03-14 RX ORDER — OMEPRAZOLE 20 MG/1
20 CAPSULE, DELAYED RELEASE ORAL 2 TIMES DAILY
Qty: 28 CAPSULE | Refills: 0 | Status: CANCELLED | OUTPATIENT
Start: 2024-03-14 | End: 2024-03-28

## 2024-03-15 ENCOUNTER — TELEPHONE (OUTPATIENT)
Age: 49
End: 2024-03-15

## 2024-03-15 NOTE — TELEPHONE ENCOUNTER
Reached out to Pt re: refill request. Left VM requesting Pt contact office to let us know if she is still taking her omeprazole.

## 2024-03-15 NOTE — TELEPHONE ENCOUNTER
Pt called in she had a miss call from juan at the office. Pt is still taking omeprazole medication. Pt is requesting a call back.

## 2024-03-22 DIAGNOSIS — G43.009 MIGRAINE WITHOUT AURA AND WITHOUT STATUS MIGRAINOSUS, NOT INTRACTABLE: ICD-10-CM

## 2024-03-22 DIAGNOSIS — K21.9 GASTROESOPHAGEAL REFLUX DISEASE WITHOUT ESOPHAGITIS: ICD-10-CM

## 2024-03-22 RX ORDER — RIMEGEPANT SULFATE 75 MG/75MG
75 TABLET, ORALLY DISINTEGRATING ORAL AS NEEDED
Qty: 16 TABLET | Refills: 11 | Status: SHIPPED | OUTPATIENT
Start: 2024-03-22

## 2024-03-22 RX ORDER — OMEPRAZOLE 20 MG/1
20 CAPSULE, DELAYED RELEASE ORAL DAILY
Qty: 90 CAPSULE | Refills: 1 | Status: SHIPPED | OUTPATIENT
Start: 2024-03-22

## 2024-03-27 ENCOUNTER — TELEPHONE (OUTPATIENT)
Dept: NEUROLOGY | Facility: CLINIC | Age: 49
End: 2024-03-27

## 2024-03-27 NOTE — TELEPHONE ENCOUNTER
Pt current auth on file is set to  2024 before patient appt. New pa sent to pt plan [id number].     Will await approval/denial determination.

## 2024-03-29 ENCOUNTER — CLINICAL SUPPORT (OUTPATIENT)
Dept: BARIATRICS | Facility: CLINIC | Age: 49
End: 2024-03-29

## 2024-03-29 VITALS — BODY MASS INDEX: 43.24 KG/M2 | WEIGHT: 284.4 LBS

## 2024-03-29 DIAGNOSIS — E66.01 OBESITY, CLASS III, BMI 40-49.9 (MORBID OBESITY) (HCC): Primary | ICD-10-CM

## 2024-03-29 PROCEDURE — RECHECK

## 2024-03-29 NOTE — PROGRESS NOTES
Weight check:  Down 4#   Using protein shakes during her daily routine.   Excited for her surgery and life post surgery.

## 2024-04-03 ENCOUNTER — OFFICE VISIT (OUTPATIENT)
Dept: NEUROLOGY | Facility: CLINIC | Age: 49
End: 2024-04-03
Payer: COMMERCIAL

## 2024-04-03 VITALS
TEMPERATURE: 96.5 F | DIASTOLIC BLOOD PRESSURE: 70 MMHG | BODY MASS INDEX: 43.04 KG/M2 | WEIGHT: 284 LBS | SYSTOLIC BLOOD PRESSURE: 110 MMHG | HEIGHT: 68 IN | HEART RATE: 86 BPM

## 2024-04-03 DIAGNOSIS — G43.709 CHRONIC MIGRAINE WITHOUT AURA WITHOUT STATUS MIGRAINOSUS, NOT INTRACTABLE: Primary | ICD-10-CM

## 2024-04-03 PROCEDURE — 99214 OFFICE O/P EST MOD 30 MIN: CPT | Performed by: PHYSICIAN ASSISTANT

## 2024-04-03 RX ORDER — GLUCOSAMINE/D3/BOSWELLIA SERRA 1500MG-400
10000 TABLET ORAL DAILY
COMMUNITY

## 2024-04-03 NOTE — PATIENT INSTRUCTIONS
Preventive therapy for headaches:  Continue Botox, patient seems to doing really well with Botox  Abortive therapy for headaches:  At onset of her severe headache patients take Nurtec 75 mg.  Limit of 1 in 24 hours.   May also use Toradol with food.  May use Cyclobenzaprine 5 mg at bedtime on the nights of your migraines      Neck pain:   - We discussed the role of neck pathology and poor posture, with straightening of the normal cervical lordosis, in headaches. We discussed how tightening of the neck muscles can irritate the nerves in the occipital region of her head and cause or worsen head pain. We also discussed and demonstrated neck strengthening and relaxation exercises, as well as giving written instructions on these exercises.     - We talked about the importance of good posture for improving shoulder, neck, and head pain. The patient was given visualization exercises for correcting posture, which patient will practice at home. If this simple exercise does not help improve the posture, we will consider formal physical therapy in the future.     Medication overuse headaches:   - We discussed medication overuse headache (MOH) and how to avoid it in the future. It was explained that all analgesics have the potential to cause medication overuse headache (MOH) and analgesic overuse can negate the effectiveness of headache preventive measures. After successful MOH treatment, preventive medications for an underlying primary headache disorder have a greater chance for success. Avoid medications with narcotics, barbiturates, or caffeine in them as these can cause rebound headaches after very few doses and can interfere with other headache medicine efficacy. Taking any analgesics for more than 2-3 days a week can cause medication overuse headache.     Reproductive age women: Should take folic acid daily when taking anti-seizure drugs especially Depakote.     [place] Prescription Drug Monitoring Program report was  "reviewed and was appropriate      Headache management instructions  - When patient has a moderate to severe headache, they should seek rest, initiate relaxation and apply cold compresses to the head.   - Maintain regular sleep schedule. Adults need at least 7-8 hours of uninterrupted a night.   - Limit over the counter medications such as Tylenol, Ibuprofen, Aleve, Excedrin. (No more than 3 times a week).  - Maintain headache diary.  We discussed an PATTIE for a smart phone is \"Migraine nan\"  - Limit caffeine to 1-2 cups 8 to 16 oz a day or less.  - Avoid dietary trigger. (aged cheese, peanuts, MSG, aspartame and nitrates).  - Patient is to have regular frequent meals to prevent headache onset.    - Please drink at least 64 ounces of water a day to help remain hydrated.    Please call with any questions or concerns. Office number is [phone]  "

## 2024-04-03 NOTE — PROGRESS NOTES
Review of Systems   Constitutional:  Negative for appetite change, fatigue and fever.   HENT: Negative.  Negative for hearing loss, tinnitus, trouble swallowing and voice change.    Eyes: Negative.  Negative for photophobia, pain and visual disturbance.   Respiratory: Negative.  Negative for shortness of breath.    Cardiovascular: Negative.  Negative for palpitations.   Gastrointestinal: Negative.  Negative for nausea and vomiting.   Endocrine: Negative.  Negative for cold intolerance.   Genitourinary: Negative.  Negative for dysuria, frequency and urgency.   Musculoskeletal:  Negative for back pain, gait problem, myalgias, neck pain and neck stiffness.   Skin: Negative.  Negative for rash.   Allergic/Immunologic: Negative.    Neurological:  Positive for headaches. Negative for dizziness, tremors, seizures, syncope, facial asymmetry, speech difficulty, weakness, light-headedness and numbness.        Pt reports migraines have improved since starting botox    Hematological: Negative.  Does not bruise/bleed easily.   Psychiatric/Behavioral: Negative.  Negative for confusion, hallucinations and sleep disturbance.    All other systems reviewed and are negative.

## 2024-04-03 NOTE — ASSESSMENT & PLAN NOTE
Preventive therapy for headaches:  Continue Botox 200 units every 3 months    Abortive therapy for headaches:  At onset of her severe headache patients take Nurtec 75 mg.  Limit of 1 in 24 hours.   May also use Toradol with food.  May use Cyclobenzaprine 5 mg at bedtime on the nights of your migraines

## 2024-04-03 NOTE — PROGRESS NOTES
[facility]  PATIENT:  Jaquan Ribeiro  MRN:  [id number]  :  1975  DATE OF SERVICE:  4/3/2024      Assessment/Plan:     Chronic migraine without aura without status migrainosus, not intractable  Preventive therapy for headaches:  Continue Botox 200 units every 3 months    Abortive therapy for headaches:  At onset of her severe headache patients take Nurtec 75 mg.  Limit of 1 in 24 hours.   May also use Toradol with food.  May use Cyclobenzaprine 5 mg at bedtime on the nights of your migraines         Problem List Items Addressed This Visit          Cardiovascular and Mediastinum    Chronic migraine without aura without status migrainosus, not intractable - Primary     Preventive therapy for headaches:  Continue Botox 200 units every 3 months    Abortive therapy for headaches:  At onset of her severe headache patients take Nurtec 75 mg.  Limit of 1 in 24 hours.   May also use Toradol with food.  May use Cyclobenzaprine 5 mg at bedtime on the nights of your migraines                History of Present Illness:   We had the pleasure of evaluating Jaquan Ribeiro in neurological follow up  today for headaches.  As you know,  she is a 49 y.o.  right handed female. 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     QTc: 3/28/2021 435 ms     Chronic migraine headaches:  Current preventative:  B complex, iron, multivitamin, B12, vitamin D  Cyclobenzaprine  Sertraline, lorazepam  Botox  Unable to use beta blocker or other hypertensive medications due to normal hypotension     Current abortive:  Prochlorperazine  Nurtec     Prior Preventative:  Mag,   topamax     Prior Abortive:  Toradol,   Decadron,   aleve, advil/ ibuprofen  sumatriptan     Non-Medical/Alternative Treatments used in the past for headaches: Massage- improved neck pain, PT, TENS unit- made neck pain worse     Headache trigger: Fatigue, Stress/Tension, bright lights, missing meals  Aura - “heavy feeling” in the head and neck pain sometimes;  sometimes (not with every headache) scotomas/ colorful lights when closing eyes/ spots in eyes lasting about 10 minutes before headaches     Since last seen headaches have improved with Botox injections q 3 months. Since starting botox, the patient reports greater than 7 days of migraine relief from baseline, correlated with headache diary, decreased abortive medication use and decreased ER visits.  Patient has been getting Botox for over 6 years and has been life changing for her.       Current pain 0/10  How often do the headaches occur -   migraines: 1-2 a month prior to Botox 4+ a week (16-20);   TTH: 0-2 per week   What time of the day do the headaches start - Variable; occasionally wakes up with them  How long do the headaches last - 45 minutes to 2 hours (prior to Botox all day)  Where are they located - frontal, eyes, radiate back into the occipital and neck discomfort L>R  What is the intensity of pain -   Tension headaches: 7-8/10  severe headaches: 5-6 now was a 8-9/10  Describe your usual headache - Throbbing, Pounding     Associated symptoms:   -       Decrease of appetite, nausea   -       Photophobia, phonophobia  -       Problem with concentration  -       Blurred vision  -       Light-headed or dizzy, stiff or sore neck  -       Hands or feet tingle or feel numb  -       Prefer to be alone and in a dark room, unable to work     Number of days missed per month because of headaches:  Work (or school) days: NA  Social or Family activities: misses less     What time of the year do headaches occur more frequently? summer  Have you seen someone else for headaches or pain? No  Have you had trigger point injection performed and how often? No  Have you had Botox injection performed and how often? Yes   Have you had epidural injections or transforaminal injections performed? Yes, with childbirth     Have you used CBD or THC for your headaches and how often? No     Are you current pregnant or planning on  getting pregnant? No     Have you ever had any Brain imaging? no      Reviewed old notes from physician seen in the past- see above HPI for summary of previous encounters.       Past Medical History:   Diagnosis Date    Anemia     Anxiety     Depression     GERD (gastroesophageal reflux disease)     History of prior pregnancies         Hyperlipidemia     Migraine with aura and without status migrainosus, not intractable 2018    Obesity     Papanicolaou smear 2019    normal    Uterine bleeding, dysfunctional        Patient Active Problem List   Diagnosis    Chronic migraine without aura without status migrainosus, not intractable    Seasonal allergies    Macromastia    Pre-diabetes    Mixed dyslipidemia    GERD (gastroesophageal reflux disease)    Eustachian tube dysfunction, bilateral    Chronic cough    Weight gain    Thrombocytosis    Iron deficiency    Sore throat    Snoring    LASHAUN (generalized anxiety disorder)    Panic attack    Morbid obesity ([facility])    BMI 40.0-44.9, adult ([facility])    History of endometrial ablation    Carpal tunnel syndrome of left wrist    Medial epicondylitis of left elbow    COVID-19    Vertigo    Nausea    Benign paroxysmal positional vertigo of left ear    Obesity, Class III, BMI 40-49.9 (morbid obesity) ([facility])    Dermatochalasis    Preop cardiovascular exam       Medications:      Current Outpatient Medications   Medication Sig Dispense Refill    atorvastatin (LIPITOR) 10 mg tablet Take 1 tablet (10 mg total) by mouth daily 90 tablet 1    B Complex Vitamins (VITAMIN B COMPLEX PO) Take by mouth daily       Biotin 74366 MCG TABS Take 10,000 mg by mouth in the morning      cyclobenzaprine (FLEXERIL) 5 mg tablet Take 1 tablet (5 mg total) by mouth as needed for muscle spasms (migraine) 30 tablet 2    ferrous sulfate 324 (65 Fe) mg Take 1 tablet (324 mg total) by mouth daily before breakfast 30 tablet 1    multivitamin (THERAGRAN) TABS Take 1 tablet by mouth daily      omeprazole  (PriLOSEC) 20 mg delayed release capsule Take 1 capsule (20 mg total) by mouth 2 (two) times a day for 14 days 28 capsule 0    omeprazole (PriLOSEC) 20 mg delayed release capsule Take 1 capsule (20 mg total) by mouth daily 90 capsule 1    onabotulinumtoxin A (BOTOX) 100 units Inject into a muscle every 3 (three) months For migraines done every 3 months      ondansetron (ZOFRAN) 4 mg tablet Take 1 tablet (4 mg total) by mouth every 8 (eight) hours as needed for nausea or vomiting 20 tablet 0    rimegepant sulfate (Nurtec) 75 mg TBDP Take 1 tablet (75 mg total) by mouth if needed (migraine) Limit of 1 in 24 hours 16 tablet 11    sertraline (ZOLOFT) 50 mg tablet Take 1 tablet (50 mg total) by mouth daily 90 tablet 1    vitamin B-12 (VITAMIN B-12) 1,000 mcg tablet Take by mouth daily      Vitamin D, Cholecalciferol, 1000 units CAPS Take 1 tablet by mouth in the morning        LORazepam (ATIVAN) 0.5 mg tablet Take 2 tablets (1 mg total) by mouth daily as needed for anxiety (Patient not taking: Reported on 4/3/2024) 20 tablet 0    meclizine (ANTIVERT) 25 mg tablet Take 1 tablet (25 mg total) by mouth every 8 (eight) hours (Patient not taking: Reported on 1/17/2024) 30 tablet 0    medroxyPROGESTERone (PROVERA) 10 mg tablet Take 1 tablet (10 mg total) by mouth daily for 10 days (Patient not taking: Reported on 1/10/2024) 10 tablet 0    polyethylene glycol (MiraLax) 17 GM/SCOOP powder Take 238 g by mouth once for 1 dose Take 238 g my mouth. Use as directed (Patient not taking: Reported on 1/17/2024) 238 g 0     No current facility-administered medications for this visit.        Allergies:      Allergies   Allergen Reactions    Amoxicillin Hives    Clavulanic Acid Hives    Moxifloxacin Hives    Sulfa Antibiotics Hives       Family History:     Family History   Problem Relation Age of Onset    No Known Problems Mother     Cancer Father 80        bladder    Prostate cancer Father     Stroke Father     No Known Problems Sister   "   No Known Problems Sister     No Known Problems Daughter     No Known Problems Maternal Aunt     No Known Problems Maternal Aunt     No Known Problems Maternal Aunt     No Known Problems Maternal Aunt     No Known Problems Paternal Aunt     No Known Problems Paternal Aunt     No Known Problems Paternal Aunt     No Known Problems Maternal Grandmother     No Known Problems Maternal Grandfather     No Known Problems Paternal Grandmother     No Known Problems Paternal Grandfather        Social History:     Social History     Socioeconomic History    Marital status: /Civil Union     Spouse name: Not on file    Number of children: Not on file    Years of education: Not on file    Highest education level: Not on file   Occupational History    Not on file   Tobacco Use    Smoking status: Never    Smokeless tobacco: Never   Vaping Use    Vaping status: Never Used   Substance and Sexual Activity    Alcohol use: No    Drug use: No    Sexual activity: Yes     Partners: Male     Birth control/protection: Female Sterilization   Other Topics Concern    Not on file   Social History Narrative    Not on file     Social Determinants of Health     Financial Resource Strain: Not on file   Food Insecurity: Not on file   Transportation Needs: Not on file   Physical Activity: Not on file   Stress: Not on file   Social Connections: Not on file   Intimate Partner Violence: Not on file   Housing Stability: Not on file      I have reviewed the patient's medical, social and surgical history as well as medications in detail and updated the computerized patient record.      Objective:   Physical Exam:                                                                   Vitals:            /70 (BP Location: Left arm, Patient Position: Sitting, Cuff Size: Adult)   Pulse 86   Temp (!) 96.5 °F (35.8 °C) (Temporal)   Ht 5' 8\" (1.727 m)   Wt 129 kg (284 lb)   BMI 43.18 kg/m²   BP Readings from Last 3 Encounters:   04/03/24 110/70 "   02/15/24 142/80   01/23/24 137/87     Pulse Readings from Last 3 Encounters:   04/03/24 86   02/15/24 90   01/23/24 101          CONSTITUTIONAL: Well developed, well nourished, well groomed. No dysmorphic features.     Eyes:  EOM normal      Neck:  Normal ROM, neck supple.      HEENT:  Normocephalic atraumatic.    Chest:  Respirations regular and unlabored.    Psychiatric:  Normal behavior and appropriate affect      MENTAL STATUS  Orientation: Alert and oriented x 3  Fund of knowledge: Intact.    MOTOR (Upper and lower extremities)   Bulk/tone/abnormal movement: Normal muscle bulk and tone.      COORDINATION   Station/Gait: Normal baseline gait.           Review of Systems:   Review of Systems  Constitutional:  Negative for appetite change, fatigue and fever.   HENT: Negative.  Negative for hearing loss, tinnitus, trouble swallowing and voice change.    Eyes: Negative.  Negative for photophobia, pain and visual disturbance.   Respiratory: Negative.  Negative for shortness of breath.    Cardiovascular: Negative.  Negative for palpitations.   Gastrointestinal: Negative.  Negative for nausea and vomiting.   Endocrine: Negative.  Negative for cold intolerance.   Genitourinary: Negative.  Negative for dysuria, frequency and urgency.   Musculoskeletal:  Negative for back pain, gait problem, myalgias, neck pain and neck stiffness.   Skin: Negative.  Negative for rash.   Allergic/Immunologic: Negative.    Neurological:  Positive for headaches. Negative for dizziness, tremors, seizures, syncope, facial asymmetry, speech difficulty, weakness, light-headedness and numbness.        Pt reports migraines have improved since starting botox    Hematological: Negative.  Does not bruise/bleed easily.   Psychiatric/Behavioral: Negative.  Negative for confusion, hallucinations and sleep disturbance.    All other systems reviewed and are negative.  I personally reviewed the ROS entered by the MA      I have spent a total time of 30  minutes on 04/03/24 in caring for this patient including Prognosis, Risks and benefits of tx options, Instructions for management, Patient and family education, Importance of tx compliance, Risk factor reductions, Impressions, Counseling / Coordination of care, Documenting in the medical record, Reviewing / ordering tests, medicine, procedures  , and Obtaining or reviewing history  .      Author:  Sadie Fontanez PA-C 4/3/2024 12:37 PM

## 2024-04-05 NOTE — TELEPHONE ENCOUNTER
Received fax from [facility] with approval details below for pt.    Approved   Botox 200 UNITS  Qty.1  Auth# [id number]  Valid:4/5/2024-4/5/2025  Visits: 4    Please use STOCK

## 2024-04-11 ENCOUNTER — TELEPHONE (OUTPATIENT)
Age: 49
End: 2024-04-11

## 2024-04-11 NOTE — TELEPHONE ENCOUNTER
Patient is requesting if paperwork has been sent in to her insurance regarding where she stands with Bariatric surgery. Please call the patient back at [phone] to advise if paperwork was submitted.

## 2024-04-15 ENCOUNTER — PREP FOR PROCEDURE (OUTPATIENT)
Dept: BARIATRICS | Facility: CLINIC | Age: 49
End: 2024-04-15

## 2024-04-15 ENCOUNTER — TELEPHONE (OUTPATIENT)
Dept: BARIATRICS | Facility: CLINIC | Age: 49
End: 2024-04-15

## 2024-04-15 DIAGNOSIS — E78.5 HYPERLIPEMIA: ICD-10-CM

## 2024-04-15 DIAGNOSIS — K21.9 ESOPHAGEAL REFLUX: ICD-10-CM

## 2024-04-15 DIAGNOSIS — E66.01 MORBID OBESITY (HCC): Primary | ICD-10-CM

## 2024-04-18 ENCOUNTER — CLINICAL SUPPORT (OUTPATIENT)
Dept: BARIATRICS | Facility: CLINIC | Age: 49
End: 2024-04-18

## 2024-04-18 ENCOUNTER — OFFICE VISIT (OUTPATIENT)
Dept: BARIATRICS | Facility: CLINIC | Age: 49
End: 2024-04-18
Payer: COMMERCIAL

## 2024-04-18 VITALS
DIASTOLIC BLOOD PRESSURE: 78 MMHG | WEIGHT: 284.5 LBS | TEMPERATURE: 97.7 F | BODY MASS INDEX: 44.65 KG/M2 | OXYGEN SATURATION: 99 % | HEART RATE: 85 BPM | SYSTOLIC BLOOD PRESSURE: 110 MMHG | RESPIRATION RATE: 18 BRPM | HEIGHT: 67 IN

## 2024-04-18 DIAGNOSIS — N62 MACROMASTIA: ICD-10-CM

## 2024-04-18 DIAGNOSIS — F41.1 GAD (GENERALIZED ANXIETY DISORDER): ICD-10-CM

## 2024-04-18 DIAGNOSIS — R73.03 PRE-DIABETES: ICD-10-CM

## 2024-04-18 DIAGNOSIS — E66.01 MORBID OBESITY (HCC): ICD-10-CM

## 2024-04-18 DIAGNOSIS — E66.01 OBESITY, CLASS III, BMI 40-49.9 (MORBID OBESITY) (HCC): Primary | ICD-10-CM

## 2024-04-18 DIAGNOSIS — G43.709 CHRONIC MIGRAINE WITHOUT AURA WITHOUT STATUS MIGRAINOSUS, NOT INTRACTABLE: ICD-10-CM

## 2024-04-18 DIAGNOSIS — E78.2 MIXED DYSLIPIDEMIA: ICD-10-CM

## 2024-04-18 DIAGNOSIS — K21.9 GASTROESOPHAGEAL REFLUX DISEASE WITHOUT ESOPHAGITIS: ICD-10-CM

## 2024-04-18 PROCEDURE — 99213 OFFICE O/P EST LOW 20 MIN: CPT | Performed by: SURGERY

## 2024-04-18 PROCEDURE — RECHECK: Performed by: DIETITIAN, REGISTERED

## 2024-04-18 RX ORDER — ACETAMINOPHEN 10 MG/ML
1000 INJECTION, SOLUTION INTRAVENOUS ONCE
OUTPATIENT
Start: 2024-04-29 | End: 2024-04-18

## 2024-04-18 RX ORDER — CELECOXIB 200 MG/1
200 CAPSULE ORAL ONCE
OUTPATIENT
Start: 2024-04-29 | End: 2024-04-18

## 2024-04-18 RX ORDER — ENOXAPARIN SODIUM 100 MG/ML
40 INJECTION SUBCUTANEOUS ONCE
OUTPATIENT
Start: 2024-04-29 | End: 2024-04-18

## 2024-04-18 NOTE — H&P
BARIATRIC H&P - BARIATRIC SURGERY  Jaquan Ribeiro 49 y.o. female MRN: [id number]  Unit/Bed#:  Encounter: [id number]      HPI:  Jaquan Ribeiro is a 49 y.o. female who presents with a long-standing history of morbid obesity.    She was found to be a good candidate to undergo a bariatric operation upon being [facility].    She is here today to discuss details of her surgery.    Review of Systems   All other systems reviewed and are negative.      Historical Information   Past Medical History:   Diagnosis Date    Anemia     Anxiety     Depression     GERD (gastroesophageal reflux disease)     History of prior pregnancies         Hyperlipidemia     Migraine with aura and without status migrainosus, not intractable 2018    Obesity     Papanicolaou smear 2019    normal    Uterine bleeding, dysfunctional      Past Surgical History:   Procedure Laterality Date     SECTION       SECTION      NH BLEPHAROPLASTY UPPER EYELID W/EXCESSIVE SKIN Bilateral 2023    Procedure: BLEPHAROPLASTY UPPER;  Surgeon: Horacio Wing MD;  Location:  MAIN OR;  Service: Plastics    NH HYSTEROSCOPY ENDOMETRIAL ABLATION N/A 2021    Procedure: ABLATION ENDOMETRIAL NOVASURE;  Surgeon: Eagle Santiago MD;  Location: [facility]  MAIN OR;  Service: Gynecology    TUBAL LIGATION       Social History   Social History     Substance and Sexual Activity   Alcohol Use No     Social History     Substance and Sexual Activity   Drug Use No     Social History     Tobacco Use   Smoking Status Never   Smokeless Tobacco Never     Family History: non-contributory    Meds/Allergies   all medications and allergies reviewed  Allergies   Allergen Reactions    Amoxicillin Hives    Clavulanic Acid Hives    Moxifloxacin Hives    Sulfa Antibiotics Hives       Objective     Current Vitals:   Blood Pressure: 110/78 (24 1325)  Pulse: 85 (24 1325)  Temperature: 97.7 °F (36.5 °C) (24  "1325)  Respirations: 18 (24 1325)  Height: 5' 7\" (170.2 cm) (24 132)  Weight - Scale: 129 kg (284 lb 8 oz) (24 132)  SpO2: 99 % (24)      Invasive Devices       None                   Physical Exam  Vitals and nursing note reviewed.   Constitutional:       Appearance: Normal appearance. She is well-developed.   HENT:      Head: Normocephalic and atraumatic.      Nose: Nose normal.   Eyes:      General: No scleral icterus.        Right eye: No discharge.         Left eye: No discharge.      Conjunctiva/sclera: Conjunctivae normal.   Cardiovascular:      Rate and Rhythm: Normal rate and regular rhythm.      Heart sounds: Normal heart sounds.   Pulmonary:      Effort: Pulmonary effort is normal.      Breath sounds: Normal breath sounds. No stridor. No wheezing or rales.   Chest:      Chest wall: No tenderness.   Abdominal:      General: Bowel sounds are normal.      Palpations: Abdomen is soft.      Tenderness: There is no abdominal tenderness. There is no guarding or rebound.      Comments: Abdomen is obese, soft and benign.  Well-healed scars from previous    Musculoskeletal:         General: Normal range of motion.      Cervical back: Normal range of motion and neck supple.   Lymphadenopathy:      Cervical: No cervical adenopathy.   Skin:     General: Skin is warm and dry.      Findings: No erythema or rash.   Neurological:      Mental Status: She is alert and oriented to person, place, and time.   Psychiatric:         Behavior: Behavior normal.         Thought Content: Thought content normal.         Judgment: Judgment normal.         Lab Results: I have personally reviewed pertinent lab results.    Imaging: I have personally reviewed pertinent reports.    EKG, Pathology, and Other Studies: I have personally reviewed pertinent reports.    The endoscopy showed gastritis.  The biopsies revealed  Final Diagnosis  A. Stomach, biopsy:  -Gastric antral and oxyntic mucosa with " chronic active gastritis and Helicobacter pylori-like organisms present.  -Antral mucosa with intestinal metaplasia.  -Negative for dysplasia.   She has completed the treatment for H. pylori already      Assessment/PLAN:    49 y.o. female morbidly obese found to be a good candidate to undergo a weight loss operation upon being enrolled here at the [facility].    Patient has a long history of morbid obesity and is presenting to discuss the surgical weight loss options. Despite the patient best efforts patient was unable to lose any meaningful or sustainable weight using nonsurgical means.We had a long discussion regarding all the surgical weight-loss options at our disposal at this point and reviewed the risks and benefits of each procedure in details as it relates to her age, BMI and medical conditions.    She has been pre certified to undergo a Laparoscopic sleeve gastrectomy.    We have discussed the 14%-20% incidence of post op heartburn after the sleeve gastrectomy and the fact that if this cannot be controlled with medication or conservative measures it might need to be converted to a Lesvia-en-Y gastric bypass.    We have also discussed the fact that the patient needs to be followed up postoperatively and potentially get screening endoscopies in the future to rule out any development of pathology as a result of the sleeve gastrectomy.      Here today to review her pre op test results.      Has been medically cleared for the procedure.      I have discussed with her at length the risks and benefits of the operation and reiterated the components of our multidisciplinary program and the importance of compliance and follow up in the post operative period. Although there is a great statistical chance of improvement or even resolution of most of her associated comorbidities, the results vary from patient to patient and they largely depend on her commitment.     The patient was also instructed with  regards to the importance of behavior modification, nutritional counseling, support meeting attendance and lifestyle changes that are important to ensure success.    I have explained and reviewed the instructions for stopping or tapering anti-obesity medications prior to surgery.  She was given the opportunity to ask questions and I have answered all of them.     I have addressed with the patient the level of CODE STATUS for this hospital stay and after explaining the different options currently she wishes to be a Level I.    She understands and wishes to proceed.    She she still has to lose another 10 to 12 pounds prior to the surgery.  She will start her liquid diet in the next couple days.    Mann Edwards MD  4/18/2024  1:37 PM

## 2024-04-18 NOTE — H&P (VIEW-ONLY)
BARIATRIC H&P - BARIATRIC SURGERY  Jaquan Ribeiro 49 y.o. female MRN: [id number]  Unit/Bed#:  Encounter: [id number]      HPI:  Jaquan Ribeiro is a 49 y.o. female who presents with a long-standing history of morbid obesity.    She was found to be a good candidate to undergo a bariatric operation upon being enrolled [facility].    She is here today to discuss details of her surgery.    Review of Systems   All other systems reviewed and are negative.      Historical Information   Past Medical History:   Diagnosis Date    Anemia     Anxiety     Depression     GERD (gastroesophageal reflux disease)     History of prior pregnancies         Hyperlipidemia     Migraine with aura and without status migrainosus, not intractable 2018    Obesity     Papanicolaou smear 2019    normal    Uterine bleeding, dysfunctional      Past Surgical History:   Procedure Laterality Date     SECTION       SECTION      AR BLEPHAROPLASTY UPPER EYELID W/EXCESSIVE SKIN Bilateral 2023    Procedure: BLEPHAROPLASTY UPPER;  Surgeon: Horacio Wing MD;  Location:  MAIN OR;  Service: Plastics    AR HYSTEROSCOPY ENDOMETRIAL ABLATION N/A 2021    Procedure: ABLATION ENDOMETRIAL NOVASURE;  Surgeon: Eagle Santiago MD;  Location: [facility];  Service: Gynecology    TUBAL LIGATION       Social History   Social History     Substance and Sexual Activity   Alcohol Use No     Social History     Substance and Sexual Activity   Drug Use No     Social History     Tobacco Use   Smoking Status Never   Smokeless Tobacco Never     Family History: non-contributory    Meds/Allergies   all medications and allergies reviewed  Allergies   Allergen Reactions    Amoxicillin Hives    Clavulanic Acid Hives    Moxifloxacin Hives    Sulfa Antibiotics Hives       Objective     Current Vitals:   Blood Pressure: 110/78 (24 1325)  Pulse: 85 (24 1325)  Temperature: 97.7 °F (36.5 °C) (24  "1325)  Respirations: 18 (24 1325)  Height: 5' 7\" (170.2 cm) (24 132)  Weight - Scale: 129 kg (284 lb 8 oz) (24 132)  SpO2: 99 % (24)      Invasive Devices       None                   Physical Exam  Vitals and nursing note reviewed.   Constitutional:       Appearance: Normal appearance. She is well-developed.   HENT:      Head: Normocephalic and atraumatic.      Nose: Nose normal.   Eyes:      General: No scleral icterus.        Right eye: No discharge.         Left eye: No discharge.      Conjunctiva/sclera: Conjunctivae normal.   Cardiovascular:      Rate and Rhythm: Normal rate and regular rhythm.      Heart sounds: Normal heart sounds.   Pulmonary:      Effort: Pulmonary effort is normal.      Breath sounds: Normal breath sounds. No stridor. No wheezing or rales.   Chest:      Chest wall: No tenderness.   Abdominal:      General: Bowel sounds are normal.      Palpations: Abdomen is soft.      Tenderness: There is no abdominal tenderness. There is no guarding or rebound.      Comments: Abdomen is obese, soft and benign.  Well-healed scars from previous    Musculoskeletal:         General: Normal range of motion.      Cervical back: Normal range of motion and neck supple.   Lymphadenopathy:      Cervical: No cervical adenopathy.   Skin:     General: Skin is warm and dry.      Findings: No erythema or rash.   Neurological:      Mental Status: She is alert and oriented to person, place, and time.   Psychiatric:         Behavior: Behavior normal.         Thought Content: Thought content normal.         Judgment: Judgment normal.         Lab Results: I have personally reviewed pertinent lab results.    Imaging: I have personally reviewed pertinent reports.    EKG, Pathology, and Other Studies: I have personally reviewed pertinent reports.    The endoscopy showed gastritis.  The biopsies revealed  Final Diagnosis  A. Stomach, biopsy:  -Gastric antral and oxyntic mucosa with " chronic active gastritis and Helicobacter pylori-like organisms present.  -Antral mucosa with intestinal metaplasia.  -Negative for dysplasia.   She has completed the treatment for H. pylori already      Assessment/PLAN:    49 y.o. female morbidly obese found to be a good candidate to undergo a weight loss operation upon being enrolled here at the [facility].    Patient has a long history of morbid obesity and is presenting to discuss the surgical weight loss options. Despite the patient best efforts patient was unable to lose any meaningful or sustainable weight using nonsurgical means.We had a long discussion regarding all the surgical weight-loss options at our disposal at this point and reviewed the risks and benefits of each procedure in details as it relates to her age, BMI and medical conditions.    She has been pre certified to undergo a Laparoscopic sleeve gastrectomy.    We have discussed the 14%-20% incidence of post op heartburn after the sleeve gastrectomy and the fact that if this cannot be controlled with medication or conservative measures it might need to be converted to a Lesvia-en-Y gastric bypass.    We have also discussed the fact that the patient needs to be followed up postoperatively and potentially get screening endoscopies in the future to rule out any development of pathology as a result of the sleeve gastrectomy.      Here today to review her pre op test results.      Has been medically cleared for the procedure.      I have discussed with her at length the risks and benefits of the operation and reiterated the components of our multidisciplinary program and the importance of compliance and follow up in the post operative period. Although there is a great statistical chance of improvement or even resolution of most of her associated comorbidities, the results vary from patient to patient and they largely depend on her commitment.     The patient was also instructed with  regards to the importance of behavior modification, nutritional counseling, support meeting attendance and lifestyle changes that are important to ensure success.    I have explained and reviewed the instructions for stopping or tapering anti-obesity medications prior to surgery.  She was given the opportunity to ask questions and I have answered all of them.     I have addressed with the patient the level of CODE STATUS for this hospital stay and after explaining the different options currently she wishes to be a Level I.    She understands and wishes to proceed.    She she still has to lose another 10 to 12 pounds prior to the surgery.  She will start her liquid diet in the next couple days.    Mann Edwards MD  4/18/2024  1:37 PM

## 2024-04-19 RX ORDER — OMEPRAZOLE 20 MG/1
20 CAPSULE, DELAYED RELEASE ORAL DAILY
Qty: 90 CAPSULE | Refills: 1 | Status: SHIPPED | OUTPATIENT
Start: 2024-04-19

## 2024-04-19 RX ORDER — ENOXAPARIN SODIUM 100 MG/ML
40 INJECTION SUBCUTANEOUS DAILY
Qty: 5.2 ML | Refills: 0 | Status: SHIPPED | OUTPATIENT
Start: 2024-04-19 | End: 2024-05-02

## 2024-04-19 RX ORDER — OXYCODONE HYDROCHLORIDE 5 MG/1
5 TABLET ORAL EVERY 4 HOURS PRN
Qty: 5 TABLET | Refills: 0 | Status: SHIPPED | OUTPATIENT
Start: 2024-04-19

## 2024-04-19 NOTE — TELEPHONE ENCOUNTER
PO Meds for   Laparoscopic  Sleeve  Gastrectomy  &  Intraoperative  EGD  Robotically  Assisted  ,Sx 4/29/24 with Dr. Edwards             Opioid Agreement: On File (Received on 3/16/2021) . Not on med list .       Allergies **Amoxicillin ,Clavulanic Acid ,Moxifloxacin ,Sulfa Antibiotics .

## 2024-04-23 ENCOUNTER — TELEPHONE (OUTPATIENT)
Dept: BARIATRICS | Facility: CLINIC | Age: 49
End: 2024-04-23

## 2024-04-23 ENCOUNTER — PROCEDURE VISIT (OUTPATIENT)
Dept: NEUROLOGY | Facility: CLINIC | Age: 49
End: 2024-04-23
Payer: COMMERCIAL

## 2024-04-23 VITALS
BODY MASS INDEX: 44.57 KG/M2 | SYSTOLIC BLOOD PRESSURE: 137 MMHG | OXYGEN SATURATION: 96 % | WEIGHT: 284 LBS | DIASTOLIC BLOOD PRESSURE: 71 MMHG | HEIGHT: 67 IN | HEART RATE: 95 BPM | TEMPERATURE: 98.4 F

## 2024-04-23 DIAGNOSIS — G43.709 CHRONIC MIGRAINE WITHOUT AURA WITHOUT STATUS MIGRAINOSUS, NOT INTRACTABLE: Primary | ICD-10-CM

## 2024-04-23 DIAGNOSIS — G43.009 MIGRAINE WITHOUT AURA AND WITHOUT STATUS MIGRAINOSUS, NOT INTRACTABLE: ICD-10-CM

## 2024-04-23 PROCEDURE — 64615 CHEMODENERV MUSC MIGRAINE: CPT | Performed by: PHYSICIAN ASSISTANT

## 2024-04-23 RX ORDER — SCOLOPAMINE TRANSDERMAL SYSTEM 1 MG/1
1 PATCH, EXTENDED RELEASE TRANSDERMAL
Qty: 30 PATCH | Refills: 0 | Status: SHIPPED | OUTPATIENT
Start: 2024-04-23

## 2024-04-23 RX ORDER — DIHYDROERGOTAMINE MESYLATE 4 MG/ML
0.72 SPRAY, METERED NASAL AS NEEDED
Qty: 8 ML | Refills: 11 | Status: SHIPPED | OUTPATIENT
Start: 2024-04-23

## 2024-04-23 RX ORDER — ONDANSETRON 4 MG/1
4 TABLET, ORALLY DISINTEGRATING ORAL EVERY 6 HOURS PRN
Qty: 30 TABLET | Refills: 2 | Status: SHIPPED | OUTPATIENT
Start: 2024-04-23

## 2024-04-23 NOTE — PROGRESS NOTES
"Universal Protocol   Consent: Verbal consent obtained. Written consent obtained.  Risks and benefits: risks, benefits and alternatives were discussed  Consent given by: patient  Time out: Immediately prior to procedure a \"time out\" was called to verify the correct patient, procedure, equipment, support staff and site/side marked as required.  Patient understanding: patient states understanding of the procedure being performed  Patient consent: the patient's understanding of the procedure matches consent given  Procedure consent: procedure consent matches procedure scheduled  Relevant documents: relevant documents present and verified  Patient identity confirmed: verbally with patient      Chemodenervation     Date/Time  4/23/2024 2:00 PM     Performed by  Sadie Fontanez PA-C   Authorized by  Sadie Fontanez PA-C     Pre-procedure details      Prepped With: Alcohol     Anesthesia  (see MAR for exact dosages):     Anesthesia method:  None   Procedure details      Position:  Upright   Botox      Botox Type:  Type A    Brand:  Botox    mL's of Botulinum Toxin:  200    Final Concentration per CC:  50 units    Needle Gauge:  30 G 2.5 inch   Procedures      Botox Procedures: chronic headache      Indications: migraines     Injection Location      Head / Face:  L superior cervical paraspinal, R superior cervical paraspinal, L , R , L frontalis, R frontalis, L medial occipitalis, R medial occipitalis, procerus, R temporalis, L temporalis, R superior trapezius and L superior trapezius    L  injection amount:  5 unit(s)    R  injection amount:  5 unit(s)    L lateral frontalis:  5 unit(s)    R lateral frontalis:  5 unit(s)    L medial frontalis:  5 unit(s)    R medial frontalis:  5 unit(s)    L temporalis injection amount:  20 unit(s)    R temporalis injection amount:  20 unit(s)    Procerus injection amount:  5 unit(s)    L medial occipitalis injection amount:  15 unit(s)    R medial " occipitalis injection amount:  15 unit(s)    L superior cervical paraspinal injection amount:  10 unit(s)    R superior cervical paraspinal injection amount:  10 unit(s)    L superior trapezius injection amount:  15 unit(s)    R superior trapezius injection amount:  15 unit(s)   Total Units      Total units used:  200    Total units discarded:  0   Post-procedure details      Chemodenervation:  Chronic migraine    Facial Nerve Location::  Bilateral facial nerve    Patient tolerance of procedure:  Tolerated well, no immediate complications   Comments       5 units orbicularis oculi bilaterally  10 temporalis bilaterally  15 units frontalis (7.5 lower)  All medically necessary

## 2024-04-24 NOTE — PRE-PROCEDURE INSTRUCTIONS
Pre-Surgery Instructions:   Medication Instructions    atorvastatin (LIPITOR) 10 mg tablet Take night before surgery    B Complex Vitamins (VITAMIN B COMPLEX PO) Last dose 4/21    Biotin 03999 MCG TABS Last dose 4/21    cyclobenzaprine (FLEXERIL) 5 mg tablet Uses PRN- OK to take day of surgery    Dihydroergotamine Mesylate HFA (Trudhesa) 0.725 MG/ACT AERS Uses PRN- OK to take day of surgery    ferrous sulfate 324 (65 Fe) mg Last dose 4/21    LORazepam (ATIVAN) 0.5 mg tablet Uses PRN- OK to take day of surgery    multivitamin (THERAGRAN) TABS Last dose 4/21    omeprazole (PriLOSEC) 20 mg delayed release capsule Take day of surgery.    ondansetron (ZOFRAN) 4 mg tablet Uses PRN- OK to take day of surgery    ondansetron (ZOFRAN-ODT) 4 mg disintegrating tablet Uses PRN- OK to take day of surgery    rimegepant sulfate (Nurtec) 75 mg TBDP Uses PRN- OK to take day of surgery    scopolamine (TRANSDERM-SCOP) 1 mg/3 days TD 72 hr patch Uses PRN- OK to take day of surgery    sertraline (ZOLOFT) 50 mg tablet Take day of surgery.    vitamin B-12 (VITAMIN B-12) 1,000 mcg tablet Last dose 4/21    Vitamin D, Cholecalciferol, 1000 units CAPS Last dose 4/21     Spoke with pt via phone.    Medication instructions for day surgery reviewed. Please use only a sip of water to take your instructed medications. Avoid all over the counter vitamins, supplements and NSAIDS for one week prior to surgery per anesthesia guidelines. Tylenol is ok to take as needed.     You will receive a call one business day prior to surgery with an arrival time and hospital directions. If your surgery is scheduled on a Monday, the hospital will be calling you on the Friday prior to your surgery. If you have not heard from anyone by 8pm, please call the hospital supervisor through the hospital  at [phone]. ([place] [phone] or [place] [phone]).    Do not eat or drink anything after midnight the night before your surgery, including candy,  mints, lifesavers, or chewing gum. Do not drink alcohol 24hrs before your surgery. Try not to smoke at least 24hrs before your surgery.       First carb drink the day before surgery an hour after dinnertime, second one an hour before bed, timing of third one will be given during surgery arrival time phone call.    Follow the pre surgery showering instructions as listed in the “My Surgical Experience Booklet” or otherwise provided by your surgeon's office. Do not use a blade to shave the surgical area 1 week before surgery. It is okay to use a clean electric clippers up to 24 hours before surgery. Do not apply any lotions, creams, including makeup, cologne, deodorant, or perfumes after showering on the day of your surgery. Do not use dry shampoo, hair spray, hair gel, or any type of hair products.     No contact lenses, eye make-up, or artificial eyelashes. Remove nail polish, including gel polish, and any artificial, gel, or acrylic nails if possible. Remove all jewelry including rings and body piercing jewelry.     Wear causal clothing that is easy to take on and off. Consider your type of surgery.    Keep any valuables, jewelry, piercings at home. Please bring any specially ordered equipment (sling, braces) if indicated.    Arrange for a responsible person to drive you to and from the hospital on the day of your surgery. Please confirm the visitor policy for the day of your procedure when you receive your phone call with an arrival time.     Call the surgeon's office with any new illnesses, exposures, or additional questions prior to surgery.    Please reference your “My Surgical Experience Booklet” for additional information to prepare for your upcoming surgery.

## 2024-04-25 ENCOUNTER — ANESTHESIA EVENT (OUTPATIENT)
Dept: PERIOP | Facility: HOSPITAL | Age: 49
DRG: 621 | End: 2024-04-25
Payer: COMMERCIAL

## 2024-04-25 RX ORDER — SODIUM CHLORIDE, SODIUM LACTATE, POTASSIUM CHLORIDE, CALCIUM CHLORIDE 600; 310; 30; 20 MG/100ML; MG/100ML; MG/100ML; MG/100ML
125 INJECTION, SOLUTION INTRAVENOUS CONTINUOUS
Status: CANCELLED | OUTPATIENT
Start: 2024-04-25

## 2024-04-29 ENCOUNTER — HOSPITAL ENCOUNTER (INPATIENT)
Facility: HOSPITAL | Age: 49
LOS: 1 days | Discharge: HOME | DRG: 621 | End: 2024-04-30
Attending: SURGERY | Admitting: SURGERY
Payer: COMMERCIAL

## 2024-04-29 ENCOUNTER — ANESTHESIA (OUTPATIENT)
Dept: PERIOP | Facility: HOSPITAL | Age: 49
DRG: 621 | End: 2024-04-29
Payer: COMMERCIAL

## 2024-04-29 DIAGNOSIS — E78.5 HYPERLIPEMIA: ICD-10-CM

## 2024-04-29 DIAGNOSIS — K21.9 ESOPHAGEAL REFLUX: ICD-10-CM

## 2024-04-29 DIAGNOSIS — E66.01 OBESITY, CLASS III, BMI 40-49.9 (MORBID OBESITY) (HCC): Primary | ICD-10-CM

## 2024-04-29 DIAGNOSIS — E66.01 MORBID OBESITY (HCC): ICD-10-CM

## 2024-04-29 LAB
EXT PREGNANCY TEST URINE: NEGATIVE
EXT. CONTROL: NORMAL
GLUCOSE SERPL-MCNC: 150 MG/DL (ref 65–140)

## 2024-04-29 PROCEDURE — 81025 URINE PREGNANCY TEST: CPT | Performed by: ANESTHESIOLOGY

## 2024-04-29 PROCEDURE — 8E0W4CZ ROBOTIC ASSISTED PROCEDURE OF TRUNK REGION, PERCUTANEOUS ENDOSCOPIC APPROACH: ICD-10-PCS | Performed by: SURGERY

## 2024-04-29 PROCEDURE — S2900 ROBOTIC SURGICAL SYSTEM: HCPCS | Performed by: SURGERY

## 2024-04-29 PROCEDURE — 43775 LAP SLEEVE GASTRECTOMY: CPT | Performed by: STUDENT IN AN ORGANIZED HEALTH CARE EDUCATION/TRAINING PROGRAM

## 2024-04-29 PROCEDURE — C1781 MESH (IMPLANTABLE): HCPCS | Performed by: SURGERY

## 2024-04-29 PROCEDURE — 0DB64Z3 EXCISION OF STOMACH, PERCUTANEOUS ENDOSCOPIC APPROACH, VERTICAL: ICD-10-PCS | Performed by: SURGERY

## 2024-04-29 PROCEDURE — C9290 INJ, BUPIVACAINE LIPOSOME: HCPCS | Performed by: STUDENT IN AN ORGANIZED HEALTH CARE EDUCATION/TRAINING PROGRAM

## 2024-04-29 PROCEDURE — 0DJ08ZZ INSPECTION OF UPPER INTESTINAL TRACT, VIA NATURAL OR ARTIFICIAL OPENING ENDOSCOPIC: ICD-10-PCS | Performed by: SURGERY

## 2024-04-29 PROCEDURE — 82948 REAGENT STRIP/BLOOD GLUCOSE: CPT

## 2024-04-29 PROCEDURE — 88342 IMHCHEM/IMCYTCHM 1ST ANTB: CPT | Performed by: SPECIALIST

## 2024-04-29 PROCEDURE — 88307 TISSUE EXAM BY PATHOLOGIST: CPT | Performed by: SPECIALIST

## 2024-04-29 PROCEDURE — 43775 LAP SLEEVE GASTRECTOMY: CPT | Performed by: SURGERY

## 2024-04-29 DEVICE — SEAMGUARD STPL REINF ENDO GIA ULTRA UNIV 60 PURPLE: Type: IMPLANTABLE DEVICE | Site: STOMACH | Status: FUNCTIONAL

## 2024-04-29 DEVICE — SEAMGUARD STPL REINF ENDO GIA ULTRA UNV 60 BLACK: Type: IMPLANTABLE DEVICE | Site: STOMACH | Status: FUNCTIONAL

## 2024-04-29 RX ORDER — MORPHINE SULFATE 4 MG/ML
4 INJECTION, SOLUTION INTRAMUSCULAR; INTRAVENOUS EVERY 4 HOURS PRN
Status: DISCONTINUED | OUTPATIENT
Start: 2024-04-29 | End: 2024-04-30 | Stop reason: HOSPADM

## 2024-04-29 RX ORDER — OXYCODONE HCL 5 MG/5 ML
10 SOLUTION, ORAL ORAL EVERY 4 HOURS PRN
Status: DISCONTINUED | OUTPATIENT
Start: 2024-04-29 | End: 2024-04-30 | Stop reason: HOSPADM

## 2024-04-29 RX ORDER — ACETAMINOPHEN 10 MG/ML
1000 INJECTION, SOLUTION INTRAVENOUS EVERY 6 HOURS SCHEDULED
Status: DISCONTINUED | OUTPATIENT
Start: 2024-04-29 | End: 2024-04-30 | Stop reason: HOSPADM

## 2024-04-29 RX ORDER — SODIUM CHLORIDE 9 MG/ML
125 INJECTION, SOLUTION INTRAVENOUS CONTINUOUS
Status: DISCONTINUED | OUTPATIENT
Start: 2024-04-29 | End: 2024-04-30 | Stop reason: HOSPADM

## 2024-04-29 RX ORDER — MEPERIDINE HYDROCHLORIDE 25 MG/ML
12.5 INJECTION INTRAMUSCULAR; INTRAVENOUS; SUBCUTANEOUS
Status: DISCONTINUED | OUTPATIENT
Start: 2024-04-29 | End: 2024-04-29 | Stop reason: HOSPADM

## 2024-04-29 RX ORDER — SODIUM CHLORIDE 9 MG/ML
INJECTION INTRAVENOUS AS NEEDED
Status: DISCONTINUED | OUTPATIENT
Start: 2024-04-29 | End: 2024-04-29 | Stop reason: HOSPADM

## 2024-04-29 RX ORDER — CELECOXIB 200 MG/1
200 CAPSULE ORAL ONCE
Status: COMPLETED | OUTPATIENT
Start: 2024-04-29 | End: 2024-04-29

## 2024-04-29 RX ORDER — METOCLOPRAMIDE HYDROCHLORIDE 5 MG/ML
10 INJECTION INTRAMUSCULAR; INTRAVENOUS EVERY 6 HOURS PRN
Status: DISCONTINUED | OUTPATIENT
Start: 2024-04-29 | End: 2024-04-29

## 2024-04-29 RX ORDER — ENOXAPARIN SODIUM 100 MG/ML
40 INJECTION SUBCUTANEOUS ONCE
Status: COMPLETED | OUTPATIENT
Start: 2024-04-29 | End: 2024-04-29

## 2024-04-29 RX ORDER — KETOROLAC TROMETHAMINE 30 MG/ML
15 INJECTION, SOLUTION INTRAMUSCULAR; INTRAVENOUS EVERY 6 HOURS
Status: DISCONTINUED | OUTPATIENT
Start: 2024-04-29 | End: 2024-04-30 | Stop reason: HOSPADM

## 2024-04-29 RX ORDER — ONDANSETRON 2 MG/ML
INJECTION INTRAMUSCULAR; INTRAVENOUS AS NEEDED
Status: DISCONTINUED | OUTPATIENT
Start: 2024-04-29 | End: 2024-04-29

## 2024-04-29 RX ORDER — FENTANYL CITRATE 50 UG/ML
INJECTION, SOLUTION INTRAMUSCULAR; INTRAVENOUS AS NEEDED
Status: DISCONTINUED | OUTPATIENT
Start: 2024-04-29 | End: 2024-04-29

## 2024-04-29 RX ORDER — ENOXAPARIN SODIUM 100 MG/ML
40 INJECTION SUBCUTANEOUS DAILY
Status: DISCONTINUED | OUTPATIENT
Start: 2024-04-30 | End: 2024-04-30 | Stop reason: HOSPADM

## 2024-04-29 RX ORDER — MAGNESIUM HYDROXIDE 1200 MG/15ML
LIQUID ORAL AS NEEDED
Status: DISCONTINUED | OUTPATIENT
Start: 2024-04-29 | End: 2024-04-29 | Stop reason: HOSPADM

## 2024-04-29 RX ORDER — PROMETHAZINE HYDROCHLORIDE 25 MG/ML
25 INJECTION, SOLUTION INTRAMUSCULAR; INTRAVENOUS EVERY 6 HOURS PRN
Status: DISCONTINUED | OUTPATIENT
Start: 2024-04-29 | End: 2024-04-30 | Stop reason: HOSPADM

## 2024-04-29 RX ORDER — HYDROMORPHONE HCL/PF 1 MG/ML
0.5 SYRINGE (ML) INJECTION
Status: DISCONTINUED | OUTPATIENT
Start: 2024-04-29 | End: 2024-04-29 | Stop reason: HOSPADM

## 2024-04-29 RX ORDER — FAMOTIDINE 10 MG/ML
20 INJECTION, SOLUTION INTRAVENOUS EVERY 12 HOURS SCHEDULED
Status: DISCONTINUED | OUTPATIENT
Start: 2024-04-29 | End: 2024-04-30 | Stop reason: HOSPADM

## 2024-04-29 RX ORDER — PROPOFOL 10 MG/ML
INJECTION, EMULSION INTRAVENOUS CONTINUOUS PRN
Status: DISCONTINUED | OUTPATIENT
Start: 2024-04-29 | End: 2024-04-29

## 2024-04-29 RX ORDER — ONDANSETRON 2 MG/ML
4 INJECTION INTRAMUSCULAR; INTRAVENOUS EVERY 6 HOURS PRN
Status: DISCONTINUED | OUTPATIENT
Start: 2024-04-29 | End: 2024-04-30 | Stop reason: HOSPADM

## 2024-04-29 RX ORDER — DEXAMETHASONE SODIUM PHOSPHATE 10 MG/ML
INJECTION, SOLUTION INTRAMUSCULAR; INTRAVENOUS AS NEEDED
Status: DISCONTINUED | OUTPATIENT
Start: 2024-04-29 | End: 2024-04-29

## 2024-04-29 RX ORDER — CYCLOBENZAPRINE HCL 5 MG
5 TABLET ORAL 3 TIMES DAILY PRN
Status: DISCONTINUED | OUTPATIENT
Start: 2024-04-29 | End: 2024-04-30 | Stop reason: HOSPADM

## 2024-04-29 RX ORDER — FENTANYL CITRATE/PF 50 MCG/ML
25 SYRINGE (ML) INJECTION
Status: COMPLETED | OUTPATIENT
Start: 2024-04-29 | End: 2024-04-29

## 2024-04-29 RX ORDER — BUPIVACAINE HYDROCHLORIDE 5 MG/ML
INJECTION, SOLUTION EPIDURAL; INTRACAUDAL AS NEEDED
Status: DISCONTINUED | OUTPATIENT
Start: 2024-04-29 | End: 2024-04-29 | Stop reason: HOSPADM

## 2024-04-29 RX ORDER — LIDOCAINE HYDROCHLORIDE 20 MG/ML
INJECTION, SOLUTION EPIDURAL; INFILTRATION; INTRACAUDAL; PERINEURAL AS NEEDED
Status: DISCONTINUED | OUTPATIENT
Start: 2024-04-29 | End: 2024-04-29

## 2024-04-29 RX ORDER — SIMETHICONE 80 MG
80 TABLET,CHEWABLE ORAL 4 TIMES DAILY PRN
Status: DISCONTINUED | OUTPATIENT
Start: 2024-04-29 | End: 2024-04-30 | Stop reason: HOSPADM

## 2024-04-29 RX ORDER — ONDANSETRON 2 MG/ML
4 INJECTION INTRAMUSCULAR; INTRAVENOUS ONCE AS NEEDED
Status: COMPLETED | OUTPATIENT
Start: 2024-04-29 | End: 2024-04-29

## 2024-04-29 RX ORDER — MIDAZOLAM HYDROCHLORIDE 2 MG/2ML
INJECTION, SOLUTION INTRAMUSCULAR; INTRAVENOUS AS NEEDED
Status: DISCONTINUED | OUTPATIENT
Start: 2024-04-29 | End: 2024-04-29

## 2024-04-29 RX ORDER — PROPOFOL 10 MG/ML
INJECTION, EMULSION INTRAVENOUS AS NEEDED
Status: DISCONTINUED | OUTPATIENT
Start: 2024-04-29 | End: 2024-04-29

## 2024-04-29 RX ORDER — METOCLOPRAMIDE HYDROCHLORIDE 5 MG/ML
10 INJECTION INTRAMUSCULAR; INTRAVENOUS EVERY 6 HOURS PRN
Status: DISCONTINUED | OUTPATIENT
Start: 2024-04-29 | End: 2024-04-30 | Stop reason: HOSPADM

## 2024-04-29 RX ORDER — DIPHENHYDRAMINE HCL 25 MG
25 TABLET ORAL
Status: DISCONTINUED | OUTPATIENT
Start: 2024-04-29 | End: 2024-04-30 | Stop reason: HOSPADM

## 2024-04-29 RX ORDER — ATORVASTATIN CALCIUM 10 MG/1
10 TABLET, FILM COATED ORAL
Status: DISCONTINUED | OUTPATIENT
Start: 2024-04-29 | End: 2024-04-30 | Stop reason: HOSPADM

## 2024-04-29 RX ORDER — PHENYLEPHRINE HCL IN 0.9% NACL 1 MG/10 ML
SYRINGE (ML) INTRAVENOUS AS NEEDED
Status: DISCONTINUED | OUTPATIENT
Start: 2024-04-29 | End: 2024-04-29

## 2024-04-29 RX ORDER — ACETAMINOPHEN 10 MG/ML
1000 INJECTION, SOLUTION INTRAVENOUS ONCE
Status: COMPLETED | OUTPATIENT
Start: 2024-04-29 | End: 2024-04-30

## 2024-04-29 RX ORDER — ROCURONIUM BROMIDE 10 MG/ML
INJECTION, SOLUTION INTRAVENOUS AS NEEDED
Status: DISCONTINUED | OUTPATIENT
Start: 2024-04-29 | End: 2024-04-29

## 2024-04-29 RX ORDER — SODIUM CHLORIDE, SODIUM LACTATE, POTASSIUM CHLORIDE, CALCIUM CHLORIDE 600; 310; 30; 20 MG/100ML; MG/100ML; MG/100ML; MG/100ML
75 INJECTION, SOLUTION INTRAVENOUS CONTINUOUS
Status: DISCONTINUED | OUTPATIENT
Start: 2024-04-29 | End: 2024-04-30 | Stop reason: HOSPADM

## 2024-04-29 RX ORDER — PROMETHAZINE HYDROCHLORIDE 25 MG/ML
25 INJECTION, SOLUTION INTRAMUSCULAR; INTRAVENOUS EVERY 6 HOURS PRN
Status: DISCONTINUED | OUTPATIENT
Start: 2024-04-29 | End: 2024-04-29

## 2024-04-29 RX ORDER — CLINDAMYCIN PHOSPHATE 900 MG/50ML
900 INJECTION, SOLUTION INTRAVENOUS
Status: DISCONTINUED | OUTPATIENT
Start: 2024-04-30 | End: 2024-04-29 | Stop reason: ALTCHOICE

## 2024-04-29 RX ORDER — OXYCODONE HCL 5 MG/5 ML
5 SOLUTION, ORAL ORAL EVERY 4 HOURS PRN
Status: DISCONTINUED | OUTPATIENT
Start: 2024-04-29 | End: 2024-04-30 | Stop reason: HOSPADM

## 2024-04-29 RX ORDER — LORAZEPAM 1 MG/1
1 TABLET ORAL DAILY PRN
Status: DISCONTINUED | OUTPATIENT
Start: 2024-04-29 | End: 2024-04-30 | Stop reason: HOSPADM

## 2024-04-29 RX ADMIN — LIDOCAINE HYDROCHLORIDE 100 MG: 20 INJECTION, SOLUTION EPIDURAL; INFILTRATION; INTRACAUDAL at 07:39

## 2024-04-29 RX ADMIN — METOCLOPRAMIDE 10 MG: 5 INJECTION, SOLUTION INTRAMUSCULAR; INTRAVENOUS at 17:29

## 2024-04-29 RX ADMIN — MIDAZOLAM 2 MG: 1 INJECTION INTRAMUSCULAR; INTRAVENOUS at 07:33

## 2024-04-29 RX ADMIN — DEXTROSE 3000 MG: 50 INJECTION, SOLUTION INTRAVENOUS at 07:33

## 2024-04-29 RX ADMIN — Medication 200 MCG: at 08:22

## 2024-04-29 RX ADMIN — HYDROMORPHONE HYDROCHLORIDE 0.5 MG: 0.5 INJECTION, SOLUTION INTRAMUSCULAR; INTRAVENOUS; SUBCUTANEOUS at 10:19

## 2024-04-29 RX ADMIN — FAMOTIDINE 20 MG: 10 INJECTION INTRAVENOUS at 11:19

## 2024-04-29 RX ADMIN — FAMOTIDINE 20 MG: 10 INJECTION INTRAVENOUS at 21:59

## 2024-04-29 RX ADMIN — Medication 200 MCG: at 08:12

## 2024-04-29 RX ADMIN — KETOROLAC TROMETHAMINE 15 MG: 30 INJECTION, SOLUTION INTRAMUSCULAR; INTRAVENOUS at 21:59

## 2024-04-29 RX ADMIN — FENTANYL CITRATE 25 MCG: 50 INJECTION, SOLUTION INTRAMUSCULAR; INTRAVENOUS at 09:54

## 2024-04-29 RX ADMIN — OXYCODONE HYDROCHLORIDE 10 MG: 5 SOLUTION ORAL at 17:10

## 2024-04-29 RX ADMIN — ENOXAPARIN SODIUM 40 MG: 40 INJECTION SUBCUTANEOUS at 05:52

## 2024-04-29 RX ADMIN — ACETAMINOPHEN 1000 MG: 10 INJECTION INTRAVENOUS at 18:10

## 2024-04-29 RX ADMIN — KETOROLAC TROMETHAMINE 15 MG: 30 INJECTION, SOLUTION INTRAMUSCULAR; INTRAVENOUS at 10:25

## 2024-04-29 RX ADMIN — ONDANSETRON 4 MG: 2 INJECTION INTRAMUSCULAR; INTRAVENOUS at 07:48

## 2024-04-29 RX ADMIN — FOSAPREPITANT DIMEGLUMINE 150 MG: 150 INJECTION, POWDER, LYOPHILIZED, FOR SOLUTION INTRAVENOUS at 05:52

## 2024-04-29 RX ADMIN — Medication 200 MCG: at 07:50

## 2024-04-29 RX ADMIN — SODIUM CHLORIDE 125 ML/HR: 0.9 INJECTION, SOLUTION INTRAVENOUS at 05:51

## 2024-04-29 RX ADMIN — FENTANYL CITRATE 25 MCG: 50 INJECTION, SOLUTION INTRAMUSCULAR; INTRAVENOUS at 09:27

## 2024-04-29 RX ADMIN — OXYCODONE HYDROCHLORIDE 10 MG: 5 SOLUTION ORAL at 22:00

## 2024-04-29 RX ADMIN — SUGAMMADEX 200 MG: 100 INJECTION, SOLUTION INTRAVENOUS at 08:58

## 2024-04-29 RX ADMIN — FENTANYL CITRATE 25 MCG: 50 INJECTION, SOLUTION INTRAMUSCULAR; INTRAVENOUS at 09:34

## 2024-04-29 RX ADMIN — PROPOFOL 50 MCG/KG/MIN: 10 INJECTION, EMULSION INTRAVENOUS at 07:50

## 2024-04-29 RX ADMIN — KETOROLAC TROMETHAMINE 15 MG: 30 INJECTION, SOLUTION INTRAMUSCULAR; INTRAVENOUS at 16:18

## 2024-04-29 RX ADMIN — ACETAMINOPHEN 1000 MG: 10 INJECTION INTRAVENOUS at 11:19

## 2024-04-29 RX ADMIN — ONDANSETRON 4 MG: 2 INJECTION INTRAMUSCULAR; INTRAVENOUS at 09:26

## 2024-04-29 RX ADMIN — ACETAMINOPHEN 1000 MG: 10 INJECTION INTRAVENOUS at 06:39

## 2024-04-29 RX ADMIN — HYDROMORPHONE HYDROCHLORIDE 0.5 MG: 0.5 INJECTION, SOLUTION INTRAMUSCULAR; INTRAVENOUS; SUBCUTANEOUS at 10:05

## 2024-04-29 RX ADMIN — SODIUM CHLORIDE: 0.9 INJECTION, SOLUTION INTRAVENOUS at 08:51

## 2024-04-29 RX ADMIN — ATORVASTATIN CALCIUM 10 MG: 10 TABLET, FILM COATED ORAL at 16:10

## 2024-04-29 RX ADMIN — Medication 200 MCG: at 07:45

## 2024-04-29 RX ADMIN — PROPOFOL 200 MG: 10 INJECTION, EMULSION INTRAVENOUS at 07:39

## 2024-04-29 RX ADMIN — CELECOXIB 200 MG: 200 CAPSULE ORAL at 05:37

## 2024-04-29 RX ADMIN — SODIUM CHLORIDE, SODIUM LACTATE, POTASSIUM CHLORIDE, AND CALCIUM CHLORIDE 75 ML/HR: .6; .31; .03; .02 INJECTION, SOLUTION INTRAVENOUS at 09:37

## 2024-04-29 RX ADMIN — ROCURONIUM 50 MG: 50 INJECTION, SOLUTION INTRAVENOUS at 07:39

## 2024-04-29 RX ADMIN — FENTANYL CITRATE 100 MCG: 0.05 INJECTION, SOLUTION INTRAMUSCULAR; INTRAVENOUS at 07:39

## 2024-04-29 RX ADMIN — Medication 200 MCG: at 07:56

## 2024-04-29 RX ADMIN — FENTANYL CITRATE 25 MCG: 50 INJECTION, SOLUTION INTRAMUSCULAR; INTRAVENOUS at 09:44

## 2024-04-29 RX ADMIN — ONDANSETRON 4 MG: 2 INJECTION INTRAMUSCULAR; INTRAVENOUS at 14:12

## 2024-04-29 RX ADMIN — SODIUM CHLORIDE: 0.9 INJECTION, SOLUTION INTRAVENOUS at 07:54

## 2024-04-29 RX ADMIN — DEXAMETHASONE SODIUM PHOSPHATE 8 MG: 10 INJECTION INTRAMUSCULAR; INTRAVENOUS at 07:48

## 2024-04-29 RX ADMIN — Medication 200 MCG: at 08:35

## 2024-04-29 RX ADMIN — TRIMETHOBENZAMIDE HYDROCHLORIDE 200 MG: 100 INJECTION INTRAMUSCULAR at 09:47

## 2024-04-29 RX ADMIN — MORPHINE SULFATE 4 MG: 4 INJECTION INTRAVENOUS at 14:06

## 2024-04-29 NOTE — ANESTHESIA PREPROCEDURE EVALUATION
Procedure:  GASTRECTOMY  SLEEVE W/ ROBOTICS & INTRAOPERATIVE EGD (Abdomen)    Relevant Problems   CARDIO   (+) Chronic migraine without aura without status migrainosus, not intractable      GI/HEPATIC   (+) GERD (gastroesophageal reflux disease)      NEURO/PSYCH   (+) Chronic migraine without aura without status migrainosus, not intractable   (+) LASHAUN (generalized anxiety disorder)   (+) Panic attack      Other   (+) BMI 40.0-44.9, adult (HCC)   (+) Mixed dyslipidemia   (+) Pre-diabetes   (+) Vertigo        Physical Exam    Airway    Mallampati score: III  TM Distance: >3 FB  Neck ROM: full     Dental   No notable dental hx     Cardiovascular  Rhythm: regular, Rate: normal, Cardiovascular exam normal    Pulmonary  Pulmonary exam normal Breath sounds clear to auscultation    Other Findings  post-pubertal.      Anesthesia Plan  ASA Score- 3     Anesthesia Type- general with ASA Monitors.         Additional Monitors:     Airway Plan: ETT.           Plan Factors-    Chart reviewed.   Existing labs reviewed. Patient summary reviewed.                  Induction- intravenous.    Postoperative Plan- Plan for postoperative opioid use.     Informed Consent- Anesthetic plan and risks discussed with patient.

## 2024-04-29 NOTE — OP NOTE
[facility]   [address] [place] [address], [address], [address] [phone] (Fax)      Operative Report  GASTRECTOMY  SLEEVE W/ ROBOTICS & INTRAOPERATIVE EGD     Patient Name: Jaquan Ribeiro    :  1975  MRN: [id number]  Patient Location: [facility] OR ROOM 07  Surgery Date : 2024  Surgeons:  Surgeons and Role:     * Mann Edwards MD - Primary     * Julio Mast MD - Assisting    Diagnosis:    Pre-Op Diagnosis Codes:  Morbid obesity (HCC) [E66.01]  Body mass index is 43.99 kg/m².  Esophageal reflux [K21.9]  Hyperlipemia [E78.5]    Post-Op Diagnosis Codes:     * Morbid obesity (HCC) [E66.01]     * Esophageal reflux [K21.9]     * Hyperlipemia [E78.5]     * Body mass index is 43.99 kg/m².      Procedure  Intraoperative Endoscopy  Laparoscopic Robotically Assisted Sleeve Gastrectomy    Specimen(s):  ID Type Source Tests Collected by Time Destination   1 : PORTION OF STOMACH Tissue Stomach TISSUE EXAM Mann Edwards MD 2024 0853        Estimated Blood Loss:    20 cc    Anesthesia Type:     General    Operative Indications:    Morbid obesity (HCC) [E66.01]  Esophageal reflux [K21.9]  Hyperlipemia [E78.5]  Body mass index is 43.99 kg/m².      Operative Findings:    Normal anatomy    Complications:     None    Procedure and Technique:    INDICATION    Jaquan Ribeiro is a 49 y.o. female with a Body mass index is 43.99 kg/m². and a long standing history of morbid obesity and inability to lose a significant amount of weight on its own.  This patient was found to be a good candidate to undergo a bariatric procedure upon being enrolled here at the [facility].    OPERATIVE TECHNIQUE    The patient was taken to the operating room and placed in a supine position. A dose of IV antibiotic prophylaxis that consisted of Ancef 3g was given.   Also 40 mg of subcutaneous Enoxaparin to prevent deep vein thrombosis were administered.   Sequential compression devices were placed on both lower extremities.    After satisfactory general anesthesia induction and endotracheal intubation was achieved, the extremities were placed and properly secured to prevent neuromuscular damage as best as possible.      Subsequently, the abdominal wall was prepped and draped in a surgical standard sterile fashion. After a timeout was done and the patient was properly identified and the type of procedure was confirmed  access to the peritoneal cavity was gained with standard Veress needle technique and an optical trocar. With this device, we were able to visualize the layers of the abdominal wall, and enter the peritoneal cavity under direct visualization. Pneumoperitoneum was then established with CO2 insufflation.     A four quadrant transversus abdominis plane block was performed under direct laparoscopic vision.    After this was completed four additional trocars were placed: an 8 mm in the right flank in the anterior axillary line, a 12-mm port was placed in the right flank midclavicular line, a 8-mm port was placed in the left flank  midclavicular line and another 12-mm port was placed in the left flank anterior axillary line lateral to the supraumbilical port.    The Bryanna liver retractor was placed in the subxiphoid position through the use of a 5-mm trocar incision.  The patient was repositioned to a reverse Trendelenburg position and the robot was docked    With the trocars in place, the dissection was begun. We divided the gastrocolic ligament with the energy device to enter the lesser sac. We continued to divide this ligament along the greater curvature of the stomach towards the angle of His. Special care was taken while dividing the short gastric vessels close to the spleen. This process was completely hemostatic.    We then turned to the creation of an elongated and thin gastric pouch. A 36 Polish calibration tube was placed by the anesthesia staff  into the stomach under our laparoscopic surveillance. Once the tip of the bougie was confirmed to be next to the pylorus, serial firings of the laparoscopic stapler with 60-mm cartridges were utilized. We started in a point inferior to the incisura angularis and the Crows foot nerve looking to preserve the gastric emptying. This was 5 to 6 centimeters proximal to the pylorus.    We created a pouch based on the lesser curve, and in vertical orientation. We continued the vertical serial firings of the stapler to the angle of His gently cinching the bougie with our laparoscopic stapler looking to create a thin pouch. As we approached the fundus of the stomach and the angle of His, the stapler loads were changed appropriately according to the variable thickness of the tissue and were reinforced with buttressing material as needed.    This completely  the pouch from the remnant stomach.     We then turned our attention to the newly created pouch and examined it for bleeding or obvious defects on the staple lines and none were found.    The distal stomach pouch was occluded and an EGD as well as an air insufflation test was performed. Neither intraoperative bleeding nor leaks were detected.     I then covered the most proximal aspect of the staple line with a tongue of omentum in a Miquel patch fashion and secured it in place with a single 2/0 Vicryl stitch.    The robot was undocked and the 12 mm right flank trocar site was dilated and the gastric remnant was externalized through it and passed off the surgical field to be sent to pathology.      The sponge, needle and instrument count was reported complete.    The previously dilated trocar site was then closed with use of a suture closure device and a figure-of-eight with absorbable suture.  The pneumoperitoneum was evacuated using the Whiteside filter and smoke evacuator. The liver retractor and the remainder ports were then removed under direct laparoscopic  visualization and no back bleeding was noted.     The skin incisions were all closed with 4-0 absorbable subcuticular suture.     The patient tolerated the procedure well, was extubated uneventfully and was transferred to the recovery room in stable condition.     I was present for the entire length of the procedure as the attending of record.  No qualified resident was available to assist.  The presence of an assistant was necessary for camera holding, traction and counter traction and for help with suturing and stapling in addition to performing the intraop-EGD.    Patient Disposition:    PACU     Signature: Mann Edwards MD  Date: April 29, 2024  Time: 9:03 AM

## 2024-04-29 NOTE — ANESTHESIA POSTPROCEDURE EVALUATION
Post-Op Assessment Note    CV Status:  Stable    Pain management: adequate       Mental Status:  Alert and awake   Hydration Status:  Euvolemic   PONV Controlled:  Controlled   Airway Patency:  Patent     Post Op Vitals Reviewed: Yes    No anethesia notable event occurred.    Staff: Anesthesiologist, CRNA               /69 (04/29/24 0918)    Temp 97.6 °F (36.4 °C) (04/29/24 0918)    Pulse 86 (04/29/24 0918)   Resp      SpO2 97 % (04/29/24 0918)

## 2024-04-29 NOTE — INTERVAL H&P NOTE
H&P reviewed. After examining the patient I find no changes in the patients condition since the H&P had been written.    Vitals:    04/29/24 0601   BP: 117/84   Pulse: 65   Resp: 20   Temp: (!) 96.8 °F (36 °C)   SpO2: 94%

## 2024-04-29 NOTE — PLAN OF CARE
Problem: PAIN - ADULT  Goal: Verbalizes/displays adequate comfort level or baseline comfort level  Description: Interventions:  - Encourage patient to monitor pain and request assistance  - Assess pain using appropriate pain scale  - Administer analgesics based on type and severity of pain and evaluate response  - Implement non-pharmacological measures as appropriate and evaluate response  - Consider cultural and social influences on pain and pain management  - Notify physician/advanced practitioner if interventions unsuccessful or patient reports new pain  Outcome: Progressing     Problem: INFECTION - ADULT  Goal: Absence or prevention of progression during hospitalization  Description: INTERVENTIONS:  - Assess and monitor for signs and symptoms of infection  - Monitor lab/diagnostic results  - Monitor all insertion sites, i.e. indwelling lines, tubes, and drains  - Monitor endotracheal if appropriate and nasal secretions for changes in amount and color  - [place] appropriate cooling/warming therapies per order  - Administer medications as ordered  - Instruct and encourage patient and family to use good hand hygiene technique  - Identify and instruct in appropriate isolation precautions for identified infection/condition  Outcome: Progressing     Problem: SAFETY ADULT  Goal: Patient will remain free of falls  Description: INTERVENTIONS:  - Educate patient/family on patient safety including physical limitations  - Instruct patient to call for assistance with activity   - Consult OT/PT to assist with strengthening/mobility   - Keep Call bell within reach  - Keep bed low and locked with side rails adjusted as appropriate  - Keep care items and personal belongings within reach  - Initiate and maintain comfort rounds  - Make Fall Risk Sign visible to staff  - Offer Toileting every 2 Hours, in advance of need  - Initiate/Maintain bed alarm- Apply yellow socks and bracelet for high fall risk patients  - Consider  moving patient to room near nurses station  Outcome: Progressing     Problem: DISCHARGE PLANNING  Goal: Discharge to home or other facility with appropriate resources  Description: INTERVENTIONS:  - Identify barriers to discharge w/patient and caregiver  - Arrange for needed discharge resources and transportation as appropriate  - Identify discharge learning needs (meds, wound care, etc.)  - Arrange for interpretive services to assist at discharge as needed  - Refer to Case Management Department for coordinating discharge planning if the patient needs post-hospital services based on physician/advanced practitioner order or complex needs related to functional status, cognitive ability, or social support system  Outcome: Progressing     Problem: Knowledge Deficit  Goal: Patient/family/caregiver demonstrates understanding of disease process, treatment plan, medications, and discharge instructions  Description: Complete learning assessment and assess knowledge base.  Interventions:  - Provide teaching at level of understanding  - Provide teaching via preferred learning methods  Outcome: Progressing     Problem: RESPIRATORY - ADULT  Goal: Achieves optimal ventilation and oxygenation  Description: INTERVENTIONS:  - Assess for changes in respiratory status  - Assess for changes in mentation and behavior  - Position to facilitate oxygenation and minimize respiratory effort  - Oxygen administered by appropriate delivery if ordered  - Initiate smoking cessation education as indicated  - Encourage broncho-pulmonary hygiene including cough, deep breathe, Incentive Spirometry  - Assess the need for suctioning and aspirate as needed  - Assess and instruct to report SOB or any respiratory difficulty  - Respiratory Therapy support as indicated  Outcome: Progressing     Problem: GENITOURINARY - ADULT  Goal: Maintains or returns to baseline urinary function  Description: INTERVENTIONS:  - Assess urinary function  - Encourage oral  fluids to ensure adequate hydration if ordered  - Administer IV fluids as ordered to ensure adequate hydration  - Administer ordered medications as needed  - Offer frequent toileting  - Follow urinary retention protocol if ordered  Outcome: Progressing     Problem: SKIN/TISSUE INTEGRITY - ADULT  Goal: Skin Integrity remains intact(Skin Breakdown Prevention)  Description: Assess:  -Perform Ghanshyam assessment every shift  -Clean and moisturize skin every shift  -Inspect skin when repositioning, toileting, and assisting with ADLS  -Assess under medical devices such as roma sleeve every shift  -Assess extremities for adequate circulation and sensation     Bed Management:  -Have minimal linens on bed & keep smooth, unwrinkled  -Change linens as needed when moist or perspiring  -Avoid sitting or lying in one position for more than 2 hours while in bed  -Keep HOB at 30 degrees     Toileting:  -Offer bedside commode    Activity:  -Mobilize patient 6 times a day  -Encourage activity and walks on unit  -Instruct/ Assist with weight shifting every 2 hours when out of bed in chair  -Consider limitation of chair time 2 hour intervals    Skin Care:  -Avoid use of baby powder, tape, friction and shearing, hot water or constrictive clothing    Next Steps:  -Teach patient strategies to minimize risks such as incentive spirometer   -Consider consults to  interdisciplinary teams such as n/a  Outcome: Progressing  Goal: Incision(s), wounds(s) or drain site(s) healing without S/S of infection  Description: INTERVENTIONS  - Assess and document dressing, incision, wound bed, drain sites and surrounding tissue  - Provide patient and family education  - Perform skin care/dressing changes daily & prn  Outcome: Progressing

## 2024-04-30 VITALS
OXYGEN SATURATION: 95 % | RESPIRATION RATE: 17 BRPM | HEART RATE: 78 BPM | SYSTOLIC BLOOD PRESSURE: 138 MMHG | WEIGHT: 280.87 LBS | TEMPERATURE: 97.7 F | DIASTOLIC BLOOD PRESSURE: 82 MMHG | BODY MASS INDEX: 44.08 KG/M2 | HEIGHT: 67 IN

## 2024-04-30 LAB
ANION GAP SERPL CALCULATED.3IONS-SCNC: 6 MMOL/L (ref 4–13)
BUN SERPL-MCNC: 9 MG/DL (ref 5–25)
CALCIUM SERPL-MCNC: 8.5 MG/DL (ref 8.4–10.2)
CHLORIDE SERPL-SCNC: 105 MMOL/L (ref 96–108)
CO2 SERPL-SCNC: 26 MMOL/L (ref 21–32)
CREAT SERPL-MCNC: 0.53 MG/DL (ref 0.6–1.3)
ERYTHROCYTE [DISTWIDTH] IN BLOOD BY AUTOMATED COUNT: 16.5 % (ref 11.6–15.1)
GFR SERPL CREATININE-BSD FRML MDRD: 111 ML/MIN/1.73SQ M
GLUCOSE SERPL-MCNC: 120 MG/DL (ref 65–140)
HCT VFR BLD AUTO: 34.7 % (ref 34.8–46.1)
HCT VFR BLD AUTO: 36.2 % (ref 34.8–46.1)
HGB BLD-MCNC: 11.1 G/DL (ref 11.5–15.4)
HGB BLD-MCNC: 11.4 G/DL (ref 11.5–15.4)
MCH RBC QN AUTO: 28 PG (ref 26.8–34.3)
MCHC RBC AUTO-ENTMCNC: 32 G/DL (ref 31.4–37.4)
MCV RBC AUTO: 88 FL (ref 82–98)
PLATELET # BLD AUTO: 273 THOUSANDS/UL (ref 149–390)
PMV BLD AUTO: 10.1 FL (ref 8.9–12.7)
POTASSIUM SERPL-SCNC: 4.3 MMOL/L (ref 3.5–5.3)
RBC # BLD AUTO: 3.96 MILLION/UL (ref 3.81–5.12)
SODIUM SERPL-SCNC: 137 MMOL/L (ref 135–147)
WBC # BLD AUTO: 11.79 THOUSAND/UL (ref 4.31–10.16)

## 2024-04-30 PROCEDURE — 80048 BASIC METABOLIC PNL TOTAL CA: CPT | Performed by: STUDENT IN AN ORGANIZED HEALTH CARE EDUCATION/TRAINING PROGRAM

## 2024-04-30 PROCEDURE — 85027 COMPLETE CBC AUTOMATED: CPT | Performed by: STUDENT IN AN ORGANIZED HEALTH CARE EDUCATION/TRAINING PROGRAM

## 2024-04-30 PROCEDURE — NC001 PR NO CHARGE: Performed by: SURGERY

## 2024-04-30 PROCEDURE — 85014 HEMATOCRIT: CPT | Performed by: PHYSICIAN ASSISTANT

## 2024-04-30 PROCEDURE — 85018 HEMOGLOBIN: CPT | Performed by: PHYSICIAN ASSISTANT

## 2024-04-30 PROCEDURE — 99024 POSTOP FOLLOW-UP VISIT: CPT | Performed by: SURGERY

## 2024-04-30 RX ORDER — ACETAMINOPHEN 160 MG/5ML
975 SUSPENSION ORAL EVERY 8 HOURS
Qty: 638.4 ML | Refills: 0 | Status: SHIPPED | OUTPATIENT
Start: 2024-04-30 | End: 2024-05-07

## 2024-04-30 RX ADMIN — FAMOTIDINE 20 MG: 10 INJECTION INTRAVENOUS at 09:18

## 2024-04-30 RX ADMIN — ACETAMINOPHEN 1000 MG: 10 INJECTION INTRAVENOUS at 00:42

## 2024-04-30 RX ADMIN — SERTRALINE HYDROCHLORIDE 50 MG: 50 TABLET ORAL at 09:17

## 2024-04-30 RX ADMIN — KETOROLAC TROMETHAMINE 15 MG: 30 INJECTION, SOLUTION INTRAMUSCULAR; INTRAVENOUS at 05:14

## 2024-04-30 RX ADMIN — OXYCODONE HYDROCHLORIDE 10 MG: 5 SOLUTION ORAL at 03:24

## 2024-04-30 RX ADMIN — SIMETHICONE 80 MG: 80 TABLET, CHEWABLE ORAL at 09:17

## 2024-04-30 RX ADMIN — ACETAMINOPHEN 1000 MG: 10 INJECTION INTRAVENOUS at 11:11

## 2024-04-30 RX ADMIN — OXYCODONE HYDROCHLORIDE 10 MG: 5 SOLUTION ORAL at 07:29

## 2024-04-30 RX ADMIN — OXYCODONE HYDROCHLORIDE 10 MG: 5 SOLUTION ORAL at 14:19

## 2024-04-30 RX ADMIN — SODIUM CHLORIDE, SODIUM LACTATE, POTASSIUM CHLORIDE, AND CALCIUM CHLORIDE 75 ML/HR: .6; .31; .03; .02 INJECTION, SOLUTION INTRAVENOUS at 00:47

## 2024-04-30 RX ADMIN — ONDANSETRON 4 MG: 2 INJECTION INTRAMUSCULAR; INTRAVENOUS at 08:02

## 2024-04-30 RX ADMIN — ENOXAPARIN SODIUM 40 MG: 40 INJECTION SUBCUTANEOUS at 10:15

## 2024-04-30 RX ADMIN — KETOROLAC TROMETHAMINE 15 MG: 30 INJECTION, SOLUTION INTRAMUSCULAR; INTRAVENOUS at 11:11

## 2024-04-30 NOTE — PROGRESS NOTES
"Progress Note - Bariatric Surgery   Jaquan Ribeiro 49 y.o. female MRN: [id number]  Unit/Bed#: E5 -01 Encounter: [id number]      Subjective/Objective     Subjective:  Patient with morbid obesity POD1 s/p Robotic sleeve gastrectomy.  Patient denies fevers, chills, sweats, SOB, CP, calf pain.  Pain adequately controlled on oral pain medication, complaining of right sided incisional pain. Wearing abdominal binder.  Ambulating without assistance, voiding well, and using incentive spirometer.  Tolerating liquid diet without nausea or vomiting today.  Vital signs stable.  CBC today shows Hgb drop at 11.1 from 13.1 with repeat at 11.4.  BMP obtained today WNL.       Objective:    /82   Pulse 78   Temp 97.7 °F (36.5 °C)   Resp 17   Ht 5' 7\" (1.702 m)   Wt 127 kg (280 lb 13.9 oz)   LMP 03/30/2024 (Exact Date)   SpO2 95%   BMI 43.99 kg/m²       Intake/Output Summary (Last 24 hours) at 4/30/2024 0935  Last data filed at 4/30/2024 0855  Gross per 24 hour   Intake 1380 ml   Output 950 ml   Net 430 ml       Invasive Devices       Peripheral Intravenous Line  Duration             Peripheral IV 04/29/24 Dorsal (posterior);Left Forearm 1 day                    ROS: 10-point system completed. All negative except see HPI.    Physical Exam    General Appearance:    Alert, cooperative, no distress, appears stated age   Head:    Normocephalic, without obvious abnormality, atraumatic   Lungs:     Respirations unlabored   Heart:    Regular rate and rhythm   Abdomen:     Soft, appropriate tenderness, no masses, no organomegaly, non-distended   Extremities:   Extremities normal, atraumatic, no cyanosis or edema   Neurologic:  Incision:  Psych:   Normal strength and sensation    Clean, dry, and intact, no bleeding    Normal mood and affect       Lab, Imaging and other studies:I have personally reviewed pertinent lab results.  , CBC:   Lab Results   Component Value Date    WBC 11.79 (H) 04/30/2024    HGB 11.4 (L) " 04/30/2024    HCT 36.2 04/30/2024    MCV 88 04/30/2024     04/30/2024    RBC 3.96 04/30/2024    MCH 28.0 04/30/2024    MCHC 32.0 04/30/2024    RDW 16.5 (H) 04/30/2024    MPV 10.1 04/30/2024   , CMP:   Lab Results   Component Value Date    SODIUM 137 04/30/2024    K 4.3 04/30/2024     04/30/2024    CO2 26 04/30/2024    BUN 9 04/30/2024    CREATININE 0.53 (L) 04/30/2024    CALCIUM 8.5 04/30/2024    EGFR 111 04/30/2024        VTE Mechanical Prophylaxis: sequential compression device    Assessment/Plan  1)  Patient with morbid Obesity s/p Robotic Sleeve Gastrectomy with stable post op course.  Patient afebrile and hemodynamically stable.     - Encourage PO fluids  - Recommend ambulation, use of SCDs when not ambulating, and incentive spirometry.   - Ok to give Lovenox as repeat Hgb is stable   - Repeated H&H due to Hgb drop  - Plan to D/C patient home today pending anticipated progression    Plan of care was discussed with patient.  Care plan discussed with Dr. Grace Ovalle, PA-C  Bariatric Surgery  4/30/2024  9:35 AM

## 2024-04-30 NOTE — DISCHARGE SUMMARY
Discharge Summary - Jaquan Ribeiro 49 y.o. female MRN: [id number]    Unit/Bed#: E5 -01 Encounter: [id number]      Pre-Operative Diagnosis: Pre-Op Diagnosis Codes:     * Morbid obesity (HCC) [E66.01]     * Esophageal reflux [K21.9]     * Hyperlipemia [E78.5]    Post-Operative Diagnosis: Post-Op Diagnosis Codes:     * Morbid obesity (HCC) [E66.01]     * Esophageal reflux [K21.9]     * Hyperlipemia [E78.5]    Procedures Performed:  Procedure(s):  GASTRECTOMY  SLEEVE W/ ROBOTICS & INTRAOPERATIVE EGD    Surgeon: Mann Edwards MD    See H & P for full details of admission and Operative Note for full details of operations performed.     Patient tolerated surgery well without complications. In the morning postoperative Day 1, the patient had mild nausea and abdominal pain. Tolerated a clear liquid diet without vomiting. Able to ambulate and voiding independently. Patient was deemed ready for discharge home on lovenox.     Patient was seen and examined prior to discharge.      Provisions for Follow-Up Care:  See After Visit Summary/Discharge Instructions for information related to follow-up care and home orders.      Disposition: Home, in stable condition.     Planned Readmission: No    Discharge Medications:  See After Visit Summary/Discharge Instructions for reconciled discharge medications provided to patient and family.      Post Operative instructions: Reviewed with patient and/or family.    Signature:   Maria T Ovalle PA-C  Date: 4/30/2024 Time: 10:44 AM

## 2024-04-30 NOTE — DISCHARGE INSTRUCTIONS
your medications from [facility] in Hospital Lobby   Crush or cut your pills and open capsules, mix with liquid to drink.  Take Tylenol every 8 hours around the clock, unless instructed otherwise  Take your omeprazole daily  It is important to stay hydrated and follow your discharge diet progression   Mild nausea is ok as long as you can drink fluids, sip very slowly and get up and walk during any periods of nausea  You may shower normally after 48 hours, but do not scrub incision sites, blot gently with clean towel to dry incisions  Take home medications as usual unless instructed otherwise while in hospital  Follow up with Dr. Edwards and your PCP within the next week  Sleeve gastrectomy patients ONLY: Complete full course of lovenox injections!

## 2024-04-30 NOTE — PLAN OF CARE
Problem: PAIN - ADULT  Goal: Verbalizes/displays adequate comfort level or baseline comfort level  Description: Interventions:  - Encourage patient to monitor pain and request assistance  - Assess pain using appropriate pain scale  - Administer analgesics based on type and severity of pain and evaluate response  - Implement non-pharmacological measures as appropriate and evaluate response  - Consider cultural and social influences on pain and pain management  - Notify physician/advanced practitioner if interventions unsuccessful or patient reports new pain  Outcome: Progressing     Problem: INFECTION - ADULT  Goal: Absence or prevention of progression during hospitalization  Description: INTERVENTIONS:  - Assess and monitor for signs and symptoms of infection  - Monitor lab/diagnostic results  - Monitor all insertion sites, i.e. indwelling lines, tubes, and drains  - Monitor endotracheal if appropriate and nasal secretions for changes in amount and color  - [place] appropriate cooling/warming therapies per order  - Administer medications as ordered  - Instruct and encourage patient and family to use good hand hygiene technique  - Identify and instruct in appropriate isolation precautions for identified infection/condition  Outcome: Progressing     Problem: SAFETY ADULT  Goal: Patient will remain free of falls  Description: INTERVENTIONS:  - Educate patient/family on patient safety including physical limitations  - Instruct patient to call for assistance with activity   - Consult OT/PT to assist with strengthening/mobility   - Keep Call bell within reach  - Keep bed low and locked with side rails adjusted as appropriate  - Keep care items and personal belongings within reach  - Initiate and maintain comfort rounds  - Make Fall Risk Sign visible to staff  - Offer Toileting every 2 Hours, in advance of need  - Initiate/Maintain bed alarm- Apply yellow socks and bracelet for high fall risk patients  - Consider  moving patient to room near nurses station  Outcome: Progressing     Problem: DISCHARGE PLANNING  Goal: Discharge to home or other facility with appropriate resources  Description: INTERVENTIONS:  - Identify barriers to discharge w/patient and caregiver  - Arrange for needed discharge resources and transportation as appropriate  - Identify discharge learning needs (meds, wound care, etc.)  - Arrange for interpretive services to assist at discharge as needed  - Refer to Case Management Department for coordinating discharge planning if the patient needs post-hospital services based on physician/advanced practitioner order or complex needs related to functional status, cognitive ability, or social support system  Outcome: Progressing     Problem: Knowledge Deficit  Goal: Patient/family/caregiver demonstrates understanding of disease process, treatment plan, medications, and discharge instructions  Description: Complete learning assessment and assess knowledge base.  Interventions:  - Provide teaching at level of understanding  - Provide teaching via preferred learning methods  Outcome: Progressing     Problem: RESPIRATORY - ADULT  Goal: Achieves optimal ventilation and oxygenation  Description: INTERVENTIONS:  - Assess for changes in respiratory status  - Assess for changes in mentation and behavior  - Position to facilitate oxygenation and minimize respiratory effort  - Oxygen administered by appropriate delivery if ordered  - Initiate smoking cessation education as indicated  - Encourage broncho-pulmonary hygiene including cough, deep breathe, Incentive Spirometry  - Assess the need for suctioning and aspirate as needed  - Assess and instruct to report SOB or any respiratory difficulty  - Respiratory Therapy support as indicated  Outcome: Progressing     Problem: GENITOURINARY - ADULT  Goal: Maintains or returns to baseline urinary function  Description: INTERVENTIONS:  - Assess urinary function  - Encourage oral  fluids to ensure adequate hydration if ordered  - Administer IV fluids as ordered to ensure adequate hydration  - Administer ordered medications as needed  - Offer frequent toileting  - Follow urinary retention protocol if ordered  Outcome: Progressing     Problem: SKIN/TISSUE INTEGRITY - ADULT  Goal: Skin Integrity remains intact(Skin Breakdown Prevention)  Description: Assess:  -Perform Ghanshyam assessment every shift  -Clean and moisturize skin every shift  -Inspect skin when repositioning, toileting, and assisting with ADLS  -Assess under medical devices such as roma sleeve every shift  -Assess extremities for adequate circulation and sensation     Bed Management:  -Have minimal linens on bed & keep smooth, unwrinkled  -Change linens as needed when moist or perspiring  -Avoid sitting or lying in one position for more than 2 hours while in bed  -Keep HOB at 30 degrees     Toileting:  -Offer bedside commode    Activity:  -Mobilize patient 6 times a day  -Encourage activity and walks on unit  -Instruct/ Assist with weight shifting every 2 hours when out of bed in chair  -Consider limitation of chair time 2 hour intervals    Skin Care:  -Avoid use of baby powder, tape, friction and shearing, hot water or constrictive clothing    Next Steps:  -Teach patient strategies to minimize risks such as incentive spirometer   -Consider consults to  interdisciplinary teams such as n/a  Outcome: Progressing  Goal: Incision(s), wounds(s) or drain site(s) healing without S/S of infection  Description: INTERVENTIONS  - Assess and document dressing, incision, wound bed, drain sites and surrounding tissue  - Provide patient and family education  - Perform skin care/dressing changes daily & prn  Outcome: Progressing

## 2024-04-30 NOTE — DISCHARGE INSTR - AVS FIRST PAGE
Bariatric/Weight Loss Surgery  Hospital Discharge Instructions  ACTIVITY:  Progress as feels comfortable - a good rule is:  if you are doing something and it begins to hurt, stop doing the activity. Walk every hour while at home.  You may walk stairs if you do so slowly  You may shower 48 hours after surgery.  Do not scrub incision sites.  Blot gently with clean towel to dry incisions. (see #4 below)   Use your incentive spirometer 10 times per hour while awake for 1 week after surgery.  Do NOT drive for 48 hours after surgery. No driving 24 hours after taking certain prescription pain medications. Examples of such medication are Percocet, Darvocet, Oxycodone, Tylenol #3, and Tylenol with Codeine.     DIET  Stay on a liquid diet for 7 days after your surgery date, sipping slowly. Refer to your manual for examples of choices. Remember to keep your liquids sugar free or low calorie. You may have protein drinks. Make sure to drink 48 to 64 ounces per day of fluids.   You may advance to a pureed diet one week after surgery as instructed by your diet progression pamphlet. Once you get approval from your surgeon at your first post operative visit, you may advance to the soft diet and remain on soft diet for 8 weeks unless otherwise instructed.    MEDICATIONS:  The abdominal nerve block will wear off during the first 1-2 days that you are home, and you may become sore (especially over incision site/sites where abdominal wall is sutured). This may create a pulling sensation, especially while moving around, and will fade over time.  Continue to take your Tylenol and your pain medication as instructed.   Start vitamins and minerals one week after surgery or when you start stage 3/puree diet.   Anti-acid Medication as per prescription.  Other medications as indicated on the Physician Patient Discharge Instructions form given to you at the time of discharge.  Make sure that you are splitting your pill or tablet medications in  halves or fourths or even crushing them before you take them. Capsules should be opened and mixed with water or jello. You need to do this for at least 4 weeks after surgery. Eventually you will be able to take your medications the regular way as they were prescribed.   You will need to consult with your Family Doctor in regards to all your prescribed medication, particularly those for blood pressure and diabetes.  As you lose weight, medical conditions may change, requiring an alteration or elimination of the drug dose. Monitor blood pressure closely and call PCP with any concerns.   Sleeve Gastrectomy patients ONLY:  Complete full course of lovenox injections!  DO NOT TAKE BIRTH CONTROL(BC) MEDICATIONS, INSERT BC VAGINAL RINGS, OR PLACE IUD OR ANY OTHER BC METHODS UNTIL 31 DAYS FROM DAY OF DISCHARGE FROM HOSPITAL. THIS PLACES YOU AT HIGH RISK FOR A POTENTIALLY LIFE THREATENING BLOOD CLOT. Remember to always use barrier methods for birth control and speak to your GYN about using two forms of birth control to start 31 days after surgery. It is very important to avoid pregnancy until at least 18-24 months after surgery.     INCISION CARE  You may shower and get incisions wet 2 days after surgery. No soaking tub baths or swimming for 30 days after surgery. Keep abdominal area and incisions clean. Use soap and water to create a good lather and rinse off.  Do not scrub incisions.   If you have a drain, empty the drain as the nurses instructed.    FOLLOW-UP APPOINTMENT should be made for one week after discharge. Call surgeon’s office at [phone] to schedule an appointment.    CALL YOUR DOCTOR FOR:  pain not controlled by pain medications, a temperature greater than 101.5° F, any increase or change in drainage or redness from any incision, any vomiting or inability to keep liquids down, shortness of breath, shoulder pain, or bleeding

## 2024-04-30 NOTE — UTILIZATION REVIEW
"Initial Clinical Review    Elective   IP    surgical procedure    Age/Sex: 49 y.o. female    Surgery Date:   4/29    Procedure: Intraoperative Endoscopy  Laparoscopic Robotically Assisted Sleeve Gastrectomy    Anesthesia:  general    Operative Findings:  normal anatomy    POD#1 Progress Note:   4/30  D/C  home    Admission Orders: Date/Time/Statement:   Admission Orders (From admission, onward)       Ordered        04/29/24 0728  Inpatient Admission  Once                          Orders Placed This Encounter   Procedures    Inpatient Admission     Standing Status:   Standing     Number of Occurrences:   1     Order Specific Question:   Level of Care     Answer:   Med Surg [16]     Order Specific Question:   Bed Type     Answer:   Bariatric [1]     Order Specific Question:   Estimated length of stay     Answer:   Inpatient Only Surgery     Vital Signs: /82   Pulse 78   Temp 97.7 °F (36.5 °C)   Resp 17   Ht 5' 7\" (1.702 m)   Wt 127 kg (280 lb 13.9 oz)   LMP 03/30/2024 (Exact Date)   SpO2 95%   BMI 43.99 kg/m²     Pertinent Labs/Diagnostic Test Results:     Results from last 7 days   Lab Units 04/30/24  0910 04/30/24  0509   WBC Thousand/uL  --  11.79*   HEMOGLOBIN g/dL 11.4* 11.1*   HEMATOCRIT % 36.2 34.7*   PLATELETS Thousands/uL  --  273         Results from last 7 days   Lab Units 04/30/24  0509   SODIUM mmol/L 137   POTASSIUM mmol/L 4.3   CHLORIDE mmol/L 105   CO2 mmol/L 26   ANION GAP mmol/L 6   BUN mg/dL 9   CREATININE mg/dL 0.53*   EGFR ml/min/1.73sq m 111   CALCIUM mg/dL 8.5         Results from last 7 days   Lab Units 04/29/24  0953   POC GLUCOSE mg/dl 150*     Results from last 7 days   Lab Units 04/30/24  0509   GLUCOSE RANDOM mg/dL 120                         Diet:  bariatric  cl liq    Mobility:   OOB as  tolerated    DVT Prophylaxis:  SCD's    Medications/Pain Control:   Scheduled Medications:  acetaminophen, 1,000 mg, Intravenous, Q6H TROY  atorvastatin, 10 mg, Oral, Daily With " Dinner  enoxaparin, 40 mg, Subcutaneous, Daily  famotidine, 20 mg, Intravenous, Q12H TROY  ketorolac, 15 mg, Intravenous, Q6H  sertraline, 50 mg, Oral, Daily      Continuous IV Infusions:  lactated ringers, 75 mL/hr, Intravenous, Continuous  sodium chloride, 125 mL/hr, Intravenous, Continuous      PRN Meds:  cyclobenzaprine, 5 mg, Oral, TID PRN  diphenhydrAMINE, 25 mg, Oral, HS PRN  LORazepam, 1 mg, Oral, Daily PRN  metoclopramide, 10 mg, Intravenous, Q6H PRN  morphine injection, 4 mg, Intravenous, Q4H PRN  ondansetron, 4 mg, Intravenous, Q6H PRN  oxyCODONE, 10 mg, Oral, Q4H PRN  oxyCODONE, 5 mg, Oral, Q4H PRN  phenol, 2 spray, Mouth/Throat, Q2H PRN  promethazine, 25 mg, Intravenous, Q6H PRN  simethicone, 80 mg, Oral, 4x Daily PRN        Network Utilization Review Department  ATTENTION: Please call with any questions or concerns to [phone] and carefully listen to the prompts so that you are directed to the right person. All voicemails are confidential.   For Discharge needs, contact Care Management DC Support Team at [phone] opt. 2  Send all requests for admission clinical reviews, approved or denied determinations and any other requests to dedicated fax number below belonging to the campus where the patient is receiving treatment. List of dedicated fax numbers for the Facilities:  FACILITY NAME UR FAX NUMBER   ADMISSION DENIALS (Administrative/Medical Necessity) [phone]   DISCHARGE SUPPORT TEAM (NETWORK) [phone]   PARENT CHILD HEALTH (Maternity/NICU/Pediatrics) [phone]   [facility] [phone]   [facility] [phone]   [facility] [phone]   [facility] [phone]   [facility] [phone]   [facility] [phone]   [facility] [phone]   [facility] [phone]    [facility] [phone]   [facility] [phone]   [facility] [phone]   [facility] [phone]

## 2024-04-30 NOTE — PLAN OF CARE
Problem: PAIN - ADULT  Goal: Verbalizes/displays adequate comfort level or baseline comfort level  Description: Interventions:  - Encourage patient to monitor pain and request assistance  - Assess pain using appropriate pain scale  - Administer analgesics based on type and severity of pain and evaluate response  - Implement non-pharmacological measures as appropriate and evaluate response  - Consider cultural and social influences on pain and pain management  - Notify physician/advanced practitioner if interventions unsuccessful or patient reports new pain  Outcome: Progressing     Problem: INFECTION - ADULT  Goal: Absence or prevention of progression during hospitalization  Description: INTERVENTIONS:  - Assess and monitor for signs and symptoms of infection  - Monitor lab/diagnostic results  - Monitor all insertion sites, i.e. indwelling lines, tubes, and drains  - Monitor endotracheal if appropriate and nasal secretions for changes in amount and color  - [place] appropriate cooling/warming therapies per order  - Administer medications as ordered  - Instruct and encourage patient and family to use good hand hygiene technique  - Identify and instruct in appropriate isolation precautions for identified infection/condition  Outcome: Progressing     Problem: SAFETY ADULT  Goal: Patient will remain free of falls  Description: INTERVENTIONS:  - Educate patient/family on patient safety including physical limitations  - Instruct patient to call for assistance with activity   - Consult OT/PT to assist with strengthening/mobility   - Keep Call bell within reach  - Keep bed low and locked with side rails adjusted as appropriate  - Keep care items and personal belongings within reach  - Initiate and maintain comfort rounds  - Make Fall Risk Sign visible to staff  - Offer Toileting every 2 Hours, in advance of need  - Initiate/Maintain bed alarm- Apply yellow socks and bracelet for high fall risk patients  - Consider  moving patient to room near nurses station  Outcome: Progressing     Problem: DISCHARGE PLANNING  Goal: Discharge to home or other facility with appropriate resources  Description: INTERVENTIONS:  - Identify barriers to discharge w/patient and caregiver  - Arrange for needed discharge resources and transportation as appropriate  - Identify discharge learning needs (meds, wound care, etc.)  - Arrange for interpretive services to assist at discharge as needed  - Refer to Case Management Department for coordinating discharge planning if the patient needs post-hospital services based on physician/advanced practitioner order or complex needs related to functional status, cognitive ability, or social support system  Outcome: Progressing     Problem: Knowledge Deficit  Goal: Patient/family/caregiver demonstrates understanding of disease process, treatment plan, medications, and discharge instructions  Description: Complete learning assessment and assess knowledge base.  Interventions:  - Provide teaching at level of understanding  - Provide teaching via preferred learning methods  Outcome: Progressing     Problem: RESPIRATORY - ADULT  Goal: Achieves optimal ventilation and oxygenation  Description: INTERVENTIONS:  - Assess for changes in respiratory status  - Assess for changes in mentation and behavior  - Position to facilitate oxygenation and minimize respiratory effort  - Oxygen administered by appropriate delivery if ordered  - Initiate smoking cessation education as indicated  - Encourage broncho-pulmonary hygiene including cough, deep breathe, Incentive Spirometry  - Assess the need for suctioning and aspirate as needed  - Assess and instruct to report SOB or any respiratory difficulty  - Respiratory Therapy support as indicated  Outcome: Progressing     Problem: GENITOURINARY - ADULT  Goal: Maintains or returns to baseline urinary function  Description: INTERVENTIONS:  - Assess urinary function  - Encourage oral  fluids to ensure adequate hydration if ordered  - Administer IV fluids as ordered to ensure adequate hydration  - Administer ordered medications as needed  - Offer frequent toileting  - Follow urinary retention protocol if ordered  Outcome: Progressing     Problem: SKIN/TISSUE INTEGRITY - ADULT  Goal: Skin Integrity remains intact(Skin Breakdown Prevention)  Description: Assess:  -Perform Ghanshyam assessment every shift  -Clean and moisturize skin every shift  -Inspect skin when repositioning, toileting, and assisting with ADLS  -Assess under medical devices such as roma sleeve every shift  -Assess extremities for adequate circulation and sensation     Bed Management:  -Have minimal linens on bed & keep smooth, unwrinkled  -Change linens as needed when moist or perspiring  -Avoid sitting or lying in one position for more than 2 hours while in bed  -Keep HOB at 30 degrees     Toileting:  -Offer bedside commode    Activity:  -Mobilize patient 6 times a day  -Encourage activity and walks on unit  -Instruct/ Assist with weight shifting every 2 hours when out of bed in chair  -Consider limitation of chair time 2 hour intervals    Skin Care:  -Avoid use of baby powder, tape, friction and shearing, hot water or constrictive clothing    Next Steps:  -Teach patient strategies to minimize risks such as incentive spirometer   -Consider consults to  interdisciplinary teams such as n/a  Outcome: Progressing  Goal: Incision(s), wounds(s) or drain site(s) healing without S/S of infection  Description: INTERVENTIONS  - Assess and document dressing, incision, wound bed, drain sites and surrounding tissue  - Provide patient and family education  - Perform skin care/dressing changes daily & prn  Outcome: Progressing

## 2024-05-01 ENCOUNTER — TELEPHONE (OUTPATIENT)
Dept: INTERNAL MEDICINE CLINIC | Facility: OTHER | Age: 49
End: 2024-05-01

## 2024-05-01 ENCOUNTER — TELEPHONE (OUTPATIENT)
Dept: MEDSURG UNIT | Facility: HOSPITAL | Age: 49
End: 2024-05-01

## 2024-05-01 ENCOUNTER — TRANSITIONAL CARE MANAGEMENT (OUTPATIENT)
Dept: INTERNAL MEDICINE CLINIC | Facility: OTHER | Age: 49
End: 2024-05-01

## 2024-05-01 PROCEDURE — 88307 TISSUE EXAM BY PATHOLOGIST: CPT | Performed by: SPECIALIST

## 2024-05-01 PROCEDURE — 88342 IMHCHEM/IMCYTCHM 1ST ANTB: CPT | Performed by: SPECIALIST

## 2024-05-01 NOTE — TELEPHONE ENCOUNTER
LMOM for pt to call me at [facility] office    Please schedule TCM on or before 5/14    Please send me TEAMS message to see if I am available to take the call.

## 2024-05-01 NOTE — TELEPHONE ENCOUNTER
Post op follow up phone call completed.  Pt is sipping liquids and has consumed about  14 oz so far today.  Some episodes of nausea but no vomiting.  Slows sipping when nausea occurs.  Using IS as instructed, reinforced importance of using IS to help prevent pneumonia. Ambulating about home without difficulty.  Pain controlled with analgesia.  Reaffirmed examples of liquid diet over the next week.  Started miralax, passing gas, no BM yet.  Pt stated understanding about discharge instructions and medication adjustments.  Tolerating 's administration of Lovenox injections without difficulty.  Follow up appt with surgeon scheduled for next week.   Instructed to call with any additional questions or concerns.

## 2024-05-02 NOTE — UTILIZATION REVIEW
NOTIFICATION OF ADMISSION DISCHARGE   This is a Notification of Discharge from [facility]. Please be advised that this patient has been discharge from our facility. Below you will find the admission and discharge date and time including the patient’s disposition.   UTILIZATION REVIEW CONTACT:  Arlyn Schuler MA  Utilization   Network Utilization Review Department  Phone: [phone] x carefully listen to the prompts. All voicemails are confidential.  Email: [email or web address]     ADMISSION INFORMATION  PRESENTATION DATE: 4/29/2024  4:55 AM  OBERVATION ADMISSION DATE:   INPATIENT ADMISSION DATE: 4/29/24  7:28 AM   DISCHARGE DATE: 4/30/2024  3:10 PM   DISPOSITION:Home/Self Care    Network Utilization Review Department  ATTENTION: Please call with any questions or concerns to [phone] and carefully listen to the prompts so that you are directed to the right person. All voicemails are confidential.   For Discharge needs, contact Care Management DC Support Team at [phone] opt. 2  Send all requests for admission clinical reviews, approved or denied determinations and any other requests to dedicated fax number below belonging to the campus where the patient is receiving treatment. List of dedicated fax numbers for the Facilities:  FACILITY NAME UR FAX NUMBER   ADMISSION DENIALS (Administrative/Medical Necessity) [phone]   DISCHARGE SUPPORT TEAM ([facility]) [phone]   PARENT CHILD HEALTH (Maternity/NICU/Pediatrics) [phone]   [facility] [phone]   [facility] [phone]   [facility] [phone]   [facility] [phone]   [facility] [phone]   [facility] [phone]   [facility] [phone]   [facility]  [phone]   [facility] [phone]   [facility] [phone]   General acute hospital [phone]   [facility] [phone]

## 2024-05-09 ENCOUNTER — CLINICAL SUPPORT (OUTPATIENT)
Dept: BARIATRICS | Facility: CLINIC | Age: 49
End: 2024-05-09

## 2024-05-09 ENCOUNTER — OFFICE VISIT (OUTPATIENT)
Dept: BARIATRICS | Facility: CLINIC | Age: 49
End: 2024-05-09

## 2024-05-09 VITALS
TEMPERATURE: 98.3 F | HEIGHT: 67 IN | BODY MASS INDEX: 42.14 KG/M2 | OXYGEN SATURATION: 98 % | DIASTOLIC BLOOD PRESSURE: 80 MMHG | WEIGHT: 268.5 LBS | SYSTOLIC BLOOD PRESSURE: 110 MMHG | HEART RATE: 105 BPM

## 2024-05-09 DIAGNOSIS — Z98.84 BARIATRIC SURGERY STATUS: Primary | ICD-10-CM

## 2024-05-09 DIAGNOSIS — Z09 POSTOP CHECK: ICD-10-CM

## 2024-05-09 DIAGNOSIS — Z98.84 BARIATRIC SURGERY STATUS: ICD-10-CM

## 2024-05-09 PROCEDURE — 99213 OFFICE O/P EST LOW 20 MIN: CPT | Performed by: STUDENT IN AN ORGANIZED HEALTH CARE EDUCATION/TRAINING PROGRAM

## 2024-05-09 PROCEDURE — RECHECK: Performed by: DIETITIAN, REGISTERED

## 2024-05-09 NOTE — PROGRESS NOTES
Weight Management Nutrition NOte      Bariatric Surgeon: Dr. Mann Edwards    Surgery: Vertical Sleeve Gastrectomy    Class: first post op note    Topics discussed today include:     fluid goals post op, protein goals post op, chew food well, exercise, diet progression, protein supplems, vitamin/mineral supplements, calcium supplements, and iron supplements    Patient was able to verbalize basic diet (protein, fluid, vitamin and mineral) recommendations and possible nutrition-related complications. Yes

## 2024-05-09 NOTE — PROGRESS NOTES
POST OP UP VISIT - BARIATRIC SURGERY  Jaquan Ribeiro 49 y.o. female MRN: [id number]  Unit/Bed#:  Encounter: [id number]      HPI:  Jaquan Ribeiro is a 49 y.o. female status post robotic sleeve gastrectomy performed by Dr. Edwards on  returning to office for first post op visit since surgery.    Tolerating diet.  Denies nausea and vomiting.  Taking multivitamins and PPI daily.  Administering lovenox injections.     Review of Systems   All other systems reviewed and are negative.      Historical Information   Past Medical History:   Diagnosis Date    Anemia     Anxiety     Depression     GERD (gastroesophageal reflux disease)     History of prior pregnancies         Hyperlipidemia     Migraine with aura and without status migrainosus, not intractable 2018    Obesity     Papanicolaou smear 2019    normal    Uterine bleeding, dysfunctional      Past Surgical History:   Procedure Laterality Date     SECTION       SECTION      DC BLEPHAROPLASTY UPPER EYELID W/EXCESSIVE SKIN Bilateral 2023    Procedure: BLEPHAROPLASTY UPPER;  Surgeon: Horacio Wing MD;  Location:  MAIN OR;  Service: Plastics    DC HYSTEROSCOPY ENDOMETRIAL ABLATION N/A 2021    Procedure: ABLATION ENDOMETRIAL NOVASURE;  Surgeon: Eagle Santiago MD;  Location: [facility];  Service: Gynecology    DC LAPS [facility] RSTRICTIV PX LONGITUDINAL GASTRECTOMY N/A 2024    Procedure: GASTRECTOMY  SLEEVE W/ ROBOTICS & INTRAOPERATIVE EGD;  Surgeon: Mann Edwards MD;  Location: [facility] Main [facility];  Service: Bariatrics    TUBAL LIGATION       Social History   Social History     Substance and Sexual Activity   Alcohol Use No     Social History     Substance and Sexual Activity   Drug Use No     Social History     Tobacco Use   Smoking Status Never   Smokeless Tobacco Never     Family History: non-contributory    Meds/Allergies   all medications and allergies reviewed  Allergies   Allergen Reactions    Amoxicillin Hives     "Clavulanic Acid Hives    Moxifloxacin Hives    Sulfa Antibiotics Hives       Objective       Current Vitals:   Height: 5' 7\" (170.2 cm) (05/09/24 1424)  Weight - Scale: 122 kg (268 lb 8 oz) (05/09/24 1424)      Invasive Devices       None                   Physical Exam  Constitutional:       Appearance: Normal appearance.   HENT:      Head: Normocephalic.   Cardiovascular:      Rate and Rhythm: Normal rate.      Pulses: Normal pulses.   Pulmonary:      Effort: Pulmonary effort is normal.   Abdominal:      Palpations: Abdomen is soft.      Comments: Incisions clean dry and intact   Neurological:      General: No focal deficit present.      Mental Status: She is alert and oriented to person, place, and time.             Assessment/PLAN:    49 y.o. female status post robotic sleeve gastrectomy done on 4/29 by Dr. Edwards, doing well post op. No major issues and healing well.     The pathology report was reviewed with the patient and the results were within normal limits.     Pathology, and Other Studies: I have personally reviewed pertinent reports.    Lab Results   Component Value Date    FINALDX  04/29/2024     A. Stomach, \"portion of stomach\":  -  Portion of stomach with Helicobacter. pylori associated chronic active gastritis.  -  Helicobacter pylori, identified by H&E and immunostain.  -  Negative for intestinal metaplasia, dysplasia or carcinoma.          Increase physical activity slowly as tolerated and instructed.  Advance diet as instructed by our dietitians today and as indicated in the binder.  Continue Lovenox prophylaxis until completed.   Continue PPI    Follow up in one month as scheduled.        Otto Santiago MD  Bariatric Surgery   5/9/2024  2:27 PM      .   "

## 2024-05-13 ENCOUNTER — OFFICE VISIT (OUTPATIENT)
Dept: INTERNAL MEDICINE CLINIC | Age: 49
End: 2024-05-13
Payer: COMMERCIAL

## 2024-05-13 VITALS
BODY MASS INDEX: 42 KG/M2 | OXYGEN SATURATION: 99 % | SYSTOLIC BLOOD PRESSURE: 112 MMHG | HEART RATE: 99 BPM | TEMPERATURE: 97.4 F | WEIGHT: 267.6 LBS | HEIGHT: 67 IN | DIASTOLIC BLOOD PRESSURE: 86 MMHG

## 2024-05-13 DIAGNOSIS — G43.009 MIGRAINE WITHOUT AURA AND WITHOUT STATUS MIGRAINOSUS, NOT INTRACTABLE: ICD-10-CM

## 2024-05-13 DIAGNOSIS — Z98.84 S/P BARIATRIC SURGERY: ICD-10-CM

## 2024-05-13 DIAGNOSIS — Z09 HOSPITAL DISCHARGE FOLLOW-UP: Primary | ICD-10-CM

## 2024-05-13 DIAGNOSIS — F41.0 PANIC ATTACK: ICD-10-CM

## 2024-05-13 DIAGNOSIS — D50.8 OTHER IRON DEFICIENCY ANEMIA: ICD-10-CM

## 2024-05-13 DIAGNOSIS — F41.1 GAD (GENERALIZED ANXIETY DISORDER): ICD-10-CM

## 2024-05-13 DIAGNOSIS — E66.01 OBESITY, CLASS III, BMI 40-49.9 (MORBID OBESITY) (HCC): ICD-10-CM

## 2024-05-13 DIAGNOSIS — K21.9 GASTROESOPHAGEAL REFLUX DISEASE WITHOUT ESOPHAGITIS: ICD-10-CM

## 2024-05-13 DIAGNOSIS — E66.01 MORBID OBESITY (HCC): ICD-10-CM

## 2024-05-13 PROCEDURE — 99495 TRANSJ CARE MGMT MOD F2F 14D: CPT | Performed by: INTERNAL MEDICINE

## 2024-05-13 RX ORDER — SERTRALINE HYDROCHLORIDE 25 MG/1
25 TABLET, FILM COATED ORAL DAILY
Qty: 90 TABLET | Refills: 0 | Status: SHIPPED | OUTPATIENT
Start: 2024-05-13 | End: 2025-05-08

## 2024-05-13 RX ORDER — SCOLOPAMINE TRANSDERMAL SYSTEM 1 MG/1
1 PATCH, EXTENDED RELEASE TRANSDERMAL
Qty: 30 PATCH | Refills: 0 | Status: SHIPPED | OUTPATIENT
Start: 2024-05-13

## 2024-05-13 NOTE — PROGRESS NOTES
Assessment/Plan:    Transitional care management visit/hospital discharge follow-up  -Done 13 days postsurgery  -Improving greatly  -Follow-up with bariatric surgery    Morbid obesity status post bariatric surgery  -She has lost 27 pounds already  -Continue with diet and exercise    Nausea  - well controlled on her scopolamine patch and would like a refill  -we will refill scopolamine patch as requested    Iron deficiency anemia  -Last hemoglobin done on 4/30/2024 was 11.4, up from 11.1  -We will order repeat CBC  -Continue with iron supplement    GERD  -Symptoms are much improved  -Continue with omeprazole  -Continue to avoid diets and behaviors that trigger symptoms    Generalized anxiety disorder with panic attacks  -Much improved  -Patient wants to be weaned off Zoloft gently  -We will decrease the dose from 50 mg to 25 mg daily and she was counseled to take it for about 2 to 3 weeks and if she feels well and wants to be weaned off completely, then she may take it every other day for another 3 weeks and then stop  -She was counseled to call the office if she develops worsening anxiety or depression  -Continue with as needed Ativan     Diagnoses and all orders for this visit:    Hospital discharge follow-up    Morbid obesity (HCC)    Obesity, Class III, BMI 40-49.9 (morbid obesity) (HCC)    Gastroesophageal reflux disease without esophagitis    S/P bariatric surgery    Other iron deficiency anemia  -     CBC and differential; Future    LASHAUN (generalized anxiety disorder)  -     sertraline (ZOLOFT) 25 mg tablet; Take 1 tablet (25 mg total) by mouth daily    Panic attack  -     sertraline (ZOLOFT) 25 mg tablet; Take 1 tablet (25 mg total) by mouth daily    Migraine without aura and without status migrainosus, not intractable  -     scopolamine (TRANSDERM-SCOP) 1 mg/3 days TD 72 hr patch; Place 1 patch on the skin over 72 hours every third day           Subjective:      Patient ID: Jaquan Shermanuzma is a 49 y.o.  female.    HPI    Patient presents for a transitional care management visit.  She was admitted at [facility] from 4/29/2024 to 4/30/2024 for  gastrectomy sleeve with robotics and intraoperative EGD.    Per hospital notes, she tolerated the surgery well without complications.    On postop day #1, it was reported that she had mild nausea and abdominal pain but she did tolerate a clear liquid diet without vomiting and was able to ambulate and void independently and so was deemed ready for discharge home on Lovenox.  She was seen by bariatrics for a postop visit.   Per bariatrics office notes, her incisions were clean, dry and intact  without signs of infection.  Pathology showed H. pylori patient with chronic gastritis and she states that she was treated and was told to continue with PPI.  She states that she has lost some wt - 27 lbs  She states that she feels good   Denies any vomiting but admits to occasional nausea.  She would like a refill of her scopolamine patches.  Has been eating as recommended   She states that she feels so much better and stronger with more energy.  She has been okayed to exercise by bariatrics and is doing so.  Lost 17 lbs prior to sx from diet and exercise.  Wants to stop the zoloft or at least decrease the dose because she has been feeling so well  Wants to reduce the dose - took the zolft for a couple of years-about 3 to 4 years.  No longer feels depressed at all   Has not been feeling anxious and has not been needing to use her ativan.  Occasonally needs to use her scopolamine patches for her nausea  Her acid reflux got better   She states that after her EGD and biopsy, she was told that there is no need for treatment for h pylori infection and states that her symptoms are indeed resolved.  Takes her vit d and multivitamins   Drinks about 40- oz of water daily   Will see bariatrics in one month   Taking iron tabs - 65 mg and states that it does not make her constipated.    TCM  Call     Date and time call was made  5/1/2024 10:23 AM    Hospital care reviewed  Records reviewed    Patient was hospitialized at  [facility]    Date of Admission  04/29/24    Date of discharge  04/30/24    Diagnosis  BMI 40.0-44.9    Disposition  Home    Current Symptoms  Incisional pain    Incisional pain severity  Mild      TCM Call     Post hospital issues  None    Scheduled for follow up?  Yes    Did you obtain your prescribed medications  Yes    Do you need help managing your prescriptions or medications  No    Is transportation to your appointment needed  No    I have advised the patient to call PCP with any new or worsening symptoms  Albaro Hawkins CMA          The following portions of the patient's history were reviewed and updated as appropriate: She  has a past medical history of Anemia, Anxiety, Depression, GERD (gastroesophageal reflux disease), History of prior pregnancies, Hyperlipidemia, Migraine with aura and without status migrainosus, not intractable (04/23/2018), Obesity, Papanicolaou smear (01/2019), and Uterine bleeding, dysfunctional.  She   Patient Active Problem List    Diagnosis Date Noted   • Preop cardiovascular exam 01/17/2024   • Dermatochalasis 04/12/2023   • Obesity, Class III, BMI 40-49.9 (morbid obesity) ([facility]) 02/03/2023   • Vertigo 09/20/2022   • Nausea 09/20/2022   • Benign paroxysmal positional vertigo of left ear 09/20/2022   • COVID-19 05/10/2022   • Carpal tunnel syndrome of left wrist 10/06/2021   • Medial epicondylitis of left elbow 10/06/2021   • History of endometrial ablation 04/27/2021   • LASHAUN (generalized anxiety disorder) 03/26/2021   • Panic attack 03/26/2021   • Morbid obesity (HCC) 03/26/2021   • BMI 40.0-44.9, adult ([facility]) 03/26/2021   • Sore throat 03/10/2021   • Snoring 03/10/2021   • Thrombocytosis 09/19/2019   • Iron deficiency 09/19/2019   • Chronic cough 09/18/2019   • Weight gain 09/18/2019   • Eustachian tube dysfunction, bilateral 06/03/2019    • GERD (gastroesophageal reflux disease) 2018   • Macromastia 10/10/2018   • Pre-diabetes 10/10/2018   • Mixed dyslipidemia 10/10/2018   • Chronic migraine without aura without status migrainosus, not intractable 2018   • Seasonal allergies 10/09/2017     She  has a past surgical history that includes  section;  section; Tubal ligation; pr hysteroscopy endometrial ablation (N/A, 2021); pr blepharoplasty upper eyelid w/excessive skin (Bilateral, 2023); and pr laps gstrc rstrictiv px longitudinal gastrectomy (N/A, 2024).  Her family history includes Cancer (age of onset: 80) in her father; No Known Problems in her daughter, maternal aunt, maternal aunt, maternal aunt, maternal aunt, maternal grandfather, maternal grandmother, mother, paternal aunt, paternal aunt, paternal aunt, paternal grandfather, paternal grandmother, sister, and sister; Prostate cancer in her father; Stroke in her father.  She  reports that she has never smoked. She has never used smokeless tobacco. She reports that she does not drink alcohol and does not use drugs.  Current Outpatient Medications   Medication Sig Dispense Refill   • atorvastatin (LIPITOR) 10 mg tablet Take 1 tablet (10 mg total) by mouth daily (Patient taking differently: Take 10 mg by mouth daily In evening) 90 tablet 1   • B Complex Vitamins (VITAMIN B COMPLEX PO) Take by mouth daily      • Biotin 28549 MCG TABS Take 10,000 mg by mouth in the morning     • cyclobenzaprine (FLEXERIL) 5 mg tablet Take 1 tablet (5 mg total) by mouth as needed for muscle spasms (migraine) 30 tablet 2   • enoxaparin (Lovenox) 40 mg/0.4 mL Inject 0.4 mL (40 mg total) under the skin in the morning for 13 days 5.2 mL 0   • ferrous sulfate 324 (65 Fe) mg Take 1 tablet (324 mg total) by mouth daily before breakfast 30 tablet 1   • multivitamin (THERAGRAN) TABS Take 1 tablet by mouth daily     • omeprazole (PriLOSEC) 20 mg delayed release capsule Take 1 capsule  (20 mg total) by mouth daily 90 capsule 1   • onabotulinumtoxin A (BOTOX) 100 units Inject into a muscle every 3 (three) months For migraines done every 3 months     • ondansetron (ZOFRAN-ODT) 4 mg disintegrating tablet Take 1 tablet (4 mg total) by mouth every 6 (six) hours as needed for nausea or vomiting 30 tablet 2   • rimegepant sulfate (Nurtec) 75 mg TBDP Take 1 tablet (75 mg total) by mouth if needed (migraine) Limit of 1 in 24 hours 16 tablet 11   • scopolamine (TRANSDERM-SCOP) 1 mg/3 days TD 72 hr patch Place 1 patch on the skin over 72 hours every third day 30 patch 0   • sertraline (ZOLOFT) 25 mg tablet Take 1 tablet (25 mg total) by mouth daily 90 tablet 0   • vitamin B-12 (VITAMIN B-12) 1,000 mcg tablet Take by mouth daily     • Vitamin D, Cholecalciferol, 1000 units CAPS Take 1 tablet by mouth in the morning       • LORazepam (ATIVAN) 0.5 mg tablet Take 2 tablets (1 mg total) by mouth daily as needed for anxiety (Patient not taking: Reported on 5/9/2024) 20 tablet 0     No current facility-administered medications for this visit.     Current Outpatient Medications on File Prior to Visit   Medication Sig   • atorvastatin (LIPITOR) 10 mg tablet Take 1 tablet (10 mg total) by mouth daily (Patient taking differently: Take 10 mg by mouth daily In evening)   • B Complex Vitamins (VITAMIN B COMPLEX PO) Take by mouth daily    • Biotin 56848 MCG TABS Take 10,000 mg by mouth in the morning   • cyclobenzaprine (FLEXERIL) 5 mg tablet Take 1 tablet (5 mg total) by mouth as needed for muscle spasms (migraine)   • enoxaparin (Lovenox) 40 mg/0.4 mL Inject 0.4 mL (40 mg total) under the skin in the morning for 13 days   • ferrous sulfate 324 (65 Fe) mg Take 1 tablet (324 mg total) by mouth daily before breakfast   • multivitamin (THERAGRAN) TABS Take 1 tablet by mouth daily   • omeprazole (PriLOSEC) 20 mg delayed release capsule Take 1 capsule (20 mg total) by mouth daily   • onabotulinumtoxin A (BOTOX) 100 units  Inject into a muscle every 3 (three) months For migraines done every 3 months   • ondansetron (ZOFRAN-ODT) 4 mg disintegrating tablet Take 1 tablet (4 mg total) by mouth every 6 (six) hours as needed for nausea or vomiting   • rimegepant sulfate (Nurtec) 75 mg TBDP Take 1 tablet (75 mg total) by mouth if needed (migraine) Limit of 1 in 24 hours   • vitamin B-12 (VITAMIN B-12) 1,000 mcg tablet Take by mouth daily   • Vitamin D, Cholecalciferol, 1000 units CAPS Take 1 tablet by mouth in the morning     • [DISCONTINUED] scopolamine (TRANSDERM-SCOP) 1 mg/3 days TD 72 hr patch Place 1 patch on the skin over 72 hours every third day   • [DISCONTINUED] sertraline (ZOLOFT) 50 mg tablet Take 1 tablet (50 mg total) by mouth daily   • LORazepam (ATIVAN) 0.5 mg tablet Take 2 tablets (1 mg total) by mouth daily as needed for anxiety (Patient not taking: Reported on 5/9/2024)   • [DISCONTINUED] Dihydroergotamine Mesylate HFA (Trudhesa) 0.725 MG/ACT AERS 0.725 mg into each nostril if needed (migraine) Use at onset of migraine.  May repeat in 1 hour if needed.  Limit of 3 a week or 8 a month.  Do not use within 24 hours of a triptan. (Patient not taking: Reported on 5/9/2024)   • [DISCONTINUED] oxyCODONE (Roxicodone) 5 immediate release tablet Take 1 tablet (5 mg total) by mouth every 4 (four) hours as needed for moderate pain Max Daily Amount: 30 mg (Patient not taking: Reported on 5/9/2024)   • [DISCONTINUED] polyethylene glycol (MiraLax) 17 GM/SCOOP powder Take 238 g by mouth once for 1 dose Take 238 g my mouth. Use as directed (Patient not taking: Reported on 1/17/2024)     No current facility-administered medications on file prior to visit.     She is allergic to amoxicillin, clavulanic acid, moxifloxacin, and sulfa antibiotics..    Review of Systems   Constitutional:  Negative for activity change, chills, fatigue, fever and unexpected weight change.   HENT:  Negative for ear pain, postnasal drip, rhinorrhea, sinus pressure  "and sore throat.    Eyes:  Negative for pain.   Respiratory:  Negative for cough, choking, chest tightness, shortness of breath and wheezing.    Cardiovascular:  Negative for chest pain, palpitations and leg swelling.   Gastrointestinal:  Positive for abdominal pain (some soreness at her incision site) and nausea. Negative for constipation, diarrhea and vomiting.   Genitourinary:  Negative for dysuria and hematuria.   Musculoskeletal:  Negative for arthralgias, back pain, gait problem, joint swelling, myalgias and neck stiffness.   Skin:  Negative for pallor and rash.   Neurological:  Negative for dizziness, tremors, seizures, syncope, light-headedness and headaches.   Hematological:  Negative for adenopathy.   Psychiatric/Behavioral:  Negative for behavioral problems.          Objective:      /86 (BP Location: Left arm, Patient Position: Sitting, Cuff Size: Large)   Pulse 99   Temp (!) 97.4 °F (36.3 °C) (Temporal)   Ht 5' 7\" (1.702 m)   Wt 121 kg (267 lb 9.6 oz)   LMP 03/30/2024 (Exact Date)   SpO2 99% Comment: ra  BMI 41.91 kg/m²          Physical Exam  Constitutional:       General: She is not in acute distress.     Appearance: She is well-developed. She is not diaphoretic.   HENT:      Head: Normocephalic and atraumatic.      Right Ear: External ear normal.      Left Ear: External ear normal.      Nose: Nose normal.      Mouth/Throat:      Mouth: Mucous membranes are dry.      Pharynx: No oropharyngeal exudate.      Comments: Dry mucous membranes  Eyes:      General: No scleral icterus.        Right eye: No discharge.         Left eye: No discharge.      Conjunctiva/sclera: Conjunctivae normal.      Pupils: Pupils are equal, round, and reactive to light.   Neck:      Thyroid: No thyromegaly.      Vascular: No JVD.      Trachea: No tracheal deviation.   Cardiovascular:      Rate and Rhythm: Normal rate and regular rhythm.      Heart sounds: Normal heart sounds. No murmur heard.     No friction rub. " No gallop.   Pulmonary:      Effort: Pulmonary effort is normal. No respiratory distress.      Breath sounds: Normal breath sounds. No wheezing or rales.   Chest:      Chest wall: No tenderness.   Abdominal:      General: Bowel sounds are normal. There is no distension.      Palpations: Abdomen is soft. There is no mass.      Tenderness: There is no abdominal tenderness (Incision sites are mildly erythematous, clean, dry and intact without tenderness). There is no guarding or rebound.   Musculoskeletal:         General: No tenderness or deformity. Normal range of motion.      Cervical back: Normal range of motion and neck supple.   Lymphadenopathy:      Cervical: No cervical adenopathy.   Skin:     General: Skin is warm and dry.      Coloration: Skin is not pale.      Findings: No erythema or rash.   Neurological:      Mental Status: She is alert and oriented to person, place, and time.      Cranial Nerves: No cranial nerve deficit.      Motor: No abnormal muscle tone.      Coordination: Coordination normal.      Deep Tendon Reflexes: Reflexes are normal and symmetric.   Psychiatric:         Behavior: Behavior normal.           Admission on 04/29/2024, Discharged on 04/30/2024   Component Date Value Ref Range Status   • EXT Preg Test, Ur 04/29/2024 Negative  Negative Final   • Control 04/29/2024 Valid  Valid Final   • Case Report 04/29/2024    Final                    Value:Surgical Pathology Report                         Case: [id number]                                  Authorizing Provider:  Mann Edwards MD        Collected:           04/29/2024 0853              Ordering Location:     [facility]        Received:            04/29/2024 [address] [place] [address] Operating Room                                                     Pathologist:           Rahat Matthew MD                                                     Specimen:    Stomach, PORTION OF STOMACH                                                                • Final Diagnosis 04/29/2024    Final                    Value:This result contains rich text formatting which cannot be displayed here.   • Note 04/29/2024    Final                    Value:This result contains rich text formatting which cannot be displayed here.   • Additional Information 04/29/2024    Final                    Value:This result contains rich text formatting which cannot be displayed here.   • Gross Description 04/29/2024    Final                    Value:This result contains rich text formatting which cannot be displayed here.   • POC Glucose 04/29/2024 150 (H)  65 - 140 mg/dl Final   • Sodium 04/30/2024 137  135 - 147 mmol/L Final   • Potassium 04/30/2024 4.3  3.5 - 5.3 mmol/L Final   • Chloride 04/30/2024 105  96 - 108 mmol/L Final   • CO2 04/30/2024 26  21 - 32 mmol/L Final   • ANION GAP 04/30/2024 6  4 - 13 mmol/L Final   • BUN 04/30/2024 9  5 - 25 mg/dL Final   • Creatinine 04/30/2024 0.53 (L)  0.60 - 1.30 mg/dL Final    Standardized to IDMS reference method   • Glucose 04/30/2024 120  65 - 140 mg/dL Final    If the patient is fasting, the ADA then defines impaired fasting glucose as > 100 mg/dL and diabetes as > or equal to 123 mg/dL.   • Calcium 04/30/2024 8.5  8.4 - 10.2 mg/dL Final   • eGFR 04/30/2024 111  ml/min/1.73sq m Final   • WBC 04/30/2024 11.79 (H)  4.31 - 10.16 Thousand/uL Final   • RBC 04/30/2024 3.96  3.81 - 5.12 Million/uL Final   • Hemoglobin 04/30/2024 11.1 (L)  11.5 - 15.4 g/dL Final   • Hematocrit 04/30/2024 34.7 (L)  34.8 - 46.1 % Final   • MCV 04/30/2024 88  82 - 98 fL Final   • MCH 04/30/2024 28.0  26.8 - 34.3 pg Final   • MCHC 04/30/2024 32.0  31.4 - 37.4 g/dL Final   • RDW 04/30/2024 16.5 (H)  11.6 - 15.1 % Final   • Platelets 04/30/2024 273  149 - 390 Thousands/uL Final   • MPV 04/30/2024 10.1  8.9 - 12.7 fL Final   • Hemoglobin 04/30/2024 11.4 (L)  11.5 - 15.4 g/dL Final   • Hematocrit 04/30/2024 36.2  34.8 - 46.1 %  Final   Appointment on 02/16/2024   Component Date Value Ref Range Status   • Sodium 02/16/2024 138  135 - 147 mmol/L Final   • Potassium 02/16/2024 4.2  3.5 - 5.3 mmol/L Final   • Chloride 02/16/2024 102  96 - 108 mmol/L Final   • CO2 02/16/2024 30  21 - 32 mmol/L Final   • ANION GAP 02/16/2024 6  mmol/L Final   • BUN 02/16/2024 12  5 - 25 mg/dL Final   • Creatinine 02/16/2024 0.59 (L)  0.60 - 1.30 mg/dL Final    Standardized to IDMS reference method   • Glucose, Fasting 02/16/2024 86  65 - 99 mg/dL Final   • Calcium 02/16/2024 9.4  8.4 - 10.2 mg/dL Final   • AST 02/16/2024 20  13 - 39 U/L Final   • ALT 02/16/2024 22  7 - 52 U/L Final    Specimen collection should occur prior to Sulfasalazine administration due to the potential for falsely depressed results.    • Alkaline Phosphatase 02/16/2024 77  34 - 104 U/L Final   • Total Protein 02/16/2024 7.6  6.4 - 8.4 g/dL Final   • Albumin 02/16/2024 4.2  3.5 - 5.0 g/dL Final   • Total Bilirubin 02/16/2024 0.53  0.20 - 1.00 mg/dL Final    Use of this assay is not recommended for patients undergoing treatment with eltrombopag due to the potential for falsely elevated results.  N-acetyl-p-benzoquinone imine (metabolite of Acetaminophen) will generate erroneously low results in samples for patients that have taken an overdose of Acetaminophen.   • eGFR 02/16/2024 107  ml/min/1.73sq m Final   • TSH 3RD GENERATON 02/16/2024 1.109  0.450 - 4.500 uIU/mL Final    The recommended reference ranges for TSH during pregnancy are as follows:   First trimester 0.100 to 2.500 uIU/mL   Second trimester  0.200 to 3.000 uIU/mL   Third trimester 0.300 to 3.000 uIU/m    Note: Normal ranges may not apply to patients who are transgender, non-binary, or whose legal sex, sex at birth, and gender identity differ.  Adult TSH (3rd generation) reference range follows the recommended guidelines of the American Thyroid Association, January, 2020.   • WBC 02/16/2024 7.94  4.31 - 10.16 Thousand/uL Final    • RBC 02/16/2024 4.87  3.81 - 5.12 Million/uL Final   • Hemoglobin 02/16/2024 13.1  11.5 - 15.4 g/dL Final   • Hematocrit 02/16/2024 41.3  34.8 - 46.1 % Final   • MCV 02/16/2024 85  82 - 98 fL Final   • MCH 02/16/2024 26.9  26.8 - 34.3 pg Final   • MCHC 02/16/2024 31.7  31.4 - 37.4 g/dL Final   • RDW 02/16/2024 15.9 (H)  11.6 - 15.1 % Final   • MPV 02/16/2024 10.2  8.9 - 12.7 fL Final   • Platelets 02/16/2024 339  149 - 390 Thousands/uL Final   • nRBC 02/16/2024 0  /100 WBCs Final   • Segmented % 02/16/2024 52  43 - 75 % Final   • Immature Grans % 02/16/2024 0  0 - 2 % Final   • Lymphocytes % 02/16/2024 39  14 - 44 % Final   • Monocytes % 02/16/2024 6  4 - 12 % Final   • Eosinophils Relative 02/16/2024 2  0 - 6 % Final   • Basophils Relative 02/16/2024 1  0 - 1 % Final   • Absolute Neutrophils 02/16/2024 4.18  1.85 - 7.62 Thousands/µL Final   • Absolute Immature Grans 02/16/2024 0.02  0.00 - 0.20 Thousand/uL Final   • Absolute Lymphocytes 02/16/2024 3.10  0.60 - 4.47 Thousands/µL Final   • Absolute Monocytes 02/16/2024 0.45  0.17 - 1.22 Thousand/µL Final   • Eosinophils Absolute 02/16/2024 0.15  0.00 - 0.61 Thousand/µL Final   • Basophils Absolute 02/16/2024 0.04  0.00 - 0.10 Thousands/µL Final   • Hemoglobin A1C 02/16/2024 6.4 (H)  Normal 4.0-5.6%; PreDiabetic 5.7-6.4%; Diabetic >=6.5%; Glycemic control for adults with diabetes <7.0% % Final   • EAG 02/16/2024 137  mg/dl Final   • Hep A Total Ab 02/16/2024 Non-reactive  Non-Reactive Final   • Hep B S Ab 02/16/2024 <3.00  3-500 mIU/mL mIU/mL Final    Protective Immunity: Hep B Surface Antibody >= 10 mIu/ml (Traceable to WHO International Reference Preparation)   • Hepatitis B Surface Ag 02/16/2024 Non-reactive  Non-Reactive Final   • Hep A IgM 02/16/2024 Non-reactive  Non-Reactive Final   • Hepatitis C Ab 02/16/2024 Non-reactive  Non-Reactive Final   • Hep B C IgM 02/16/2024 Non-reactive  Non-Reactive Final   • Glucose, Random 02/16/2024 88  70 - 99 mg/dL Final    • Cholesterol, Total 2024 158  100 - 199 mg/dL Final   • BILIRUBIN, TOTAL 2024 0.3  0.0 - 1.2 mg/dL Final   • AST (SGOT) P5P 2024 25  0 - 40 IU/L Final   • Triglycerides 2024 116  0 - 149 mg/dL Final   • ALT (SGPT) 2024 25  0 - 40 IU/L Final   • Haptoglobin 2024 295  42 - 296 mg/dL Final   • GGT 2024 23  0 - 60 IU/L Final   • Apolipoprotein A-1 2024 141  116 - 209 mg/dL Final   • Alpha 2-Macroglobulins, Qn 2024 125  110 - 276 mg/dL Final   • Fibrosis Scorin2024 Comment   Final         <=0.21 = Stage F0 - No fibrosis  0.21 - 0.27 = Stage F0 - F1  0.27 - 0.31 = Stage F1 - Portal fibrosis  0.31 - 0.48 = Stage F1 - F2  0.48 - 0.58 = Stage F2 - Bridging fibrosis with few septa  0.58 - 0.72 = Stage F3 - Bridging fibrosis with many septa  0.72 - 0.74 = Stage F3 - F4        >0.74 = Stage F4 - Cirrhosis   • PATEL Scoring 2024 Comment   Final         <=0.25 = N0 - No PATEL  0.25 - 0.50 = N1 - Mild PATEL  0.50 - 0.75 = N2 - Moderate PATEL        >0.75 = N3 - Severe PATEL   • Fibrosis Score 2024 0.03  0.00 - 0.21 Final   • Fibrosis Stage 2024 Comment   Final                       F0 - No fibrosis   • Steatosis Score 2024 0.50 (H)  0.00 - 0.40 Final   • Steatosis Grade 2024 Comment   Final                    S1 - Mild Steatosis                       (But Clinically Significant) (5-33%)   • Comment: 2024 Comment   Final    This test was developed and its performance characteristics  determined by [facility]. It has not been cleared or approved  by the Food and Drug Administration.  For questions regarding this report please contact customer service  at [phone].  References:  1.  Anna Richard al. Diagnostic Value of Biochemical Markers  (FibroTest) for the prediction of Liver Fibrosis in patients with  Non-Alcoholic Fatty Liver Disease. BMC Gastroenterology 2006; 6:6.  2.  Hans ÁLVAREZ. et al. The Diagnostic Performance of a  Simplified  Blood Test (SteatoTest-2) for the Prediction of Liver Steatosis.  Eur J Gastroenterol Hepatol. 2019; 31:393-402.  3.  Hans Curry al. Diagnostic performance of a new noninvasive  test for nonalcoholic steatohepatitis using a simplified histological  reference. Eur J Gastroenterol Hepatol. 2018 May; 30:569-577.   • PATEL SCORE 02/16/2024 0.33 (H)  0.00 - 0.25 Final   • PATEL GRADE 02/16/2024 Comment   Final                       N1 -  Mild PATEL   • METHODOLOGY 02/16/2024 Comment   Final    The analytes tested are performed by FibroSure-Specific methods.  Not intended for use with other diagnostic considerations.   • INTERPRETATION 02/16/2024 Comment   Final    Comment: Quantitative results of 10 biochemicals in combination with age and  gender, are analyzed using a computational algorithm to provide a  quantitative surrogate marker (0.0-1.0) of liver fibrosis (Metavir  F0-F4), hepatic steatosis (0.0-1.0, S0-S3), and Non-Alcoholic  Steato-Hepatitis (PATEL) (0.0-1.0, N0-N3). The absence of steatosis  (S<0.40) precludes the diagnosis of PATEL.  Fibrosis marker: In a study of 171 Non-Alcoholic Fatty Liver Disease  (NAFLD) patients where 23% had significant NAFLD fibrosis (Metavir  F2-F4) and 11% had cirrhosis by liver biopsy, a fibrosis result of  >0.3 yielded a sensitivity of 83% and a specificity of 78% for the  detection of significant fibrosis.[1]  Steatosis marker: In a population of 2997 patients, where 61% had  significant steatosis (>=5%) on a liver biopsy, a steatosis score  >0.4 had a sensitivity of 79% and a specificity of 50% for  identification of significant steatosis.[2]  PATEL marker: In a population of 1081 NAFLD patients, where 51% had  at                            least some PATEL by liver biopsy, a prediction of PATEL had a  sensitivity of 72% for identifying PATEL and a specificity of 71%.[3]   • STEATOSIS SCORING 02/16/2024 Comment   Final         <=0.40 = S0  - No Steatosis (<5%)  0.40 -  0.55 = S1  - Mild Steatosis                      (but Clinically Significant) (5-33%)        >0.55 = S2S3- Moderate to Severe Steatosis                      (Clinically Significant) (%)   • LIMITATIONS: 02/16/2024 Comment   Final    PATEL FibroSure(R) Plus is recommended for patients with suspected  non-alcoholic fatty liver disease. It is not recommended for  patients with other liver diseases. It is also not recommended  in patients with Gilbert Disease, acute hemolysis, acute viral  hepatitis, drug induced hepatitis, genetic liver disease,  autoimmune hepatitis and/or extra-hepatic cholestasis. Any of  these clinical situations may lead to inaccurate quantitative  predictions of fibrosis.   • ABO Grouping 02/16/2024 A   Final   • Rh Factor 02/16/2024 Positive   Final   • Antibody Screen 02/16/2024 Negative   Final   • Specimen Expiration Date 02/16/2024 20240315   Final   Hospital Outpatient Visit on 10/25/2023   Component Date Value Ref Range Status   • EXT Preg Test, Ur 10/25/2023 Negative  Negative Final   • Control 10/25/2023 Valid  Valid Final   • Case Report 10/25/2023    Final                    Value:Surgical Pathology Report                         Case: [id number]                                   Authorizing Provider:  Mann Edwards MD        Collected:           10/25/2023 0919              Ordering Location:     [facility]        Received:            10/25/2023 [address] [place] [address] Endoscopy                                                          Pathologist:           Tone Casas DO                                                            Specimen:    Stomach, biopsy retrieved by cold biopsy forceps, R/O H. pylori                           • Final Diagnosis 10/25/2023    Final                    Value:This result contains rich text formatting which cannot be displayed here.   • Additional Information 10/25/2023    Final                     Value:This result contains rich text formatting which cannot be displayed here.   • Gross Description 10/25/2023    Final                    Value:This result contains rich text formatting which cannot be displayed here.   Appointment on 06/27/2023   Component Date Value Ref Range Status   • Hemoglobin A1C 06/27/2023 5.7 (H)  Normal 3.8-5.6%; PreDiabetic 5.7-6.4%; Diabetic >=6.5%; Glycemic control for adults with diabetes <7.0% % Final   • EAG 06/27/2023 117  mg/dl Final   • Cholesterol 06/27/2023 180  See Comment mg/dL Final    Cholesterol:         Pediatric <18 Years        Desirable          <170 mg/dL      Borderline High    170-199 mg/dL      High               >=200 mg/dL        Adult >=18 Years            Desirable         <200 mg/dL      Borderline High   200-239 mg/dL      High              >239 mg/dL     • Triglycerides 06/27/2023 133  See Comment mg/dL Final    Triglyceride:     0-9Y            <75mg/dL     10Y-17Y         <90 mg/dL       >=18Y     Normal          <150 mg/dL     Borderline High 150-199 mg/dL     High            200-499 mg/dL        Very High       >499 mg/dL    Specimen collection should occur prior to N-Acetylcysteine or Metamizole administration due to the potential for falsely depressed results.   • HDL, Direct 06/27/2023 64  >=50 mg/dL Final    Specimen collection should occur prior to Metamizole administration due to the potential for falsley depressed results.   • LDL Calculated 06/27/2023 89  0 - 100 mg/dL Final    LDL Cholesterol:     Optimal           <100 mg/dl     Near Optimal      100-129 mg/dl     Above Optimal       Borderline High 130-159 mg/dl       High            160-189 mg/dl       Very High       >189 mg/dl         This screening LDL is a calculated result.   It does not have the accuracy of the Direct Measured LDL in the monitoring of patients with hyperlipidemia and/or statin therapy.   Direct Measure LDL (RFE437) must be ordered separately in these patients.   •  Non-HDL-Chol (CHOL-HDL) 06/27/2023 116  mg/dl Final

## 2024-05-18 DIAGNOSIS — Z79.899 ON STATIN THERAPY: ICD-10-CM

## 2024-05-18 DIAGNOSIS — E78.2 MIXED DYSLIPIDEMIA: ICD-10-CM

## 2024-05-19 RX ORDER — ATORVASTATIN CALCIUM 10 MG/1
10 TABLET, FILM COATED ORAL DAILY
Qty: 90 TABLET | Refills: 1 | Status: SHIPPED | OUTPATIENT
Start: 2024-05-19

## 2024-06-10 ENCOUNTER — TELEPHONE (OUTPATIENT)
Age: 49
End: 2024-06-10

## 2024-06-11 ENCOUNTER — TELEPHONE (OUTPATIENT)
Dept: BARIATRICS | Facility: CLINIC | Age: 49
End: 2024-06-11

## 2024-06-11 NOTE — TELEPHONE ENCOUNTER
Received message that pt had post-op diet questions.  Called pt, left voicemail with office hours and contact # for pt to return call.

## 2024-06-14 ENCOUNTER — NURSE TRIAGE (OUTPATIENT)
Dept: BARIATRICS | Facility: CLINIC | Age: 49
End: 2024-06-14

## 2024-06-14 NOTE — TELEPHONE ENCOUNTER
Pt of Dr. Edwards, GASTRECTOMY  SLEEVE W/ ROBOTICS & INTRAOPERATIVE EGD (Abdomen)  on 4/29/24    REASON FOR CALL: when can she eat salad?    Pt calling to ask when she is able to eat salad.  Pt is 7 weeks post op.  Reviewed with pt that after 8 weeks she may start eating raw fruits and vegetables as long as she is tolerating soft foods. Advised that veggies and fruit must be chewed well.  Pt also asked if she should continue protein drinks.  Advise to have 1 to 2 proteins shakes per day between meals for the first 6 months after surgery.  Pt asking if normal to have nausea on occasion, states the last time was a few months ago.  Advise that this is normal as the body recovers from surgery.  Call back if any increase in nausea, vomiting or abd pain or any other concerns.  Pt verbalizes understanding.

## 2024-06-20 ENCOUNTER — TELEPHONE (OUTPATIENT)
Dept: GASTROENTEROLOGY | Facility: CLINIC | Age: 49
End: 2024-06-20

## 2024-06-20 NOTE — TELEPHONE ENCOUNTER
Called pt re 7/16 appnt with Dr. Arcos-matt to 8/1 at 3:30 PM in [facility] (Dr. Arcos in hep clinic). LVM to confirm 8/1 appnt.

## 2024-06-28 DIAGNOSIS — F41.0 PANIC ATTACK: ICD-10-CM

## 2024-06-28 DIAGNOSIS — E78.2 MIXED DYSLIPIDEMIA: ICD-10-CM

## 2024-06-28 DIAGNOSIS — F41.1 GAD (GENERALIZED ANXIETY DISORDER): ICD-10-CM

## 2024-06-28 DIAGNOSIS — Z79.899 ON STATIN THERAPY: ICD-10-CM

## 2024-06-28 RX ORDER — ATORVASTATIN CALCIUM 10 MG/1
10 TABLET, FILM COATED ORAL DAILY
Qty: 90 TABLET | Refills: 0 | Status: SHIPPED | OUTPATIENT
Start: 2024-06-28

## 2024-06-28 RX ORDER — SERTRALINE HYDROCHLORIDE 25 MG/1
25 TABLET, FILM COATED ORAL DAILY
Qty: 90 TABLET | Refills: 0 | Status: SHIPPED | OUTPATIENT
Start: 2024-06-28 | End: 2025-06-23

## 2024-06-29 RX ORDER — OMEPRAZOLE 20 MG/1
20 CAPSULE, DELAYED RELEASE ORAL DAILY
Qty: 90 CAPSULE | Refills: 1 | Status: SHIPPED | OUTPATIENT
Start: 2024-06-29

## 2024-07-29 ENCOUNTER — PROCEDURE VISIT (OUTPATIENT)
Dept: NEUROLOGY | Facility: CLINIC | Age: 49
End: 2024-07-29
Payer: COMMERCIAL

## 2024-07-29 VITALS — SYSTOLIC BLOOD PRESSURE: 125 MMHG | HEART RATE: 68 BPM | DIASTOLIC BLOOD PRESSURE: 84 MMHG | TEMPERATURE: 97 F

## 2024-07-29 DIAGNOSIS — G43.709 CHRONIC MIGRAINE WITHOUT AURA WITHOUT STATUS MIGRAINOSUS, NOT INTRACTABLE: Primary | ICD-10-CM

## 2024-07-29 PROCEDURE — 64615 CHEMODENERV MUSC MIGRAINE: CPT | Performed by: PHYSICIAN ASSISTANT

## 2024-07-29 NOTE — PROGRESS NOTES
"Universal Protocol   Consent: Verbal consent obtained. Written consent obtained.  Risks and benefits: risks, benefits and alternatives were discussed  Consent given by: patient  Time out: Immediately prior to procedure a \"time out\" was called to verify the correct patient, procedure, equipment, support staff and site/side marked as required.  Patient understanding: patient states understanding of the procedure being performed  Patient consent: the patient's understanding of the procedure matches consent given  Procedure consent: procedure consent matches procedure scheduled  Relevant documents: relevant documents present and verified  Patient identity confirmed: verbally with patient      Chemodenervation     Date/Time  7/29/2024 12:00 PM     Performed by  Sadie Fontanez PA-C   Authorized by  Sadie Fontanez PA-C     Pre-procedure details      Prepped With: Alcohol     Anesthesia  (see MAR for exact dosages):     Anesthesia method:  None   Procedure details      Position:  Upright   Botox      Botox Type:  Type A    Brand:  Botox    mL's of Botulinum Toxin:  200    Final Concentration per CC:  50 units    Needle Gauge:  30 G 2.5 inch   Procedures      Botox Procedures: chronic headache      Indications: migraines     Injection Location      Head / Face:  L superior cervical paraspinal, R superior cervical paraspinal, L , R , L frontalis, R frontalis, L medial occipitalis, R medial occipitalis, procerus, R temporalis, L temporalis, R superior trapezius and L superior trapezius    L  injection amount:  5 unit(s)    R  injection amount:  5 unit(s)    L lateral frontalis:  5 unit(s)    R lateral frontalis:  5 unit(s)    L medial frontalis:  5 unit(s)    R medial frontalis:  5 unit(s)    L temporalis injection amount:  20 unit(s)    R temporalis injection amount:  20 unit(s)    Procerus injection amount:  5 unit(s)    L medial occipitalis injection amount:  15 unit(s)    R medial " occipitalis injection amount:  15 unit(s)    L superior cervical paraspinal injection amount:  10 unit(s)    R superior cervical paraspinal injection amount:  10 unit(s)    L superior trapezius injection amount:  15 unit(s)    R superior trapezius injection amount:  15 unit(s)   Total Units      Total units used:  200    Total units discarded:  0   Post-procedure details      Chemodenervation:  Chronic migraine    Facial Nerve Location::  Bilateral facial nerve    Patient tolerance of procedure:  Tolerated well, no immediate complications   Comments       5 units orbicularis oculi bilaterally  10 temporalis bilaterally  15 units frontalis (7.5 lower)  All medically necessary

## 2024-08-02 ENCOUNTER — APPOINTMENT (OUTPATIENT)
Dept: LAB | Facility: IMAGING CENTER | Age: 49
End: 2024-08-02

## 2024-08-02 DIAGNOSIS — Z00.8 HEALTH EXAMINATION IN POPULATION SURVEY: ICD-10-CM

## 2024-08-02 LAB
CHOLEST SERPL-MCNC: 147 MG/DL
EST. AVERAGE GLUCOSE BLD GHB EST-MCNC: 114 MG/DL
HBA1C MFR BLD: 5.6 %
HDLC SERPL-MCNC: 55 MG/DL
LDLC SERPL CALC-MCNC: 68 MG/DL (ref 0–100)
NONHDLC SERPL-MCNC: 92 MG/DL
TRIGL SERPL-MCNC: 121 MG/DL

## 2024-08-02 PROCEDURE — 80061 LIPID PANEL: CPT

## 2024-08-02 PROCEDURE — 36415 COLL VENOUS BLD VENIPUNCTURE: CPT

## 2024-08-02 PROCEDURE — 83036 HEMOGLOBIN GLYCOSYLATED A1C: CPT

## 2024-08-18 DIAGNOSIS — E78.2 MIXED DYSLIPIDEMIA: ICD-10-CM

## 2024-08-18 DIAGNOSIS — Z79.899 ON STATIN THERAPY: ICD-10-CM

## 2024-08-19 RX ORDER — ATORVASTATIN CALCIUM 10 MG/1
10 TABLET, FILM COATED ORAL DAILY
Qty: 100 TABLET | Refills: 1 | Status: SHIPPED | OUTPATIENT
Start: 2024-08-19

## 2024-08-23 ENCOUNTER — OFFICE VISIT (OUTPATIENT)
Dept: BARIATRICS | Facility: CLINIC | Age: 49
End: 2024-08-23
Payer: COMMERCIAL

## 2024-08-23 VITALS
OXYGEN SATURATION: 98 % | DIASTOLIC BLOOD PRESSURE: 60 MMHG | SYSTOLIC BLOOD PRESSURE: 118 MMHG | HEART RATE: 98 BPM | WEIGHT: 235 LBS | BODY MASS INDEX: 36.88 KG/M2 | TEMPERATURE: 97.5 F | HEIGHT: 67 IN

## 2024-08-23 DIAGNOSIS — K91.2 POSTSURGICAL MALABSORPTION: ICD-10-CM

## 2024-08-23 DIAGNOSIS — Z48.815 ENCOUNTER FOR SURGICAL AFTERCARE FOLLOWING SURGERY ON THE DIGESTIVE SYSTEM: Primary | ICD-10-CM

## 2024-08-23 DIAGNOSIS — Z98.84 BARIATRIC SURGERY STATUS: ICD-10-CM

## 2024-08-23 DIAGNOSIS — K21.9 GERD (GASTROESOPHAGEAL REFLUX DISEASE): ICD-10-CM

## 2024-08-23 DIAGNOSIS — E66.9 OBESITY, CLASS II, BMI 35-39.9: ICD-10-CM

## 2024-08-23 PROCEDURE — 99214 OFFICE O/P EST MOD 30 MIN: CPT | Performed by: NURSE PRACTITIONER

## 2024-08-23 NOTE — PROGRESS NOTES
Date of surgery: 4/29/2024  Procedure: Sleeve   Performing surgeon: Dr Mendez KANG     Initial Weight -  280.0 lb   Current Weight - 235.0 lb   Total Body Weight Loss (EWL)-  45  EWL% -  37%  TWB % - 16%

## 2024-08-23 NOTE — PROGRESS NOTES
Assessment/Plan:     Patient ID: Jaquan Ribeiro is a 49 y.o. female.     Bariatric Surgery Status/BMI 36/GERD    -s/p Vertical Sleeve Gastrectomy with Dr. Edwards on 04/29/2024. Presents to the office today for 2nd post op visit. Overall doing well, tolerating a regular diet. Denies having any abdominal pain, N/V/D/C, regurgitation, reflux or dysphagia. Taking her multivitamins daily and PPI daily. She was on omeprazole even prior to surgery for heartburn. Thus far she has not had any symptoms.     PLAN:     - continue with omeprazole for now. Will plan to taper off at next visit.   - Routine follow up in 3 months for 3rd post op visit.   - Continue with healthy lifestyle, adequate protein intake of 60 gm, fluid intake of at least 64 oz.   - Continue with MVI daily.   - Activity as tolerated.   - Labs ordered and will adjust accordingly if any deficiency.   - Follow up with RD and SW as needed.   .    Continued/Maintain healthy weight loss with good nutrition intakes.  Adequate hydration with at least 64oz. fluid intake.  Follow diet as discussed.  Follow vitamin and mineral recommendations as reviewed with you.  Exercise as tolerated.    Colonoscopy referral made: UTD  Mammogram - UTD    Follow-up in 3 months for 3rd post op visit. We kindly ask that your arrive 15 minutes before your scheduled appointment time with your provider to allow our staff to room you, get your vital signs and update your chart.    Get lab work done prior to annual visit. Please call the office if you need a script.  It is recommended to check with your insurance BEFORE getting labs done to make sure they are covered by your policy.      Call our office if you have any problems with abdominal pain especially associated with fever, chills, nausea, vomiting or any other concerns.    All  Post-bariatric surgery patients should be aware that very small quantities of any alcohol can cause impairment and it is very possible not to feel the  effect. The effect can be in the system for several hours.  It is also a stomach irritant.     It is advised to AVOID alcohol, Nonsteroidal antiinflammatory drugs (NSAIDS) and nicotine of all forms . Any of these can cause stomach irritation/pain.    Discussed the effects of alcohol on a bariatric patient and the increased impairment risk.     Keep up the good work!     Postsurgical Malabsorption   -At risk for malabsorption of vitamins/minerals secondary to malabsorption and restriction of intake from bariatric surgery  -Currently taking adequate postop bariatric surgery vitamin supplementation  -Next set of bariatric labs ordered for approximately 2 weeks  -Patient received education about the importance of adhering to a lifelong supplementation regimen to avoid vitamin/mineral deficiencies      Diagnoses and all orders for this visit:    Encounter for surgical aftercare following surgery on the digestive system  -     Comprehensive metabolic panel; Future  -     Folate; Future  -     Iron Panel (Includes Ferritin, Iron Sat%, Iron, and TIBC); Future  -     PTH, intact; Future  -     Vitamin A; Future  -     Vitamin B1, whole blood; Future  -     Vitamin B12; Future  -     Vitamin D 25 hydroxy; Future  -     Zinc; Future    Bariatric surgery status  -     Comprehensive metabolic panel; Future  -     Folate; Future  -     Iron Panel (Includes Ferritin, Iron Sat%, Iron, and TIBC); Future  -     PTH, intact; Future  -     Vitamin A; Future  -     Vitamin B1, whole blood; Future  -     Vitamin B12; Future  -     Vitamin D 25 hydroxy; Future  -     Zinc; Future  -     omeprazole (PriLOSEC) 20 mg delayed release capsule; Take 1 capsule (20 mg total) by mouth daily    Postsurgical malabsorption  -     Comprehensive metabolic panel; Future  -     Folate; Future  -     Iron Panel (Includes Ferritin, Iron Sat%, Iron, and TIBC); Future  -     PTH, intact; Future  -     Vitamin A; Future  -     Vitamin B1, whole blood;  Future  -     Vitamin B12; Future  -     Vitamin D 25 hydroxy; Future  -     Zinc; Future    Obesity, Class II, BMI 35-39.9  -     Comprehensive metabolic panel; Future  -     Folate; Future  -     Iron Panel (Includes Ferritin, Iron Sat%, Iron, and TIBC); Future  -     PTH, intact; Future  -     Vitamin A; Future  -     Vitamin B1, whole blood; Future  -     Vitamin B12; Future  -     Vitamin D 25 hydroxy; Future  -     Zinc; Future    BMI 36.0-36.9,adult  -     Comprehensive metabolic panel; Future  -     Folate; Future  -     Iron Panel (Includes Ferritin, Iron Sat%, Iron, and TIBC); Future  -     PTH, intact; Future  -     Vitamin A; Future  -     Vitamin B1, whole blood; Future  -     Vitamin B12; Future  -     Vitamin D 25 hydroxy; Future  -     Zinc; Future    GERD (gastroesophageal reflux disease)         Subjective:      Patient ID: Jaquan Ribeiro is a 49 y.o. female.    -s/p Vertical Sleeve Gastrectomy with Dr. Edwards on 04/29/2024. Presents to the office today for 2nd post op visit. Overall doing well, tolerating a regular diet. Denies having any abdominal pain, N/V/D/C, regurgitation, reflux or dysphagia. Taking her multivitamins daily and PPI daily. She was on omeprazole even prior to surgery for heartburn. Thus far she has not had any symptoms.     Initial: 280 lbs   Current: 235 lbs   EWL: (Weight loss is ahead of schedule at this post surgical period.)  James: current   Current BMI is Body mass index is 36.81 kg/m².    Tolerating a regular diet-yes  Eating at least 60 grams of protein per day-yes  Following 30/60 minute rule with liquids-yes  Drinking at least 64 ounces of fluid per day-yes  Drinking carbonated beverages-no  Sufficient exercise-yes - walking and strength training   Using NSAIDs regularly-no  Using nicotine-no  Using alcohol-no  Supplements:  Bariatric multivitamin, shira hansonh's hair skin and nails, biotin 10,000 mcg, zinc, fish oil, vitamin C + iron 65 mg + b-complex + vitamin  "D3 1000 IU daily  + viactiv calcium     EWL is 37%, which places the patient ahead of schedule for expected post surgical weight loss at this time.     The following portions of the patient's history were reviewed and updated as appropriate: allergies, current medications, past family history, past medical history, past social history, past surgical history and problem list.    Review of Systems   Constitutional: Negative.    Respiratory: Negative.     Cardiovascular: Negative.    Gastrointestinal:  Positive for nausea (at times depending on foods).   Musculoskeletal: Negative.    Neurological: Negative.    Psychiatric/Behavioral: Negative.           Objective:    /60 (BP Location: Left arm, Patient Position: Sitting, Cuff Size: Adult)   Pulse 98   Temp 97.5 °F (36.4 °C) (Tympanic)   Ht 5' 7\" (1.702 m)   Wt 107 kg (235 lb)   SpO2 98%   BMI 36.81 kg/m²      Physical Exam  Vitals and nursing note reviewed.   Constitutional:       Appearance: Normal appearance. She is obese.   Cardiovascular:      Rate and Rhythm: Normal rate and regular rhythm.      Pulses: Normal pulses.      Heart sounds: Normal heart sounds.   Pulmonary:      Effort: Pulmonary effort is normal.      Breath sounds: Normal breath sounds.   Abdominal:      General: Bowel sounds are normal.      Palpations: Abdomen is soft.      Tenderness: There is no abdominal tenderness.   Musculoskeletal:         General: Normal range of motion.   Skin:     General: Skin is warm and dry.   Neurological:      General: No focal deficit present.      Mental Status: She is alert and oriented to person, place, and time.   Psychiatric:         Mood and Affect: Mood normal.         Behavior: Behavior normal.         Thought Content: Thought content normal.         Judgment: Judgment normal.             "

## 2024-09-12 ENCOUNTER — TELEPHONE (OUTPATIENT)
Age: 49
End: 2024-09-12

## 2024-09-12 NOTE — TELEPHONE ENCOUNTER
Called [phone] and left a detailed message on pt's answering machine of the below (ok per communication consent) and  for a call back if any questions or concerns  normal...

## 2024-09-12 NOTE — TELEPHONE ENCOUNTER
Inbound call from Patient Spouse to alert us [facility]. Patient went to  the [employer] when they were made aware of need for PA. Patient is out of [employer]. Patient told Patient  she is at the start of a Migraine. Patient  asked how fast it could be done and was made aware a timeframe was not available but we will let our team know they request it be done asap. It was explained the timeframe varies.     Last office visit was 04/03/2024 with Sadie Fontanez PA-C. Patient also gets Botox treatments with Sadie Fontanez PA-C.     Patient  states we may call [phone] and leave a detailed voicemail if needed. We do have a consent on file to discuss care with ,     Team please start the [employer] Prior Auth, Patient is out of [employer]urtec and needs it for beginning Migraine. Thank you!

## 2024-09-12 NOTE — TELEPHONE ENCOUNTER
[employer] approved per CMM    Approved today by [employer] 2017  PA Case: [id number], Status: Approved, Coverage Starts on: 9/12/2024 12:00 AM, Coverage Ends on: 9/12/2025 12:00 AM. Questions? Contact [phone].  Authorization Expiration Date: 9/11/2025    Called [facility] and made her aware of approval  States that Script was transferred to [facility]an [facility]   She asked where pt would like to get medication filled at.  States that pt can call which ever may pharm that they would like this filled at.    Called pt's  and made him aware of approval and above.  He will contact [facility] to have this filled

## 2024-09-30 ENCOUNTER — TELEPHONE (OUTPATIENT)
Dept: BARIATRICS | Facility: CLINIC | Age: 49
End: 2024-09-30

## 2024-09-30 NOTE — TELEPHONE ENCOUNTER
LVM to call back to reschedule 11/25 appointment as provider had schedule change and out of the office.

## 2024-10-22 DIAGNOSIS — F41.1 GAD (GENERALIZED ANXIETY DISORDER): ICD-10-CM

## 2024-10-22 DIAGNOSIS — F41.0 PANIC ATTACK: ICD-10-CM

## 2024-10-22 RX ORDER — SERTRALINE HYDROCHLORIDE 25 MG/1
25 TABLET, FILM COATED ORAL DAILY
Qty: 90 TABLET | Refills: 0 | Status: SHIPPED | OUTPATIENT
Start: 2024-10-22 | End: 2025-01-20

## 2024-10-22 NOTE — TELEPHONE ENCOUNTER
Spouse made aware that Patient needs to schedule a follow up appointment in order to continue receiving refills.    Reason for call:   [x] Refill   [] Prior Auth  [] Other:     Office:   [x] PCP/Provider -   [] Specialty/Provider -     Medication: sertraline (ZOLOFT) 25 mg tablet     Dose/Frequency:  Take 1 tablet (25 mg total) by mouth daily,     Quantity: 90 tablet    Pharmacy:   [facility] - [place], [place] - [address] [phone]       Does the patient have enough for 3 days?   [] Yes   [x] No - Send as HP to POD

## 2024-10-22 NOTE — TELEPHONE ENCOUNTER
Patient returned call and scheduled OV for 10/24.  Patient stated she is still taking Zoloft and is completely out of medication.

## 2024-10-22 NOTE — TELEPHONE ENCOUNTER
Left message on machine for patient to call the office back to schedule follow up appt and to also find out if she is still taking medication

## 2024-10-28 ENCOUNTER — ANNUAL EXAM (OUTPATIENT)
Dept: GYNECOLOGY | Facility: CLINIC | Age: 49
End: 2024-10-28
Payer: COMMERCIAL

## 2024-10-28 VITALS
WEIGHT: 222 LBS | HEIGHT: 67 IN | SYSTOLIC BLOOD PRESSURE: 114 MMHG | DIASTOLIC BLOOD PRESSURE: 78 MMHG | BODY MASS INDEX: 34.84 KG/M2

## 2024-10-28 DIAGNOSIS — Z01.419 ENCOUNTER FOR GYNECOLOGICAL EXAMINATION WITHOUT ABNORMAL FINDING: ICD-10-CM

## 2024-10-28 DIAGNOSIS — Z12.31 SCREENING MAMMOGRAM FOR BREAST CANCER: Primary | ICD-10-CM

## 2024-10-28 PROCEDURE — S0612 ANNUAL GYNECOLOGICAL EXAMINA: HCPCS | Performed by: OBSTETRICS & GYNECOLOGY

## 2024-10-28 NOTE — PROGRESS NOTES
"Ambulatory Visit  Name: Jaquan Ribeiro      : 1975      MRN: [id number]  Encounter Provider: Eagle Santiago MD  Encounter Date: 10/28/2024   Encounter department: [facility]YN[facility] [place]    Assessment & Plan  Encounter for gynecological examination without abnormal finding           Normal breast and GYN exam  Regular menstrual cycles with occasional heavy flow  Normal mammogram  with a normal right breast ultrasound follow-up  Bariatric surgery (gastric sleeve) 2024  84 pound weight loss since surgery  Colonoscopy   Normal mammogram   Normal Pap smear negative HPV 2022    Plan: Rx mammogram.  Recommend healthy diet and exercise.  Continue to observe menstrual cycles.      History of Present Illness G3, P2  and a     Jaquan Ribeiro is a 49 y.o. female who presents annual exam complaining of occasional heavy menstrual cycles.  Patient was considering hysterectomy for possible failed endometrial ablation which was performed in .  However over the last year her menstrual cycles have improved.  Status post gastric sleeve 2024 with 84 pound weight loss.  Patient feels fantastic.  Exercising more and eating healthier.  Denies any breast bowel or bladder problems.  Medications reviewed.  Now working part-time.          Objective     /78 (BP Location: Left arm, Patient Position: Sitting, Cuff Size: Standard)   Ht 5' 7\" (1.702 m)   Wt 101 kg (222 lb)   LMP 2024 (Approximate)   BMI 34.77 kg/m²     Physical Exam  Vitals and nursing note reviewed.   Constitutional:       General: She is not in acute distress.     Appearance: She is well-developed.   HENT:      Head: Normocephalic and atraumatic.   Eyes:      Conjunctiva/sclera: Conjunctivae normal.   Cardiovascular:      Rate and Rhythm: Normal rate and regular rhythm.      Heart sounds: No murmur heard.  Pulmonary:      Effort: Pulmonary effort is normal. No respiratory distress.      " Breath sounds: Normal breath sounds.   Chest:   Breasts:     Right: Normal.      Left: Normal.   Abdominal:      Palpations: Abdomen is soft.      Tenderness: There is no abdominal tenderness.   Genitourinary:     Vagina: Normal.      Cervix: Normal.      Uterus: Normal.       Adnexa: Right adnexa normal and left adnexa normal.      Comments: Sternal genitalia normal.  No uterine prolapse cystocele or rectocele.  Musculoskeletal:         General: No swelling.      Cervical back: Neck supple.   Skin:     General: Skin is warm and dry.      Capillary Refill: Capillary refill takes less than 2 seconds.   Neurological:      Mental Status: She is alert.   Psychiatric:         Mood and Affect: Mood normal.

## 2024-10-29 ENCOUNTER — PROCEDURE VISIT (OUTPATIENT)
Dept: NEUROLOGY | Facility: CLINIC | Age: 49
End: 2024-10-29
Payer: COMMERCIAL

## 2024-10-29 VITALS — DIASTOLIC BLOOD PRESSURE: 82 MMHG | TEMPERATURE: 98.1 F | HEART RATE: 64 BPM | SYSTOLIC BLOOD PRESSURE: 120 MMHG

## 2024-10-29 DIAGNOSIS — G43.709 CHRONIC MIGRAINE WITHOUT AURA WITHOUT STATUS MIGRAINOSUS, NOT INTRACTABLE: Primary | ICD-10-CM

## 2024-10-29 PROCEDURE — 64615 CHEMODENERV MUSC MIGRAINE: CPT | Performed by: PHYSICIAN ASSISTANT

## 2024-10-29 NOTE — PROGRESS NOTES
"Universal Protocol   procedure performed by consultantConsent: Verbal consent obtained. Written consent obtained.  Risks and benefits: risks, benefits and alternatives were discussed  Consent given by: patient  Time out: Immediately prior to procedure a \"time out\" was called to verify the correct patient, procedure, equipment, support staff and site/side marked as required.  Patient understanding: patient states understanding of the procedure being performed  Patient consent: the patient's understanding of the procedure matches consent given  Procedure consent: procedure consent matches procedure scheduled  Relevant documents: relevant documents present and verified  Patient identity confirmed: verbally with patient      Chemodenervation     Date/Time  10/29/2024 1:30 PM     Performed by  Sadie Fontanez PA-C   Authorized by  Sadie Fontanez PA-C     Pre-procedure details      Prepped With: Alcohol     Anesthesia  (see MAR for exact dosages):     Anesthesia method:  None   Procedure details      Position:  Upright   Botox      Botox Type:  Type A    Brand:  Botox    mL's of Botulinum Toxin:  200    Final Concentration per CC:  50 units    Needle Gauge:  30 G 2.5 inch   Procedures      Botox Procedures: chronic headache      Indications: migraines     Injection Location      Head / Face:  L superior cervical paraspinal, R superior cervical paraspinal, L , R , L frontalis, R frontalis, L medial occipitalis, R medial occipitalis, procerus, R temporalis, L temporalis, R superior trapezius and L superior trapezius    L  injection amount:  5 unit(s)    R  injection amount:  5 unit(s)    L lateral frontalis:  5 unit(s)    R lateral frontalis:  5 unit(s)    L medial frontalis:  5 unit(s)    R medial frontalis:  5 unit(s)    L temporalis injection amount:  20 unit(s)    R temporalis injection amount:  20 unit(s)    Procerus injection amount:  5 unit(s)    L medial occipitalis injection " amount:  15 unit(s)    R medial occipitalis injection amount:  15 unit(s)    L superior cervical paraspinal injection amount:  10 unit(s)    R superior cervical paraspinal injection amount:  10 unit(s)    L superior trapezius injection amount:  15 unit(s)    R superior trapezius injection amount:  15 unit(s)   Total Units      Total units used:  200    Total units discarded:  0   Post-procedure details      Chemodenervation:  Chronic migraine    Facial Nerve Location::  Bilateral facial nerve    Patient tolerance of procedure:  Tolerated well, no immediate complications   Comments       5 units orbicularis oculi bilaterally  10 temporalis bilaterally  15 units frontalis (7.5 lower)  All medically necessary

## 2024-11-08 ENCOUNTER — OFFICE VISIT (OUTPATIENT)
Dept: INTERNAL MEDICINE CLINIC | Age: 49
End: 2024-11-08
Payer: COMMERCIAL

## 2024-11-08 VITALS
SYSTOLIC BLOOD PRESSURE: 128 MMHG | DIASTOLIC BLOOD PRESSURE: 80 MMHG | TEMPERATURE: 98 F | WEIGHT: 220 LBS | OXYGEN SATURATION: 99 % | BODY MASS INDEX: 34.53 KG/M2 | HEART RATE: 80 BPM | HEIGHT: 67 IN

## 2024-11-08 DIAGNOSIS — G43.709 CHRONIC MIGRAINE WITHOUT AURA WITHOUT STATUS MIGRAINOSUS, NOT INTRACTABLE: Primary | ICD-10-CM

## 2024-11-08 DIAGNOSIS — F41.9 ANXIETY: ICD-10-CM

## 2024-11-08 DIAGNOSIS — E78.2 MIXED DYSLIPIDEMIA: ICD-10-CM

## 2024-11-08 DIAGNOSIS — Z98.84 BARIATRIC SURGERY STATUS: ICD-10-CM

## 2024-11-08 DIAGNOSIS — F41.0 PANIC ATTACK: ICD-10-CM

## 2024-11-08 DIAGNOSIS — F41.1 GAD (GENERALIZED ANXIETY DISORDER): ICD-10-CM

## 2024-11-08 DIAGNOSIS — K21.9 GASTROESOPHAGEAL REFLUX DISEASE WITHOUT ESOPHAGITIS: ICD-10-CM

## 2024-11-08 DIAGNOSIS — Z23 NEEDS FLU SHOT: ICD-10-CM

## 2024-11-08 PROCEDURE — 99214 OFFICE O/P EST MOD 30 MIN: CPT | Performed by: INTERNAL MEDICINE

## 2024-11-08 PROCEDURE — 90471 IMMUNIZATION ADMIN: CPT | Performed by: INTERNAL MEDICINE

## 2024-11-08 PROCEDURE — 90673 RIV3 VACCINE NO PRESERV IM: CPT | Performed by: INTERNAL MEDICINE

## 2024-11-08 RX ORDER — SERTRALINE HYDROCHLORIDE 25 MG/1
25 TABLET, FILM COATED ORAL DAILY
Qty: 90 TABLET | Refills: 0 | Status: SHIPPED | OUTPATIENT
Start: 2024-11-08 | End: 2025-02-06

## 2024-11-08 RX ORDER — LORAZEPAM 0.5 MG/1
1 TABLET ORAL DAILY PRN
Qty: 10 TABLET | Refills: 0 | Status: SHIPPED | OUTPATIENT
Start: 2024-11-08

## 2024-11-08 NOTE — ASSESSMENT & PLAN NOTE
-Well-controlled  -Continue with Botox injections and as needed cyclobenzaprine, Nurtec,   -Continue to follow with neurology

## 2024-11-08 NOTE — ASSESSMENT & PLAN NOTE
-Well-controlled on sertraline and as needed lorazepam  -Will refill sertraline and as needed alprazolam as requested.  -The [place] PDMP website was queried and did not show misuse.  Last refill was given over 1 year ago and was 20 pills only and patient wants only 10 tablets this time.    Orders:    sertraline (ZOLOFT) 25 mg tablet; Take 1 tablet (25 mg total) by mouth daily

## 2024-11-08 NOTE — ASSESSMENT & PLAN NOTE
-Currently not on atorvastatin but diet has improved  -Will recheck a lipid panel prior to her next visit

## 2024-11-08 NOTE — ASSESSMENT & PLAN NOTE
- well controlled   - continue with sertraline, which we will refill today as requested as well as as needed lorazepam  -The [place] PDMP website was queried and did not show misuse    Orders:    sertraline (ZOLOFT) 25 mg tablet; Take 1 tablet (25 mg total) by mouth daily     Discussion/Summary   Notifying patient.        Verified Results  CHLAMYDIA / GC BY NUCLEIC ACID AMPLIFICATION 23Mar2018 03:56PM BRENT BOSTON     Test Name Result Flag Reference   CHLAMYDIA TRACHOMATIS BY NUCLEIC ACID AMPLIFICATION POSITIVE A NEGATIVE   Positive results are reported to the [facility] of Public [facility].  The Aptima Combo 2 Assay is not intended for the evaluation of suspected sexual abuse or for other medico-legal indications, nor has it been evaluated in adolescents less than 14 years of age.   In these scenarios, and in clinical settings where the prevalence of infection is low, confirmatory testing on positive results is recommended.     The Aptima Combo 2 Assay is not FDA approved for testing on throat, rectal and female urine specimens. [employer] has internally validated these specimen sources. The expected normal reference range is negative.   NEISSERIA GONORRHOEAE BY NUCLEIC ACID AMPLIFICATION NEGATIVE  NEGATIVE   Positive results are reported to the [facility] Public [facility].  The Aptima Combo 2 Assay is not intended for the evaluation of suspected sexual abuse or for other medico-legal indications, nor has it been evaluated in adolescents less than 14 years of age.   In these scenarios, and in clinical settings where the prevalence of infection is low, confirmatory testing on positive results is recommended.     The Aptima Combo 2 Assay is not FDA approved for testing on throat, rectal and female urine specimens. [employer] has internally validated these specimen sources. The expected normal reference range is negative.   SOURCE URINE

## 2024-11-08 NOTE — ASSESSMENT & PLAN NOTE
-Well-controlled  -Continue with omeprazole, which we will refill today as requested  -Continue to avoid diets and behaviors that trigger symptoms

## 2024-11-08 NOTE — PROGRESS NOTES
Ambulatory Visit  Name: Jaquan Ribeiro      : 1975      MRN: [id number]  Encounter Provider: Fany Wiley DO  Encounter Date: 2024   Encounter department: [facility]    Assessment & Plan  Chronic migraine without aura without status migrainosus, not intractable  -Well-controlled  -Continue with Botox injections and as needed cyclobenzaprine, Nurtec,   -Continue to follow with neurology       Gastroesophageal reflux disease without esophagitis  -Well-controlled  -Continue with omeprazole, which we will refill today as requested  -Continue to avoid diets and behaviors that trigger symptoms         LASHAUN (generalized anxiety disorder)  - well controlled   - continue with sertraline, which we will refill today as requested as well as as needed lorazepam  -The [place] PDM website was queried and did not show misuse    Orders:    sertraline (ZOLOFT) 25 mg tablet; Take 1 tablet (25 mg total) by mouth daily    Panic attack  -Well-controlled on sertraline and as needed lorazepam  -Will refill sertraline and as needed alprazolam as requested.  -The [place] PDM website was queried and did not show misuse.  Last refill was given over 1 year ago and was 20 pills only and patient wants only 10 tablets this time.    Orders:    sertraline (ZOLOFT) 25 mg tablet; Take 1 tablet (25 mg total) by mouth daily    Mixed dyslipidemia  -Currently not on atorvastatin but diet has improved  -Will recheck a lipid panel prior to her next visit       Bariatric surgery status  -Has lost 74 pounds  -Continue to follow with bariatric surgery  - continue with vitamins and exercise  Orders:    omeprazole (PriLOSEC) 20 mg delayed release capsule; Take 1 capsule (20 mg total) by mouth daily    Anxiety    Orders:    LORazepam (ATIVAN) 0.5 mg tablet; Take 2 tablets (1 mg total) by mouth daily as needed for anxiety    Needs flu shot  -She will  receive the flu shot today as  requested.    Orders:    influenza vaccine, recombinant, PF, 0.5 mL IM (Flublok)       History of Present Illness         HPI      Patient presents for a follow-up visit regarding her anxiety, dyslipidemia, GERD, migraine headaches, and she would like to have a flu shot.  She states that she has been taking her medications as prescribed.  She recently had bariatric surgery.  Today pt states that she has lost 74 lbs since her sx  and she is exercising and has been walking with her dogs and running again and does not have excess skin on her abd.  She does not have pain in her knees any more   Mood is good   Still taking 25 mg of zoloft and wants to stay on that and needs a refill.  She is eating moslty veges, fish , protein  Does not eat much carbs anymore.  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    History obtained from : patient  Review of Systems   Constitutional:  Negative for activity change, chills, fatigue, fever and unexpected weight change.   HENT:  Negative for ear pain, postnasal drip, rhinorrhea, sinus pressure and sore throat.    Eyes:  Negative for pain.   Respiratory:  Negative for cough, choking, chest tightness, shortness of breath and wheezing.    Cardiovascular:  Negative for chest pain, palpitations and leg swelling.   Gastrointestinal:  Negative for abdominal pain, constipation, diarrhea, nausea and vomiting.   Genitourinary:  Negative for dysuria and hematuria.   Musculoskeletal:  Negative for arthralgias, back pain, gait problem, joint swelling, myalgias and neck stiffness.   Skin:  Negative for pallor and rash.   Neurological:  Negative for dizziness, tremors, seizures, syncope, light-headedness  and headaches.   Hematological:  Negative for adenopathy.   Psychiatric/Behavioral:  Negative for behavioral problems. The patient is nervous/anxious (well controlled but has claustrophobia on planes).      Pertinent Medical History         Medical History Reviewed by provider this encounter:  Tobacco  Allergies  Meds  Problems  Med Hx  Surg Hx  Fam Hx       Past Medical History   Past Medical History:   Diagnosis Date    Anemia     Anxiety     Depression     GERD (gastroesophageal reflux disease)     History of prior pregnancies         Hyperlipidemia     Migraine with aura and without status migrainosus, not intractable 2018    Obesity     Papanicolaou smear 2019    normal    Uterine bleeding, dysfunctional      Past Surgical History:   Procedure Laterality Date     SECTION       SECTION      SD BLEPHAROPLASTY UPPER EYELID W/EXCESSIVE SKIN Bilateral 2023    Procedure: BLEPHAROPLASTY UPPER;  Surgeon: Horacio Wing MD;  Location:  MAIN OR;  Service: Plastics    SD HYSTEROSCOPY ENDOMETRIAL ABLATION N/A 2021    Procedure: ABLATION ENDOMETRIAL NOVASURE;  Surgeon: Eagle Santiago MD;  Location: [facility];  Service: Gynecology    SD LAPS GSTRC RSTRICTIV PX LONGITUDINAL GASTRECTOMY N/A 2024    Procedure: GASTRECTOMY  SLEEVE W/ ROBOTICS & INTRAOPERATIVE EGD;  Surgeon: Mann Edwards MD;  Location: [facility];  Service: Bariatrics    TUBAL LIGATION       Family History   Problem Relation Age of Onset    No Known Problems Mother     Cancer Father 80        bladder    Prostate cancer Father     Stroke Father     No Known Problems Sister     No Known Problems Sister     No Known Problems Daughter     No Known Problems Maternal Aunt     No Known Problems Maternal Aunt     No Known Problems Maternal Aunt     No Known Problems Maternal Aunt     No Known Problems Paternal Aunt     No Known Problems Paternal Aunt     No Known Problems Paternal Aunt     No Known Problems  Maternal Grandmother     No Known Problems Maternal Grandfather     No Known Problems Paternal Grandmother     No Known Problems Paternal Grandfather      Current Outpatient Medications on File Prior to Visit   Medication Sig Dispense Refill    atorvastatin (LIPITOR) 10 mg tablet Take 1 tablet (10 mg total) by mouth daily (Patient not taking: Reported on 8/23/2024) 100 tablet 1    B Complex Vitamins (VITAMIN B COMPLEX PO) Take by mouth daily       Biotin 36075 MCG TABS Take 10,000 mg by mouth in the morning      cyclobenzaprine (FLEXERIL) 5 mg tablet Take 1 tablet (5 mg total) by mouth as needed for muscle spasms (migraine) 30 tablet 2    ferrous sulfate 324 (65 Fe) mg Take 1 tablet (324 mg total) by mouth daily before breakfast 30 tablet 1    multivitamin (THERAGRAN) TABS Take 1 tablet by mouth daily      Omega-3 Fatty Acids (OMEGA 3 PO) Take by mouth      onabotulinumtoxin A (BOTOX) 100 units Inject into a muscle every 3 (three) months For migraines done every 3 months      ondansetron (ZOFRAN-ODT) 4 mg disintegrating tablet Take 1 tablet (4 mg total) by mouth every 6 (six) hours as needed for nausea or vomiting (Patient not taking: Reported on 10/28/2024) 30 tablet 2    rimegepant sulfate (Nurtec) 75 mg TBDP Take 1 tablet (75 mg total) by mouth if needed (migraine) Limit of 1 in 24 hours 16 tablet 11    vitamin B-12 (VITAMIN B-12) 1,000 mcg tablet Take by mouth daily      Vitamin D, Cholecalciferol, 1000 units CAPS Take 1 tablet by mouth in the morning        [DISCONTINUED] LORazepam (ATIVAN) 0.5 mg tablet Take 2 tablets (1 mg total) by mouth daily as needed for anxiety 20 tablet 0    [DISCONTINUED] omeprazole (PriLOSEC) 20 mg delayed release capsule Take 1 capsule (20 mg total) by mouth daily (Patient not taking: Reported on 10/28/2024) 90 capsule 1    [DISCONTINUED] sertraline (ZOLOFT) 25 mg tablet Take 1 tablet (25 mg total) by mouth daily 90 tablet 0     No current facility-administered medications on file  prior to visit.     Allergies   Allergen Reactions    Amoxicillin Hives    Clavulanic Acid Hives    Moxifloxacin Hives    Sulfa Antibiotics Hives      Current Outpatient Medications on File Prior to Visit   Medication Sig Dispense Refill    atorvastatin (LIPITOR) 10 mg tablet Take 1 tablet (10 mg total) by mouth daily (Patient not taking: Reported on 8/23/2024) 100 tablet 1    B Complex Vitamins (VITAMIN B COMPLEX PO) Take by mouth daily       Biotin 33013 MCG TABS Take 10,000 mg by mouth in the morning      cyclobenzaprine (FLEXERIL) 5 mg tablet Take 1 tablet (5 mg total) by mouth as needed for muscle spasms (migraine) 30 tablet 2    ferrous sulfate 324 (65 Fe) mg Take 1 tablet (324 mg total) by mouth daily before breakfast 30 tablet 1    multivitamin (THERAGRAN) TABS Take 1 tablet by mouth daily      Omega-3 Fatty Acids (OMEGA 3 PO) Take by mouth      onabotulinumtoxin A (BOTOX) 100 units Inject into a muscle every 3 (three) months For migraines done every 3 months      ondansetron (ZOFRAN-ODT) 4 mg disintegrating tablet Take 1 tablet (4 mg total) by mouth every 6 (six) hours as needed for nausea or vomiting (Patient not taking: Reported on 10/28/2024) 30 tablet 2    rimegepant sulfate (Nurtec) 75 mg TBDP Take 1 tablet (75 mg total) by mouth if needed (migraine) Limit of 1 in 24 hours 16 tablet 11    vitamin B-12 (VITAMIN B-12) 1,000 mcg tablet Take by mouth daily      Vitamin D, Cholecalciferol, 1000 units CAPS Take 1 tablet by mouth in the morning        [DISCONTINUED] LORazepam (ATIVAN) 0.5 mg tablet Take 2 tablets (1 mg total) by mouth daily as needed for anxiety 20 tablet 0    [DISCONTINUED] omeprazole (PriLOSEC) 20 mg delayed release capsule Take 1 capsule (20 mg total) by mouth daily (Patient not taking: Reported on 10/28/2024) 90 capsule 1    [DISCONTINUED] sertraline (ZOLOFT) 25 mg tablet Take 1 tablet (25 mg total) by mouth daily 90 tablet 0     No current facility-administered medications on file prior  "to visit.      Social History     Tobacco Use    Smoking status: Never    Smokeless tobacco: Never   Vaping Use    Vaping status: Never Used   Substance and Sexual Activity    Alcohol use: No    Drug use: No    Sexual activity: Yes     Partners: Male     Birth control/protection: Female Sterilization         Objective     /80 (BP Location: Left arm, Patient Position: Sitting, Cuff Size: Large)   Pulse 80   Temp 98 °F (36.7 °C) (Temporal)   Ht 5' 7\" (1.702 m)   Wt 99.8 kg (220 lb)   LMP 09/18/2024 (Approximate)   SpO2 99%   BMI 34.46 kg/m²     Physical Exam  Constitutional:       General: She is not in acute distress.     Appearance: She is well-developed. She is not diaphoretic.   HENT:      Head: Normocephalic and atraumatic.      Right Ear: External ear normal.      Left Ear: External ear normal.      Nose: Nose normal.      Mouth/Throat:      Mouth: Mucous membranes are dry.      Pharynx: Posterior oropharyngeal erythema present. No oropharyngeal exudate.   Eyes:      General: No scleral icterus.        Right eye: No discharge.         Left eye: No discharge.      Conjunctiva/sclera: Conjunctivae normal.      Pupils: Pupils are equal, round, and reactive to light.   Neck:      Thyroid: No thyromegaly.      Vascular: No JVD.      Trachea: No tracheal deviation.   Cardiovascular:      Rate and Rhythm: Normal rate and regular rhythm.      Heart sounds: Normal heart sounds. No murmur heard.     No friction rub. No gallop.   Pulmonary:      Effort: Pulmonary effort is normal. No respiratory distress.      Breath sounds: Normal breath sounds. No wheezing or rales.   Chest:      Chest wall: No tenderness.   Abdominal:      General: Bowel sounds are normal. There is no distension.      Palpations: Abdomen is soft. There is no mass.      Tenderness: There is no abdominal tenderness. There is no guarding or rebound.   Musculoskeletal:         General: No tenderness or deformity. Normal range of motion.      " Cervical back: Normal range of motion and neck supple.   Lymphadenopathy:      Cervical: No cervical adenopathy.   Skin:     General: Skin is warm and dry.      Coloration: Skin is not pale.      Findings: No erythema or rash.   Neurological:      Mental Status: She is alert and oriented to person, place, and time.      Cranial Nerves: No cranial nerve deficit.      Motor: No abnormal muscle tone.      Coordination: Coordination normal.      Deep Tendon Reflexes: Reflexes are normal and symmetric.   Psychiatric:         Behavior: Behavior normal.

## 2024-11-15 ENCOUNTER — HOSPITAL ENCOUNTER (OUTPATIENT)
Dept: RADIOLOGY | Age: 49
Discharge: HOME | End: 2024-11-15
Payer: COMMERCIAL

## 2024-11-15 VITALS — WEIGHT: 220 LBS | HEIGHT: 68 IN | BODY MASS INDEX: 33.34 KG/M2

## 2024-11-15 DIAGNOSIS — Z12.31 SCREENING MAMMOGRAM FOR BREAST CANCER: ICD-10-CM

## 2024-11-15 PROCEDURE — 77063 BREAST TOMOSYNTHESIS BI: CPT

## 2024-11-15 PROCEDURE — 77067 SCR MAMMO BI INCL CAD: CPT

## 2024-11-18 ENCOUNTER — RESULTS FOLLOW-UP (OUTPATIENT)
Dept: GYNECOLOGY | Facility: CLINIC | Age: 49
End: 2024-11-18

## 2024-11-25 ENCOUNTER — OFFICE VISIT (OUTPATIENT)
Dept: BARIATRICS | Facility: CLINIC | Age: 49
End: 2024-11-25
Payer: COMMERCIAL

## 2024-11-25 VITALS
TEMPERATURE: 97.9 F | WEIGHT: 217 LBS | SYSTOLIC BLOOD PRESSURE: 120 MMHG | HEIGHT: 67 IN | OXYGEN SATURATION: 99 % | HEART RATE: 66 BPM | DIASTOLIC BLOOD PRESSURE: 82 MMHG | BODY MASS INDEX: 34.06 KG/M2

## 2024-11-25 DIAGNOSIS — Z48.815 ENCOUNTER FOR SURGICAL AFTERCARE FOLLOWING SURGERY OF DIGESTIVE SYSTEM: Primary | ICD-10-CM

## 2024-11-25 DIAGNOSIS — Z98.84 BARIATRIC SURGERY STATUS: ICD-10-CM

## 2024-11-25 DIAGNOSIS — K91.2 POSTSURGICAL MALABSORPTION: ICD-10-CM

## 2024-11-25 DIAGNOSIS — E78.2 MIXED DYSLIPIDEMIA: ICD-10-CM

## 2024-11-25 DIAGNOSIS — E66.811 OBESITY, CLASS I, BMI 30-34.9: ICD-10-CM

## 2024-11-25 PROCEDURE — 99214 OFFICE O/P EST MOD 30 MIN: CPT | Performed by: PHYSICIAN ASSISTANT

## 2024-11-25 NOTE — PROGRESS NOTES
Date of surgery: 4/29/2024  Procedure: Sleeve  Performing surgeon: Dr. Edwards    Initial Weight - 280.0 lbs.  Current Weight - 217.0 lbs.  James Weight - 217.0 lbs.  Total Body Weight Loss (EWL) - 63.1 lbs.  EWL% - 52%  TWB% - 23%

## 2024-11-25 NOTE — PROGRESS NOTES
Assessment/Plan:     Patient ID: Jaquan Ribeiro is a 49 y.o. female.     Bariatric Surgery Status  -s/p Vertical Sleeve Gastrectomy with Dr. Edwards on 04/29/2024. Presents to the office today for 3rd post op visit. Overall doing well, tolerating a regular diet. Denies having any abdominal pain, N/V/D/C, regurgitation, reflux or dysphagia. Taking her multivitamins daily and PPI daily. She was on omeprazole even prior to surgery for heartburn. Thus far she has not had any symptoms. Will wait till annual to attempt taper.     Continued/Maintain healthy weight loss with good nutrition intakes.  Adequate hydration with at least 64oz. fluid intake.  Follow diet as discussed.  Follow vitamin and mineral recommendations as reviewed with you.  Exercise as tolerated.    Colonoscopy referral made: utd  Mammo: utd     Follow-up in 6 months. We kindly ask that your arrive 15 minutes before your scheduled appointment time with your provider to allow our staff to room you, get your vital signs and update your chart.    Get lab work done. Please call the office if you need a script.  It is recommended to check with your insurance BEFORE getting labs done to make sure they are covered by your policy.      Call our office if you have any problems with abdominal pain especially associated with fever, chills, nausea, vomiting or any other concerns.    All  Post-bariatric surgery patients should be aware that very small quantities of any alcohol can cause impairment and it is very possible not to feel the effect. The effect can be in the system for several hours.  It is also a stomach irritant.     It is advised to AVOID alcohol, Nonsteroidal antiinflammatory drugs (NSAIDS) and nicotine of all forms . Any of these can cause stomach irritation/pain.    Discussed the effects of alcohol on a bariatric patient and the increased impairment risk.     Keep up the good work!     Postsurgical Malabsorption   -At risk for malabsorption of  vitamins/minerals secondary to malabsorption and restriction of intake from bariatric surgery  -Currently taking adequate postop bariatric surgery vitamin supplementation  -Last set of bariatric labs ordered at last visit - needs to complete   -Patient received education about the importance of adhering to a lifelong supplementation regimen to avoid vitamin/mineral deficiencies      Diagnoses and all orders for this visit:    Encounter for surgical aftercare following surgery of digestive system  -     TSH, 3rd generation with Free T4 reflex; Future    Bariatric surgery status  -     TSH, 3rd generation with Free T4 reflex; Future    Postsurgical malabsorption  -     TSH, 3rd generation with Free T4 reflex; Future    Obesity, Class I, BMI 30-34.9  -     TSH, 3rd generation with Free T4 reflex; Future    Mixed dyslipidemia  -     TSH, 3rd generation with Free T4 reflex; Future    Other orders  -     Collagen-Vitamin C-Biotin (COLLAGEN 1500/C PO); Take 2 each by mouth daily         Subjective:      Patient ID: Jaquan Ribeiro is a 49 y.o. female.    -s/p Vertical Sleeve Gastrectomy with Dr. Edwards on 04/29/2024. Presents to the office today for 3rd post op visit.. Tolerating diet without issues; denies N/V, dysphagia, reflux.     Initial: 280 lbs  Current: 217 lbs   EWL: (Weight loss is ahead of schedule at this post surgical period.)  James: current   Current BMI is Body mass index is 33.99 kg/m².    Tolerating a regular diet-yes  Eating at least 60 grams of protein per day-yes  Following 30/60 minute rule with liquids-yes  Drinking at least 64 ounces of fluid per day-yes  Sufficient exercise-yes  Using NSAIDs regularly-no  Using nicotine-no  Using alcohol-no  Supplements:  Bariatric multivitamin, shira sandoval's hair skin and nails, biotin 10,000 mcg, zinc, fish oil, vitamin C + iron 65 mg + b-complex + vitamin D3 1000 IU daily  + viactiv calcium     EWL is 52  5, which places the patient ahead of schedule for  "expected post surgical weight loss at this time.     The following portions of the patient's history were reviewed and updated as appropriate: allergies, current medications, past family history, past medical history, past social history, past surgical history and problem list.    Review of Systems   Constitutional: Negative.    Respiratory: Negative.     Cardiovascular: Negative.    Gastrointestinal: Negative.    Neurological: Negative.    Psychiatric/Behavioral: Negative.           Objective:    /82 (BP Location: Left arm, Patient Position: Sitting, Cuff Size: Large)   Pulse 66   Temp 97.9 °F (36.6 °C) (Tympanic)   Ht 5' 7\" (1.702 m)   Wt 98.4 kg (217 lb)   LMP 09/18/2024 (Approximate)   SpO2 99%   BMI 33.99 kg/m²      Physical Exam  Vitals and nursing note reviewed.   Constitutional:       Appearance: Normal appearance. She is obese.   HENT:      Head: Normocephalic and atraumatic.   Eyes:      Extraocular Movements: Extraocular movements intact.   Cardiovascular:      Rate and Rhythm: Normal rate.   Pulmonary:      Effort: Pulmonary effort is normal.   Musculoskeletal:         General: Normal range of motion.      Cervical back: Normal range of motion.   Skin:     General: Skin is warm and dry.   Neurological:      General: No focal deficit present.      Mental Status: She is alert and oriented to person, place, and time.   Psychiatric:         Mood and Affect: Mood normal.             "

## 2025-01-28 ENCOUNTER — PROCEDURE VISIT (OUTPATIENT)
Dept: NEUROLOGY | Facility: CLINIC | Age: 50
End: 2025-01-28
Payer: COMMERCIAL

## 2025-01-28 VITALS — SYSTOLIC BLOOD PRESSURE: 123 MMHG | TEMPERATURE: 97.9 F | HEART RATE: 75 BPM | DIASTOLIC BLOOD PRESSURE: 56 MMHG

## 2025-01-28 DIAGNOSIS — G43.009 MIGRAINE WITHOUT AURA AND WITHOUT STATUS MIGRAINOSUS, NOT INTRACTABLE: ICD-10-CM

## 2025-01-28 DIAGNOSIS — G43.709 CHRONIC MIGRAINE WITHOUT AURA WITHOUT STATUS MIGRAINOSUS, NOT INTRACTABLE: Primary | ICD-10-CM

## 2025-01-28 PROCEDURE — 64615 CHEMODENERV MUSC MIGRAINE: CPT | Performed by: PHYSICIAN ASSISTANT

## 2025-01-28 RX ORDER — RIMEGEPANT SULFATE 75 MG/75MG
75 TABLET, ORALLY DISINTEGRATING ORAL AS NEEDED
Qty: 16 TABLET | Refills: 11 | Status: SHIPPED | OUTPATIENT
Start: 2025-01-28

## 2025-01-28 RX ORDER — ONDANSETRON 4 MG/1
4 TABLET, ORALLY DISINTEGRATING ORAL EVERY 6 HOURS PRN
Qty: 30 TABLET | Refills: 2 | Status: SHIPPED | OUTPATIENT
Start: 2025-01-28

## 2025-01-28 RX ORDER — CYCLOBENZAPRINE HCL 5 MG
5 TABLET ORAL AS NEEDED
Qty: 30 TABLET | Refills: 2 | Status: SHIPPED | OUTPATIENT
Start: 2025-01-28

## 2025-01-28 NOTE — PROGRESS NOTES
"Universal Protocol   procedure performed by consultantConsent: Verbal consent obtained. Written consent obtained.  Risks and benefits: risks, benefits and alternatives were discussed  Consent given by: patient  Time out: Immediately prior to procedure a \"time out\" was called to verify the correct patient, procedure, equipment, support staff and site/side marked as required.  Patient understanding: patient states understanding of the procedure being performed  Patient consent: the patient's understanding of the procedure matches consent given  Procedure consent: procedure consent matches procedure scheduled  Relevant documents: relevant documents present and verified  Patient identity confirmed: verbally with patient      Chemodenervation     Date/Time  1/28/2025 12:00 PM     Performed by  Sadie Fontanez PA-C   Authorized by  Sadie Fontanez PA-C     Pre-procedure details      Prepped With: Alcohol     Anesthesia  (see MAR for exact dosages):     Anesthesia method:  None   Procedure details      Position:  Upright   Botox      Botox Type:  Type A    Brand:  Botox    mL's of Botulinum Toxin:  200    Final Concentration per CC:  50 units    Needle Gauge:  30 G 2.5 inch   Procedures      Botox Procedures: chronic headache      Indications: migraines     Injection Location      Head / Face:  L superior cervical paraspinal, R superior cervical paraspinal, L , R , L frontalis, R frontalis, L medial occipitalis, R medial occipitalis, procerus, R temporalis, L temporalis, R superior trapezius and L superior trapezius    L  injection amount:  5 unit(s)    R  injection amount:  5 unit(s)    L lateral frontalis:  5 unit(s)    R lateral frontalis:  5 unit(s)    L medial frontalis:  5 unit(s)    R medial frontalis:  5 unit(s)    L temporalis injection amount:  20 unit(s)    R temporalis injection amount:  20 unit(s)    Procerus injection amount:  5 unit(s)    L medial occipitalis injection " amount:  15 unit(s)    R medial occipitalis injection amount:  15 unit(s)    L superior cervical paraspinal injection amount:  10 unit(s)    R superior cervical paraspinal injection amount:  10 unit(s)    L superior trapezius injection amount:  15 unit(s)    R superior trapezius injection amount:  15 unit(s)   Total Units      Total units used:  200    Total units discarded:  0   Post-procedure details      Chemodenervation:  Chronic migraine    Facial Nerve Location::  Bilateral facial nerve    Patient tolerance of procedure:  Tolerated well, no immediate complications   Comments       5 units orbicularis oculi bilaterally  10 temporalis bilaterally  15 units frontalis (7.5 lower)  All medically necessary

## 2025-02-12 ENCOUNTER — TELEPHONE (OUTPATIENT)
Dept: NEUROLOGY | Facility: CLINIC | Age: 50
End: 2025-02-12

## 2025-02-26 DIAGNOSIS — F41.1 GAD (GENERALIZED ANXIETY DISORDER): ICD-10-CM

## 2025-02-26 DIAGNOSIS — F41.0 PANIC ATTACK: ICD-10-CM

## 2025-02-26 RX ORDER — SERTRALINE HYDROCHLORIDE 25 MG/1
25 TABLET, FILM COATED ORAL DAILY
Qty: 90 TABLET | Refills: 0 | Status: SHIPPED | OUTPATIENT
Start: 2025-02-26 | End: 2025-05-27

## 2025-04-29 ENCOUNTER — PROCEDURE VISIT (OUTPATIENT)
Dept: NEUROLOGY | Facility: CLINIC | Age: 50
End: 2025-04-29
Payer: COMMERCIAL

## 2025-04-29 VITALS — TEMPERATURE: 97.7 F | SYSTOLIC BLOOD PRESSURE: 108 MMHG | HEART RATE: 74 BPM | DIASTOLIC BLOOD PRESSURE: 72 MMHG

## 2025-04-29 DIAGNOSIS — G43.009 MIGRAINE WITHOUT AURA AND WITHOUT STATUS MIGRAINOSUS, NOT INTRACTABLE: ICD-10-CM

## 2025-04-29 DIAGNOSIS — G43.709 CHRONIC MIGRAINE WITHOUT AURA WITHOUT STATUS MIGRAINOSUS, NOT INTRACTABLE: Primary | ICD-10-CM

## 2025-04-29 PROCEDURE — 64615 CHEMODENERV MUSC MIGRAINE: CPT | Performed by: PHYSICIAN ASSISTANT

## 2025-04-29 RX ORDER — ONDANSETRON 4 MG/1
4 TABLET, ORALLY DISINTEGRATING ORAL EVERY 6 HOURS PRN
Qty: 30 TABLET | Refills: 2 | Status: SHIPPED | OUTPATIENT
Start: 2025-04-29

## 2025-04-29 RX ORDER — CYCLOBENZAPRINE HCL 5 MG
5 TABLET ORAL AS NEEDED
Qty: 30 TABLET | Refills: 2 | Status: SHIPPED | OUTPATIENT
Start: 2025-04-29

## 2025-04-29 NOTE — PROGRESS NOTES
"Universal Protocol   procedure performed by consultantConsent: Verbal consent obtained. Written consent obtained.  Risks and benefits: risks, benefits and alternatives were discussed  Consent given by: patient  Time out: Immediately prior to procedure a \"time out\" was called to verify the correct patient, procedure, equipment, support staff and site/side marked as required.  Patient understanding: patient states understanding of the procedure being performed  Patient consent: the patient's understanding of the procedure matches consent given  Procedure consent: procedure consent matches procedure scheduled  Relevant documents: relevant documents present and verified  Patient identity confirmed: verbally with patient      Chemodenervation     Date/Time  4/29/2025 2:30 PM     Performed by  Sadie Fontanez PA-C   Authorized by  Sadie Fontanez PA-C     Pre-procedure details      Prepped With: Alcohol     Anesthesia  (see MAR for exact dosages):     Anesthesia method:  None   Procedure details      Position:  Upright   Botox      Botox Type:  Type A    Brand:  Botox    mL's of Botulinum Toxin:  200    Final Concentration per CC:  50 units    Needle Gauge:  30 G 2.5 inch   Procedures      Botox Procedures: chronic headache      Indications: migraines     Injection Location      Head / Face:  L superior cervical paraspinal, R superior cervical paraspinal, L , R , L frontalis, R frontalis, L medial occipitalis, R medial occipitalis, procerus, R temporalis, L temporalis, R superior trapezius and L superior trapezius    L  injection amount:  5 unit(s)    R  injection amount:  5 unit(s)    L lateral frontalis:  5 unit(s)    R lateral frontalis:  5 unit(s)    L medial frontalis:  5 unit(s)    R medial frontalis:  5 unit(s)    L temporalis injection amount:  20 unit(s)    R temporalis injection amount:  20 unit(s)    Procerus injection amount:  5 unit(s)    L medial occipitalis injection " amount:  15 unit(s)    R medial occipitalis injection amount:  15 unit(s)    L superior cervical paraspinal injection amount:  10 unit(s)    R superior cervical paraspinal injection amount:  10 unit(s)    L superior trapezius injection amount:  15 unit(s)    R superior trapezius injection amount:  15 unit(s)   Total Units      Total units used:  200    Total units discarded:  0   Post-procedure details      Chemodenervation:  Chronic migraine    Facial Nerve Location::  Bilateral facial nerve    Patient tolerance of procedure:  Tolerated well, no immediate complications   Comments       5 units orbicularis oculi bilaterally  10 temporalis bilaterally  15 units frontalis (7.5 lower)  All medically necessary

## 2025-05-13 DIAGNOSIS — Z98.84 BARIATRIC SURGERY STATUS: ICD-10-CM

## 2025-05-13 DIAGNOSIS — Z79.899 ON STATIN THERAPY: ICD-10-CM

## 2025-05-13 DIAGNOSIS — F41.9 ANXIETY: ICD-10-CM

## 2025-05-13 DIAGNOSIS — G43.709 CHRONIC MIGRAINE WITHOUT AURA WITHOUT STATUS MIGRAINOSUS, NOT INTRACTABLE: ICD-10-CM

## 2025-05-13 DIAGNOSIS — F41.0 PANIC ATTACK: ICD-10-CM

## 2025-05-13 DIAGNOSIS — G43.009 MIGRAINE WITHOUT AURA AND WITHOUT STATUS MIGRAINOSUS, NOT INTRACTABLE: ICD-10-CM

## 2025-05-13 DIAGNOSIS — E78.2 MIXED DYSLIPIDEMIA: ICD-10-CM

## 2025-05-13 DIAGNOSIS — F41.1 GAD (GENERALIZED ANXIETY DISORDER): ICD-10-CM

## 2025-05-13 RX ORDER — ONDANSETRON 4 MG/1
4 TABLET, ORALLY DISINTEGRATING ORAL EVERY 6 HOURS PRN
Qty: 30 TABLET | Refills: 0 | OUTPATIENT
Start: 2025-05-13

## 2025-05-13 RX ORDER — SERTRALINE HYDROCHLORIDE 25 MG/1
25 TABLET, FILM COATED ORAL DAILY
Qty: 90 TABLET | Refills: 1 | Status: SHIPPED | OUTPATIENT
Start: 2025-05-13 | End: 2025-11-09

## 2025-05-13 RX ORDER — OMEPRAZOLE 20 MG/1
20 CAPSULE, DELAYED RELEASE ORAL DAILY
Qty: 90 CAPSULE | Refills: 1 | Status: SHIPPED | OUTPATIENT
Start: 2025-05-13

## 2025-05-13 RX ORDER — LORAZEPAM 0.5 MG/1
1 TABLET ORAL DAILY PRN
Qty: 10 TABLET | Refills: 0 | Status: SHIPPED | OUTPATIENT
Start: 2025-05-13

## 2025-05-13 RX ORDER — RIMEGEPANT SULFATE 75 MG/75MG
75 TABLET, ORALLY DISINTEGRATING ORAL AS NEEDED
Qty: 16 TABLET | Refills: 0 | OUTPATIENT
Start: 2025-05-13

## 2025-05-13 RX ORDER — CYCLOBENZAPRINE HCL 5 MG
5 TABLET ORAL AS NEEDED
Qty: 30 TABLET | Refills: 0 | OUTPATIENT
Start: 2025-05-13

## 2025-05-13 RX ORDER — ATORVASTATIN CALCIUM 10 MG/1
10 TABLET, FILM COATED ORAL DAILY
Qty: 90 TABLET | Refills: 1 | Status: SHIPPED | OUTPATIENT
Start: 2025-05-13

## 2025-05-13 NOTE — TELEPHONE ENCOUNTER
ondansetron (ZOFRAN-ODT) 4 mg disintegrating tablet , was sent to the [facility] on 4/29/25 with a qty of 30 tablets with 2 refills and Your prescription     rimegepant sulfate (Nurtec) 75 mg , was sent to the [facility] on 1/28/25 with a qty of 16 tablets with 11 refills.       prescription, cyclobenzaprine (FLEXERIL) 5 mg tablet was also sent to [facility] on 4/29/25 with a qty of 30 tablets and 2 refills

## 2025-05-28 ENCOUNTER — OFFICE VISIT (OUTPATIENT)
Dept: BARIATRICS | Facility: CLINIC | Age: 50
End: 2025-05-28
Payer: COMMERCIAL

## 2025-05-28 VITALS
DIASTOLIC BLOOD PRESSURE: 82 MMHG | HEART RATE: 74 BPM | HEIGHT: 67 IN | TEMPERATURE: 97.5 F | BODY MASS INDEX: 33.43 KG/M2 | OXYGEN SATURATION: 98 % | SYSTOLIC BLOOD PRESSURE: 126 MMHG | WEIGHT: 213 LBS

## 2025-05-28 DIAGNOSIS — Z48.815 ENCOUNTER FOR SURGICAL AFTERCARE FOLLOWING SURGERY OF DIGESTIVE SYSTEM: Primary | ICD-10-CM

## 2025-05-28 DIAGNOSIS — E66.811 OBESITY, CLASS I, BMI 30-34.9: ICD-10-CM

## 2025-05-28 DIAGNOSIS — Z98.84 BARIATRIC SURGERY STATUS: ICD-10-CM

## 2025-05-28 DIAGNOSIS — K91.2 POSTSURGICAL MALABSORPTION: ICD-10-CM

## 2025-05-28 PROCEDURE — 99214 OFFICE O/P EST MOD 30 MIN: CPT | Performed by: PHYSICIAN ASSISTANT

## 2025-05-28 NOTE — PROGRESS NOTES
Assessment/Plan:     Patient ID: Jaquan Ribeiro is a 50 y.o. female.     Bariatric Surgery Status    -s/p Vertical Sleeve Gastrectomy with Dr. Edwards on 04/29/2024. Presents to the office today for annual post op visit. Overall doing well, tolerating a regular diet. Denies having any abdominal pain, N/V/D/C, regurgitation, reflux or dysphagia. Taking her multivitamins daily and PPI daily.     She feels she hit a weight loss plateau. Still trying to meet her protein and fluid goals, typically around 40-60 g per day. She is walking for exercise - at least 2 miles a day  She thinks she may not be eating enough calories - most days thinks she is under 1000 calories. However is still losing inches and clothes are fitting looser.     Discussed with pt she is most likely undereating calories, especially given she walks 2 miles a day. Will start to food log. Advised at least 1200 calories a day. She will also touch base with her RD  Follow up 6 months   Get labs done in the meantime        Continued/Maintain healthy weight loss with good nutrition intakes.  Adequate hydration with at least 64oz. fluid intake.  Follow diet as discussed.  Follow vitamin and mineral recommendations as reviewed with you.  Exercise as tolerated.    Colonoscopy referral made: utd   Mammo: utd     Follow-up in 6 months. We kindly ask that your arrive 15 minutes before your scheduled appointment time with your provider to allow our staff to room you, get your vital signs and update your chart.    Get lab work done . Please call the office if you need a script.  It is recommended to check with your insurance BEFORE getting labs done to make sure they are covered by your policy.      Call our office if you have any problems with abdominal pain especially associated with fever, chills, nausea, vomiting or any other concerns.    All  Post-bariatric surgery patients should be aware that very small quantities of any alcohol can cause impairment and  it is very possible not to feel the effect. The effect can be in the system for several hours.  It is also a stomach irritant.     It is advised to AVOID alcohol, Nonsteroidal antiinflammatory drugs (NSAIDS) and nicotine of all forms . Any of these can cause stomach irritation/pain.    Discussed the effects of alcohol on a bariatric patient and the increased impairment risk.     Keep up the good work!     Postsurgical Malabsorption   -At risk for malabsorption of vitamins/minerals secondary to malabsorption and restriction of intake from bariatric surgery  -Currently taking adequate postop bariatric surgery vitamin supplementation  -Next set of bariatric labs ordered for approximately 2 weeks  -Patient received education about the importance of adhering to a lifelong supplementation regimen to avoid vitamin/mineral deficiencies      Diagnoses and all orders for this visit:    Encounter for surgical aftercare following surgery of digestive system    Bariatric surgery status    Postsurgical malabsorption    Obesity, Class I, BMI 30-34.9         Subjective:      Patient ID: Jaquan Ribeiro is a 50 y.o. female.    -s/p Vertical Sleeve Gastrectomy with Dr. Edwards on 04/29/2024. Presents to the office today for annual post op visit. Overall doing well, tolerating a regular diet. Denies having any abdominal pain, N/V/D/C, regurgitation, reflux or dysphagia. Taking her multivitamins daily and PPI daily.     Initial: 280  Current: 213  EWL: (Weight loss is ahead of schedule at this post surgical period.)  James: current   Current BMI is Body mass index is 33.36 kg/m².    Tolerating a regular diet-yes  Eating at least 60 grams of protein per day-yes  Drinking at least 64 ounces of fluid per day-yes  Sufficient exercise-yes  Using nicotine-no  Using alcohol-no  Supplements: Bariatric multivitamin, shira sandoval's hair skin and nails, biotin 10,000 mcg, zinc, fish oil, vitamin C + iron 65 mg + b-complex + vitamin D3 1000 IU  "daily + viactiv calcium     EWL is 55%, which places the patient ahead of schedule for expected post surgical weight loss at this time.     The following portions of the patient's history were reviewed and updated as appropriate: allergies, current medications, past family history, past medical history, past social history, past surgical history and problem list.    Review of Systems   Constitutional: Negative.    Respiratory: Negative.     Cardiovascular: Negative.    Gastrointestinal: Negative.    Neurological: Negative.    Psychiatric/Behavioral: Negative.           Objective:    /82 (BP Location: Left arm, Patient Position: Sitting, Cuff Size: Large)   Pulse 74   Temp 97.5 °F (36.4 °C) (Tympanic)   Ht 5' 7\" (1.702 m)   Wt 96.6 kg (213 lb)   SpO2 98%   BMI 33.36 kg/m²      Physical Exam  Vitals and nursing note reviewed.   Constitutional:       Appearance: Normal appearance. She is obese.   HENT:      Head: Normocephalic and atraumatic.     Eyes:      Extraocular Movements: Extraocular movements intact.       Cardiovascular:      Rate and Rhythm: Normal rate.   Pulmonary:      Effort: Pulmonary effort is normal.   Abdominal:      General: Bowel sounds are normal.     Musculoskeletal:         General: Normal range of motion.      Cervical back: Normal range of motion.     Skin:     General: Skin is warm and dry.     Neurological:      General: No focal deficit present.      Mental Status: She is alert and oriented to person, place, and time.     Psychiatric:         Mood and Affect: Mood normal.             "

## 2025-05-28 NOTE — PROGRESS NOTES
Date of surgery:4/29/2024  Procedure:Sleeve gastrectomy   Performing surgeon:    Initial Weight - 280.0 Ibs  Current Weight -213.0 Ibs  James Weight - 213.0 Ibs  Total Body Weight Loss (EWL)- 67.0  EWL% - 55%  TWB % -24%    For *NEW/TRANSFER* patients only: Who referred the patient? Please provide name and specialty (PCP, GI, etc.).

## 2025-06-11 ENCOUNTER — OFFICE VISIT (OUTPATIENT)
Dept: INTERNAL MEDICINE CLINIC | Age: 50
End: 2025-06-11
Payer: COMMERCIAL

## 2025-06-11 VITALS
SYSTOLIC BLOOD PRESSURE: 118 MMHG | OXYGEN SATURATION: 99 % | WEIGHT: 211 LBS | BODY MASS INDEX: 33.12 KG/M2 | HEART RATE: 71 BPM | DIASTOLIC BLOOD PRESSURE: 66 MMHG | HEIGHT: 67 IN | TEMPERATURE: 98.1 F

## 2025-06-11 DIAGNOSIS — Z00.00 ANNUAL PHYSICAL EXAM: Primary | ICD-10-CM

## 2025-06-11 DIAGNOSIS — H60.312 ACUTE DIFFUSE OTITIS EXTERNA OF LEFT EAR: ICD-10-CM

## 2025-06-11 DIAGNOSIS — H61.92 EARLOBE LESION, LEFT: ICD-10-CM

## 2025-06-11 PROCEDURE — 99214 OFFICE O/P EST MOD 30 MIN: CPT | Performed by: INTERNAL MEDICINE

## 2025-06-11 PROCEDURE — 99396 PREV VISIT EST AGE 40-64: CPT | Performed by: INTERNAL MEDICINE

## 2025-06-11 RX ORDER — NEOMYCIN SULFATE, POLYMYXIN B SULFATE AND HYDROCORTISONE 3.5; 10000; 1 MG/ML; [IU]/ML; MG/ML
4 SOLUTION AURICULAR (OTIC) EVERY 6 HOURS SCHEDULED
Qty: 10 ML | Refills: 0 | Status: SHIPPED | OUTPATIENT
Start: 2025-06-11

## 2025-06-11 NOTE — PROGRESS NOTES
Name: Jaquan Ribeiro      : 1975      MRN: [id number]  Encounter Provider: Fany Wiley DO  Encounter Date: 2025   Encounter department: [facility]  :  Assessment & Plan  Annual physical exam  - History and physical examination done  - Pt was counseled to eat a heart healthy diet, to drink at least 2 L of water daily, to take a daily multivitamin and to exercise for at least 30 minutes of cardio exercise daily, for at least 5 days a week.  - CBC, CMP, TSH and lipid panel have been ordered and we will follow-up with the results.  - She reports that she is up-to-date with 4 COVID vaccines  - She is up-to-date with her Pap smear, mammogram and colonoscopy  - follow up in 6 months    Orders:  •  Hemoglobin A1C; Future  •  CBC and differential; Future  •  TSH, 3rd generation with Free T4 reflex; Future  •  Comprehensive metabolic panel; Future  •  Lipid panel; Future    Acute diffuse otitis externa of left ear  -Will start patient on Corticosporin eardrops  - She should avoid getting water in her ears while using the eardrops  Orders:  •  neomycin-polymyxin-hydrocortisone (CORTISPORIN) otic solution; Administer 4 drops into the left ear every 6 (six) hours    Earlobe lesion, left  -Lesion has been growing bigger and is now painful  - We will refer patient to dermatology  Orders:  •  Ambulatory Referral to Dermatology; Future          Depression Screening and Follow-up Plan: Patient was screened for depression during today's encounter. They screened negative with a PHQ-2 score of 0.      History of Present Illness   HPI    Patient presents for an annual physical exam.    Last annual physical exam-over a year ago     Past medical history-migraines, gerd, cts, epicondylitis, garland, dyslipidemia     Past surgical history-blepharoplasty, gastric sleeve, endometrial ablation, wisdom teeth extraction    Medications-see list     Allergies-see list     Diet-mostly protein and  drinks more water then before but puts crystal lite    Exercise-walking and yoga    Alcohol use-none    Caffeine and soda use-2 shots of esspreso daily    Nicotine use-never    Recreational drug use-never    Work-    Sexual history, STD history and HIV testing- monogamous with , std hx - never, hiv testing - never    Gynecological history/Prostate health/testicular health history- LMP - 2 months s/p ablation , last pap smear - 03/21/22, last mammogram - 11/15/2024    Colonoscopy-2/15/2024 and told to return in 10 years    Immunization history- up to date with 4 covid shots     Dental visit-every year    Vision-readers    Family history-  Dm - sis   Aneurysms -P aunts   Prostate ca - dad   Cva - M aunt     Today, patient states that she sees bariatrics since she had her gastric bypass and states that she wants to lose more weight.  She states that she has been taking her vitamins as prescribed.  She complains that she has a lesion on her left lobe that has been present for year but is getting bigger and is painful travis when she wears an ear ring.  She would like to have it removed.  She also complains that she occasionally gets a red rash under her abd flap that she developed after her gastric bypass and she would like to have the excessive skin in her abdominal flap removed.  She states that he occasionally gets itchy especially when she sweats.  Of note, she does not have the rash today.  She also complains of occasional left-sided earache.  She admits to occasional nausea but denies any fever, chills, night sweats, headache, dizziness, nasal congestion, runny nose, pnd, sore throat, ear ache, sinus pain or pressure, wheezing, cough, chest pain, sob, palpitations,  vomiting, diarrhea, constipation, hematochezia, hematuria, melena stools, arthralgias, myalgias, feelings of anxiety, depression or insomnia.      Review of Systems   Constitutional:  Negative for activity change, chills, fatigue, fever  "and unexpected weight change.   HENT:  Positive for ear pain (Left-sided). Negative for postnasal drip, rhinorrhea, sinus pressure and sore throat.    Eyes:  Negative for pain.   Respiratory:  Negative for cough, choking, chest tightness, shortness of breath and wheezing.    Cardiovascular:  Negative for chest pain, palpitations and leg swelling.   Gastrointestinal:  Positive for nausea (occasionally). Negative for abdominal pain, constipation, diarrhea and vomiting.   Genitourinary:  Negative for dysuria and hematuria.   Musculoskeletal:  Negative for arthralgias, back pain, gait problem, joint swelling, myalgias and neck stiffness.   Skin:  Negative for pallor and rash.   Neurological:  Negative for dizziness, tremors, seizures, syncope, light-headedness and headaches.   Hematological:  Negative for adenopathy.   Psychiatric/Behavioral:  Negative for behavioral problems, dysphoric mood and sleep disturbance. The patient is not nervous/anxious.        Objective   /66 (BP Location: Left arm, Patient Position: Sitting, Cuff Size: Standard)   Pulse 71   Temp 98.1 °F (36.7 °C) (Temporal)   Ht 5' 7\" (1.702 m)   Wt 95.7 kg (211 lb)   SpO2 99%   BMI 33.05 kg/m²      Physical Exam  Constitutional:       General: She is not in acute distress.     Appearance: She is well-developed. She is not diaphoretic.   HENT:      Head: Normocephalic and atraumatic.      Right Ear: External ear normal.      Left Ear: External ear normal. Drainage, swelling and tenderness present.      Nose: Nose normal.      Mouth/Throat:      Mouth: Mucous membranes are dry.      Pharynx: Posterior oropharyngeal erythema present. No oropharyngeal exudate.     Eyes:      General: No scleral icterus.        Right eye: No discharge.         Left eye: No discharge.      Conjunctiva/sclera: Conjunctivae normal.      Pupils: Pupils are equal, round, and reactive to light.     Neck:      Thyroid: No thyromegaly.      Vascular: No JVD.      Trachea: " No tracheal deviation.     Cardiovascular:      Rate and Rhythm: Normal rate and regular rhythm.      Heart sounds: Normal heart sounds. No murmur heard.     No friction rub. No gallop.   Pulmonary:      Effort: Pulmonary effort is normal. No respiratory distress.      Breath sounds: Normal breath sounds. No wheezing or rales.   Chest:      Chest wall: No tenderness.   Abdominal:      General: Bowel sounds are normal. There is no distension.      Palpations: Abdomen is soft. There is no mass.      Tenderness: There is no abdominal tenderness. There is no guarding or rebound.     Musculoskeletal:         General: No tenderness or deformity. Normal range of motion.      Cervical back: Normal range of motion and neck supple.   Lymphadenopathy:      Cervical: No cervical adenopathy.     Skin:     General: Skin is warm and dry.      Coloration: Skin is not pale.      Findings: No erythema or rash.     Neurological:      Mental Status: She is alert and oriented to person, place, and time.      Cranial Nerves: No cranial nerve deficit.      Motor: No abnormal muscle tone.      Coordination: Coordination normal.      Deep Tendon Reflexes: Reflexes are normal and symmetric.     Psychiatric:         Behavior: Behavior normal.

## 2025-06-11 NOTE — PATIENT INSTRUCTIONS
"Patient Education     Routine physical for adults   The Basics   Written by the doctors and editors at [facility]   What is a physical? -- A physical is a routine visit, or \"check-up,\" with your doctor. You might also hear it called a \"wellness visit\" or \"preventive visit.\"  During each visit, the doctor will:   Ask about your physical and mental health   Ask about your habits, behaviors, and lifestyle   Do an exam   Give you vaccines if needed   Talk to you about any medicines you take   Give advice about your health   Answer your questions  Getting regular check-ups is an important part of taking care of your health. It can help your doctor find and treat any problems you have. But it's also important for preventing health problems.  A routine physical is different from a \"sick visit.\" A sick visit is when you see a doctor because of a health concern or problem. Since physicals are scheduled ahead of time, you can think about what you want to ask the doctor.  How often should I get a physical? -- It depends on your age and health. In general, for people age 21 years and older:   If you are younger than 50 years, you might be able to get a physical every 3 years.   If you are 50 years or older, your doctor might recommend a physical every year.  If you have an ongoing health condition, like diabetes or high blood pressure, your doctor will probably want to see you more often.  What happens during a physical? -- In general, each visit will include:   Physical exam - The doctor or nurse will check your height, weight, heart rate, and blood pressure. They will also look at your eyes and ears. They will ask about how you are feeling and whether you have any symptoms that bother you.   Medicines - It's a good idea to bring a list of all the medicines you take to each doctor visit. Your doctor will talk to you about your medicines and answer any questions. Tell them if you are having any side effects that bother you. You " Time: 5:03 PM EST  Chief Complaint:   Chief Complaint   Patient presents with   • Abdominal Pain           History of Present Illness:  Patient is a 53 y.o. year old female who presents to the emergency department with RLQ pain.          Patient presents the emergency department today with complaints of night.  Patient states the pain is dull, but she will intermittently have sharp stabbing pains.  Patient states it feels like the pain is worsening.  Patient currently rates her pain 7 out of 10.  Patient does admit to nausea which she thinks is related to the pain.  Patient has no fevers, constipation, or diarrhea.  Patient states she has had previous cholecystectomy and the pain that she is having [place] similar to the pain she was having from her gallbladder.  Patient denies any dysuria, but does states she is currently being treated for yeast infection.  No other complaints.      History provided by:  Patient   used: No    Abdominal Pain  Pain location:  RLQ  Pain quality: dull and stabbing    Pain radiates to:  Does not radiate  Pain severity:  Moderate  Onset quality:  Gradual  Duration: 2 days.  Timing:  Constant  Progression:  Worsening  Chronicity:  New  Relieved by:  Lying down  Worsened by:  Movement and palpation  Associated symptoms: nausea    Associated symptoms: no belching, no chest pain, no chills, no constipation, no cough, no diarrhea, no dysuria, no fatigue, no fever, no hematuria, no melena, no shortness of breath and no vomiting            Patient Care Team  Primary Care Provider: Сергей Mckenna MD    Past Medical History:     Allergies   Allergen Reactions   • Erythromycin Anaphylaxis   • Sulfa Antibiotics Anaphylaxis     Past Medical History:   Diagnosis Date   • Hyperlipidemia      Past Surgical History:   Procedure Laterality Date   • BACK SURGERY     • CHOLECYSTECTOMY     • KNEE SURGERY Right    • TUBAL ABDOMINAL LIGATION       History reviewed. No pertinent family  "history.    Home Medications:  Prior to Admission medications    Not on File        Social History:   Social History     Tobacco Use   • Smoking status: Former     Types: Cigarettes   Vaping Use   • Vaping Use: Every day   Substance Use Topics   • Alcohol use: Never   • Drug use: Never         Review of Systems:  Review of Systems   Constitutional: Negative for chills, fatigue and fever.   HENT: Negative for ear pain.    Eyes: Negative for pain.   Respiratory: Negative for cough and shortness of breath.    Cardiovascular: Negative for chest pain.   Gastrointestinal: Positive for abdominal pain and nausea. Negative for constipation, diarrhea, melena and vomiting.   Genitourinary: Negative for dysuria and hematuria.   Musculoskeletal: Negative for arthralgias.   Skin: Negative for rash.   Neurological: Negative for headaches.        Physical Exam:  /67   Pulse 86   Temp 98.3 °F (36.8 °C)   Resp 18   Ht 162.6 cm (64\")   Wt 81.3 kg (179 lb 3.7 oz)   SpO2 98%   BMI 30.77 kg/m²     Physical Exam  Vitals and nursing note reviewed.   Constitutional:       General: She is not in acute distress.     Appearance: Normal appearance. She is well-developed and normal weight. She is not ill-appearing, toxic-appearing or diaphoretic.   HENT:      Head: Normocephalic and atraumatic.      Nose: Nose normal.      Mouth/Throat:      Mouth: Mucous membranes are moist.   Eyes:      Extraocular Movements: Extraocular movements intact.      Conjunctiva/sclera: Conjunctivae normal.      Pupils: Pupils are equal, round, and reactive to light.   Cardiovascular:      Rate and Rhythm: Normal rate and regular rhythm.      Heart sounds: Normal heart sounds.   Pulmonary:      Effort: Pulmonary effort is normal.      Breath sounds: Normal breath sounds.   Abdominal:      General: Abdomen is flat. Bowel sounds are normal. There is no distension.      Palpations: Abdomen is soft. There is no hepatomegaly, splenomegaly, mass or pulsatile " "should also tell them if you are having trouble paying for any of your medicines.   Habits and behaviors - This includes:   Your diet   Your exercise habits   Whether you smoke, drink alcohol, or use drugs   Whether you are sexually active   Whether you feel safe at home  Your doctor will talk to you about things you can do to improve your health and lower your risk of health problems. They will also offer help and support. For example, if you want to quit smoking, they can give you advice and might prescribe medicines. If you want to improve your diet or get more physical activity, they can help you with this, too.   Lab tests, if needed - The tests you get will depend on your age and situation. For example, your doctor might want to check your:   Cholesterol   Blood sugar   Iron level   Vaccines - The recommended vaccines will depend on your age, health, and what vaccines you already had. Vaccines are very important because they can prevent certain serious or deadly infections.   Discussion of screening - \"Screening\" means checking for diseases or other health problems before they cause symptoms. Your doctor can recommend screening based on your age, risk, and preferences. This might include tests to check for:   Cancer, such as breast, prostate, cervical, ovarian, colorectal, prostate, lung, or skin cancer   Sexually transmitted infections, such as chlamydia and gonorrhea   Mental health conditions like depression and anxiety  Your doctor will talk to you about the different types of screening tests. They can help you decide which screenings to have. They can also explain what the results might mean.   Answering questions - The physical is a good time to ask the doctor or nurse questions about your health. If needed, they can refer you to other doctors or specialists, too.  Adults older than 65 years often need other care, too. As you get older, your doctor will talk to you about:   How to prevent falling at " home   Hearing or vision tests   Memory testing   How to take your medicines safely   Making sure that you have the help and support you need at home  All topics are updated as new evidence becomes available and our peer review process is complete.  This topic retrieved from [employer] on: May 02, 2024.  Topic 927473 Version 1.0  Release: 32.4.3 - C32.122  © 2024 UpToDate, Inc. and/or its affiliates. All rights reserved.  Consumer Information Use and Disclaimer   Disclaimer: This generalized information is a limited summary of diagnosis, treatment, and/or medication information. It is not meant to be comprehensive and should be used as a tool to help the user understand and/or assess potential diagnostic and treatment options. It does NOT include all information about conditions, treatments, medications, side effects, or risks that may apply to a specific patient. It is not intended to be medical advice or a substitute for the medical advice, diagnosis, or treatment of a health care provider based on the health care provider's examination and assessment of a patient's specific and unique circumstances. Patients must speak with a health care provider for complete information about their health, medical questions, and treatment options, including any risks or benefits regarding use of medications. This information does not endorse any treatments or medications as safe, effective, or approved for treating a specific patient. UpToDate, Inc. and its affiliates disclaim any warranty or liability relating to this information or the use thereof.The use of this information is governed by the Terms of Use, available at https://www.[employer].com/en/know/clinical-effectiveness-terms. 2024© UpToDate, Inc. and its affiliates and/or licensors. All rights reserved.  Copyright   © 2024 Up[employer]Date, Inc. and/or its affiliates. All rights reserved.     mass.      Tenderness: There is abdominal tenderness in the right lower quadrant. There is no guarding or rebound.      Hernia: No hernia is present.   Musculoskeletal:         General: Normal range of motion.      Cervical back: Normal range of motion and neck supple.   Skin:     General: Skin is warm and dry.   Neurological:      General: No focal deficit present.      Mental Status: She is alert and oriented to person, place, and time.   Psychiatric:         Mood and Affect: Mood normal.         Behavior: Behavior normal.         Thought Content: Thought content normal.         Judgment: Judgment normal.                Medications in the Emergency Department:  Medications   sodium chloride 0.9 % flush 10 mL (has no administration in time range)   ketorolac (TORADOL) injection 15 mg (15 mg Intravenous Given 12/1/22 1845)   ondansetron (ZOFRAN) injection 4 mg (4 mg Intravenous Given 12/1/22 1846)   iopamidol (ISOVUE-370) 76 % injection 100 mL (100 mL Intravenous Given 12/1/22 2002)        Labs  Lab Results (last 24 hours)     Procedure Component Value Units Date/Time    CBC & Differential [[id number]]  (Abnormal) Collected: 12/01/22 1704    Specimen: Blood Updated: 12/01/22 1744    Narrative:      The following orders were created for panel order CBC & Differential.  Procedure                               Abnormality         Status                     ---------                               -----------         ------                     CBC Auto Differential[[id number]]        Abnormal            Final result                 Please view results for these tests on the individual orders.    Comprehensive Metabolic Panel [[id number]] Collected: 12/01/22 1704    Specimen: Blood Updated: 12/01/22 1810     Glucose 91 mg/dL      BUN 16 mg/dL      Creatinine 0.75 mg/dL      Sodium 139 mmol/L      Potassium 3.7 mmol/L      Chloride 101 mmol/L      CO2 26.6 mmol/L      Calcium 9.6 mg/dL      Total Protein 7.6 g/dL      Albumin  4.70 g/dL      ALT (SGPT) 17 U/L      AST (SGOT) 15 U/L      Alkaline Phosphatase 63 U/L      Total Bilirubin 0.4 mg/dL      Globulin 2.9 gm/dL      A/G Ratio 1.6 g/dL      BUN/Creatinine Ratio 21.3     Anion Gap 11.4 mmol/L      eGFR 94.7 mL/min/1.73      Comment: National Kidney Foundation and American Society of Nephrology (ASN) Task Force recommended calculation based on the Chronic Kidney Disease Epidemiology Collaboration (CKD-EPI) equation refit without adjustment for race.       Narrative:      GFR Normal >60  Chronic Kidney Disease <60  Kidney Failure <15      Lipase [[id number]]  (Normal) Collected: 12/01/22 1704    Specimen: Blood Updated: 12/01/22 1810     Lipase 23 U/L     Lactic Acid, Plasma [[id number]]  (Normal) Collected: 12/01/22 1704    Specimen: Blood Updated: 12/01/22 1803     Lactate 0.8 mmol/L     CBC Auto Differential [[id number]]  (Abnormal) Collected: 12/01/22 1704    Specimen: Blood Updated: 12/01/22 1744     WBC 10.75 10*3/mm3      RBC 5.25 10*6/mm3      Hemoglobin 15.7 g/dL      Hematocrit 46.8 %      MCV 89.1 fL      MCH 29.9 pg      MCHC 33.5 g/dL      RDW 13.1 %      RDW-SD 42.9 fl      MPV 11.4 fL      Platelets 233 10*3/mm3      Neutrophil % 71.6 %      Lymphocyte % 21.9 %      Monocyte % 4.8 %      Eosinophil % 1.1 %      Basophil % 0.5 %      Immature Grans % 0.1 %      Neutrophils, Absolute 7.70 10*3/mm3      Lymphocytes, Absolute 2.35 10*3/mm3      Monocytes, Absolute 0.52 10*3/mm3      Eosinophils, Absolute 0.12 10*3/mm3      Basophils, Absolute 0.05 10*3/mm3      Immature Grans, Absolute 0.01 10*3/mm3      nRBC 0.0 /100 WBC     Urinalysis With Microscopic If Indicated (No Culture) - Urine, Clean Catch [[id number]]  (Abnormal) Collected: 12/01/22 1729    Specimen: Urine, Clean Catch Updated: 12/01/22 1804     Color, UA Yellow     Appearance, UA Clear     pH, UA 5.5     Specific Gravity, UA 1.023     Glucose, UA Negative     Ketones, UA Negative     Bilirubin, UA Negative     Blood,  UA Negative     Protein, UA Negative     Leuk Esterase, UA Moderate (2+)     Nitrite, UA Negative     Urobilinogen, UA 1.0 E.U./dL    Urinalysis, Microscopic Only - Urine, Clean Catch [[id number]]  (Abnormal) Collected: 12/01/22 1729    Specimen: Urine, Clean Catch Updated: 12/01/22 1804     RBC, UA 3-5 /HPF      WBC, UA 3-5 /HPF      Bacteria, UA None Seen /HPF      Squamous Epithelial Cells, UA 3-6 /HPF      Hyaline Casts, UA 0-2 /LPF      Methodology Automated Microscopy           Imaging:  CT Abdomen Pelvis With Contrast    Result Date: 12/1/2022  PROCEDURE: CT ABDOMEN PELVIS W CONTRAST  COMPARISON: None  INDICATIONS: abdominal pain  TECHNIQUE: After obtaining the patient's consent, CT images were created with non-ionic intravenous contrast material.   PROTOCOL:   Standard imaging protocol performed    RADIATION:   DLP: 627mGy*cm   Automated exposure control was utilized to minimize radiation dose. CONTRAST: 100cc Isovue 370 I.V.  FINDINGS:  The lung bases are clear.  The liver, adrenal glands, kidneys, spleen, and pancreas are unremarkable.  The gallbladder is surgically absent.  There is a small hiatal hernia.  The small bowel appears normal in caliber and configuration.  The colon appears normal.  The appendix appears normal.  There is no ascites or loculated collection.  No abnormally enlarged lymph nodes are identified.  The rectum, uterus and adnexa, and urinary bladder are unremarkable.  No aggressive osseous lesions are identified.  Surgical hardware is present at L5-S1.        1. No acute process identified within abdomen/pelvis. 2. Small hiatal hernia.     HELGA THOMPSON MD       Electronically Signed and Approved By: HELGA THOMPSON MD on 12/01/2022 at 21:35               Procedures:  Procedures    Progress                            The patient was initially evaluated in the triage area where orders were placed. The patient was later dispositioned by Len Maurer PA-C.      The patient was  advised to stay for completion of workup which includes but is not limited to communication of labs and radiological results, reassessment and plan. The patient was advised that leaving prior to disposition by a provider could result in critical findings that are not communicated to the patient.     Medical Decision Making:  MDM  Number of Diagnoses or Management Options  Nausea  Right lower quadrant abdominal pain  Diagnosis management comments: CBC unremarkable, CMP unremarkable, lipase unremarkable.  Urinalysis noted to have 3-5 red blood cells, 3-5 white blood cells and moderate leukocytes, patient is asymptomatic for UTI and will not treat at this time.  CT of the abdomen pelvis has no acute findings but does note small hiatal hernia. The patient is resting comfortably and feels better, is alert and in no distress. Repeat examination is unremarkable and benign; in particular, there's no discomfort at McBurney's point and there is no pulsatile mass. The history, exam, diagnostic testing, and current condition does not suggest acute appendicitis, bowel obstruction, acute cholecystitis, bowel perforation, major gastrointestinal bleeding, severe diverticulitis, abdominal aortic aneurysm, mesenteric ischemia, volvulus, sepsis, or other significant pathology that warrants further testing, continued ED treatment, admission, for surgical evaluation at this point. The vital signs have been stable. The patient does not have uncontrollable pain, intractable vomiting, or other significant symptoms. The patient's condition is stable and appropriate for discharge from the emergency department.       Amount and/or Complexity of Data Reviewed  Clinical lab tests: reviewed and ordered  Tests in the radiology section of CPT®: reviewed and ordered    Risk of Complications, Morbidity, and/or Mortality  Presenting problems: moderate  Diagnostic procedures: moderate  Management options: low    Patient Progress  Patient progress:  stable           The following orders were placed after triage and evaluation:  Orders Placed This Encounter   Procedures   • CT Abdomen Pelvis With Contrast   • [place] Draw   • Comprehensive Metabolic Panel   • Lipase   • Urinalysis With Microscopic If Indicated (No Culture) - Urine, Clean Catch   • Lactic Acid, Plasma   • CBC Auto Differential   • Urinalysis, Microscopic Only - Urine, Clean Catch   • NPO Diet NPO Type: Strict NPO   • Undress & Gown   • Insert Peripheral IV   • CBC & Differential   • Green Top (Gel)   • Lavender Top   • Gold Top - SST   • Light Blue Top       Final diagnoses:   Right lower quadrant abdominal pain   Nausea          Disposition:  ED Disposition     ED Disposition   Discharge    Condition   Stable    Comment   --             This medical record created using voice recognition software.           Len Maurer PA-C  12/01/22 3657

## 2025-06-12 NOTE — ASSESSMENT & PLAN NOTE
-Lesion has been growing bigger and is now painful  - We will refer patient to dermatology  Orders:    Ambulatory Referral to Dermatology; Future

## 2025-06-26 ENCOUNTER — TELEPHONE (OUTPATIENT)
Dept: BARIATRICS | Facility: CLINIC | Age: 50
End: 2025-06-26

## 2025-08-07 ENCOUNTER — APPOINTMENT (OUTPATIENT)
Dept: LAB | Age: 50
End: 2025-08-07
Attending: NURSE PRACTITIONER
Payer: COMMERCIAL

## 2025-08-07 ENCOUNTER — APPOINTMENT (OUTPATIENT)
Dept: LAB | Age: 50
End: 2025-08-07
Attending: PREVENTIVE MEDICINE
Payer: COMMERCIAL

## 2025-08-07 ENCOUNTER — TRANSCRIBE ORDERS (OUTPATIENT)
Dept: ADMINISTRATIVE | Age: 50
End: 2025-08-07

## 2025-08-07 DIAGNOSIS — Z00.8 ENCOUNTER FOR OTHER GENERAL EXAMINATION: Primary | ICD-10-CM

## 2025-08-13 ENCOUNTER — OFFICE VISIT (OUTPATIENT)
Dept: INTERNAL MEDICINE CLINIC | Age: 50
End: 2025-08-13
Payer: COMMERCIAL

## 2025-08-18 ENCOUNTER — PROCEDURE VISIT (OUTPATIENT)
Dept: NEUROLOGY | Facility: CLINIC | Age: 50
End: 2025-08-18
Payer: COMMERCIAL

## 2025-08-18 VITALS
OXYGEN SATURATION: 97 % | DIASTOLIC BLOOD PRESSURE: 72 MMHG | HEART RATE: 64 BPM | TEMPERATURE: 98.2 F | SYSTOLIC BLOOD PRESSURE: 124 MMHG

## 2025-08-18 DIAGNOSIS — G43.709 CHRONIC MIGRAINE WITHOUT AURA WITHOUT STATUS MIGRAINOSUS, NOT INTRACTABLE: Primary | ICD-10-CM

## 2025-08-18 PROCEDURE — 64615 CHEMODENERV MUSC MIGRAINE: CPT | Performed by: PHYSICIAN ASSISTANT

## (undated) DEVICE — VIOLET BRAIDED (POLYGLACTIN 910), SYNTHETIC ABSORBABLE SUTURE: Brand: COATED VICRYL

## (undated) DEVICE — Device: Brand: DEFENDO AIR/WATER/SUCTION AND BIOPSY VALVE

## (undated) DEVICE — DISPOSABLE OR TOWEL: Brand: CARDINAL HEALTH

## (undated) DEVICE — REDUCER: Brand: ENDOWRIST

## (undated) DEVICE — ARM DRAPE

## (undated) DEVICE — VESSEL SEALER EXTEND: Brand: ENDOWRIST

## (undated) DEVICE — TROCAR: Brand: KII FIOS FIRST ENTRY

## (undated) DEVICE — Device

## (undated) DEVICE — MEGA SUTURECUT ND: Brand: ENDOWRIST

## (undated) DEVICE — KIT, ROBOTIC BARIATRIC: Brand: CARDINAL HEALTH

## (undated) DEVICE — 2000CC GUARDIAN II: Brand: GUARDIAN

## (undated) DEVICE — SEAL

## (undated) DEVICE — DECANTER: Brand: UNBRANDED

## (undated) DEVICE — TUBING SMOKE EVAC W/FILTRATION DEVICE PLUMEPORT ACTIV

## (undated) DEVICE — STERILE SURGICAL LUBRICANT,  TUBE: Brand: SURGILUBE

## (undated) DEVICE — CANNULA SEAL

## (undated) DEVICE — CADIERE FORCEPS: Brand: ENDOWRIST

## (undated) DEVICE — ADHESIVE SKIN HIGH VISCOSITY EXOFIN 1ML

## (undated) DEVICE — GLOVE SRG BIOGEL ECLIPSE 8

## (undated) DEVICE — MAXI PAD5.51 X 13.78 IN. (14.0 X 35.0 CM)HEAVYCONTOUREDUNSCENTED: Brand: CURITY

## (undated) DEVICE — ARTICULATING RELOAD WITH TRI-STAPLE TECHNOLOGY: Brand: ENDO GIA

## (undated) DEVICE — INSUFFLATION NEEDLE TO ESTABLISH PNEUMOPERITONEUM.: Brand: INSUFFLATION NEEDLE

## (undated) DEVICE — GLOVE SRG BIOGEL ORTHOPEDIC 7.5

## (undated) DEVICE — BLACK INTELLIGENT RELOAD: Brand: TRI-STAPLE 2.0

## (undated) DEVICE — BETHLEHEM UNIVERSAL MINOR VAG: Brand: CARDINAL HEALTH

## (undated) DEVICE — PLUMEPEN PRO 10FT

## (undated) DEVICE — VISIGI 3D®  CALIBRATION SYSTEM  SIZE 36FR SLEEVE/STD: Brand: BOEHRINGER® VISIGI 3D™ SLEEVE GASTRECTOMY CALIBRATION SYSTEM, SIZE 36FR

## (undated) DEVICE — SUT MONOCRYL 4-0 PS-2 27 IN Y426H

## (undated) DEVICE — [HIGH FLOW INSUFFLATOR,  DO NOT USE IF PACKAGE IS DAMAGED,  KEEP DRY,  KEEP AWAY FROM SUNLIGHT,  PROTECT FROM HEAT AND RADIOACTIVE SOURCES.]: Brand: PNEUMOSURE

## (undated) DEVICE — VISUALIZATION SYSTEM: Brand: CLEARIFY

## (undated) DEVICE — TROCARS: Brand: KII® OPTICAL ACCESS SYSTEM

## (undated) DEVICE — BLADELESS OBTURATOR: Brand: WECK VISTA

## (undated) DEVICE — COLUMN DRAPE

## (undated) DEVICE — 3000CC GUARDIAN II: Brand: GUARDIAN